# Patient Record
Sex: FEMALE | Race: BLACK OR AFRICAN AMERICAN | Employment: OTHER | URBAN - METROPOLITAN AREA
[De-identification: names, ages, dates, MRNs, and addresses within clinical notes are randomized per-mention and may not be internally consistent; named-entity substitution may affect disease eponyms.]

---

## 2017-01-02 DIAGNOSIS — R74.8 ELEVATED ALKALINE PHOSPHATASE LEVEL: ICD-10-CM

## 2017-01-02 DIAGNOSIS — R79.89 ABNORMAL LFTS (LIVER FUNCTION TESTS): ICD-10-CM

## 2017-01-02 DIAGNOSIS — E87.6 HYPOKALEMIA: ICD-10-CM

## 2017-01-02 DIAGNOSIS — R74.01 ELEVATED AST (SGOT): ICD-10-CM

## 2017-01-16 ENCOUNTER — OFFICE VISIT (OUTPATIENT)
Dept: SURGERY | Age: 72
End: 2017-01-16

## 2017-01-16 VITALS
TEMPERATURE: 98.1 F | HEIGHT: 63 IN | SYSTOLIC BLOOD PRESSURE: 147 MMHG | BODY MASS INDEX: 28 KG/M2 | WEIGHT: 158 LBS | DIASTOLIC BLOOD PRESSURE: 89 MMHG | RESPIRATION RATE: 20 BRPM | HEART RATE: 94 BPM

## 2017-01-16 DIAGNOSIS — Z12.11 ENCOUNTER FOR SCREENING COLONOSCOPY FOR NON-HIGH-RISK PATIENT: Primary | ICD-10-CM

## 2017-01-16 RX ORDER — POLYETHYLENE GLYCOL 3350, SODIUM CHLORIDE, POTASSIUM CHLORIDE, SODIUM BICARBONATE, AND SODIUM SULFATE 240; 5.84; 2.98; 6.72; 22.72 G/4L; G/4L; G/4L; G/4L; G/4L
4 POWDER, FOR SOLUTION ORAL
Qty: 4 L | Refills: 0 | Status: SHIPPED | OUTPATIENT
Start: 2017-01-16 | End: 2017-01-16

## 2017-01-16 NOTE — PATIENT INSTRUCTIONS
If you have any questions or concerns about today's appointment, the verbal and/or written instructions you were given for follow up care, please call our office at [phone].     [facility] - [address]    [phone] office  [phone]

## 2017-01-16 NOTE — PROGRESS NOTES
[place] [address]  [address] [address]  [phone]    Colonoscopy History and Physical    Patient: Ana Parkinson MRN: [id number]  SSN: [id number]    YOB: 1945  Age: 66 y.o. Sex: female      Subjective: Ana Parkinson is a 66 y.o. female who was referred by Dr. Nury Penaloza for colonoscopy for   Screening colonoscopy. Her last colon screening was 10 years ago and was negative per the patient. The patient denies any rectal bleeding, change in bowel habits, weight changes, nor any abdominal pain. She denies constipation, vomiting, diarrhea, bloody stools, mucousy stools, difficulty swallowing, loss of appetite, reflux and nausea  No family hx of colon cancer. Past Medical History   Diagnosis Date    Hypercholesterolemia     Hypertension     Osteoarthritis      Past Surgical History   Procedure Laterality Date    Hx hysterectomy      Hx cholecystectomy        Family History   Problem Relation Age of Onset    No Known Problems Mother     No Known Problems Father      Social History   Substance Use Topics    Smoking status: Never Smoker    Smokeless tobacco: Never Used    Alcohol use No      Prior to Admission medications    Medication Sig Start Date End Date Taking? Authorizing Provider   amLODIPine (NORVASC) 5 mg tablet TAKE ONE TABLET BY MOUTH DAILY 12/15/16  Yes Karen Gibson MD   lisinopril-hydroCHLOROthiazide (PRINZIDE, ZESTORETIC) 20-25 mg per tablet TAKE ONE TABLET BY MOUTH DAILY 12/15/16  Yes Karen Gibson MD   simvastatin (ZOCOR) 10 mg tablet Take 1 Tab by mouth nightly. 12/15/16  Yes Karen Gibson MD   ergocalciferol (ERGOCALCIFEROL) 50,000 unit capsule Take 1 Cap by mouth every seven (7) days. 9/12/16  Yes Karen Gibson MD   aspirin delayed-release 81 mg tablet Take 1 Tab by mouth daily.  9/12/16  Yes Karen Gibson MD   oxyCODONE IR (OXY-IR) 15 mg immediate release tablet Take 15 mg by mouth every four (4) hours as needed for Pain. Yes Historical Provider   tiZANidine (ZANAFLEX) 4 mg tablet Take 1 Tab by mouth two (2) times a day. 10/13/15  Yes Paz Abbott MD   potassium chloride (K-DUR, KLOR-CON) 10 mEq tablet Take 1 Tab by mouth daily. Recheck potassium levels after 90 days 10/13/15  Yes Paz Abbott MD   guaiFENesin ER [facility]) 600 mg ER tablet Take 600 mg by mouth two (2) times daily as needed for Congestion. Yes Historical Provider   azithromycin (ZITHROMAX) 250 mg tablet Take two tablets today then one tablet daily 12/20/16   Kalyan Moran MD   magnesium oxide (MAG-OX) 400 mg tablet Take 1 Tab by mouth daily. Magnesium level to be rechecked after 90 days 10/13/15   Paz Abbott MD        No Known Allergies    Review of Systems:  Review of systems performed with findings as noted. Objective:     Vitals:    01/16/17 0801   BP: 147/89   Pulse: 94   Resp: 20   Temp: 98.1 °F (36.7 °C)   TempSrc: Oral   Weight: 71.7 kg (158 lb)   Height: 5' 3\" (1.6 m)        Physical Exam:  GENERAL: alert, cooperative, no distress, appears stated age  LUNG: clear to auscultation bilaterally  HEART: regular rate and rhythm  ABDOMEN: soft, non-tender. Bowel sounds normal. No masses,  no organomegaly  NEUROLOGIC: negative  PSYCHIATRIC: non focal    Assessment:   Megan Mayo is a 66 y.o. female who presents for colonoscopy for   Screening colonoscopy    Plan:   1. I recommend proceeding with colonoscopy. The patient was in full agreement and was eager to proceed. 2. I discussed the details of the colonoscopy procedure. The risks of colonoscopy were discussed including colon injury/perforation, anesthesia issues, bleeding, and the possibility of incomplete examination. The patient was willing to accept these risks and proceed with the examination. All questions were answered to the patient's satisfaction.      3. The patient was provided with the instructions in preparation for the colonoscopy procedure including the bowel prep recommendations.                Signed By: Layton Clark NP     January 16, 2017

## 2017-01-16 NOTE — PROGRESS NOTES
Treasure Sheth is a 66 y.o. female who presents today with   Chief Complaint   Patient presents with    Colon Cancer Screening     consult                1. Have you been to the ER, urgent care clinic since your last visit? Hospitalized since your last visit? No    2. Have you seen or consulted any other health care providers outside of the [address] [place] [address] since your last visit? Include any pap smears or colon screening.  No

## 2017-01-16 NOTE — MR AVS SNAPSHOT
Visit Information Date & Time Provider Department Dept. Phone Encounter #  
 1/16/2017  8:00 AM Tashi More NP [facility] [phone] [id number] Your Appointments 3/20/2017 12:45 PM  
Follow Up with MD Rosendo Contreras [facility]) Appt Note: 3 mo f/u  
 [employer] [address]  
  
   
 [address] [address]  
  
    
 6/12/2017  9:00 AM  
Follow Up with MD Rosendo Contreras [facility]) Appt Note: 6 month f/u  
 [place] [address] [id number]  
[phone] Upcoming Health Maintenance Date Due DTaP/Tdap/Td series (1 - Tdap) 7/4/1965 FOBT Q 1 YEAR AGE 50-75 7/4/1994 ZOSTER VACCINE AGE 60> 7/4/2004 Pneumococcal 65+ Low/Medium Risk (1 of 2 - PCV13) 7/4/2009 MEDICARE YEARLY EXAM 7/4/2009 BREAST CANCER SCRN MAMMOGRAM 8/2/2018 GLAUCOMA SCREENING Q2Y 8/18/2018 Allergies as of 1/16/2017  Review Complete On: 1/16/2017 By: Rajan Kahn No Known Allergies Current Immunizations  Reviewed on 10/13/2015 Name Date Influenza High Dose Vaccine PF 12/12/2016 Influenza Vaccine 10/13/2015 12:16 PM  
  
 Not reviewed this visit Vitals BP Pulse Temp Resp Height(growth percentile) Weight(growth percentile) 147/89 (BP 1 Location: Right arm, BP Patient Position: At rest) 94 98.1 °F (36.7 °C) (Oral) 20 5' 3\" (1.6 m) 158 lb (71.7 kg) BMI OB Status Smoking Status 27.99 kg/m2 Postmenopausal Never Smoker BMI and BSA Data Body Mass Index Body Surface Area  
 27.99 kg/m 2 1.79 m 2 Preferred Pharmacy Pharmacy Name Phone [facility] [phone] Your Updated Medication List  
  
   
 This list is accurate as of: 1/16/17  8:30 AM.  Always use your most recent med list. amLODIPine 5 mg tablet Commonly known as:  Tru Childs TAKE ONE TABLET BY MOUTH DAILY  
  
 aspirin delayed-release 81 mg tablet Take 1 Tab by mouth daily. azithromycin 250 mg tablet Commonly known as:  Tanika Pickmagaly Take two tablets today then one tablet daily  
  
 ergocalciferol 50,000 unit capsule Commonly known as:  ERGOCALCIFEROL Take 1 Cap by mouth every seven (7) days. lisinopril-hydroCHLOROthiazide 20-25 mg per tablet Commonly known as:  PRINZIDE, ZESTORETIC  
TAKE ONE TABLET BY MOUTH DAILY  
  
 magnesium oxide 400 mg tablet Commonly known as:  MAG-OX Take 1 Tab by mouth daily. Magnesium level to be rechecked after 90 days MUCINEX 600 mg ER tablet Generic drug:  guaiFENesin ER Take 600 mg by mouth two (2) times daily as needed for Congestion. oxyCODONE IR 15 mg immediate release tablet Commonly known as:  OXY-IR Take 15 mg by mouth every four (4) hours as needed for Pain.  
  
 potassium chloride 10 mEq tablet Commonly known as:  K-DUR, KLOR-CON Take 1 Tab by mouth daily. Recheck potassium levels after 90 days  
  
 simvastatin 10 mg tablet Commonly known as:  ZOCOR Take 1 Tab by mouth nightly. tiZANidine 4 mg tablet Commonly known as:  Inez Crape Take 1 Tab by mouth two (2) times a day. Patient Instructions If you have any questions or concerns about today's appointment, the verbal and/or written instructions you were given for follow up care, please call our office at [phone]. [employer] [facility] - [address], [address] 
 
[phone] office [phone] Introducing [facility]! [employer] introduces [employer] patient portal. Now you can access parts of your medical record, email your doctor's office, and request medication refills online. 1. In your internet browser, go to [email or web address] 2. Click on the First Time User? Click Here link in the Sign In box. You will see the New Member Sign Up page. 3. Enter your [employer] Access Code exactly as it appears below. You will not need to use this code after youve completed the sign-up process. If you do not sign up before the expiration date, you must request a new code. · [employer] Access Code: [email or web address] Expires: 3/12/2017 11:20 AM 
 
4. Enter the last four digits of your Social Security Number (xxxx) and Date of Birth (mm/dd/yyyy) as indicated and click Submit. You will be taken to the next sign-up page. 5. Create a Anam Mobilet ID. This will be your [employer] login ID and cannot be changed, so think of one that is secure and easy to remember. 6. Create a [employer] password. You can change your password at any time. 7. Enter your Password Reset Question and Answer. This can be used at a later time if you forget your password. 8. Enter your e-mail address. You will receive e-mail notification when new information is available in [address] 9. Click Sign Up. You can now view and download portions of your medical record. 10. Click the Download Summary menu link to download a portable copy of your medical information. If you have questions, please visit the Frequently Asked Questions section of the [employer] website. Remember, [employer] is NOT to be used for urgent needs. For medical emergencies, dial 911. Now available from your iPhone and Android! Please provide this summary of care documentation to your next provider. Your primary care clinician is listed as Tyrese Holden. If you have any questions after today's visit, please call [phone].

## 2017-01-17 ENCOUNTER — TELEPHONE (OUTPATIENT)
Dept: SURGERY | Age: 72
End: 2017-01-17

## 2017-01-17 NOTE — TELEPHONE ENCOUNTER
SPOKE TO PT TO SCHEDULE HER COLONOSCOPY. SCHEDULED HER FOR 2/2/17 AT 3PM (ARRIVAL TIME 2PM.) PT STATED SHE HAD HER PREP SHEET AND SHE WOULD CALL BACK WITH QUESTIONS.

## 2017-01-19 ENCOUNTER — HOSPITAL ENCOUNTER (OUTPATIENT)
Dept: LAB | Age: 72
Discharge: HOME | End: 2017-01-19
Payer: MEDICARE

## 2017-01-19 DIAGNOSIS — R74.01 ELEVATED AST (SGOT): ICD-10-CM

## 2017-01-19 DIAGNOSIS — E87.6 HYPOKALEMIA: ICD-10-CM

## 2017-01-19 DIAGNOSIS — R79.89 ABNORMAL LFTS (LIVER FUNCTION TESTS): ICD-10-CM

## 2017-01-19 DIAGNOSIS — R74.8 ELEVATED ALKALINE PHOSPHATASE LEVEL: ICD-10-CM

## 2017-01-19 LAB
ALBUMIN SERPL BCP-MCNC: 3.9 G/DL (ref 3.4–5)
ALBUMIN/GLOB SERPL: 1.1 {RATIO} (ref 0.8–1.7)
ALP SERPL-CCNC: 88 U/L (ref 45–117)
ALT SERPL-CCNC: 22 U/L (ref 13–56)
AST SERPL W P-5'-P-CCNC: 33 U/L (ref 15–37)
BILIRUB DIRECT SERPL-MCNC: <0.1 MG/DL (ref 0–0.2)
BILIRUB SERPL-MCNC: 0.4 MG/DL (ref 0.2–1)
GLOBULIN SER CALC-MCNC: 3.6 G/DL (ref 2–4)
POTASSIUM SERPL-SCNC: 4.2 MMOL/L (ref 3.5–5.5)
PROT SERPL-MCNC: 7.5 G/DL (ref 6.4–8.2)

## 2017-01-19 PROCEDURE — 84132 ASSAY OF SERUM POTASSIUM: CPT | Performed by: CLINIC/CENTER

## 2017-01-19 PROCEDURE — 84080 ASSAY ALKALINE PHOSPHATASES: CPT | Performed by: CLINIC/CENTER

## 2017-01-19 PROCEDURE — 80074 ACUTE HEPATITIS PANEL: CPT | Performed by: CLINIC/CENTER

## 2017-01-19 PROCEDURE — 80076 HEPATIC FUNCTION PANEL: CPT | Performed by: CLINIC/CENTER

## 2017-01-19 PROCEDURE — 36415 COLL VENOUS BLD VENIPUNCTURE: CPT | Performed by: CLINIC/CENTER

## 2017-01-23 LAB
ALP BONE CFR SERPL: 38 % (ref 14–68)
ALP INTEST CFR SERPL: 2 % (ref 0–18)
ALP LIVER CFR SERPL: 60 % (ref 18–85)
ALP SERPL-CCNC: 80 IU/L (ref 39–117)

## 2017-01-24 LAB
HAV IGM SERPL QL IA: NEGATIVE
HBV CORE IGM SER QL: NEGATIVE
HBV SURFACE AG SER QL: <0.1 INDEX
HBV SURFACE AG SER QL: NEGATIVE
HCV AB SER IA-ACNC: 0.05 INDEX
HCV AB SERPL QL IA: NEGATIVE
HCV COMMENT,HCGAC: NORMAL
SP1: NORMAL
SP2: NORMAL
SP3: NORMAL

## 2017-02-02 ENCOUNTER — SURGERY (OUTPATIENT)
Age: 72
End: 2017-02-02

## 2017-02-02 ENCOUNTER — HOSPITAL ENCOUNTER (OUTPATIENT)
Age: 72
Setting detail: OUTPATIENT SURGERY
Discharge: HOME | End: 2017-02-02
Attending: COLON & RECTAL SURGERY | Admitting: COLON & RECTAL SURGERY
Payer: MEDICARE

## 2017-02-02 VITALS
WEIGHT: 151 LBS | HEART RATE: 78 BPM | TEMPERATURE: 97.7 F | SYSTOLIC BLOOD PRESSURE: 118 MMHG | BODY MASS INDEX: 26.75 KG/M2 | HEIGHT: 63 IN | OXYGEN SATURATION: 95 % | DIASTOLIC BLOOD PRESSURE: 74 MMHG | RESPIRATION RATE: 14 BRPM

## 2017-02-02 PROCEDURE — 76040000007: Performed by: COLON & RECTAL SURGERY

## 2017-02-02 PROCEDURE — 77030031670 HC DEV INFL ENDOTEK BIG60 MRTM -B: Performed by: COLON & RECTAL SURGERY

## 2017-02-02 PROCEDURE — 74011250636 HC RX REV CODE- 250/636: Performed by: NURSE PRACTITIONER

## 2017-02-02 PROCEDURE — 74011250636 HC RX REV CODE- 250/636

## 2017-02-02 RX ORDER — SODIUM CHLORIDE 0.9 % (FLUSH) 0.9 %
5-10 SYRINGE (ML) INJECTION EVERY 8 HOURS
Status: DISCONTINUED | OUTPATIENT
Start: 2017-02-02 | End: 2017-02-02 | Stop reason: HOSPADM

## 2017-02-02 RX ORDER — MIDAZOLAM HYDROCHLORIDE 1 MG/ML
.25-5 INJECTION, SOLUTION INTRAMUSCULAR; INTRAVENOUS
Status: DISCONTINUED | OUTPATIENT
Start: 2017-02-02 | End: 2017-02-02 | Stop reason: HOSPADM

## 2017-02-02 RX ORDER — DEXTROMETHORPHAN/PSEUDOEPHED 2.5-7.5/.8
1.2 DROPS ORAL
Status: DISCONTINUED | OUTPATIENT
Start: 2017-02-02 | End: 2017-02-02 | Stop reason: HOSPADM

## 2017-02-02 RX ORDER — FLUMAZENIL 0.1 MG/ML
0.2 INJECTION INTRAVENOUS
Status: DISCONTINUED | OUTPATIENT
Start: 2017-02-02 | End: 2017-02-02 | Stop reason: HOSPADM

## 2017-02-02 RX ORDER — EPINEPHRINE 0.1 MG/ML
1 INJECTION INTRACARDIAC; INTRAVENOUS
Status: DISCONTINUED | OUTPATIENT
Start: 2017-02-02 | End: 2017-02-02 | Stop reason: HOSPADM

## 2017-02-02 RX ORDER — ATROPINE SULFATE 0.1 MG/ML
0.5 INJECTION INTRAVENOUS
Status: DISCONTINUED | OUTPATIENT
Start: 2017-02-02 | End: 2017-02-02 | Stop reason: HOSPADM

## 2017-02-02 RX ORDER — MIDAZOLAM HYDROCHLORIDE 1 MG/ML
INJECTION, SOLUTION INTRAMUSCULAR; INTRAVENOUS
Status: DISCONTINUED
Start: 2017-02-02 | End: 2017-02-02 | Stop reason: HOSPADM

## 2017-02-02 RX ORDER — NALOXONE HYDROCHLORIDE 0.4 MG/ML
0.4 INJECTION, SOLUTION INTRAMUSCULAR; INTRAVENOUS; SUBCUTANEOUS
Status: DISCONTINUED | OUTPATIENT
Start: 2017-02-02 | End: 2017-02-02 | Stop reason: HOSPADM

## 2017-02-02 RX ORDER — SODIUM CHLORIDE 9 MG/ML
25 INJECTION, SOLUTION INTRAVENOUS CONTINUOUS
Status: DISCONTINUED | OUTPATIENT
Start: 2017-02-02 | End: 2017-02-02 | Stop reason: HOSPADM

## 2017-02-02 RX ORDER — SODIUM CHLORIDE 0.9 % (FLUSH) 0.9 %
5-10 SYRINGE (ML) INJECTION AS NEEDED
Status: DISCONTINUED | OUTPATIENT
Start: 2017-02-02 | End: 2017-02-02 | Stop reason: HOSPADM

## 2017-02-02 RX ADMIN — MIDAZOLAM HYDROCHLORIDE 2 MG: 1 INJECTION, SOLUTION INTRAMUSCULAR; INTRAVENOUS at 16:29

## 2017-02-02 RX ADMIN — MIDAZOLAM HYDROCHLORIDE 1 MG: 1 INJECTION, SOLUTION INTRAMUSCULAR; INTRAVENOUS at 16:31

## 2017-02-02 RX ADMIN — SODIUM CHLORIDE 25 ML/HR: 900 INJECTION, SOLUTION INTRAVENOUS at 16:20

## 2017-02-02 NOTE — H&P (VIEW-ONLY)
[place] [address]  [address] [address]  [phone]    Colonoscopy History and Physical    Patient: Angela Singh MRN: [id number]  SSN: [id number]    YOB: 1945  Age: 66 y.o. Sex: female      Subjective: Angela Singh is a 66 y.o. female who was referred by Dr. Kirsten Reddy for colonoscopy for   Screening colonoscopy. Her last colon screening was 10 years ago and was negative per the patient. The patient denies any rectal bleeding, change in bowel habits, weight changes, nor any abdominal pain. She denies constipation, vomiting, diarrhea, bloody stools, mucousy stools, difficulty swallowing, loss of appetite, reflux and nausea  No family hx of colon cancer. Past Medical History   Diagnosis Date    Hypercholesterolemia     Hypertension     Osteoarthritis      Past Surgical History   Procedure Laterality Date    Hx hysterectomy      Hx cholecystectomy        Family History   Problem Relation Age of Onset    No Known Problems Mother     No Known Problems Father      Social History   Substance Use Topics    Smoking status: Never Smoker    Smokeless tobacco: Never Used    Alcohol use No      Prior to Admission medications    Medication Sig Start Date End Date Taking? Authorizing Provider   amLODIPine (NORVASC) 5 mg tablet TAKE ONE TABLET BY MOUTH DAILY 12/15/16  Yes Biju Martinez MD   lisinopril-hydroCHLOROthiazide (PRINZIDE, ZESTORETIC) 20-25 mg per tablet TAKE ONE TABLET BY MOUTH DAILY 12/15/16  Yes Biju Martinez MD   simvastatin (ZOCOR) 10 mg tablet Take 1 Tab by mouth nightly. 12/15/16  Yes Biju Martinez MD   ergocalciferol (ERGOCALCIFEROL) 50,000 unit capsule Take 1 Cap by mouth every seven (7) days. 9/12/16  Yes Biju Martinez MD   aspirin delayed-release 81 mg tablet Take 1 Tab by mouth daily.  9/12/16  Yes Biju Martinez MD   oxyCODONE IR (OXY-IR) 15 mg immediate release tablet Take 15 mg by mouth every four (4) hours as needed for Pain. Yes Historical Provider   tiZANidine (ZANAFLEX) 4 mg tablet Take 1 Tab by mouth two (2) times a day. 10/13/15  Yes Sreekanth Seals MD   potassium chloride (K-DUR, KLOR-CON) 10 mEq tablet Take 1 Tab by mouth daily. Recheck potassium levels after 90 days 10/13/15  Yes Sreekanth Seals MD   guaiFENesin ER [facility]) 600 mg ER tablet Take 600 mg by mouth two (2) times daily as needed for Congestion. Yes Historical Provider   azithromycin (ZITHROMAX) 250 mg tablet Take two tablets today then one tablet daily 12/20/16   Steve Juraez MD   magnesium oxide (MAG-OX) 400 mg tablet Take 1 Tab by mouth daily. Magnesium level to be rechecked after 90 days 10/13/15   rSeekanth Seals MD        No Known Allergies    Review of Systems:  Review of systems performed with findings as noted. Objective:     Vitals:    01/16/17 0801   BP: 147/89   Pulse: 94   Resp: 20   Temp: 98.1 °F (36.7 °C)   TempSrc: Oral   Weight: 71.7 kg (158 lb)   Height: 5' 3\" (1.6 m)        Physical Exam:  GENERAL: alert, cooperative, no distress, appears stated age  LUNG: clear to auscultation bilaterally  HEART: regular rate and rhythm  ABDOMEN: soft, non-tender. Bowel sounds normal. No masses,  no organomegaly  NEUROLOGIC: negative  PSYCHIATRIC: non focal    Assessment:   Shila Cevallos is a 66 y.o. female who presents for colonoscopy for   Screening colonoscopy    Plan:   1. I recommend proceeding with colonoscopy. The patient was in full agreement and was eager to proceed. 2. I discussed the details of the colonoscopy procedure. The risks of colonoscopy were discussed including colon injury/perforation, anesthesia issues, bleeding, and the possibility of incomplete examination. The patient was willing to accept these risks and proceed with the examination. All questions were answered to the patient's satisfaction.      3. The patient was provided with the instructions in preparation for the colonoscopy procedure including the bowel prep recommendations.                Signed By: Kerline Cardoza NP     January 16, 2017

## 2017-02-02 NOTE — PROCEDURES
Colonoscopy Procedure Note      Jason Martinez  [birth year]  [id number]                Date of Procedure: 2/2/2017    Indications:    Screening colonoscopy     Preoperative diagnosis: colon cancer screening      Postoperative diagnosis: normal colonoscopy exam    Title of Procedure:  Colonoscopy, screening    :  Keith Martinez MD    Assistant(s): Endoscopy Technician-1: Claudene Rho  Endoscopy RN-1: Manuela Gil RN    Referring Source:  Tyrese Holden MD    Sedation:  Demerol 50 mg IV,  Versed 3 mg IV      ASA Class: ASA 2 - Mild systemic disease       Procedure Details:    Prior to the procedure, a history and physical were performed. The patients medications, allergies and sensitivities were reviewed and all questions were answered. After informed consent was obtained for the procedure, with all risks and benefits of procedure explained. The patient was taken to the endoscopy suite and placed in the left lateral decubitus position. Patient identification and proposed procedure were verified prior to the procedure by the nurse and I. Following the  satisfactory administration of sedation,  the anus was inspected and appeared within normal limits with few benign sentinel tags. Digital rectal examination revealed Normal sphincter tone and squeeze pressure. Palpation revealed No Masses. Next the Olympus video colonoscope was introduced through the anus and advanced to cecum, which was identified by the ileocecal valve and appendiceal orifice, terminal ileum. The quality of preparation was good. The terminal ileum was visualized. The colonoscope was then slowly withdrawn and the mucosa carefully examined for any abnormalities. Cecal withdrawl time was greater than six minutes. The patient tolerated the procedure well.       Findings:   Rectum: normal  Sigmoid: normal  Descending Colon: normal  Transverse Colon: normal  Ascending Colon: normal  Cecum: normal  Terminal Ileum: normal    Interventions:  none    Specimen Removed: * No specimens in log *     Complications: None. EBL:  None. Impression:  normal colonoscopy exam      Recommendations: -Repeat colonoscopy in 10 years   Resume normal medication(s). Discharge Disposition:  Home in the company of a  when able to ambulate.         Constantine Couch MD, FACS, FASCRS  Colon and Rectal Surgery  [facility]  Office [phone]  Fax     [phone]  2/2/2017  4:59 PM       [facility]  [address] [place] [address]

## 2017-02-02 NOTE — IP AVS SNAPSHOT
[address] [place] [address] [address] 
[phone] Patient: Christina Mendez MRN: [id number] NKZ:[id number] You are allergic to the following No active allergies Recent Documentation Height Weight BMI OB Status Smoking Status 1.6 m 68.5 kg 26.75 kg/m2 Postmenopausal Never Smoker Emergency Contacts Name Discharge Info Relation Home Work Mobile Shantelle CAREGIVER [3] Daughter [21] [phone] Delroy Marino N/A  AT THIS TIME [6] Son [22] [phone] About your hospitalization You were admitted on:  February 2, 2017 You last received care in the:  [facility] PHASE 2 RECOVERY You were discharged on:  February 2, 2017 Unit phone number:  [phone] Why you were hospitalized Your primary diagnosis was:  Not on File Providers Seen During Your Hospitalizations Provider Role Specialty Primary office phone Izzy Cai MD Attending Provider Colon and Rectal Surgery [phone] Your Primary Care Physician (PCP) Primary Care Physician Office Phone Office Fax 6586 [facility], [address] [place] [address] [phone] Follow-up Information Follow up With Details Comments Contact Info Jorge Kyle MD   [facility] [address] [facility] [phone] zIzy Cai MD  Please contact Dr Maritza Christie for any questions [id number] [facility] [address] [facility] [phone] Current Discharge Medication List  
  
CONTINUE these medications which have NOT CHANGED Dose & Instructions Dispensing Information Comments Morning Noon Evening Bedtime  
 amLODIPine 5 mg tablet Commonly known as:  Martha Pace Your next dose is: Today, Tomorrow Other:  _________ TAKE ONE TABLET BY MOUTH DAILY Quantity:  30 Tab Refills:  5 This prescription was filled today(12/7/2016). Any refills authorized will be placed on file. aspirin delayed-release 81 mg tablet Your next dose is: Today, Tomorrow Other:  _________ Dose:  81 mg Take 1 Tab by mouth daily. Quantity:  30 Tab Refills:  1  
     
   
   
   
  
 ergocalciferol 50,000 unit capsule Commonly known as:  ERGOCALCIFEROL Your next dose is: Today, Tomorrow Other:  _________ Dose:  34440 Units Take 1 Cap by mouth every seven (7) days. Quantity:  4 Cap Refills:  1  
     
   
   
   
  
 lisinopril-hydroCHLOROthiazide 20-25 mg per tablet Commonly known as:  Cl Blanco Your next dose is: Today, Tomorrow Other:  _________ TAKE ONE TABLET BY MOUTH DAILY Quantity:  30 Tab Refills:  5 This prescription was filled today(12/7/2016). Any refills authorized will be placed on file.  
    
   
   
   
  
 magnesium oxide 400 mg tablet Commonly known as:  MAG-OX Your next dose is: Today, Tomorrow Other:  _________ Dose:  400 mg Take 1 Tab by mouth daily. Magnesium level to be rechecked after 90 days Quantity:  90 Tab Refills:  0 MUCINEX 600 mg ER tablet Generic drug:  guaiFENesin ER Your next dose is: Today, Tomorrow Other:  _________ Dose:  600 mg Take 600 mg by mouth two (2) times daily as needed for Congestion. Refills:  0  
     
   
   
   
  
 oxyCODONE IR 15 mg immediate release tablet Commonly known as:  OXY-IR Your next dose is: Today, Tomorrow Other:  _________ Dose:  15 mg Take 15 mg by mouth every four (4) hours as needed for Pain. Refills:  0  
     
   
   
   
  
 potassium chloride 10 mEq tablet Commonly known as:  K-DUR, KLOR-CON Your next dose is: Today, Tomorrow Other:  _________ Dose:  10 mEq Take 1 Tab by mouth daily. Recheck potassium levels after 90 days Quantity:  90 Tab Refills:  0  
     
   
   
   
  
 simvastatin 10 mg tablet Commonly known as:  ZOCOR Your next dose is: Today, Tomorrow Other:  _________ Dose:  10 mg Take 1 Tab by mouth nightly. Quantity:  30 Tab Refills:  5  
     
   
   
   
  
 tiZANidine 4 mg tablet Commonly known as:  Mesha Medina Your next dose is: Today, Tomorrow Other:  _________ Dose:  4 mg Take 1 Tab by mouth two (2) times a day. Quantity:  60 Tab Refills:  1 Discharge Instructions Colonoscopy Discharge Instructions Kwan Chase [id number] 
1945 COLONOSCOPY FINDINGS: 
Your colonoscopy showed:       Normal examination. FOLLOW UP RECOMMENDATIONS: 
 Dr. Dang Agarwal recommends your next colonoscopy in 10 years. DISCOMFORT: 
If you have redness at your IV site- apply warm compress to area; if redness or soreness persist- contact your physician There may be a slight amount of blood passed from the rectum, more than a teaspoon of bright red blood is not expected - contact your physician Gaseous discomfort is common- walking, belching will help relieve any gas pains. If discomfort persist- contact your physician DIET: 
 Regular diet. ACTIVITY: 
You may resume your normal daily activities, however, it is recommended that you spend the remainder of the day resting - avoiding any strenuous activities. You may not operate a vehicle for 24 hours You may not engage in an occupation involving machinery or appliances for rest of today You may not drink alcoholic beverages for at least 24 hours Avoid making any critical decisions for at least 24 hour CALL M.D. ANY SIGN OF: Increasing pain, nausea, vomiting Abdominal distension (swelling) New increased bleeding Fever or chills Pain in chest area or shortness of breath Angelica Thorne MD, FACS, FASCRS Colon and Rectal Surgery [facility] Office [phone] Fax     [phone] DISCHARGE SUMMARY from Nurse The following personal items are in your possession at time of discharge: 
 
Dental Appliances: None Visual Aid: Glasses PATIENT INSTRUCTIONS: 
 
 
F-face looks uneven A-arms unable to move or move unevenly S-speech slurred or non-existent T-time-call 911 as soon as signs and symptoms begin-DO NOT go Back to bed or wait to see if you get better-TIME IS BRAIN. Warning Signs of HEART ATTACK Call 911 if you have these symptoms: 
? Chest discomfort. Most heart attacks involve discomfort in the center of the chest that lasts more than a few minutes, or that goes away and comes back. It can feel like uncomfortable pressure, squeezing, fullness, or pain. ? Discomfort in other areas of the upper body. Symptoms can include pain or discomfort in one or both arms, the back, neck, jaw, or stomach. ? Shortness of breath with or without chest discomfort. ? Other signs may include breaking out in a cold sweat, nausea, or lightheadedness. Don't wait more than five minutes to call [address]! Fast action can save your life. Calling 911 is almost always the fastest way to get lifesaving treatment. Emergency Medical Services staff can begin treatment when they arrive  up to an hour sooner than if someone gets to the hospital by car. The discharge information has been reviewed with the patient. The patient verbalized understanding. Discharge medications reviewed with the patient and appropriate educational materials and side effects teaching were provided. Patient armband removed and given to patient to take home.   Patient was informed of the privacy risks if armband lost or stolen Discharge Orders None Introducing [facility]! [employer] introduces [employer] patient portal. Now you can access parts of your medical record, email your doctor's office, and request medication refills online. 1. In your internet browser, go to [email or web address] 2. Click on the First Time User? Click Here link in the Sign In box. You will see the New Member Sign Up page. 3. Enter your Metabar Access Code exactly as it appears below. You will not need to use this code after youve completed the sign-up process. If you do not sign up before the expiration date, you must request a new code. · Metabar Access Code: [email or web address] Expires: 3/12/2017 11:20 AM 
 
4. Enter the last four digits of your Social Security Number (xxxx) and Date of Birth (mm/dd/yyyy) as indicated and click Submit. You will be taken to the next sign-up page. 5. Create a Metabar ID. This will be your Metabar login ID and cannot be changed, so think of one that is secure and easy to remember. 6. Create a Metabar password. You can change your password at any time. 7. Enter your Password Reset Question and Answer. This can be used at a later time if you forget your password. 8. Enter your e-mail address. You will receive e-mail notification when new information is available in [address] 9. Click Sign Up. You can now view and download portions of your medical record. 10. Click the Download Summary menu link to download a portable copy of your medical information. If you have questions, please visit the Frequently Asked Questions section of the Metabar website. Remember, Metabar is NOT to be used for urgent needs. For medical emergencies, dial 911. Now available from your iPhone and Android! General Information Please provide this summary of care documentation to your next provider. Patient Signature:  ____________________________________________________________ Date:  ____________________________________________________________  
  
Suzon Mems Provider Signature:  ____________________________________________________________ Date:  ____________________________________________________________

## 2017-02-02 NOTE — DISCHARGE INSTRUCTIONS
Colonoscopy Discharge Instructions       Elisa Major  [id number]  1945      COLONOSCOPY FINDINGS:  Your colonoscopy showed:       Normal examination. FOLLOW UP RECOMMENDATIONS:   Dr. Kaylin Mera recommends your next colonoscopy in 10 years. DISCOMFORT:  If you have redness at your IV site- apply warm compress to area; if redness or soreness persist- contact your physician  There may be a slight amount of blood passed from the rectum, more than a teaspoon of bright red blood is not expected - contact your physician  Gaseous discomfort is common- walking, belching will help relieve any gas pains. If discomfort persist- contact your physician    DIET:   Regular diet. ACTIVITY:  You may resume your normal daily activities, however, it is recommended that you spend the remainder of the day resting - avoiding any strenuous activities. You may not operate a vehicle for 24 hours  You may not engage in an occupation involving machinery or appliances for rest of today  You may not drink alcoholic beverages for at least 24 hours  Avoid making any critical decisions for at least 24 hour    CALL M.D.   ANY SIGN OF:   Increasing pain, nausea, vomiting  Abdominal distension (swelling)  New increased bleeding   Fever or chills  Pain in chest area or shortness of breath      Saurabh Rasheed MD, FACS, FASCRS  Colon and Rectal Surgery  [facility]  Office [phone]  Fax     [phone] SUMMARY from Nurse    The following personal items are in your possession at time of discharge:    Dental Appliances: None  Visual Aid: Glasses                            PATIENT INSTRUCTIONS:    After general anesthesia or intravenous sedation, for 24 hours or while taking prescription Narcotics:  · Limit your activities  · Do not drive and operate hazardous machinery  · Do not make important personal or business decisions  · Do  not drink alcoholic beverages  · If you have not urinated within 8 hours after discharge, please contact your surgeon on call. Report the following to your surgeon:  · Excessive pain, swelling, redness or odor of or around the surgical area  · Temperature over 100.5  · Nausea and vomiting lasting longer than 4 hours or if unable to take medications  · Any signs of decreased circulation or nerve impairment to extremity: change in color, persistent  numbness, tingling, coldness or increase pain  · Any questions        What to do at Home:  Recommended activity: Activity as tolerated and no driving for today. *  Please give a list of your current medications to your Primary Care Provider. *  Please update this list whenever your medications are discontinued, doses are      changed, or new medications (including over-the-counter products) are added. *  Please carry medication information at all times in case of emergency situations. These are general instructions for a healthy lifestyle:    No smoking/ No tobacco products/ Avoid exposure to second hand smoke    Surgeon General's Warning:  Quitting smoking now greatly reduces serious risk to your health. Obesity, smoking, and sedentary lifestyle greatly increases your risk for illness    A healthy diet, regular physical exercise & weight monitoring are important for maintaining a healthy lifestyle    You may be retaining fluid if you have a history of heart failure or if you experience any of the following symptoms:  Weight gain of 3 pounds or more overnight or 5 pounds in a week, increased swelling in our hands or feet or shortness of breath while lying flat in bed. Please call your doctor as soon as you notice any of these symptoms; do not wait until your next office visit.     Recognize signs and symptoms of STROKE:    F-face looks uneven    A-arms unable to move or move unevenly    S-speech slurred or non-existent    T-time-call 911 as soon as signs and symptoms begin-DO NOT go       Back to bed or wait to see if you get better-TIME IS BRAIN. Warning Signs of HEART ATTACK     Call 911 if you have these symptoms:   Chest discomfort. Most heart attacks involve discomfort in the center of the chest that lasts more than a few minutes, or that goes away and comes back. It can feel like uncomfortable pressure, squeezing, fullness, or pain.  Discomfort in other areas of the upper body. Symptoms can include pain or discomfort in one or both arms, the back, neck, jaw, or stomach.  Shortness of breath with or without chest discomfort.  Other signs may include breaking out in a cold sweat, nausea, or lightheadedness. Don't wait more than five minutes to call 911 - MINUTES MATTER! Fast action can save your life. Calling 911 is almost always the fastest way to get lifesaving treatment. Emergency Medical Services staff can begin treatment when they arrive -- up to an hour sooner than if someone gets to the hospital by car. The discharge information has been reviewed with the patient. The patient verbalized understanding. Discharge medications reviewed with the patient and appropriate educational materials and side effects teaching were provided. Patient armband removed and given to patient to take home.   Patient was informed of the privacy risks if armband lost or stolen

## 2017-02-02 NOTE — INTERVAL H&P NOTE
H&P Update: Adeline Salmon was seen and examined. History and physical has been reviewed. The patient has been examined.  There have been no significant clinical changes since the completion of the originally dated History and Physical.    Signed By: Alejo Rossi MD     February 2, 2017 2:30 PM

## 2017-03-20 ENCOUNTER — OFFICE VISIT (OUTPATIENT)
Dept: FAMILY MEDICINE CLINIC | Age: 72
End: 2017-03-20

## 2017-03-20 VITALS
HEIGHT: 63 IN | TEMPERATURE: 98.1 F | DIASTOLIC BLOOD PRESSURE: 86 MMHG | RESPIRATION RATE: 20 BRPM | BODY MASS INDEX: 27.64 KG/M2 | HEART RATE: 94 BPM | SYSTOLIC BLOOD PRESSURE: 138 MMHG | WEIGHT: 156 LBS | OXYGEN SATURATION: 94 %

## 2017-03-20 DIAGNOSIS — I10 ESSENTIAL HYPERTENSION: ICD-10-CM

## 2017-03-20 DIAGNOSIS — R06.02 SOB (SHORTNESS OF BREATH): ICD-10-CM

## 2017-03-20 DIAGNOSIS — M54.41 CHRONIC BILATERAL LOW BACK PAIN WITH BILATERAL SCIATICA: ICD-10-CM

## 2017-03-20 DIAGNOSIS — G89.29 CHRONIC BILATERAL LOW BACK PAIN WITH BILATERAL SCIATICA: ICD-10-CM

## 2017-03-20 DIAGNOSIS — E78.2 MIXED HYPERLIPIDEMIA: ICD-10-CM

## 2017-03-20 DIAGNOSIS — M54.42 CHRONIC BILATERAL LOW BACK PAIN WITH BILATERAL SCIATICA: ICD-10-CM

## 2017-03-20 DIAGNOSIS — E55.9 VITAMIN D DEFICIENCY: ICD-10-CM

## 2017-03-20 RX ORDER — CHOLECALCIFEROL (VITAMIN D3) 125 MCG
CAPSULE ORAL DAILY
COMMUNITY

## 2017-03-20 RX ORDER — ALBUTEROL SULFATE 90 UG/1
2 AEROSOL, METERED RESPIRATORY (INHALATION)
Qty: 1 INHALER | Refills: 2 | Status: SHIPPED | OUTPATIENT
Start: 2017-03-20 | End: 2020-04-07 | Stop reason: SDUPTHER

## 2017-03-20 NOTE — PROGRESS NOTES
King Lr is a 66 y.o. female and presents with Follow-up (hypertension and hyperlipidemia)       Subjective:  Mrs. Kandis Loera is here today to follow up on her chronic conditions. 1. Chronic Back Pain: Spinal Stenosis with radiculopathy, lumbar region  - Per previous notes, patient has \"long-standing and painful history of spinal stenosis\".    - Followed by Dr. Shade He for pain management and has received multiple epidural injections. She controls her pain with Oxycodone IR 15mg tablets - 1 tablet q4h prn. Tizanidine 4mg BID taken for muscle spasms in her back. - Consultation with Neurosurgery took place on 10/13/16 with Dr. Alison Gordon. Per their note, Dr. Kana Acevedo does not feel that narcotic therapy is going to be useful long term and that \"surgical intervention is indicated based on the appearance of the studies, her functional limitations as well as failure to derive significant long-lasting benefit from other pain management procedures and medications. \" Patient would require a multilevel lumbar laminectomy, instrumented spinal fusion and deformity correction. Patient was told that, due to her complex and challenging spinal problem, her surgery would be very extensive with a lengthy period of recovery. She was also informed in detail about the multiple serious risks involved with the surgery and complication rates approaching 50-75%. Dr. Kana Acevedo gave her the option of seeking a second opinion  - Mrs. Kandis Loera has decided to hold off on pursuing any surgical intervention at this time and will continue to see Dr. Mathew Goodpasture for pain management. Patient would also like to go back to therapy, although she would prefer aquatic therapy because regular PT leaves her feeling sore. We gave her a referral to have therapy in [facility] where some of her family lives, but she was unable to find a place there.  She would like to try aquatic therapy in [facility].   - Patient does not have any difficulty walking despite her pain. She denies LE weakness.      2. HTN  - /86 today. Patient is on Norvasc and Prinzide. No HA, dizziness, lightheadedness      3. Vitamin D Deficiency  - Last level was 31.8. Patient is on a daily supplement.       4. Hyperlipidemia  - Taking Zocor 10 mg every evening. Last FLP in 12/2016 LDL 49, HDL 72. Will repeat prior to her next appointment since Zocor dose was reduced. 5. Mild SOB  - New problem. Patient reports feeling a bit more winded when exerting herself and that she thinks she needs something to \"open her up. \" Patient also reports a history of mild asthma and  was given an inhaler (can't remember what it was called) which helped when she experienced similar symptoms in the past. Patient denies any symptoms at rest. Denies cough, chest pain, nausea or diaphoresis. Denies LE edema.           Health Maintenance  - DEXA Scan 3/20/15, normal  - Colonoscopy - Done with Dr. Adelaida Sosa 2/2/17. Normal. Repeat in 10 years. - Hx of Hysterectomy (she thinks partial) - Gynecologist was Dr. Kolby Vasquez. She was told she did not need anymore pap smears.   - Mammogram on 8/2/16 - No evidence for malignancy. Suggest routine annual follow up. - OV with optometrist on 8/18/16 - bilateral nuclear sclerotic cataracts. Surgery being deferred until functional vision worsens. Return on 8/17/17. ROS:  Pt denies: Wt loss, Fever/Chills, HA, Visual changes, Fatigue, Chest pain, Abd pain, N/V/D/C, Blood in stool or urine, Edema. Pertinent positive as above in HPI. All others negative. The problem list was updated as a part of today's visit. There is no problem list on file for this patient. The PSH, FH were reviewed.       SH:  Social History   Substance Use Topics    Smoking status: Never Smoker    Smokeless tobacco: Never Used    Alcohol use No         Medications/Allergies:  Current Outpatient Prescriptions on File Prior to Visit   Medication Sig Dispense Refill    amLODIPine (NORVASC) 5 mg tablet TAKE ONE TABLET BY MOUTH DAILY 30 Tab 5    lisinopril-hydroCHLOROthiazide (PRINZIDE, ZESTORETIC) 20-25 mg per tablet TAKE ONE TABLET BY MOUTH DAILY 30 Tab 5    simvastatin (ZOCOR) 10 mg tablet Take 1 Tab by mouth nightly. 30 Tab 5    ergocalciferol (ERGOCALCIFEROL) 50,000 unit capsule Take 1 Cap by mouth every seven (7) days. 4 Cap 1    aspirin delayed-release 81 mg tablet Take 1 Tab by mouth daily. 30 Tab 1    oxyCODONE IR (OXY-IR) 15 mg immediate release tablet Take 15 mg by mouth every four (4) hours as needed for Pain.  tiZANidine (ZANAFLEX) 4 mg tablet Take 1 Tab by mouth two (2) times a day. 60 Tab 1    potassium chloride (K-DUR, KLOR-CON) 10 mEq tablet Take 1 Tab by mouth daily. Recheck potassium levels after 90 days 90 Tab 0    magnesium oxide (MAG-OX) 400 mg tablet Take 1 Tab by mouth daily. Magnesium level to be rechecked after 90 days 90 Tab 0    guaiFENesin ER (MUCINEX) 600 mg ER tablet Take 600 mg by mouth two (2) times daily as needed for Congestion. No current facility-administered medications on file prior to visit. No Known Allergies    Objective:  Visit Vitals    /86    Pulse 94    Temp 98.1 °F (36.7 °C) (Oral)    Resp 20    Ht 5' 3\" (1.6 m)    Wt 156 lb (70.8 kg)    SpO2 94%  Comment: room air    BMI 27.63 kg/m2    Body mass index is 27.63 kg/(m^2). Appearance: Alert, well appearing, in no respiratory distress and well hydrated. HEENT: NC/AT. Exterior ears and tympanic membranes normal bilaterally. Conjunctiva normal. PERRL. EOMI  Oropharynx clear and moist mucous membranes. Neck: Supple. No cervical lymphadenopathy. Heart: RRR without M/R/G. Intact distal pulses. No edema. Lungs: CTAB, no rhonchi, rales, or wheezes with good air exchange  Abdomen: Soft, normoactive BS, non-tender, non-distended, no palpable masses.  Noted a small bit of firm tissue when palpating along the lower left side of her abdomen near her hysterectomy scar - I suspect this is scar tissue given its proximity to her healed surgical incision. Will assess it again at her next appointment to see if there are any changes. MSK: Gait normal. Strength equal and intact bilateral upper and lower ext. Normal ROM all extremities. Feet warm and well perfused with good dp pulses and capillary refill. Skin: No rash   Neuro: AAO x 3. No focal motor or sensory deficits. Speech normal  Psych: Appropriate affect, judgement and insight. Short-term memory intact. Labwork and Ancillary Studies:    CBC w/Diff  Lab Results   Component Value Date/Time    WBC 7.8 09/15/2016 02:23 PM    HGB 12.1 09/15/2016 02:23 PM    PLATELET 971 61/99/2549 02:23 PM         Basic Metabolic Profile  Lab Results   Component Value Date/Time    Sodium 141 12/20/2016 03:41 PM    Potassium 4.2 01/19/2017 11:07 AM    Chloride 100 12/20/2016 03:41 PM    CO2 30 12/20/2016 03:41 PM    Anion gap 11 12/20/2016 03:41 PM    Glucose 88 12/20/2016 03:41 PM    BUN 16 12/20/2016 03:41 PM    Creatinine 0.71 12/20/2016 03:41 PM    BUN/Creatinine ratio 23 12/20/2016 03:41 PM    GFR est AA >60 12/20/2016 03:41 PM    GFR est non-AA >60 12/20/2016 03:41 PM    Calcium 9.5 12/20/2016 03:41 PM        Cholesterol  Lab Results   Component Value Date/Time    Cholesterol, total 136 12/13/2016 10:10 AM    HDL Cholesterol 72 12/13/2016 10:10 AM    LDL, calculated 49 12/13/2016 10:10 AM    Triglyceride 75 12/13/2016 10:10 AM    CHOL/HDL Ratio 1.9 12/13/2016 10:10 AM       Assessment/Plan:      ICD-10-CM ICD-9-CM    1. Chronic bilateral low back pain with bilateral sciatica M54.42 724.2 Pain managed by Dr. Neto Lyons  Patient has decided not to pursue any surgical interventions at this time. Will place a referral for her to have aquatic physical therapy with [facility] if insurance will cover. M54.41 724.3     G89.29 338.29    2. Essential hypertension I10 401.9 BP well controlled. Continue current meds.    METABOLIC PANEL, BASIC   3. Vitamin D deficiency E55.9 268.9 Continue daily supplement. Check level today. VITAMIN D, 25 HYDROXY   4. Mixed hyperlipidemia E78.2 272.2 Continue Zocor 10 mg. Will check FLP at next appointment. 5. SOB (shortness of breath) R06.02 786.05 Patient reports history of mild asthma and previous use of an inhaler for similar symptoms. Plan to give a trial with prn albuterol inhaler. Have patient return in 1 week for follow up and will pursue work up to include CXR, EKG, possibly PFTs etc if there is no improvement in the patient's symptoms. Follow-up Disposition:  Return in about 1 week (around 3/27/2017). I have discussed the diagnosis with the patient and the intended plan as seen in the above orders. The patient has received an After-Visit Summary and questions were answered concerning future plans. Patient verbalized understanding of above instructions. Health Maintenance:   Health Maintenance   Topic Date Due    DTaP/Tdap/Td series (1 - Tdap) 07/04/1965    FOBT Q 1 YEAR AGE 50-75  07/04/1994    ZOSTER VACCINE AGE 60>  07/04/2004    Pneumococcal 65+ Low/Medium Risk (1 of 2 - PCV13) 07/04/2009    MEDICARE YEARLY EXAM  07/04/2009    BREAST CANCER SCRN MAMMOGRAM  08/02/2018    GLAUCOMA SCREENING Q2Y  08/18/2018    OSTEOPOROSIS SCREENING (DEXA)  Completed    INFLUENZA AGE 9 TO ADULT  Completed       No orders of the defined types were placed in this encounter.

## 2017-03-20 NOTE — MR AVS SNAPSHOT
Visit Information Date & Time Provider Department Dept. Phone Encounter #  
 3/20/2017  9:30 AM Ana Pinon, [address] [facility] [phone] [id number] Your Appointments 6/12/2017  9:00 AM  
Follow Up with Ana Pinon MD  
[facility] [address] [place] [address]) Appt Note: 6 month f/u  
 Andrew Syed. [address]  
  
   
 [address] [facility] Upcoming Health Maintenance Date Due DTaP/Tdap/Td series (1 - Tdap) 7/4/1965 FOBT Q 1 YEAR AGE 50-75 7/4/1994 ZOSTER VACCINE AGE 60> 7/4/2004 Pneumococcal 65+ Low/Medium Risk (1 of 2 - PCV13) 7/4/2009 MEDICARE YEARLY EXAM 7/4/2009 BREAST CANCER SCRN MAMMOGRAM 8/2/2018 GLAUCOMA SCREENING Q2Y 8/18/2018 Allergies as of 3/20/2017  Review Complete On: 3/20/2017 By: Hilton Marquez LPN No Known Allergies Current Immunizations  Reviewed on 10/13/2015 Name Date Influenza High Dose Vaccine PF 12/12/2016 Influenza Vaccine 10/13/2015 12:16 PM  
  
 Not reviewed this visit You Were Diagnosed With   
  
 Codes Comments Essential hypertension     ICD-10-CM: I10 
ICD-9-CM: 401.9 Vitamin D deficiency     ICD-10-CM: E55.9 ICD-9-CM: 268.9 Vitals BP Pulse Temp Resp Height(growth percentile) Weight(growth percentile) 138/86 94 98.1 °F (36.7 °C) (Oral) 20 5' 3\" (1.6 m) 156 lb (70.8 kg) SpO2 BMI OB Status Smoking Status 94% 27.63 kg/m2 Postmenopausal Never Smoker BMI and BSA Data Body Mass Index Body Surface Area  
 27.63 kg/m 2 1.77 m 2 Preferred Pharmacy Pharmacy Name Phone [facility], [address] [place] [address] [phone] Your Updated Medication List  
  
   
This list is accurate as of: 3/20/17 10:12 AM.  Always use your most recent med list. amLODIPine 5 mg tablet Commonly known as:  Nighat Fraction TAKE ONE TABLET BY MOUTH DAILY  
  
 aspirin delayed-release 81 mg tablet Take 1 Tab by mouth daily. lisinopril-hydroCHLOROthiazide 20-25 mg per tablet Commonly known as:  PRINZIDE, ZESTORETIC  
TAKE ONE TABLET BY MOUTH DAILY  
  
 magnesium oxide 400 mg tablet Commonly known as:  MAG-OX Take 1 Tab by mouth daily. Magnesium level to be rechecked after 90 days MUCINEX 600 mg ER tablet Generic drug:  guaiFENesin ER Take 600 mg by mouth two (2) times daily as needed for Congestion. oxyCODONE IR 15 mg immediate release tablet Commonly known as:  OXY-IR Take 15 mg by mouth every four (4) hours as needed for Pain.  
  
 potassium chloride 10 mEq tablet Commonly known as:  K-DUR, KLOR-CON Take 1 Tab by mouth daily. Recheck potassium levels after 90 days  
  
 simvastatin 10 mg tablet Commonly known as:  ZOCOR Take 1 Tab by mouth nightly. tiZANidine 4 mg tablet Commonly known as:  Bev [place] Take 1 Tab by mouth two (2) times a day. VITAMIN D3 2,000 unit Tab Generic drug:  cholecalciferol (vitamin D3) Take  by mouth daily. To-Do List   
 03/20/2017 Lab:  METABOLIC PANEL, BASIC   
  
 03/20/2017 Lab:  VITAMIN D, 25 HYDROXY Introducing [facility]! Monika Dao introduces [employer] patient portal. Now you can access parts of your medical record, email your doctor's office, and request medication refills online. 1. In your internet browser, go to [email or web address] 2. Click on the First Time User? Click Here link in the Sign In box. You will see the New Member Sign Up page. 3. Enter your [employer] Access Code exactly as it appears below. You will not need to use this code after youve completed the sign-up process. If you do not sign up before the expiration date, you must request a new code. · [employer] Access Code: [email or web address] Expires: 6/18/2017 10:12 AM 
 
 4. Enter the last four digits of your Social Security Number (xxxx) and Date of Birth (mm/dd/yyyy) as indicated and click Submit. You will be taken to the next sign-up page. 5. Create a [employer] ID. This will be your [employer] login ID and cannot be changed, so think of one that is secure and easy to remember. 6. Create a [employer] password. You can change your password at any time. 7. Enter your Password Reset Question and Answer. This can be used at a later time if you forget your password. 8. Enter your e-mail address. You will receive e-mail notification when new information is available in [address] 9. Click Sign Up. You can now view and download portions of your medical record. 10. Click the Download Summary menu link to download a portable copy of your medical information. If you have questions, please visit the Frequently Asked Questions section of the [employer] website. Remember, [employer] is NOT to be used for urgent needs. For medical emergencies, dial 911. Now available from your iPhone and Android! Please provide this summary of care documentation to your next provider. Your primary care clinician is listed as Sd Dennison. If you have any questions after today's visit, please call [phone].

## 2017-03-20 NOTE — PROGRESS NOTES
Pt here today for routine 3 month follow up hypertension and hyperlipidemia    1. Have you been to the ER, urgent care clinic since your last visit? Hospitalized since your last visit? No    2. Have you seen or consulted any other health care providers outside of the [address] [place] [address] since your last visit? Include any pap smears or colon screening.  No

## 2017-05-01 RX ORDER — POTASSIUM CHLORIDE 750 MG/1
10 TABLET, EXTENDED RELEASE ORAL DAILY
Qty: 90 TAB | Refills: 0 | OUTPATIENT
Start: 2017-05-01

## 2017-06-20 ENCOUNTER — OFFICE VISIT (OUTPATIENT)
Dept: FAMILY MEDICINE CLINIC | Age: 72
End: 2017-06-20

## 2017-06-20 VITALS
RESPIRATION RATE: 16 BRPM | TEMPERATURE: 97.5 F | SYSTOLIC BLOOD PRESSURE: 121 MMHG | WEIGHT: 152.6 LBS | HEART RATE: 83 BPM | BODY MASS INDEX: 27.04 KG/M2 | HEIGHT: 63 IN | DIASTOLIC BLOOD PRESSURE: 71 MMHG

## 2017-06-20 DIAGNOSIS — M48.061 LUMBAR SPINAL STENOSIS: ICD-10-CM

## 2017-06-20 DIAGNOSIS — E78.2 MIXED HYPERLIPIDEMIA: ICD-10-CM

## 2017-06-20 DIAGNOSIS — G89.29 CHRONIC BILATERAL LOW BACK PAIN WITH BILATERAL SCIATICA: ICD-10-CM

## 2017-06-20 DIAGNOSIS — M53.9 MULTILEVEL DEGENERATIVE DISC DISEASE: ICD-10-CM

## 2017-06-20 DIAGNOSIS — I10 ESSENTIAL HYPERTENSION: ICD-10-CM

## 2017-06-20 DIAGNOSIS — E55.9 VITAMIN D DEFICIENCY: ICD-10-CM

## 2017-06-20 DIAGNOSIS — M54.41 CHRONIC BILATERAL LOW BACK PAIN WITH BILATERAL SCIATICA: ICD-10-CM

## 2017-06-20 DIAGNOSIS — M25.562 ACUTE PAIN OF LEFT KNEE: ICD-10-CM

## 2017-06-20 DIAGNOSIS — M54.42 CHRONIC BILATERAL LOW BACK PAIN WITH BILATERAL SCIATICA: ICD-10-CM

## 2017-06-20 DIAGNOSIS — R01.1 HEART MURMUR: ICD-10-CM

## 2017-06-20 NOTE — MR AVS SNAPSHOT
Visit Information Date & Time Provider Department Dept. Phone Encounter #  
 6/20/2017  8:00 AM Johnny Amato, 445 [facility] [phone] [id number] Follow-up Instructions Return in about 3 months (around 9/20/2017). Upcoming Health Maintenance Date Due DTaP/Tdap/Td series (1 - Tdap) 7/4/1965 FOBT Q 1 YEAR AGE 50-75 7/4/1994 ZOSTER VACCINE AGE 60> 7/4/2004 Pneumococcal 65+ Low/Medium Risk (1 of 2 - PCV13) 7/4/2009 MEDICARE YEARLY EXAM 7/4/2009 INFLUENZA AGE 9 TO ADULT 8/1/2017 BREAST CANCER SCRN MAMMOGRAM 8/2/2018 GLAUCOMA SCREENING Q2Y 8/18/2018 Allergies as of 6/20/2017  Review Complete On: 6/20/2017 By: Carlos Rivera No Known Allergies Current Immunizations  Reviewed on 10/13/2015 Name Date Influenza High Dose Vaccine PF 12/12/2016 Influenza Vaccine 10/13/2015 12:16 PM  
  
 Not reviewed this visit You Were Diagnosed With   
  
 Codes Comments Essential hypertension     ICD-10-CM: I10 
ICD-9-CM: 401.9 Vitamin D deficiency     ICD-10-CM: E55.9 ICD-9-CM: 268.9 Mixed hyperlipidemia     ICD-10-CM: E78.2 ICD-9-CM: 272.2 Acute pain of left knee     ICD-10-CM: M25.562 ICD-9-CM: 719.46 Heart murmur     ICD-10-CM: R01.1 ICD-9-CM: 346. 2 Vitals BP Pulse Temp Resp Height(growth percentile) Weight(growth percentile) 121/71 83 97.5 °F (36.4 °C) (Oral) 16 5' 3\" (1.6 m) 152 lb 9.6 oz (69.2 kg) BMI OB Status Smoking Status 27.03 kg/m2 Postmenopausal Never Smoker Vitals History BMI and BSA Data Body Mass Index Body Surface Area  
 27.03 kg/m 2 1.75 m 2 Preferred Pharmacy Pharmacy Name Phone [facility] [phone] Your Updated Medication List  
  
   
This list is accurate as of: 6/20/17  9:02 AM.  Always use your most recent med list.  
  
  
  
  
 albuterol 90 mcg/actuation inhaler Commonly known as:  PROVENTIL HFA, VENTOLIN HFA, PROAIR HFA Take 2 Puffs by inhalation every six (6) hours as needed for Wheezing or Shortness of Breath. amLODIPine 5 mg tablet Commonly known as:  [place] TAKE ONE TABLET BY MOUTH DAILY  
  
 aspirin delayed-release 81 mg tablet Take 1 Tab by mouth daily. lisinopril-hydroCHLOROthiazide 20-25 mg per tablet Commonly known as:  PRINZIDE, ZESTORETIC  
TAKE ONE TABLET BY MOUTH DAILY  
  
 magnesium oxide 400 mg tablet Commonly known as:  MAG-OX Take 1 Tab by mouth daily. Magnesium level to be rechecked after 90 days MUCINEX 600 mg ER tablet Generic drug:  guaiFENesin ER Take 600 mg by mouth two (2) times daily as needed for Congestion. oxyCODONE IR 15 mg immediate release tablet Commonly known as:  OXY-IR Take 15 mg by mouth every four (4) hours as needed for Pain.  
  
 potassium chloride 10 mEq tablet Commonly known as:  KLOR-CON Take 1 Tab by mouth daily. simvastatin 10 mg tablet Commonly known as:  ZOCOR Take 1 Tab by mouth nightly. tiZANidine 4 mg tablet Commonly known as:  Nighat Jonas Take 1 Tab by mouth two (2) times a day. VITAMIN D3 2,000 unit Tab Generic drug:  cholecalciferol (vitamin D3) Take  by mouth daily. Follow-up Instructions Return in about 3 months (around 9/20/2017). To-Do List   
 06/20/2017 Cardiac Services:  2D ECHO COMPLETE ADULT (TTE)   
  
 06/20/2017 Lab:  CBC WITH AUTOMATED DIFF   
  
 06/20/2017 Lab:  LIPID PANEL   
  
 06/20/2017 Lab:  METABOLIC PANEL, COMPREHENSIVE   
  
 06/20/2017 Lab:  VITAMIN D, 25 HYDROXY   
  
 06/20/2017 Imaging:  XR KNEE LT MIN 4 V   
  
 06/20/2017 9:05 AM  
  Appointment with [facility] RAD XR RM 1 at [address] [place] [address] ([phone]) Introducing [facility]!    
 Cayden Ramos introduces [employer] patient portal. Now you can access parts of your medical record, email your doctor's office, and request medication refills online. 1. In your internet browser, go to [email or web address] 2. Click on the First Time User? Click Here link in the Sign In box. You will see the New Member Sign Up page. 3. Enter your "[employer] Access Code exactly as it appears below. You will not need to use this code after youve completed the sign-up process. If you do not sign up before the expiration date, you must request a new code. · "[employer] Access Code: [email or web address] Expires: 9/18/2017  9:02 AM 
 
4. Enter the last four digits of your Social Security Number (xxxx) and Date of Birth (mm/dd/yyyy) as indicated and click Submit. You will be taken to the next sign-up page. 5. Create a "[employer] ID. This will be your "[employer] login ID and cannot be changed, so think of one that is secure and easy to remember. 6. Create a "[employer] password. You can change your password at any time. 7. Enter your Password Reset Question and Answer. This can be used at a later time if you forget your password. 8. Enter your e-mail address. You will receive e-mail notification when new information is available in [address] 9. Click Sign Up. You can now view and download portions of your medical record. 10. Click the Download Summary menu link to download a portable copy of your medical information. If you have questions, please visit the Frequently Asked Questions section of the "[employer] website. Remember, "[employer] is NOT to be used for urgent needs. For medical emergencies, dial 911. Now available from your iPhone and Android! Please provide this summary of care documentation to your next provider. Your primary care clinician is listed as Efren Jaimes. If you have any questions after today's visit, please call [phone].

## 2017-06-20 NOTE — PROGRESS NOTES
Melissa Reeves is a 66 y.o. female and presents with Follow Up Chronic Condition; Hypertension; Cholesterol Problem; Vitamin D Deficiency; Back Pain; and Medication Refill       Subjective:  Mrs. Kolby Fernández is here to follow up on her chronic conditions    1. Chronic Back Pain: Spinal Stenosis with radiculopathy, lumbar region  - Patient has long-standing and painful history of spinal stenosis. Followed by Dr. Rehana Cabrera for pain management and has received multiple epidural injections. She controls her pain with Oxycodone IR 15mg tablets - 1 tablet q4h prn. Tizanidine 4mg BID taken for muscle spasms in her back. - Patient saw Neurosurgery, shari Jones in 10/2016. Per his note, Dr. Tiburcio Lozoya did not feel that narcotic therapy was going to be useful long term and that \"surgical intervention is indicated based on the appearance of the studies, her functional limitations as well as failure to derive significant long-lasting benefit from other pain management procedures and medications. \" Patient would require a multilevel lumbar laminectomy, instrumented spinal fusion and deformity correction. Patient was told that, due to her complex and challenging spinal problem, her surgery would be very extensive with a lengthy period of recovery. She was also informed in detail about the multiple serious risks involved with the surgery and complication rates approaching 50-75%. Dr. Tiburcio Lozoya gave her the option of seeking a second opinion. Mrs. Kolby Fernández has decided to hold off on pursuing any surgical intervention at this time. - Patient was sent to the ER on 4/5/17 for an MRI because she was having significant back pain and pain radiating into her legs and Dr. Faviola Lane worried she was having a worsening process. MRI L-spine showed stable severe degenerative disease with scoliosis when compared to July 2016.  Patient was given a prescription for Neurontin 100 mg TID and she has been using this prn.   - Today, she says her pain is pretty much at baseline. She is ambulating okay and denies feeling any weakness in her legs. No bowel or bladder dysfunction. Ambulation is only difficult when she is in severe pain. However, when she takes her medication and receives her epidural injections, she does not feel weak in her legs and can walk without any trouble. She has been doing water exercises with a [employer] in a pool and feels this has been helpful and better than regular physical therapy. - We had a long discussion today about her chronic pain and what the next step should be. Patient still does not want to pursue surgery. She will go back and see Dr. Luis Alberto Larkin on 7/11/17 and discuss either getting her epidural injections more frequently or adjusting her PO medications so her pain is better controlled and she is not suffering. If these measure do not work, then she will seek a second Neurosurgery opinion. MRI L-Spine 4/5/17:   Impression:   Scoliosis with advanced disc disease throughout. Most severe central stenosis at L4-L5 followed by L3-L4, with posterior disc bulge with facet and ligamentous hypertrophy. Foraminal stenosis is also severe from combination with spondylolisthesis. Severe left L5 foraminal stenosis with nerve root compression. Overall, stable severe degenerative disease with scoliosis when compared to July 2016.       2. HTN: /71today. Patient is on Norvasc and Prinzide. No HA, dizziness, lightheadedness      3. Vitamin D Deficiency: Last level was 31.8. She is taking 2,000 iu daily.       4. Hyperlipidemia: Taking Zocor 10 mg every evening. Last FLP in 12/2016 LDL 49, HDL 72. Need to update.      5. Mild SOB: This was mentioned last visit, but patient says it has resolved and is not an issue at all. As mentioned above, she has been exercising in the pool without difficulty. Not using the albuterol inhaler than was prescribed. 6. Left Knee Pain: She has had this for about a month.  Denies any injury or trauma. Knee is not giving out on her, it just hurts sometimes. She notices the pain mostly at night while lying in bed, but changing positions helps. Health Maintenance  - DEXA Scan 3/20/15, normal  - Colonoscopy - Done with Dr. Roshan Gonzalez 2/2/17. Normal. Repeat in 10 years. - Hx of Hysterectomy (she thinks partial) - Gynecologist was Dr. Jim Apple. She was told she did not need anymore pap smears.   - Mammogram on 8/2/16 - No evidence for malignancy. Suggest routine annual follow up. - OV with optometrist on 8/18/16 - bilateral nuclear sclerotic cataracts. Surgery being deferred until functional vision worsens. Return on 8/17/17.     ROS:  Pt denies: Wt loss, Fever/Chills, HA, Visual changes, Fatigue, Chest pain, SOB, MONDRAGON, Abd pain, N/V/D/C, Blood in stool or urine, Edema. Pertinent positive as above in HPI. All others negative. The problem list was updated as a part of today's visit. There is no problem list on file for this patient. The PSH, FH were reviewed. SH:  Social History   Substance Use Topics    Smoking status: Never Smoker    Smokeless tobacco: Never Used    Alcohol use No         Medications/Allergies:  Current Outpatient Prescriptions on File Prior to Visit   Medication Sig Dispense Refill    potassium chloride (K-DUR, KLOR-CON) 10 mEq tablet Take 1 Tab by mouth daily. 90 Tab 0    cholecalciferol, vitamin D3, (VITAMIN D3) 2,000 unit tab Take  by mouth daily.  albuterol (PROVENTIL HFA, VENTOLIN HFA, PROAIR HFA) 90 mcg/actuation inhaler Take 2 Puffs by inhalation every six (6) hours as needed for Wheezing or Shortness of Breath. 1 Inhaler 2    amLODIPine (NORVASC) 5 mg tablet TAKE ONE TABLET BY MOUTH DAILY 30 Tab 5    lisinopril-hydroCHLOROthiazide (PRINZIDE, ZESTORETIC) 20-25 mg per tablet TAKE ONE TABLET BY MOUTH DAILY 30 Tab 5    simvastatin (ZOCOR) 10 mg tablet Take 1 Tab by mouth nightly.  30 Tab 5    aspirin delayed-release 81 mg tablet Take 1 Tab by mouth daily. 30 Tab 1    oxyCODONE IR (OXY-IR) 15 mg immediate release tablet Take 15 mg by mouth every four (4) hours as needed for Pain.  tiZANidine (ZANAFLEX) 4 mg tablet Take 1 Tab by mouth two (2) times a day. 60 Tab 1    magnesium oxide (MAG-OX) 400 mg tablet Take 1 Tab by mouth daily. Magnesium level to be rechecked after 90 days 90 Tab 0    guaiFENesin ER (MUCINEX) 600 mg ER tablet Take 600 mg by mouth two (2) times daily as needed for Congestion. No current facility-administered medications on file prior to visit. No Known Allergies    Objective:  Visit Vitals    /71    Pulse 83    Temp 97.5 °F (36.4 °C) (Oral)    Resp 16    Ht 5' 3\" (1.6 m)    Wt 152 lb 9.6 oz (69.2 kg)    BMI 27.03 kg/m2    Body mass index is 27.03 kg/(m^2). Appearance: Alert, well appearing, in no respiratory distress and well hydrated. HEENT: NC/AT. Exterior ears and tympanic membranes normal bilaterally. Conjunctiva normal. PERRL. EOMI  Oropharynx clear and moist mucous membranes. Neck: Supple. No cervical lymphadenopathy. Heart: RRR without R/G. (+) 2/6 murmur heard best at LUSB. Intact distal pulses. No edema. Lungs: CTAB, no rhonchi, rales, or wheezes with good air exchange  Abdomen: Soft, normoactive BS, non-tender, non-distended, no palpable masses. MSK: Gait normal. Strength equal and intact bilateral upper and lower ext. Normal ROM all extremities. Left Knee - (+) Mild ttp over anterior portion of knee. No warmth or erythema. Possibly a bit of swelling noted below the patella medially. Strength intact. No joint instability. Anterior/Posterior drawer tests performed - negative. Feet warm and well perfused with good dp pulses. Skin: No rash   Neuro: AAO x 3. No focal deficits. Speech normal  Psych: Appropriate affect, judgement and insight. Short-term memory intact.     Labwork and Ancillary Studies:    CBC w/Diff  Lab Results   Component Value Date/Time    WBC 7.8 09/15/2016 02:23 PM    HGB 12.1 09/15/2016 02:23 PM    PLATELET 873 62/70/8172 02:23 PM         Basic Metabolic Profile  Lab Results   Component Value Date/Time    Sodium 141 12/20/2016 03:41 PM    Potassium 4.2 01/19/2017 11:07 AM    Chloride 100 12/20/2016 03:41 PM    CO2 30 12/20/2016 03:41 PM    Anion gap 11 12/20/2016 03:41 PM    Glucose 88 12/20/2016 03:41 PM    BUN 16 12/20/2016 03:41 PM    Creatinine 0.71 12/20/2016 03:41 PM    BUN/Creatinine ratio 23 12/20/2016 03:41 PM    GFR est AA >60 12/20/2016 03:41 PM    GFR est non-AA >60 12/20/2016 03:41 PM    Calcium 9.5 12/20/2016 03:41 PM        Cholesterol  Lab Results   Component Value Date/Time    Cholesterol, total 136 12/13/2016 10:10 AM    HDL Cholesterol 72 12/13/2016 10:10 AM    LDL, calculated 49 12/13/2016 10:10 AM    Triglyceride 75 12/13/2016 10:10 AM    CHOL/HDL Ratio 1.9 12/13/2016 10:10 AM       Assessment/Plan:      ICD-10-CM ICD-9-CM    1. Chronic bilateral low back pain with bilateral sciatica M54.42 724.2 Recent MRI showed stable severe degenerative disease with scoliosis. BLE strength and ROM intact today on exam.  Pain managed by Dr. Lalitha Gomez. Evaluated by Neurosurgery - patient has declined surgery at this time. Plans to follow up with Dr. Lalitha Gomez regarding pain management options - possibly getting epidural injections closer together or adjusting her PO pain meds. She has agreed to seek a second Neurosurgery appointment if these measure do not work. M54.41 724.3     G89.29 338.29    2. Lumbar spinal stenosis M48.06 724.02 See above. 3. Multilevel degenerative disc disease M53.9 722.6 See above. 4. Essential hypertension I10 401.9 BP well controlled on current therapy. CBC WITH AUTOMATED DIFF      METABOLIC PANEL, COMPREHENSIVE   5. Vitamin D deficiency E55.9 268.9 VITAMIN D, 25 HYDROXY   6. Mixed hyperlipidemia E78.2 272.2 LIPID PANEL   7. Acute pain of left knee M25.562 719.46 Will check xray to r/o any acute bony abnormality.  Have patient see Ortho as well since this has been bothering her for over a month. XR KNEE LT MIN 4 V      REFERRAL TO ORTHOPEDICS   8. Heart murmur R01.1 785. 2 Murmur heard on exam today and I don't recall appreciating this before. 2D ECHO COMPLETE ADULT (TTE)       Follow-up Disposition:  Return in about 3 months (around 9/20/2017). I have discussed the diagnosis with the patient and the intended plan as seen in the above orders. The patient has received an After-Visit Summary and questions were answered concerning future plans. Patient verbalized understanding of above instructions. Health Maintenance:   Health Maintenance   Topic Date Due    DTaP/Tdap/Td series (1 - Tdap) 07/04/1965    FOBT Q 1 YEAR AGE 50-75  07/04/1994    ZOSTER VACCINE AGE 60>  07/04/2004    Pneumococcal 65+ Low/Medium Risk (1 of 2 - PCV13) 07/04/2009    MEDICARE YEARLY EXAM  07/04/2009    INFLUENZA AGE 9 TO ADULT  08/01/2017    BREAST CANCER SCRN MAMMOGRAM  08/02/2018    GLAUCOMA SCREENING Q2Y  08/18/2018    OSTEOPOROSIS SCREENING (DEXA)  Completed       No orders of the defined types were placed in this encounter.

## 2017-06-20 NOTE — PROGRESS NOTES
1. Have you been to the ER, urgent care clinic since your last visit? Hospitalized since your last visit? No.     2. Have you seen or consulted any other health care providers outside of the [address] [place] [address] since your last visit? Include any pap smears or colon screening. No.     Patient presents with follow up Hypertension, Hyperlipidemia, back pain and Vitamin D deficiency. Need All medication refills.

## 2017-06-21 RX ORDER — AMLODIPINE BESYLATE 5 MG/1
TABLET ORAL
Qty: 30 TAB | Refills: 5 | Status: SHIPPED | OUTPATIENT
Start: 2017-06-21 | End: 2017-12-19 | Stop reason: SDUPTHER

## 2017-06-25 PROBLEM — M48.061 LUMBAR SPINAL STENOSIS: Status: ACTIVE | Noted: 2017-06-25

## 2017-06-25 PROBLEM — M54.42 CHRONIC BILATERAL LOW BACK PAIN WITH BILATERAL SCIATICA: Status: ACTIVE | Noted: 2017-06-25

## 2017-06-25 PROBLEM — G89.29 CHRONIC BILATERAL LOW BACK PAIN WITH BILATERAL SCIATICA: Status: ACTIVE | Noted: 2017-06-25

## 2017-06-25 PROBLEM — M54.41 CHRONIC BILATERAL LOW BACK PAIN WITH BILATERAL SCIATICA: Status: ACTIVE | Noted: 2017-06-25

## 2017-07-10 ENCOUNTER — DOCUMENTATION ONLY (OUTPATIENT)
Dept: FAMILY MEDICINE CLINIC | Age: 72
End: 2017-07-10

## 2017-07-10 DIAGNOSIS — M25.562 LEFT KNEE PAIN, UNSPECIFIED CHRONICITY: ICD-10-CM

## 2017-07-10 DIAGNOSIS — M17.12 TRICOMPARTMENT OSTEOARTHRITIS OF LEFT KNEE: ICD-10-CM

## 2017-07-10 NOTE — PROGRESS NOTES
Patient calling requesting [facility] prior authorization for patient to see Dr. Jena Vicente. [place]az tomorrow (7/11/2017) Multiple attempts were made to obtain authorization through the united health care web site, however the web site stated that there was no information regarding provider. Spoke to a representative at [facility]. They transferred me to the credentialing department and the credentialing department did not answer. A voice mail was left with Dr. Juan Carlos Andrews office to have her nurse call back the office and advise us if Dr. Halina Foy was still a participating provider with [facility]. Patient was called and informed that we were unable to obtain authorization, patient was strongly advised not to keep follow up appointment with Dr. Halina Foy for she would be charged with a bill. Patient verbalized understanding of instructions.

## 2017-07-12 ENCOUNTER — DOCUMENTATION ONLY (OUTPATIENT)
Dept: FAMILY MEDICINE CLINIC | Age: 72
End: 2017-07-12

## 2017-07-19 ENCOUNTER — DOCUMENTATION ONLY (OUTPATIENT)
Dept: FAMILY MEDICINE CLINIC | Age: 72
End: 2017-07-19

## 2017-07-19 RX ORDER — LISINOPRIL AND HYDROCHLOROTHIAZIDE 20; 25 MG/1; MG/1
TABLET ORAL
Qty: 30 TAB | Refills: 5 | OUTPATIENT
Start: 2017-07-19

## 2017-07-19 RX ORDER — SIMVASTATIN 10 MG/1
10 TABLET, FILM COATED ORAL
Qty: 30 TAB | Refills: 5 | OUTPATIENT
Start: 2017-07-19

## 2017-07-19 NOTE — PROGRESS NOTES
Spoke to [facility] and [employer] site. He was advised that Dr. Hira Alexander did not pull up in the system. Heat ticket was placed to correct the issue, I was informed that this process could take 34-48 hours to be corrected. Reference number provided for active ticket is [id number].

## 2017-07-24 RX ORDER — LISINOPRIL AND HYDROCHLOROTHIAZIDE 20; 25 MG/1; MG/1
TABLET ORAL
Qty: 30 TAB | Refills: 5 | OUTPATIENT
Start: 2017-07-24

## 2017-07-24 RX ORDER — SIMVASTATIN 10 MG/1
10 TABLET, FILM COATED ORAL
Qty: 30 TAB | Refills: 5 | OUTPATIENT
Start: 2017-07-24

## 2017-07-26 ENCOUNTER — TELEPHONE (OUTPATIENT)
Dept: FAMILY MEDICINE CLINIC | Age: 72
End: 2017-07-26

## 2017-07-27 NOTE — TELEPHONE ENCOUNTER
[facility] did a 1 time referrral starting July 28 to sept 11, 2017 for 2 visits. They also notified dr Leonor Irving office.

## 2017-08-17 RX ORDER — POTASSIUM CHLORIDE 750 MG/1
10 TABLET, EXTENDED RELEASE ORAL DAILY
Qty: 90 TAB | Refills: 0 | Status: SHIPPED | OUTPATIENT
Start: 2017-08-17 | End: 2017-11-13 | Stop reason: SDUPTHER

## 2017-10-04 ENCOUNTER — OFFICE VISIT (OUTPATIENT)
Dept: FAMILY MEDICINE CLINIC | Age: 72
End: 2017-10-04

## 2017-10-04 VITALS
DIASTOLIC BLOOD PRESSURE: 84 MMHG | HEART RATE: 92 BPM | SYSTOLIC BLOOD PRESSURE: 146 MMHG | BODY MASS INDEX: 27.64 KG/M2 | HEIGHT: 63 IN | WEIGHT: 156 LBS | TEMPERATURE: 97.5 F | RESPIRATION RATE: 20 BRPM

## 2017-10-04 DIAGNOSIS — Z00.00 MEDICARE ANNUAL WELLNESS VISIT, SUBSEQUENT: Primary | ICD-10-CM

## 2017-10-04 DIAGNOSIS — G89.29 CHRONIC BILATERAL LOW BACK PAIN WITH BILATERAL SCIATICA: ICD-10-CM

## 2017-10-04 DIAGNOSIS — Z78.0 MENOPAUSE: ICD-10-CM

## 2017-10-04 DIAGNOSIS — M54.41 CHRONIC BILATERAL LOW BACK PAIN WITH BILATERAL SCIATICA: ICD-10-CM

## 2017-10-04 DIAGNOSIS — J45.30 MILD PERSISTENT ASTHMA WITHOUT COMPLICATION: ICD-10-CM

## 2017-10-04 DIAGNOSIS — Z71.89 ADVANCED DIRECTIVES, COUNSELING/DISCUSSION: ICD-10-CM

## 2017-10-04 DIAGNOSIS — Z12.31 ENCOUNTER FOR SCREENING MAMMOGRAM FOR MALIGNANT NEOPLASM OF BREAST: ICD-10-CM

## 2017-10-04 DIAGNOSIS — E55.9 VITAMIN D DEFICIENCY: ICD-10-CM

## 2017-10-04 DIAGNOSIS — I10 ESSENTIAL HYPERTENSION: ICD-10-CM

## 2017-10-04 DIAGNOSIS — M54.42 CHRONIC BILATERAL LOW BACK PAIN WITH BILATERAL SCIATICA: ICD-10-CM

## 2017-10-04 DIAGNOSIS — E78.2 MIXED HYPERLIPIDEMIA: ICD-10-CM

## 2017-10-04 DIAGNOSIS — Z23 ENCOUNTER FOR IMMUNIZATION: ICD-10-CM

## 2017-10-04 RX ORDER — LORATADINE 10 MG/1
10 TABLET ORAL
COMMUNITY
End: 2018-01-10

## 2017-10-04 NOTE — MR AVS SNAPSHOT
Visit Information Date & Time Provider Department Dept. Phone Encounter #  
 10/4/2017 10:30 AM Missael Renteria MD [address] [place] [address] [id number] Follow-up Instructions Return in about 3 months (around 1/4/2018) for follow up, fasting labs with Dr. Thai Del Castillo. Upcoming Health Maintenance Date Due DTaP/Tdap/Td series (1 - Tdap) 7/4/1965 FOBT Q 1 YEAR AGE 50-75 7/4/1994 ZOSTER VACCINE AGE 60> 5/4/2004 Pneumococcal 65+ Low/Medium Risk (1 of 2 - PCV13) 7/4/2009 MEDICARE YEARLY EXAM 7/4/2009 INFLUENZA AGE 9 TO ADULT 8/1/2017 BREAST CANCER SCRN MAMMOGRAM 8/2/2018 GLAUCOMA SCREENING Q2Y 8/18/2018 Allergies as of 10/4/2017  Review Complete On: 10/4/2017 By: Missael Renteria MD  
 No Known Allergies Current Immunizations  Reviewed on 10/4/2017 Name Date Influenza High Dose Vaccine PF  Incomplete, 12/12/2016 Influenza Vaccine 10/13/2015 12:16 PM  
  
 Reviewed by Missael Renteria MD on 10/4/2017 at 10:54 AM  
You Were Diagnosed With   
  
 Codes Comments Medicare annual wellness visit, subsequent    -  Primary ICD-10-CM: Z00.00 ICD-9-CM: V70.0 Chronic bilateral low back pain with bilateral sciatica     ICD-10-CM: M54.42, M54.41, G89.29 ICD-9-CM: 724.2, 724.3, 338.29 Encounter for immunization     ICD-10-CM: Q75 ICD-9-CM: V03.89 Menopause     ICD-10-CM: Z78.0 ICD-9-CM: 627.2 Encounter for screening mammogram for malignant neoplasm of breast     ICD-10-CM: Z12.31 
ICD-9-CM: V76.12 Essential hypertension     ICD-10-CM: I10 
ICD-9-CM: 401.9 Mixed hyperlipidemia     ICD-10-CM: E78.2 ICD-9-CM: 272.2 Vitamin D deficiency     ICD-10-CM: E55.9 ICD-9-CM: 268.9 Vitals BP Pulse Temp Resp Height(growth percentile) Weight(growth percentile) 146/84 92 97.5 °F (36.4 °C) (Oral) 20 5' 3\" (1.6 m) 156 lb (70.8 kg) BMI OB Status Smoking Status 27.63 kg/m2 Postmenopausal Never Smoker BMI and BSA Data Body Mass Index Body Surface Area  
 27.63 kg/m 2 1.77 m 2 Preferred Pharmacy [facility] [place] [address] [phone] Your Updated Medication List  
  
   
This list is accurate as of: 10/4/17 11:19 AM.  Always use your most recent med list.  
  
  
  
  
 albuterol 90 mcg/actuation inhaler Commonly known as:  PROVENTIL HFA, VENTOLIN HFA, PROAIR HFA Take 2 Puffs by inhalation every six (6) hours as needed for Wheezing or Shortness of Breath. amLODIPine 5 mg tablet Commonly known as:  [place] Mend TAKE ONE TABLET BY MOUTH DAILY  
  
 aspirin delayed-release 81 mg tablet Take 1 Tab by mouth daily. CLARITIN 10 mg tablet Generic drug:  loratadine Take 10 mg by mouth daily as needed for Allergies. lisinopril-hydroCHLOROthiazide 20-25 mg per tablet Commonly known as:  PRINZIDE, ZESTORETIC  
TAKE ONE TABLET BY MOUTH DAILY  
  
 magnesium oxide 400 mg tablet Commonly known as:  MAG-OX Take 1 Tab by mouth daily. Magnesium level to be rechecked after 90 days MUCINEX 600 mg ER tablet Generic drug:  guaiFENesin ER Take 600 mg by mouth two (2) times daily as needed for Congestion. oxyCODONE IR 15 mg immediate release tablet Commonly known as:  OXY-IR Take 15 mg by mouth every four (4) hours as needed for Pain.  
  
 potassium chloride 10 mEq tablet Commonly known as:  KLOR-CON Take 1 Tab by mouth daily. simvastatin 10 mg tablet Commonly known as:  ZOCOR Take 1 Tab by mouth nightly. tiZANidine 4 mg tablet Commonly known as:  [place]ule Take 1 Tab by mouth two (2) times a day. VITAMIN D3 2,000 unit Tab Generic drug:  cholecalciferol (vitamin D3) Take  by mouth daily. We Performed the Following ADMIN INFLUENZA VIRUS VAC [ [facility]] INFLUENZA VIRUS VACCINE, HIGH DOSE SEASONAL, PRESERVATIVE FREE [30434 CPT(R)] REFERRAL TO PAIN MANAGEMENT [YPN140 Custom] Comments:  
 Needs follow up referral to Dr. Shaheen Villa Follow-up Instructions Return in about 3 months (around 1/4/2018) for follow up, fasting labs with Dr. Thai Del Castillo.  
  
To-Do List   
 10/07/2017 Imaging:  DEXA BONE DENSITY STUDY AXIAL   
  
 10/07/2017 Imaging:  LIBBY MAMMO BI SCREENING INCL CAD Referral Information Referral ID Referred By Referred To  
  
 [id number] N[place] Loose Not Available Visits Status Start Date End Date 1 New Request 10/4/17 10/4/18 If your referral has a status of pending review or denied, additional information will be sent to support the outcome of this decision. Patient Instructions Medicare Wellness Visit, Female The best way to live healthy is to have a healthy lifestyle by eating a well-balanced diet, exercising regularly, limiting alcohol and stopping smoking. Regular physical exams and screening tests are another way to keep healthy. Preventive exams provided by your health care provider can find health problems before they become diseases or illnesses. Preventive services including immunizations, screening tests, monitoring and exams can help you take care of your own health. All people over age 72 should have a pneumovax  and and a prevnar shot to prevent pneumonia. These are once in a lifetime unless you and your provider decide differently. All people over 65 should have a yearly flu shot and a tetanus vaccine every 10 years. A bone mass density to screen for osteoporosis or thinning of the bones should be done every 2 years after 65. Screening for diabetes mellitus with a blood sugar test should be done every year.  
 
Glaucoma is a disease of the eye due to increased ocular pressure that can lead to blindness and it should be done every year by an eye professional. 
 
 Cardiovascular screening tests that check for elevated lipids (fatty part of blood) which can lead to heart disease and strokes should be done every 5 years. Colorectal screening that evaluates for blood or polyps in your colon should be done yearly as a stool test or every five years as a flexible sigmoidoscope or every 10 years as a colonoscopy up to age 76. Breast cancer screening with a mammogram is recommended biennially  for women age 54-69. Screening for cervical cancer with a pap smear and pelvic exam is recommended for women after age 72 years every 2 years up to age 79 or when the provider and patient decide to stop. If there is a history of cervical abnormalities or other increased risk for cancer then the test is recommended yearly. Hepatitis C screening is also recommended for anyone born between 80 through [address]. A shingles vaccine is also recommended once in a lifetime after age 61. Your Medicare Wellness Exam is recommended annually. Here is a list of your current Health Maintenance items with a due date: 
Health Maintenance Due Topic Date Due  
 DTaP/Tdap/Td  (1 - Tdap) 07/04/1965  Stool testing for trace blood  07/04/1994  Shingles Vaccine  05/04/2004  Pneumococcal Vaccine (1 of 2 - PCV13) 07/04/2009 Molly Dias Annual Well Visit  07/04/2009  Flu Vaccine  08/01/2017 Medicare Wellness Visit, Female The best way to live healthy is to have a healthy lifestyle by eating a well-balanced diet, exercising regularly, limiting alcohol and stopping smoking. Regular physical exams and screening tests are another way to keep healthy. Preventive exams provided by your health care provider can find health problems before they become diseases or illnesses. Preventive services including immunizations, screening tests, monitoring and exams can help you take care of your own health.  
 
All people over age 72 should have a pneumovax  and and a prevnar shot to prevent pneumonia. These are once in a lifetime unless you and your provider decide differently. All people over 65 should have a yearly flu shot and a tetanus vaccine every 10 years. A bone mass density to screen for osteoporosis or thinning of the bones should be done every 2 years after 65. Screening for diabetes mellitus with a blood sugar test should be done every year. Glaucoma is a disease of the eye due to increased ocular pressure that can lead to blindness and it should be done every year by an eye professional. 
 
Cardiovascular screening tests that check for elevated lipids (fatty part of blood) which can lead to heart disease and strokes should be done every 5 years. Colorectal screening that evaluates for blood or polyps in your colon should be done yearly as a stool test or every five years as a flexible sigmoidoscope or every 10 years as a colonoscopy up to age 76. Breast cancer screening with a mammogram is recommended biennially  for women age 54-69. Screening for cervical cancer with a pap smear and pelvic exam is recommended for women after age 72 years every 2 years up to age 79 or when the provider and patient decide to stop. If there is a history of cervical abnormalities or other increased risk for cancer then the test is recommended yearly. Hepatitis C screening is also recommended for anyone born between 80 through [address]. A shingles vaccine is also recommended once in a lifetime after age 61. Your Medicare Wellness Exam is recommended annually. Here is a list of your current Health Maintenance items with a due date: 
Health Maintenance Due Topic Date Due  
 DTaP/Tdap/Td  (1 - Tdap) 07/04/1965  Stool testing for trace blood  07/04/1994  Shingles Vaccine  05/04/2004  Pneumococcal Vaccine (1 of 2 - PCV13) 07/04/2009 Gail Leahy Annual Well Visit  07/04/2009  Flu Vaccine  08/01/2017 Introducing [facility]! Evan Kuhn introduces [employer] patient portal. Now you can access parts of your medical record, email your doctor's office, and request medication refills online. 1. In your internet browser, go to [email or web address] 2. Click on the First Time User? Click Here link in the Sign In box. You will see the New Member Sign Up page. 3. Enter your [employer] Access Code exactly as it appears below. You will not need to use this code after youve completed the sign-up process. If you do not sign up before the expiration date, you must request a new code. · [employer] Access Code: [email or web address] Expires: 1/2/2018 11:01 AM 
 
4. Enter the last four digits of your Social Security Number (xxxx) and Date of Birth (mm/dd/yyyy) as indicated and click Submit. You will be taken to the next sign-up page. 5. Create a [employer] ID. This will be your [employer] login ID and cannot be changed, so think of one that is secure and easy to remember. 6. Create a [employer] password. You can change your password at any time. 7. Enter your Password Reset Question and Answer. This can be used at a later time if you forget your password. 8. Enter your e-mail address. You will receive e-mail notification when new information is available in [address] 9. Click Sign Up. You can now view and download portions of your medical record. 10. Click the Download Summary menu link to download a portable copy of your medical information. If you have questions, please visit the Frequently Asked Questions section of the [employer] website. Remember, [employer] is NOT to be used for urgent needs. For medical emergencies, dial 911. Now available from your iPhone and Android! Please provide this summary of care documentation to your next provider. Your primary care clinician is listed as Suzie Jolly. If you have any questions after today's visit, please call [phone].

## 2017-10-04 NOTE — PROGRESS NOTES
Chief Complaint   Patient presents with    Follow Up Chronic Condition    Annual Wellness Visit       Pt is a 67y.o. year old female who presents for follow up of her chronic medical problems  First visit with me; Dr. Alisha Braga is her PCP    BP Readings from Last 3 Encounters:   10/04/17 146/84   06/20/17 121/71   03/20/17 138/86   Ate gumbo last night which is not the norm for her she says  On Lisinopril HCT and Norvasc-compliant  Lab Results   Component Value Date/Time    Potassium 4.2 01/19/2017 11:07 AM   on K pill once a day    Wt Readings from Last 3 Encounters:   10/04/17 156 lb (70.8 kg)   06/20/17 152 lb 9.6 oz (69.2 kg)   03/20/17 156 lb (70.8 kg)     Wt Readings from Last 3 Encounters:   10/04/17 156 lb (70.8 kg)   06/20/17 152 lb 9.6 oz (69.2 kg)   03/20/17 156 lb (70.8 kg)     Lab Results   Component Value Date/Time    Cholesterol, total 136 12/13/2016 10:10 AM    HDL Cholesterol 72 12/13/2016 10:10 AM    LDL, calculated 49 12/13/2016 10:10 AM    VLDL, calculated 15 12/13/2016 10:10 AM    Triglyceride 75 12/13/2016 10:10 AM    CHOL/HDL Ratio 1.9 12/13/2016 10:10 AM   On Zocor-compliant, no side effects  Not fasting today    Sees Dr. Walker Drivers for pain mgt-needs updated referral    Asthma -dx years ago  Advair disc does work-wants to be back on this      ROS:    Pt denies: Wt loss, Fever/Chills, HA, Visual changes, Fatigue, Chest pain, SOB, MONDRAGON, Abd pain, N/V/D/C, Blood in stool or urine, Edema. Pertinent positive as above in HPI.  All others were negative    Patient Active Problem List   Diagnosis Code    Essential hypertension I10    Vitamin D deficiency E55.9    Mixed hyperlipidemia E78.2    Chronic bilateral low back pain with bilateral sciatica M54.42, M54.41, G89.29    Lumbar spinal stenosis M48.061    Mild persistent asthma without complication K42.87    Advanced directives, counseling/discussion Z71.89       Past Medical History:   Diagnosis Date    Hypercholesterolemia     Hypertension  Osteoarthritis        Current Outpatient Prescriptions   Medication Sig Dispense Refill    loratadine (CLARITIN) 10 mg tablet Take 10 mg by mouth daily as needed for Allergies.  potassium chloride (KLOR-CON) 10 mEq tablet Take 1 Tab by mouth daily. 90 Tab 0    simvastatin (ZOCOR) 10 mg tablet Take 1 Tab by mouth nightly. 30 Tab 5    lisinopril-hydroCHLOROthiazide (PRINZIDE, ZESTORETIC) 20-25 mg per tablet TAKE ONE TABLET BY MOUTH DAILY 30 Tab 5    amLODIPine (NORVASC) 5 mg tablet TAKE ONE TABLET BY MOUTH DAILY 30 Tab 5    cholecalciferol, vitamin D3, (VITAMIN D3) 2,000 unit tab Take  by mouth daily.  albuterol (PROVENTIL HFA, VENTOLIN HFA, PROAIR HFA) 90 mcg/actuation inhaler Take 2 Puffs by inhalation every six (6) hours as needed for Wheezing or Shortness of Breath. 1 Inhaler 2    aspirin delayed-release 81 mg tablet Take 1 Tab by mouth daily. 30 Tab 1    oxyCODONE IR (OXY-IR) 15 mg immediate release tablet Take 15 mg by mouth every four (4) hours as needed for Pain.  tiZANidine (ZANAFLEX) 4 mg tablet Take 1 Tab by mouth two (2) times a day. 60 Tab 1    guaiFENesin ER (MUCINEX) 600 mg ER tablet Take 600 mg by mouth two (2) times daily as needed for Congestion.  magnesium oxide (MAG-OX) 400 mg tablet Take 1 Tab by mouth daily. Magnesium level to be rechecked after 90 days 90 Tab 0       History   Smoking Status    Never Smoker   Smokeless Tobacco    Never Used       No Known Allergies    Patient Labs were reviewed: yes      Patient Past Records were reviewed:  yes        Objective:     Vitals:    10/04/17 1019   BP: 146/84   Pulse: 92   Resp: 20   Temp: 97.5 °F (36.4 °C)   TempSrc: Oral   Weight: 156 lb (70.8 kg)   Height: 5' 3\" (1.6 m)     Body mass index is 27.63 kg/(m^2). Exam:   Appearance: alert, well appearing,  oriented to person, place, and time, acyanotic, in no respiratory distress and well hydrated.   HEENT:  NC/AT, pink conj, anicteric sclerae  Neck:  No cervical lymphadenopathy, no JVD, no thyromegaly, no carotid bruit  Heart:  RRR without M/R/G  Lungs:  CTAB, no rhonchi, rales, or wheezes with good air exchange   Abdomen:  Non-tender, pos bowel sounds, no hepatosplenomegaly  Ext:  No C/C/E    Skin: no rash  Neuro: no lateralizing signs, CNs II-XII intact      Assessment/ Plan:   Diagnoses and all orders for this visit:    1. Medicare annual wellness visit, subsequent    2. Essential hypertension    3. Mixed hyperlipidemia    4. Vitamin D deficiency    5. Chronic bilateral low back pain with bilateral sciatica  -     REFERRAL TO PAIN MANAGEMENT    6. Mild persistent asthma without complication  -     fluticasone-salmeterol (ADVAIR DISKUS) 100-50 mcg/dose diskus inhaler; Take 1 Puff by inhalation every twelve (12) hours. 7. Encounter for immunization  -     Influenza Admin ()  -     Influenza virus vaccine (FLUZONE HIGH DOSE) PF (97444)    8. Menopause  -     Dexa Bone Density Study Axial ([id number]); Future    9. Encounter for screening mammogram for malignant neoplasm of breast  -     Bilateral Digital Screening Mammography; Future    10. Advanced directives, counseling/discussion        Follow-up Disposition:  Return in about 3 months (around 1/4/2018) for follow up, fasting labs with Dr. Daryn Lagunas.        I have discussed the diagnosis with the patient and the intended plan as seen in the above orders. The patient has received an After-Visit Summary and questions were answered concerning future plans. Medication Side Effects and Warnings were discussed with patient: yes    Patient verbalized understanding of above instructions. Zoran Garcia MD  Internal Medicine  [facility]    This is a Subsequent Medicare Annual Wellness Exam (AWV) (Performed 12 months after IPPE or effective date of Medicare Part B enrollment, Once in a lifetime)    I have reviewed the patient's medical history in detail and updated the computerized patient record. Health Maintenance Due   Topic Date Due    DTaP/Tdap/Td series (1 - Tdap) 07/04/1965    FOBT Q 1 YEAR AGE 50-75 -colonoscopy recently-normal, recheck in 10 yrs 07/04/1994    ZOSTER VACCINE AGE 60>  05/04/2004    Pneumococcal 65+ Low/Medium Risk (1 of 2 - PCV13) 07/04/2009    MEDICARE YEARLY EXAM  07/04/2009    INFLUENZA AGE 9 TO ADULT  08/01/2017   MAMMO-DIGITAL SCREENING BILAT W/ TOMOSYNTHESIS8/2/2016  [facility]  Result Impression   Impression:    No evidence for malignancy. Suggest routine annual follow-up. BONE DENSITY STUDY - CENTRAL3/20/2015  [facility]  Result Impression   Impression:    1.  BMD MEASURES WITHIN NORMAL LIMITS. 2.  COMPARED TO THE PRECEDING STUDY, BMD  IS WITHOUT STATISTICALLY SIGNIFICANT INTERVAL CHANGE. 3.  COMPARED TO BASELINE STUDY, BMD HAS DECREASED. Already done in Aug-eye exam    History     Past Medical History:   Diagnosis Date    Hypercholesterolemia     Hypertension     Osteoarthritis       Past Surgical History:   Procedure Laterality Date    COLONOSCOPY N/A 2/2/2017    COLONOSCOPY performed by Santiago Wiseman MD at [address] HX CHOLECYSTECTOMY      HX HYSTERECTOMY       Current Outpatient Prescriptions   Medication Sig Dispense Refill    fluticasone-salmeterol (ADVAIR DISKUS) 100-50 mcg/dose diskus inhaler Take 1 Puff by inhalation every twelve (12) hours. 1 Each 3    loratadine (CLARITIN) 10 mg tablet Take 10 mg by mouth daily as needed for Allergies.  potassium chloride (KLOR-CON) 10 mEq tablet Take 1 Tab by mouth daily. 90 Tab 0    simvastatin (ZOCOR) 10 mg tablet Take 1 Tab by mouth nightly. 30 Tab 5    lisinopril-hydroCHLOROthiazide (PRINZIDE, ZESTORETIC) 20-25 mg per tablet TAKE ONE TABLET BY MOUTH DAILY 30 Tab 5    amLODIPine (NORVASC) 5 mg tablet TAKE ONE TABLET BY MOUTH DAILY 30 Tab 5    cholecalciferol, vitamin D3, (VITAMIN D3) 2,000 unit tab Take  by mouth daily.       albuterol (PROVENTIL HFA, VENTOLIN HFA, PROAIR HFA) 90 mcg/actuation inhaler Take 2 Puffs by inhalation every six (6) hours as needed for Wheezing or Shortness of Breath. 1 Inhaler 2    aspirin delayed-release 81 mg tablet Take 1 Tab by mouth daily. 30 Tab 1    oxyCODONE IR (OXY-IR) 15 mg immediate release tablet Take 15 mg by mouth every four (4) hours as needed for Pain.  tiZANidine (ZANAFLEX) 4 mg tablet Take 1 Tab by mouth two (2) times a day. 60 Tab 1    guaiFENesin ER (MUCINEX) 600 mg ER tablet Take 600 mg by mouth two (2) times daily as needed for Congestion.  magnesium oxide (MAG-OX) 400 mg tablet Take 1 Tab by mouth daily. Magnesium level to be rechecked after 90 days 90 Tab 0     No Known Allergies  Family History   Problem Relation Age of Onset    No Known Problems Mother     No Known Problems Father      Social History   Substance Use Topics    Smoking status: Never Smoker    Smokeless tobacco: Never Used    Alcohol use No     Patient Active Problem List   Diagnosis Code    Essential hypertension I10    Vitamin D deficiency E55.9    Mixed hyperlipidemia E78.2    Chronic bilateral low back pain with bilateral sciatica M54.42, M54.41, G89.29    Lumbar spinal stenosis M48.061    Mild persistent asthma without complication P13.05    Advanced directives, counseling/discussion Z71.89       Depression Risk Factor Screening:     PHQ over the last two weeks 10/4/2017   Little interest or pleasure in doing things Not at all   Feeling down, depressed or hopeless Not at all   Total Score PHQ 2 0     Alcohol Risk Factor Screening: You do not drink alcohol or very rarely. Functional Ability and Level of Safety:   Hearing Loss  Hearing is good. Activities of Daily Living  The home contains: no safety equipment. Patient does total self care  Fall Risk  Fall Risk Assessment, last 12 mths 10/4/2017   Able to walk? Yes   Fall in past 12 months?  No       Abuse Screen  Patient is not abused    Cognitive Screening   Evaluation of Cognitive Function:  Has your family/caregiver stated any concerns about your memory: no  Normal MMSE        Patient Care Team   Patient Care Team:  Claudio Berrios MD as PCP - General (Internal Medicine)  Patel Galvez MD (Physical Medicine and Rehab)  Camelia Galvan MD (Optometry)    Assessment/Plan   Education and counseling provided:  Are appropriate based on today's review and evaluation  End-of-Life planning (with patient's consent)-see ACP note  Pneumococcal Vaccine-advised she needs Prevnar 13  Influenza Vaccine-today  Screening Pap and pelvic (covered once every 2 years)-advised no longer needed  Colorectal cancer screening tests-done recently  Cardiovascular screening blood test-needs repeat fasting lipids next visit  Lab Results   Component Value Date/Time    Cholesterol, total 136 12/13/2016 10:10 AM    HDL Cholesterol 72 12/13/2016 10:10 AM    LDL, calculated 49 12/13/2016 10:10 AM    VLDL, calculated 15 12/13/2016 10:10 AM    Triglyceride 75 12/13/2016 10:10 AM    CHOL/HDL Ratio 1.9 12/13/2016 10:10 AM     Bone mass measurement (DEXA)-due  Screening for glaucoma-done in August  Diabetes screening test  Lab Results   Component Value Date/Time    Glucose 88 12/20/2016 03:41 PM       Diagnoses and all orders for this visit:    1. Medicare annual wellness visit, subsequent-Refer to above for plan and to patient instructions for recommendations on HM    2. Encounter for immunization  -     Influenza Admin ()  -     Influenza virus vaccine (FLUZONE HIGH DOSE) PF ([id number])    3. Menopause  -     Dexa Bone Density Study Axial ([id number]); Future    4. Encounter for screening mammogram for malignant neoplasm of breast  -     Bilateral Digital Screening Mammography; Future    5.  Advanced directives, counseling/discussion-see ACP note        Health Maintenance Due   Topic Date Due    DTaP/Tdap/Td series (1 - Tdap) 07/04/1965    FOBT Q 1 YEAR AGE 50-75  07/04/1994    ZOSTER VACCINE AGE 60>  05/04/2004  Pneumococcal 65+ Low/Medium Risk (1 of 2 - PCV13) 07/04/2009     RTC yearly for wellness visit

## 2017-10-04 NOTE — PATIENT INSTRUCTIONS

## 2017-10-04 NOTE — PROGRESS NOTES
Chief Complaint   Patient presents with    Follow Up Chronic Condition     1. Have you been to the ER, urgent care clinic since your last visit? Hospitalized since your last visit? No    2. Have you seen or consulted any other health care providers outside of the [address] [place] [address] since your last visit? Include any pap smears or colon screening. Yes When: 9/2017 Dr. Abimbola Traylor Management.

## 2017-10-09 PROBLEM — J45.30 MILD PERSISTENT ASTHMA WITHOUT COMPLICATION: Status: ACTIVE | Noted: 2017-10-09

## 2017-10-09 PROBLEM — Z71.89 ADVANCED DIRECTIVES, COUNSELING/DISCUSSION: Status: ACTIVE | Noted: 2017-10-09

## 2017-10-09 RX ORDER — FLUTICASONE PROPIONATE AND SALMETEROL 100; 50 UG/1; UG/1
1 POWDER RESPIRATORY (INHALATION) EVERY 12 HOURS
Qty: 1 EACH | Refills: 3 | Status: SHIPPED | OUTPATIENT
Start: 2017-10-09 | End: 2018-10-04 | Stop reason: SDUPTHER

## 2017-10-10 NOTE — ACP (ADVANCE CARE PLANNING)
Advance Care Planning (ACP) Provider Conversation Snapshot    Date of ACP Conversation: 10/04/17  Persons included in Conversation:  patient  Length of ACP Conversation in minutes:  <16 minutes (Non-Billable)    Authorized Decision Maker (if patient is incapable of making informed decisions):    This person is:   Her daughter will decide for her-Daniel Brooten Slipper          For Patients with Decision Making Capacity:   Values/Goals: Exploration of values, goals, and preferences if recovery is not expected, even with continued medical treatment in the event of:  Imminent death  Severe, permanent brain injury    Conversation Outcomes / Follow-Up Plan:   Recommended completion of Advance Directive form after review of ACP materials and conversation with prospective healthcare agent

## 2017-10-16 ENCOUNTER — DOCUMENTATION ONLY (OUTPATIENT)
Dept: FAMILY MEDICINE CLINIC | Age: 72
End: 2017-10-16

## 2017-10-16 NOTE — PROGRESS NOTES
Patient called office inquiring about [address]. Patient was informed that Dr. Garth Klinefelter. Anna Moser does not populate under referral submission and we were unable to obtain [address] for this patient. Spoke to [employer] and advised them that when trying to pull Dr. Anna Moser up in the system, we receive an error message that states: No Physicians were found based on the search criteria entered. Please validate and modify your search criteria and try again. Dr. Zoran Rincon NPI was entered into the system with same result. [facility] representative stated that they would contact Dr. Zoran Rincon office regarding referral submission issue.

## 2017-10-18 ENCOUNTER — TELEPHONE (OUTPATIENT)
Dept: FAMILY MEDICINE CLINIC | Age: 72
End: 2017-10-18

## 2017-10-18 NOTE — TELEPHONE ENCOUNTER
Alpa Bower from Pain management called stating a referral is needed for this patients appointment on 10/24 with Dr. Christoph Wise. Alpa Bower cam be contacted at [phone] Option #6.

## 2017-10-18 NOTE — TELEPHONE ENCOUNTER
Currently talking with a Yun Restrepo. Trying to figure out why office cant pull Dr. Ange Tucker up in [facility] syt to complete auth for patient appointment. Yun stated it must be a glitch with the [employer]. She is calling [facility] back to speak with them about updating the offices provider list. Awaiting return call from [employer] [facility] with instructions for what to do next.

## 2017-10-19 NOTE — TELEPHONE ENCOUNTER
05-23-16  Received call from secondary technical team rep with [employer]. She took information about patients referral that needs to be processed for patient appointment today with Dr. Veronica Masters. They will process authorization for patient's appointment.

## 2017-11-06 DIAGNOSIS — Z12.31 ENCOUNTER FOR SCREENING MAMMOGRAM FOR MALIGNANT NEOPLASM OF BREAST: ICD-10-CM

## 2017-11-09 DIAGNOSIS — Z78.0 MENOPAUSE: ICD-10-CM

## 2017-11-14 RX ORDER — POTASSIUM CHLORIDE 750 MG/1
10 TABLET, EXTENDED RELEASE ORAL DAILY
Qty: 90 TAB | Refills: 2 | Status: SHIPPED | OUTPATIENT
Start: 2017-11-14 | End: 2018-04-11 | Stop reason: SDUPTHER

## 2017-12-21 RX ORDER — AMLODIPINE BESYLATE 5 MG/1
TABLET ORAL
Qty: 30 TAB | Refills: 5 | Status: SHIPPED | OUTPATIENT
Start: 2017-12-21 | End: 2018-05-16 | Stop reason: SDUPTHER

## 2017-12-21 RX ORDER — LISINOPRIL AND HYDROCHLOROTHIAZIDE 20; 25 MG/1; MG/1
TABLET ORAL
Qty: 30 TAB | Refills: 5 | Status: SHIPPED | OUTPATIENT
Start: 2017-12-21 | End: 2018-08-10 | Stop reason: SDUPTHER

## 2017-12-21 RX ORDER — SIMVASTATIN 10 MG/1
10 TABLET, FILM COATED ORAL
Qty: 30 TAB | Refills: 5 | Status: SHIPPED | OUTPATIENT
Start: 2017-12-21 | End: 2018-06-20 | Stop reason: SDUPTHER

## 2017-12-21 NOTE — TELEPHONE ENCOUNTER
"Subjective:       Patient ID: Lizz Oneil is a 26 y.o. female.    Chief Complaint: Follow-up    HPI   25 yo female from [facility] with anxiety presents for f/u visit after labs, H pylori stool test completed and for f/u from starting prozac.  Prozac working well, no SI, no HI, no depression. Anxiety is improved, and stress eating has decreased. No side effects noted.  Also starting trazadone has helped significantly with sleep. No AM grogginess.    Question about right breast mass felt at home. No nipple discharge, no skin changes, no fam hx of breast ca.  Pt is expecting menstrual cycle in next few days.  LMP 9/1/2017.    Review of Systems   Constitutional: Negative for appetite change, chills, diaphoresis, fever and unexpected weight change.   HENT: Negative for congestion, rhinorrhea and trouble swallowing.    Eyes: Negative for visual disturbance.   Respiratory: Negative for chest tightness, shortness of breath and wheezing.    Cardiovascular: Negative for chest pain and palpitations.   Gastrointestinal: Negative for abdominal distention, abdominal pain, constipation, diarrhea, nausea and vomiting.        Chronic epigastric "noise," "maybe hungry"   Genitourinary: Negative for decreased urine volume, difficulty urinating and frequency.   Musculoskeletal: Negative for arthralgias, back pain and joint swelling.   Skin: Negative for wound.   Neurological: Negative for dizziness, seizures, weakness and light-headedness.   Psychiatric/Behavioral: Negative for agitation and decreased concentration.   All other systems reviewed and are negative.      Objective:      Vitals:    10/12/17 0840   BP: (!) 94/57   Pulse: 64     Physical Exam   Constitutional: She is oriented to person, place, and time. She appears well-developed and well-nourished. No distress.   HENT:   Head: Normocephalic and atraumatic.   Mouth/Throat: No oropharyngeal exudate.   Eyes: Conjunctivae and EOM are normal. Pupils are equal, round, and " Saw dr. Megan Sanchez last time reactive to light.   Neck: Normal range of motion. Neck supple. No JVD present.   Cardiovascular: Normal rate, regular rhythm and normal heart sounds.    No murmur heard.  Pulmonary/Chest: Effort normal and breath sounds normal. No respiratory distress. She has no wheezes. She has no rales.   Abdominal: Soft. Bowel sounds are normal. She exhibits no distension and no mass. There is no tenderness.   Genitourinary:   Genitourinary Comments: Right breast: fibrocystic tissue with with pea sized fibrous portion at 12 o'clock portion above nipple deep to tissue, non-tender, no skin changes, no nipple discharge    Left breast: minimal fibroglandular tissue in upper-outer quadrant, no masses palpated   Musculoskeletal: Normal range of motion. She exhibits no edema.   Neurological: She is alert and oriented to person, place, and time. No cranial nerve deficit.   Skin: Skin is warm and dry. Capillary refill takes less than 2 seconds. She is not diaphoretic.   Psychiatric: She has a normal mood and affect. Her behavior is normal. Judgment and thought content normal.   Nursing note and vitals reviewed.        Labs:  CBC reviewed- WNL  CMP reviewed- WNL  TSH reviewed- 2.086  Hep B Surface, Core Ab + (immune)  H Pylori neg    Assessment:       1. Preventative health care    2. Breast mass, right    3. Anxiety        Plan:       Preventative health care  -     Influenza - Quadrivalent (3 years & older) (PF)    Breast mass, right- likely fibrocystic        -     Pt to see Ob-GYN in 2 weeks, will ask them to examine as well        -     Will follow to re-examine after menstrual cycle, consider ultrasound in future        -     Pap with Ob-gyn    Anxiety  -     Improved, Cont Prozac 20 mg daily.     Other orders  -     trazodone (DESYREL) 50 MG tablet.  Refilled      Return in about 6 months (around 4/12/2018).

## 2017-12-22 RX ORDER — AMLODIPINE BESYLATE 5 MG/1
TABLET ORAL
Qty: 30 TAB | Refills: 5 | OUTPATIENT
Start: 2017-12-22

## 2018-01-10 ENCOUNTER — OFFICE VISIT (OUTPATIENT)
Dept: FAMILY MEDICINE CLINIC | Age: 73
End: 2018-01-10

## 2018-01-10 ENCOUNTER — HOSPITAL ENCOUNTER (OUTPATIENT)
Dept: LAB | Age: 73
Discharge: HOME | End: 2018-01-10
Payer: MEDICARE

## 2018-01-10 VITALS
DIASTOLIC BLOOD PRESSURE: 68 MMHG | BODY MASS INDEX: 27.64 KG/M2 | OXYGEN SATURATION: 96 % | HEIGHT: 63 IN | RESPIRATION RATE: 18 BRPM | HEART RATE: 88 BPM | SYSTOLIC BLOOD PRESSURE: 99 MMHG | WEIGHT: 156 LBS | TEMPERATURE: 98 F

## 2018-01-10 DIAGNOSIS — E66.3 OVERWEIGHT (BMI 25.0-29.9): ICD-10-CM

## 2018-01-10 DIAGNOSIS — I10 ESSENTIAL HYPERTENSION: Primary | ICD-10-CM

## 2018-01-10 DIAGNOSIS — D69.6 THROMBOCYTOPENIA (HCC): ICD-10-CM

## 2018-01-10 DIAGNOSIS — J01.90 ACUTE NON-RECURRENT SINUSITIS, UNSPECIFIED LOCATION: ICD-10-CM

## 2018-01-10 DIAGNOSIS — E55.9 VITAMIN D DEFICIENCY: ICD-10-CM

## 2018-01-10 DIAGNOSIS — J45.30 MILD PERSISTENT ASTHMA WITHOUT COMPLICATION: ICD-10-CM

## 2018-01-10 DIAGNOSIS — M48.061 SPINAL STENOSIS OF LUMBAR REGION, UNSPECIFIED WHETHER NEUROGENIC CLAUDICATION PRESENT: ICD-10-CM

## 2018-01-10 DIAGNOSIS — I10 ESSENTIAL HYPERTENSION: ICD-10-CM

## 2018-01-10 DIAGNOSIS — E78.2 MIXED HYPERLIPIDEMIA: ICD-10-CM

## 2018-01-10 LAB
25(OH)D3 SERPL-MCNC: 32.3 NG/ML (ref 30–100)
ALBUMIN SERPL-MCNC: 3.6 G/DL (ref 3.4–5)
ALBUMIN/GLOB SERPL: 0.9 {RATIO} (ref 0.8–1.7)
ALP SERPL-CCNC: 93 U/L (ref 45–117)
ALT SERPL-CCNC: 38 U/L (ref 13–56)
ANION GAP SERPL CALC-SCNC: 8 MMOL/L (ref 3–18)
AST SERPL-CCNC: 31 U/L (ref 15–37)
BASOPHILS # BLD: 0 K/UL (ref 0–0.06)
BASOPHILS NFR BLD: 0 % (ref 0–2)
BILIRUB SERPL-MCNC: 0.4 MG/DL (ref 0.2–1)
BUN SERPL-MCNC: 16 MG/DL (ref 7–18)
BUN/CREAT SERPL: 21 (ref 12–20)
CALCIUM SERPL-MCNC: 9.4 MG/DL (ref 8.5–10.1)
CHLORIDE SERPL-SCNC: 102 MMOL/L (ref 100–108)
CHOLEST SERPL-MCNC: 174 MG/DL
CO2 SERPL-SCNC: 30 MMOL/L (ref 21–32)
CREAT SERPL-MCNC: 0.76 MG/DL (ref 0.6–1.3)
DIFFERENTIAL METHOD BLD: ABNORMAL
EOSINOPHIL # BLD: 0 K/UL (ref 0–0.4)
EOSINOPHIL NFR BLD: 0 % (ref 0–5)
ERYTHROCYTE [DISTWIDTH] IN BLOOD BY AUTOMATED COUNT: 14 % (ref 11.6–14.5)
GLOBULIN SER CALC-MCNC: 4 G/DL (ref 2–4)
GLUCOSE SERPL-MCNC: 113 MG/DL (ref 74–99)
HCT VFR BLD AUTO: 39.6 % (ref 35–45)
HDLC SERPL-MCNC: 81 MG/DL (ref 40–60)
HDLC SERPL: 2.1 {RATIO} (ref 0–5)
HGB BLD-MCNC: 12.7 G/DL (ref 12–16)
LDLC SERPL CALC-MCNC: 73.4 MG/DL (ref 0–100)
LIPID PROFILE,FLP: ABNORMAL
LYMPHOCYTES # BLD: 1.5 K/UL (ref 0.9–3.6)
LYMPHOCYTES NFR BLD: 20 % (ref 21–52)
MCH RBC QN AUTO: 28.3 PG (ref 24–34)
MCHC RBC AUTO-ENTMCNC: 32.1 G/DL (ref 31–37)
MCV RBC AUTO: 88.4 FL (ref 74–97)
MONOCYTES # BLD: 0.4 K/UL (ref 0.05–1.2)
MONOCYTES NFR BLD: 5 % (ref 3–10)
NEUTS SEG # BLD: 5.5 K/UL (ref 1.8–8)
NEUTS SEG NFR BLD: 75 % (ref 40–73)
PLATELET # BLD AUTO: 328 K/UL (ref 135–420)
PMV BLD AUTO: 11.2 FL (ref 9.2–11.8)
POTASSIUM SERPL-SCNC: 3.7 MMOL/L (ref 3.5–5.5)
PROT SERPL-MCNC: 7.6 G/DL (ref 6.4–8.2)
RBC # BLD AUTO: 4.48 M/UL (ref 4.2–5.3)
SODIUM SERPL-SCNC: 140 MMOL/L (ref 136–145)
TRIGL SERPL-MCNC: 98 MG/DL (ref ?–150)
VLDLC SERPL CALC-MCNC: 19.6 MG/DL
WBC # BLD AUTO: 7.5 K/UL (ref 4.6–13.2)

## 2018-01-10 PROCEDURE — 80061 LIPID PANEL: CPT | Performed by: INTERNAL MEDICINE

## 2018-01-10 PROCEDURE — 80053 COMPREHEN METABOLIC PANEL: CPT | Performed by: INTERNAL MEDICINE

## 2018-01-10 PROCEDURE — 36415 COLL VENOUS BLD VENIPUNCTURE: CPT | Performed by: INTERNAL MEDICINE

## 2018-01-10 PROCEDURE — 85025 COMPLETE CBC W/AUTO DIFF WBC: CPT | Performed by: INTERNAL MEDICINE

## 2018-01-10 PROCEDURE — 82306 VITAMIN D 25 HYDROXY: CPT | Performed by: INTERNAL MEDICINE

## 2018-01-10 RX ORDER — AZITHROMYCIN 250 MG/1
TABLET, FILM COATED ORAL
Qty: 6 TAB | Refills: 0 | Status: SHIPPED | OUTPATIENT
Start: 2018-01-10 | End: 2018-01-15

## 2018-01-10 NOTE — PROGRESS NOTES
Chief Complaint   Patient presents with    Follow Up Chronic Condition     3 month    Cough     x1 week     1. Have you been to the ER, urgent care clinic since your last visit? Hospitalized since your last visit? No    2. Have you seen or consulted any other health care providers outside of the [address] [place] [address] since your last visit? Include any pap smears or colon screening. No     Patient is fasting for labs.

## 2018-01-10 NOTE — PATIENT INSTRUCTIONS
Sinusitis: Care Instructions  Your Care Instructions    Sinusitis is an infection of the lining of the sinus cavities in your head. Sinusitis often follows a cold. It causes pain and pressure in your head and face. In most cases, sinusitis gets better on its own in 1 to 2 weeks. But some mild symptoms may last for several weeks. Sometimes antibiotics are needed. Follow-up care is a key part of your treatment and safety. Be sure to make and go to all appointments, and call your doctor if you are having problems. It's also a good idea to know your test results and keep a list of the medicines you take. How can you care for yourself at home? · Take an over-the-counter pain medicine, such as acetaminophen (Tylenol), ibuprofen (Advil, Motrin), or naproxen (Aleve). Read and follow all instructions on the label. · If the doctor prescribed antibiotics, take them as directed. Do not stop taking them just because you feel better. You need to take the full course of antibiotics. · Be careful when taking over-the-counter cold or flu medicines and Tylenol at the same time. Many of these medicines have acetaminophen, which is Tylenol. Read the labels to make sure that you are not taking more than the recommended dose. Too much acetaminophen (Tylenol) can be harmful. · Breathe warm, moist air from a steamy shower, a hot bath, or a sink filled with hot water. Avoid cold, dry air. Using a humidifier in your home may help. Follow the directions for cleaning the machine. · Use saline (saltwater) nasal washes to help keep your nasal passages open and wash out mucus and bacteria. You can buy saline nose drops at a grocery store or drugstore. Or you can make your own at home by adding 1 teaspoon of salt and 1 teaspoon of baking soda to 2 cups of distilled water. If you make your own, fill a bulb syringe with the solution, insert the tip into your nostril, and squeeze gently. Hoy Rash your nose.   · Put a hot, wet towel or a warm gel pack on your face 3 or 4 times a day for 5 to 10 minutes each time. · Try a decongestant nasal spray like oxymetazoline (Afrin). Do not use it for more than 3 days in a row. Using it for more than 3 days can make your congestion worse. When should you call for help? Call your doctor now or seek immediate medical care if:  ? · You have new or worse swelling or redness in your face or around your eyes. ? · You have a new or higher fever. ? Watch closely for changes in your health, and be sure to contact your doctor if:  ? · You have new or worse facial pain. ? · The mucus from your nose becomes thicker (like pus) or has new blood in it. ? · You are not getting better as expected. Where can you learn more? Go to http://ariadna-conor.info/. Enter M407 in the search box to learn more about \"Sinusitis: Care Instructions. \"  Current as of: May 12, 2017  Content Version: 11.4  © 6936-2934 [employer]. Care instructions adapted under license by [employer] (which disclaims liability or warranty for this information). If you have questions about a medical condition or this instruction, always ask your healthcare professional. [address] any warranty or liability for your use of this information.

## 2018-01-10 NOTE — MR AVS SNAPSHOT
Visit Information Date & Time Provider Department Dept. Phone Encounter #  
 1/10/2018  8:45 AM Tierra Horton MD [facility] [id number] Follow-up Instructions Return in about 3 months (around 4/10/2018) for follow up . Your Appointments 10/4/2018 10:30 AM  
Office Visit with MD Rosendo Oneal 23 ([facility]) Andrew Syed. [address]  
  
   
 [address] Upcoming Health Maintenance Date Due DTaP/Tdap/Td series (1 - Tdap) 7/4/1965 ZOSTER VACCINE AGE 60> 5/4/2004 Pneumococcal 65+ Low/Medium Risk (1 of 2 - PCV13) 7/4/2009 GLAUCOMA SCREENING Q2Y 8/18/2018 MEDICARE YEARLY EXAM 10/5/2018 BREAST CANCER SCRN MAMMOGRAM 10/20/2019 COLONOSCOPY 2/2/2027 Allergies as of 1/10/2018  Review Complete On: 1/10/2018 By: Tierra Horton MD  
 No Known Allergies Current Immunizations  Reviewed on 1/10/2018 Name Date Influenza High Dose Vaccine PF 10/4/2017, 12/12/2016 Influenza Vaccine 10/13/2015 12:16 PM  
  
 Reviewed by Tierra Horton MD on 1/10/2018 at  9:01 AM  
You Were Diagnosed With   
  
 Codes Comments Essential hypertension    -  Primary ICD-10-CM: I10 
ICD-9-CM: 401.9 Vitamin D deficiency     ICD-10-CM: E55.9 ICD-9-CM: 268.9 Mixed hyperlipidemia     ICD-10-CM: E78.2 ICD-9-CM: 272.2 Thrombocytopenia ([address]     ICD-10-CM: D69.6 ICD-9-CM: 287.5 Overweight (BMI 25.0-29. 9)     ICD-10-CM: T71.7 ICD-9-CM: 278.02 Acute non-recurrent sinusitis, unspecified location     ICD-10-CM: J01.90 ICD-9-CM: 461.9 Vitals BP Pulse Temp Resp Height(growth percentile) Weight(growth percentile) 99/68 88 98 °F (36.7 °C) (Oral) 18 5' 3\" (1.6 m) 156 lb (70.8 kg) SpO2 BMI OB Status Smoking Status 96% 27.63 kg/m2 Postmenopausal Never Smoker Vitals History BMI and BSA Data Body Mass Index Body Surface Area  
 27.63 kg/m 2 1.77 m 2 Preferred Pharmacy Pharmacy Name Phone [facility], [address] [place] [address] [phone] Your Updated Medication List  
  
   
This list is accurate as of: 1/10/18  9:21 AM.  Always use your most recent med list.  
  
  
  
  
 albuterol 90 mcg/actuation inhaler Commonly known as:  PROVENTIL HFA, VENTOLIN HFA, PROAIR HFA Take 2 Puffs by inhalation every six (6) hours as needed for Wheezing or Shortness of Breath. amLODIPine 5 mg tablet Commonly known as:  Janel Sims TAKE ONE TABLET BY MOUTH DAILY  
  
 aspirin delayed-release 81 mg tablet Take 1 Tab by mouth daily. azithromycin 250 mg tablet Commonly known as:  Carlos Curiel Take 2 tablets today, then take 1 tablet daily  
  
 fluticasone-salmeterol 100-50 mcg/dose diskus inhaler Commonly known as:  ADVAIR DISKUS Take 1 Puff by inhalation every twelve (12) hours. lisinopril-hydroCHLOROthiazide 20-25 mg per tablet Commonly known as:  PRINZIDE, ZESTORETIC  
TAKE ONE TABLET BY MOUTH DAILY  
  
 oxyCODONE IR 15 mg immediate release tablet Commonly known as:  OXY-IR Take 15 mg by mouth every four (4) hours as needed for Pain.  
  
 potassium chloride 10 mEq tablet Commonly known as:  KLOR-CON Take 1 Tab by mouth daily. simvastatin 10 mg tablet Commonly known as:  ZOCOR Take 1 Tab by mouth nightly. tiZANidine 4 mg tablet Commonly known as:  Anatoliy Perch Take 1 Tab by mouth two (2) times a day. VITAMIN D3 2,000 unit Tab Generic drug:  cholecalciferol (vitamin D3) Take  by mouth daily. Prescriptions Sent to Pharmacy Refills  
 azithromycin (ZITHROMAX) 250 mg tablet 0 Sig: Take 2 tablets today, then take 1 tablet daily Class: Normal  
 Pharmacy: [facility], [address] [place] [address], [address] [facility] #: [phone] Follow-up Instructions Return in about 3 months (around 4/10/2018) for follow up . To-Do List   
 01/10/2018 Lab:  CBC WITH AUTOMATED DIFF   
  
 01/10/2018 Lab:  LIPID PANEL   
  
 01/10/2018 Lab:  METABOLIC PANEL, COMPREHENSIVE   
  
 01/10/2018 Lab:  VITAMIN D, 25 HYDROXY Patient Instructions Sinusitis: Care Instructions Your Care Instructions Sinusitis is an infection of the lining of the sinus cavities in your head. Sinusitis often follows a cold. It causes pain and pressure in your head and face. In most cases, sinusitis gets better on its own in 1 to 2 weeks. But some mild symptoms may last for several weeks. Sometimes antibiotics are needed. Follow-up care is a key part of your treatment and safety. Be sure to make and go to all appointments, and call your doctor if you are having problems. It's also a good idea to know your test results and keep a list of the medicines you take. How can you care for yourself at home? · Take an over-the-counter pain medicine, such as acetaminophen (Tylenol), ibuprofen (Advil, Motrin), or naproxen (Aleve). Read and follow all instructions on the label. · If the doctor prescribed antibiotics, take them as directed. Do not stop taking them just because you feel better. You need to take the full course of antibiotics. · Be careful when taking over-the-counter cold or flu medicines and Tylenol at the same time. Many of these medicines have acetaminophen, which is Tylenol. Read the labels to make sure that you are not taking more than the recommended dose. Too much acetaminophen (Tylenol) can be harmful. · Breathe warm, moist air from a steamy shower, a hot bath, or a sink filled with hot water. Avoid cold, dry air. Using a humidifier in your home may help. Follow the directions for cleaning the machine.  
· Use saline (saltwater) nasal washes to help keep your nasal passages open and wash out mucus and bacteria. You can buy saline nose drops at a grocery store or drugstore. Or you can make your own at home by adding 1 teaspoon of salt and 1 teaspoon of baking soda to 2 cups of distilled water. If you make your own, fill a bulb syringe with the solution, insert the tip into your nostril, and squeeze gently. Jodell Chi your nose. · Put a hot, wet towel or a warm gel pack on your face 3 or 4 times a day for 5 to 10 minutes each time. · Try a decongestant nasal spray like oxymetazoline (Afrin). Do not use it for more than 3 days in a row. Using it for more than 3 days can make your congestion worse. When should you call for help? Call your doctor now or seek immediate medical care if: 
? · You have new or worse swelling or redness in your face or around your eyes. ? · You have a new or higher fever. ? Watch closely for changes in your health, and be sure to contact your doctor if: 
? · You have new or worse facial pain. ? · The mucus from your nose becomes thicker (like pus) or has new blood in it. ? · You are not getting better as expected. Where can you learn more? Go to http://ariadna-conor.info/. Enter A601 in the search box to learn more about \"Sinusitis: Care Instructions. \" Current as of: May 12, 2017 Content Version: 11.4 © 2837-7907 [employer]. Care instructions adapted under license by [employer] (which disclaims liability or warranty for this information). If you have questions about a medical condition or this instruction, always ask your healthcare professional. [place] [address] any warranty or liability for your use of this information. Introducing [facility]! Omer Hines introduces Q-Sensei patient portal. Now you can access parts of your medical record, email your doctor's office, and request medication refills online.    
 
1. In your internet browser, go to [email or web address] 2. Click on the First Time User? Click Here link in the Sign In box. You will see the New Member Sign Up page. 3. Enter your [employer] Access Code exactly as it appears below. You will not need to use this code after youve completed the sign-up process. If you do not sign up before the expiration date, you must request a new code. · [email or web address] Access Code: [email or web address] Expires: 4/10/2018  9:21 AM 
 
4. Enter the last four digits of your Social Security Number (xxxx) and Date of Birth (mm/dd/yyyy) as indicated and click Submit. You will be taken to the next sign-up page. 5. Create a [employer] ID. This will be your [employer] login ID and cannot be changed, so think of one that is secure and easy to remember. 6. Create a [employer] password. You can change your password at any time. 7. Enter your Password Reset Question and Answer. This can be used at a later time if you forget your password. 8. Enter your e-mail address. You will receive e-mail notification when new information is available in [address] 9. Click Sign Up. You can now view and download portions of your medical record. 10. Click the Download Summary menu link to download a portable copy of your medical information. If you have questions, please visit the Frequently Asked Questions section of the [email or web address] website. Remember, [employer] is NOT to be used for urgent needs. For medical emergencies, dial 911. Now available from your iPhone and Android! Please provide this summary of care documentation to your next provider. Your primary care clinician is listed as Eunice Whitlock. If you have any questions after today's visit, please call [phone].

## 2018-01-10 NOTE — PROGRESS NOTES
Chief Complaint   Patient presents with    Follow Up Chronic Condition     3 month    Cough     x1 week       Pt is a 67y.o. year old female who presents for follow up of her chronic medical problems    Health Maintenance Due   Topic Date Due    DTaP/Tdap/Td series (1 - Tdap) 07/04/1965    ZOSTER VACCINE AGE 60>  05/04/2004    Pneumococcal 65+ Low/Medium Risk (1 of 2 - PCV13) 07/04/2009-needs Pneumovax     Fasting? yes    Lab Results   Component Value Date/Time    Cholesterol, total 136 12/13/2016 10:10 AM    HDL Cholesterol 72 12/13/2016 10:10 AM    LDL, calculated 49 12/13/2016 10:10 AM    VLDL, calculated 15 12/13/2016 10:10 AM    Triglyceride 75 12/13/2016 10:10 AM    CHOL/HDL Ratio 1.9 12/13/2016 10:10 AM   On Zocor-compliant, no side effects    Lab Results   Component Value Date/Time    Vitamin D 25-Hydroxy 31.8 12/13/2016 10:10 AM   On OTC vit D    BP Readings from Last 3 Encounters:   01/10/18 99/68   10/04/17 146/84   06/20/17 121/71   Repeat BP-better    sinus congestion since the last 5 days; feeling bad  Has not tried OTC meds-her first time to get out since the snow storm     BMI 27  Wt Readings from Last 3 Encounters:   01/10/18 156 lb (70.8 kg)   10/04/17 156 lb (70.8 kg)   06/20/17 152 lb 9.6 oz (69.2 kg)     Using the Advair regularly    Saw Dr. Malcolm Gamino for her back pain-on Oxycodone; has a follow up on Jan 25      ROS:    Pt denies: Wt loss, Fever/Chills, HA, Visual changes, Fatigue, Chest pain, SOB, MONDRAGON, Abd pain, N/V/D/C, Blood in stool or urine, Edema. Pertinent positive as above in HPI.  All others were negative    Patient Active Problem List   Diagnosis Code    Essential hypertension I10    Vitamin D deficiency E55.9    Mixed hyperlipidemia E78.2    Chronic bilateral low back pain with bilateral sciatica M54.42, M54.41, G89.29    Lumbar spinal stenosis M48.061    Mild persistent asthma without complication E92.02    Advanced directives, counseling/discussion Z71.89    Overweight (BMI 25.0-29. 9) E66.3       Past Medical History:   Diagnosis Date    Hypercholesterolemia     Hypertension     Osteoarthritis        Current Outpatient Prescriptions   Medication Sig Dispense Refill           amLODIPine (NORVASC) 5 mg tablet TAKE ONE TABLET BY MOUTH DAILY 30 Tab 5    lisinopril-hydroCHLOROthiazide (PRINZIDE, ZESTORETIC) 20-25 mg per tablet TAKE ONE TABLET BY MOUTH DAILY 30 Tab 5    simvastatin (ZOCOR) 10 mg tablet Take 1 Tab by mouth nightly. 30 Tab 5    potassium chloride (KLOR-CON) 10 mEq tablet Take 1 Tab by mouth daily. 90 Tab 2    fluticasone-salmeterol (ADVAIR DISKUS) 100-50 mcg/dose diskus inhaler Take 1 Puff by inhalation every twelve (12) hours. 1 Each 3    cholecalciferol, vitamin D3, (VITAMIN D3) 2,000 unit tab Take  by mouth daily.  albuterol (PROVENTIL HFA, VENTOLIN HFA, PROAIR HFA) 90 mcg/actuation inhaler Take 2 Puffs by inhalation every six (6) hours as needed for Wheezing or Shortness of Breath. 1 Inhaler 2    aspirin delayed-release 81 mg tablet Take 1 Tab by mouth daily. 30 Tab 1    oxyCODONE IR (OXY-IR) 15 mg immediate release tablet Take 15 mg by mouth every four (4) hours as needed for Pain.  tiZANidine (ZANAFLEX) 4 mg tablet Take 1 Tab by mouth two (2) times a day. 61 Tab 1       History   Smoking Status    Never Smoker   Smokeless Tobacco    Never Used       No Known Allergies    Patient Labs were reviewed: yes      Patient Past Records were reviewed:  yes        Objective:     Vitals:    01/10/18 0856   BP: 99/68   Pulse: 88   Resp: 18   Temp: 98 °F (36.7 °C)   TempSrc: Oral   SpO2: 96%   Weight: 156 lb (70.8 kg)   Height: 5' 3\" (1.6 m)     Body mass index is 27.63 kg/(m^2). Exam:   Appearance: alert, well appearing,  oriented to person, place, and time, acyanotic, in no respiratory distress and well hydrated.   HEENT:  NC/AT, pink conj, anicteric sclerae, nasal congestion, bilateral malar tenderness  Neck:  No cervical lymphadenopathy, no JVD, no thyromegaly, no carotid bruit  Heart:  RRR without M/R/G  Lungs:  CTAB, no rhonchi, rales, or wheezes with good air exchange   Abdomen:  Non-tender, pos bowel sounds, no hepatosplenomegaly  Ext:  No C/C/E    Skin: no rash  Neuro: no lateralizing signs, CNs II-XII intact      Assessment/ Plan:   Diagnoses and all orders for this visit:    1. Essential hypertension-controlled; continue wit present meds and low sodium diet  -     METABOLIC PANEL, COMPREHENSIVE; Future    2. Vitamin D deficiency-on OTC  Vit D  -     VITAMIN D, 25 HYDROXY; Future    3. Mixed hyperlipidemia-on Zocor; HDL elevated  -     LIPID PANEL; Future    4. Thrombocytopenia (HCC)-will check to see if this has resolved  -     CBC WITH AUTOMATED DIFF; Future    5. Overweight (BMI 25.0-29. 9)-discussed diet and exercise to lose weight    6. Acute non-recurrent sinusitis, unspecified location-RTc next week if sxs persist  -     azithromycin (ZITHROMAX) 250 mg tablet; Take 2 tablets today, then take 1 tablet daily    7. Chronic low back pain-continue to see pain mgt    8. Asthma, mild persistent-currently asymptomatic; continue with daily Advair and prn Albuterol    Follow-up Disposition:  Return in about 3 months (around 4/10/2018) for follow up . Given list of vaccines she needs      I have discussed the diagnosis with the patient and the intended plan as seen in the above orders. The patient has received an After-Visit Summary and questions were answered concerning future plans. Medication Side Effects and Warnings were discussed with patient: yes    Patient verbalized understanding of above instructions.     Robinette Sandifer, MD  Internal Medicine  [facility]

## 2018-01-12 ENCOUNTER — TELEPHONE (OUTPATIENT)
Dept: FAMILY MEDICINE CLINIC | Age: 73
End: 2018-01-12

## 2018-01-12 NOTE — TELEPHONE ENCOUNTER
----- Message from Margo Brizuela MD sent at 1/11/2018  6:44 AM EST -----  pls let patient know labs stable-continue with same meds

## 2018-04-11 ENCOUNTER — OFFICE VISIT (OUTPATIENT)
Dept: FAMILY MEDICINE CLINIC | Age: 73
End: 2018-04-11

## 2018-04-11 VITALS
HEART RATE: 90 BPM | HEIGHT: 63 IN | SYSTOLIC BLOOD PRESSURE: 123 MMHG | OXYGEN SATURATION: 96 % | RESPIRATION RATE: 18 BRPM | WEIGHT: 159 LBS | BODY MASS INDEX: 28.17 KG/M2 | DIASTOLIC BLOOD PRESSURE: 77 MMHG | TEMPERATURE: 97.9 F

## 2018-04-11 DIAGNOSIS — E55.9 VITAMIN D DEFICIENCY: ICD-10-CM

## 2018-04-11 DIAGNOSIS — J45.30 MILD PERSISTENT ASTHMA WITHOUT COMPLICATION: ICD-10-CM

## 2018-04-11 DIAGNOSIS — M54.41 CHRONIC BILATERAL LOW BACK PAIN WITH BILATERAL SCIATICA: ICD-10-CM

## 2018-04-11 DIAGNOSIS — M54.42 CHRONIC BILATERAL LOW BACK PAIN WITH BILATERAL SCIATICA: ICD-10-CM

## 2018-04-11 DIAGNOSIS — M25.511 ACUTE PAIN OF RIGHT SHOULDER: ICD-10-CM

## 2018-04-11 DIAGNOSIS — I10 ESSENTIAL HYPERTENSION: Primary | ICD-10-CM

## 2018-04-11 DIAGNOSIS — G89.29 CHRONIC BILATERAL LOW BACK PAIN WITH BILATERAL SCIATICA: ICD-10-CM

## 2018-04-11 DIAGNOSIS — E78.2 MIXED HYPERLIPIDEMIA: ICD-10-CM

## 2018-04-11 RX ORDER — NAPROXEN 500 MG/1
500 TABLET ORAL 2 TIMES DAILY WITH MEALS
Qty: 60 TAB | Refills: 0 | Status: SHIPPED | OUTPATIENT
Start: 2018-04-11 | End: 2018-07-10 | Stop reason: SDUPTHER

## 2018-04-11 RX ORDER — POTASSIUM CHLORIDE 750 MG/1
10 TABLET, EXTENDED RELEASE ORAL DAILY
Qty: 90 TAB | Refills: 2 | Status: SHIPPED | OUTPATIENT
Start: 2018-04-11 | End: 2019-01-03 | Stop reason: SDUPTHER

## 2018-04-11 NOTE — PROGRESS NOTES
Chief Complaint   Patient presents with    Follow Up Chronic Condition     3 month    Shoulder Pain     Related to previous rotator cuff injury       Pt is a 67y.o. year old female who presents for follow up of her chronic medical problems    Right rotator cuff many yrs ago, flared up recently-achy right shoulder-woke her up last night  Has been taking water up to her  house as well as groceries-has since taken it easy    BP Readings from Last 3 Encounters:   04/11/18 123/77   01/10/18 99/68   10/04/17 146/84     Wt Readings from Last 3 Encounters:   04/11/18 159 lb (72.1 kg)   01/10/18 156 lb (70.8 kg)   10/04/17 156 lb (70.8 kg)   BMI 28    Lab Results   Component Value Date/Time    Cholesterol, total 174 01/10/2018 09:23 AM    HDL Cholesterol 81 (H) 01/10/2018 09:23 AM    LDL, calculated 73.4 01/10/2018 09:23 AM    VLDL, calculated 19.6 01/10/2018 09:23 AM    Triglyceride 98 01/10/2018 09:23 AM    CHOL/HDL Ratio 2.1 01/10/2018 09:23 AM   On Zocor    Asthma ok-uses inhaler \"for the most part\"    Still sees Pain mgt  Wants a back brace    Lab Results   Component Value Date/Time    Vitamin D 25-Hydroxy 32.3 01/10/2018 09:23 AM     On OTC Vit d    Health Maintenance Due   Topic Date Due    DTaP/Tdap/Td series (1 - Tdap) 07/04/1965    ZOSTER VACCINE AGE 60>  05/04/2004    Pneumococcal 65+ Low/Medium Risk (1 of 2 - PCV13) 07/04/2009   Advised to get above vaccines at her pharmacy    ROS:    Pt denies: Wt loss, Fever/Chills, HA, Visual changes, Fatigue, Chest pain, SOB, MONDRAGON, Abd pain, N/V/D/C, Blood in stool or urine, Edema. Pertinent positive as above in HPI.  All others were negative    Patient Active Problem List   Diagnosis Code    Essential hypertension I10    Mixed hyperlipidemia E78.2    Chronic bilateral low back pain with bilateral sciatica M54.42, M54.41, G89.29    Lumbar spinal stenosis M48.061    Mild persistent asthma without complication A30.00    Advanced directives, counseling/discussion Z71.89    Overweight (BMI 25.0-29. 9) E66.3       Past Medical History:   Diagnosis Date    Hypercholesterolemia     Hypertension     Osteoarthritis        Current Outpatient Prescriptions   Medication Sig Dispense Refill    potassium chloride (KLOR-CON) 10 mEq tablet Take 1 Tab by mouth daily. 90 Tab 2    naproxen (NAPROSYN) 500 mg tablet Take 1 Tab by mouth two (2) times daily (with meals). 60 Tab 0    amLODIPine (NORVASC) 5 mg tablet TAKE ONE TABLET BY MOUTH DAILY 30 Tab 5    lisinopril-hydroCHLOROthiazide (PRINZIDE, ZESTORETIC) 20-25 mg per tablet TAKE ONE TABLET BY MOUTH DAILY 30 Tab 5    simvastatin (ZOCOR) 10 mg tablet Take 1 Tab by mouth nightly. 30 Tab 5    fluticasone-salmeterol (ADVAIR DISKUS) 100-50 mcg/dose diskus inhaler Take 1 Puff by inhalation every twelve (12) hours. 1 Each 3    cholecalciferol, vitamin D3, (VITAMIN D3) 2,000 unit tab Take  by mouth daily.  albuterol (PROVENTIL HFA, VENTOLIN HFA, PROAIR HFA) 90 mcg/actuation inhaler Take 2 Puffs by inhalation every six (6) hours as needed for Wheezing or Shortness of Breath. 1 Inhaler 2    aspirin delayed-release 81 mg tablet Take 1 Tab by mouth daily. 30 Tab 1    oxyCODONE IR (OXY-IR) 15 mg immediate release tablet Take 15 mg by mouth every four (4) hours as needed for Pain.  tiZANidine (ZANAFLEX) 4 mg tablet Take 1 Tab by mouth two (2) times a day. 61 Tab 1       History   Smoking Status    Never Smoker   Smokeless Tobacco    Never Used       No Known Allergies    Patient Labs were reviewed: yes      Patient Past Records were reviewed:  yes        Objective:     Vitals:    04/11/18 1040   BP: 123/77   Pulse: 90   Resp: 18   Temp: 97.9 °F (36.6 °C)   TempSrc: Oral   SpO2: 96%   Weight: 159 lb (72.1 kg)   Height: 5' 3\" (1.6 m)     Body mass index is 28.17 kg/(m^2). Exam:   Appearance: alert, well appearing,  oriented to person, place, and time, acyanotic, in no respiratory distress and well hydrated.   HEENT:  NC/AT, pink conj, anicteric sclerae  Neck:  No cervical lymphadenopathy, no JVD, no thyromegaly, no carotid bruit  Heart:  RRR without M/R/G  Lungs:  CTAB, no rhonchi, rales, or wheezes with good air exchange   Abdomen:  Non-tender, pos bowel sounds, no hepatosplenomegaly  Ext:  No C/C/E, decreased ROM of the right shoulder on active and passive ROM testing    Skin: no rash  Neuro: no lateralizing signs, CNs II-XII intact      Assessment/ Plan:   Diagnoses and all orders for this visit:    1. Essential hypertension-controlled, continue with Lisinopril HCT; advised to continue with K pills as K is on the low side and she is on diuretics  -     potassium chloride (KLOR-CON) 10 mEq tablet; Take 1 Tab by mouth daily. 2. Acute pain of right shoulder-given exercises to do  -     naproxen (NAPROSYN) 500 mg tablet; Take 1 Tab by mouth two (2) times daily (with meals). 3. Mild persistent asthma without complication-on low dose Advair    4. Chronic bilateral low back pain with bilateral sciatica-sees pain mgt  -     AMB SUPPLY ORDER    5. Mixed hyperlipidemia-at goal on Zocor    6. Vitamin D deficiency-resolved, continue with OTC Vit D        Follow-up Disposition:  Return in about 3 months (around 7/11/2018) for follow up. I have discussed the diagnosis with the patient and the intended plan as seen in the above orders. The patient has received an After-Visit Summary and questions were answered concerning future plans. Medication Side Effects and Warnings were discussed with patient: yes    Patient verbalized understanding of above instructions.     Seda Quesada MD  Internal Medicine  [facility]

## 2018-04-11 NOTE — PATIENT INSTRUCTIONS
Rotator Cuff: Exercises  Your Care Instructions  Here are some examples of typical rehabilitation exercises for your condition. Start each exercise slowly. Ease off the exercise if you start to have pain. Your doctor or physical therapist will tell you when you can start these exercises and which ones will work best for you. How to do the exercises  Pendulum swing    If you have pain in your back, do not do this exercise. 1. Hold on to a table or the back of a chair with your good arm. Then bend forward a little and let your sore arm hang straight down. This exercise does not use the arm muscles. Rather, use your legs and your hips to create movement that makes your arm swing freely. 2. Use the movement from your hips and legs to guide the slightly swinging arm back and forth like a pendulum (or elephant trunk). Then guide it in circles that start small (about the size of a dinner plate). Make the circles a bit larger each day, as your pain allows. 3. Do this exercise for 5 minutes, 5 to 7 times each day. 4. As you have less pain, try bending over a little farther to do this exercise. This will increase the amount of movement at your shoulder. Posterior stretching exercise    1. Hold the elbow of your injured arm with your other hand. 2. Use your hand to pull your injured arm gently up and across your body. You will feel a gentle stretch across the back of your injured shoulder. 3. Hold for at least 15 to 30 seconds. Then slowly lower your arm. 4. Repeat 2 to 4 times. Up-the-back stretch    Your doctor or physical therapist may want you to wait to do this stretch until you have regained most of your range of motion and strength. You can do this stretch in different ways. Hold any of these stretches for at least 15 to 30 seconds. Repeat them 2 to 4 times. 1. Put your hand in your back pocket. Let it rest there to stretch your shoulder.   2. With your other hand, hold your injured arm (palm outward) behind your back by the wrist. Pull your arm up gently to stretch your shoulder. 3. Next, put a towel over your other shoulder. Put the hand of your injured arm behind your back. Now hold the back end of the towel. With the other hand, hold the front end of the towel in front of your body. Pull gently on the front end of the towel. This will bring your hand farther up your back to stretch your shoulder. Overhead stretch    1. Standing about an arm's length away, grasp onto a solid surface. You could use a countertop, a doorknob, or the back of a sturdy chair. 2. With your knees slightly bent, bend forward with your arms straight. Lower your upper body, and let your shoulders stretch. 3. As your shoulders are able to stretch farther, you may need to take a step or two backward. 4. Hold for at least 15 to 30 seconds. Then stand up and relax. If you had stepped back during your stretch, step forward so you can keep your hands on the solid surface. 5. Repeat 2 to 4 times. Shoulder flexion (lying down)    To make a wand for this exercise, use a piece of PVC pipe or a broom handle with the broom removed. Make the wand about a foot wider than your shoulders. 1. Lie on your back, holding a wand with both hands. Your palms should face down as you hold the wand. 2. Keeping your elbows straight, slowly raise your arms over your head. Raise them until you feel a stretch in your shoulders, upper back, and chest.  3. Hold for 15 to 30 seconds. 4. Repeat 2 to 4 times. Shoulder rotation (lying down)    To make a wand for this exercise, use a piece of PVC pipe or a broom handle with the broom removed. Make the wand about a foot wider than your shoulders. 1. Lie on your back. Hold a wand with both hands with your elbows bent and palms up. 2. Keep your elbows close to your body, and move the wand across your body toward the sore arm. 3. Hold for 8 to 12 seconds. 4. Repeat 2 to 4 times.   Wall climbing (to the side)    Avoid any movement that is straight to your side, and be careful not to arch your back. Your arm should stay about 30 degrees to the front of your side. 1. Stand with your side to a wall so that your fingers can just touch it at an angle about 30 degrees toward the front of your body. 2. Walk the fingers of your injured arm up the wall as high as pain permits. Try not to shrug your shoulder up toward your ear as you move your arm up. 3. Hold that position for a count of at least 15 to 20.  4. Walk your fingers back down to the starting position. 5. Repeat at least 2 to 4 times. Try to reach higher each time. Wall climbing (to the front)    During this stretching exercise, be careful not to arch your back. 1. Face a wall, and stand so your fingers can just touch it. 2. Keeping your shoulder down, walk the fingers of your injured arm up the wall as high as pain permits. (Don't shrug your shoulder up toward your ear.)  3. Hold your arm in that position for at least 15 to 30 seconds. 4. Slowly walk your fingers back down to where you started. 5. Repeat at least 2 to 4 times. Try to reach higher each time. Shoulder blade squeeze    1. Stand with your arms at your sides, and squeeze your shoulder blades together. Do not raise your shoulders up as you squeeze. 2. Hold 6 seconds. 3. Repeat 8 to 12 times. Scapular exercise: Arm reach    1. Lie flat on your back. This exercise is a very slight motion that starts with your arms raised (elbows straight, arms straight). 2. From this position, reach higher toward the stephanie or ceiling. Keep your elbows straight. All motion should be from your shoulder blade only. 3. Relax your arms back to where you started. 4. Repeat 8 to 12 times. Arm raise to the side    During this strengthening exercise, your arm should stay about 30 degrees to the front of your side. 1. Slowly raise your injured arm to the side, with your thumb facing up.  Raise your arm 60 degrees at the most (shoulder level is 90 degrees). 2. Hold the position for 3 to 5 seconds. Then lower your arm back to your side. If you need to, bring your \"good\" arm across your body and place it under the elbow as you lower your injured arm. Use your good arm to keep your injured arm from dropping down too fast.  3. Repeat 8 to 12 times. 4. When you first start out, don't hold any extra weight in your hand. As you get stronger, you may use a 1-pound to 2-pound dumbbell or a small can of food. Shoulder flexor and extensor exercise    These are isometric exercises. That means you contract your muscles without actually moving. 1. Push forward (flex): Stand facing a wall or doorjamb, about 6 inches or less back. Hold your injured arm against your body. Make a closed fist with your thumb on top. Then gently push your hand forward into the wall with about 25% to 50% of your strength. Don't let your body move backward as you push. Hold for about 6 seconds. Relax for a few seconds. Repeat 8 to 12 times. 2. Push backward (extend): Stand with your back flat against a wall. Your upper arm should be against the wall, with your elbow bent 90 degrees (your hand straight ahead). Push your elbow gently back against the wall with about 25% to 50% of your strength. Don't let your body move forward as you push. Hold for about 6 seconds. Relax for a few seconds. Repeat 8 to 12 times. Scapular exercise: Wall push-ups    This exercise is best done with your fingers somewhat turned out, rather than straight up and down. 1. Stand facing a wall, about 12 inches to 18 inches away. 2. Place your hands on the wall at shoulder height. 3. Slowly bend your elbows and bring your face to the wall. Keep your back and hips straight. 4. Push back to where you started. 5. Repeat 8 to 12 times. 6. When you can do this exercise against a wall comfortably, you can try it against a counter.  You can then slowly progress to the end of a couch, then to a sturdy chair, and finally to the floor. Scapular exercise: Retraction    For this exercise, you will need elastic exercise material, such as surgical tubing or Thera-Band. 1. Put the band around a solid object at about waist level. (A bedpost will work well.) Each hand should hold an end of the band. 2. With your elbows at your sides and bent to 90 degrees, pull the band back. Your shoulder blades should move toward each other. Then move your arms back where you started. 3. Repeat 8 to 12 times. 4. If you have good range of motion in your shoulders, try this exercise with your arms lifted out to the sides. Keep your elbows at a 90-degree angle. Raise the elastic band up to about shoulder level. Pull the band back to move your shoulder blades toward each other. Then move your arms back where you started. Internal rotator strengthening exercise    1. Start by tying a piece of elastic exercise material to a doorknob. You can use surgical tubing or Thera-Band. 2. Stand or sit with your shoulder relaxed and your elbow bent 90 degrees. Your upper arm should rest comfortably against your side. Squeeze a rolled towel between your elbow and your body for comfort. This will help keep your arm at your side. 3. Hold one end of the elastic band in the hand of the painful arm. 4. Slowly rotate your forearm toward your body until it touches your belly. Slowly move it back to where you started. 5. Keep your elbow and upper arm firmly tucked against the towel roll or at your side. 6. Repeat 8 to 12 times. External rotator strengthening exercise    1. Start by tying a piece of elastic exercise material to a doorknob. You can use surgical tubing or Thera-Band. (You may also hold one end of the band in each hand.)  2. Stand or sit with your shoulder relaxed and your elbow bent 90 degrees. Your upper arm should rest comfortably against your side. Squeeze a rolled towel between your elbow and your body for comfort.  This will help keep your arm at your side. 3. Hold one end of the elastic band with the hand of the painful arm. 4. Start with your forearm across your belly. Slowly rotate the forearm out away from your body. Keep your elbow and upper arm tucked against the towel roll or the side of your body until you begin to feel tightness in your shoulder. Slowly move your arm back to where you started. 5. Repeat 8 to 12 times. Follow-up care is a key part of your treatment and safety. Be sure to make and go to all appointments, and call your doctor if you are having problems. It's also a good idea to know your test results and keep a list of the medicines you take. Where can you learn more? Go to http://ariadna-conor.info/. Enter Yahaira Amaya in the search box to learn more about \"Rotator Cuff: Exercises. \"  Current as of: March 21, 2017  Content Version: 11.4  © 8725-0743 [employer]. Care instructions adapted under license by [employer] (which disclaims liability or warranty for this information). If you have questions about a medical condition or this instruction, always ask your healthcare professional. [address] any warranty or liability for your use of this information.

## 2018-04-11 NOTE — MR AVS SNAPSHOT
Pramod Bhagat [address] [address] [facility] [address] [id number] 
[phone] Patient: Sal Le MRN: [id number] GXY:[id number][birth year] Visit Information Date & Time Provider Department Dept. Phone Encounter #  
 4/11/2018 10:15 AM Jose Dahl MD Chait[facility] 13 [id number] Follow-up Instructions Return in about 3 months (around 7/11/2018) for follow up. Your Appointments 10/4/2018 10:30 AM  
Office Visit with Jose Dahl MD  
[place] [address] ([address]) [address] [facility] [address] [facility] [address]  
  
   
 [address] [place] [address] 95 Upcoming Health Maintenance Date Due DTaP/Tdap/Td series (1 - Tdap) 7/4/1965 ZOSTER VACCINE AGE 60> 5/4/2004 Pneumococcal 65+ Low/Medium Risk (1 of 2 - PCV13) 7/4/2009 GLAUCOMA SCREENING Q2Y 8/18/2018 MEDICARE YEARLY EXAM 10/5/2018 BREAST CANCER SCRN MAMMOGRAM 10/20/2019 COLONOSCOPY 2/2/2027 Allergies as of 4/11/2018  Review Complete On: 4/11/2018 By: Tony Bal LPN No Known Allergies Current Immunizations  Reviewed on 4/11/2018 Name Date Influenza High Dose Vaccine PF 10/4/2017, 12/12/2016 Influenza Vaccine 10/13/2015 12:16 PM  
  
 Reviewed by Jose Dahl MD on 4/11/2018 at 11:05 AM  
You Were Diagnosed With   
  
 Codes Comments Acute pain of right shoulder    -  Primary ICD-10-CM: M25.511 ICD-9-CM: 719.41 Essential hypertension     ICD-10-CM: I10 
ICD-9-CM: 401.9 Mild persistent asthma without complication     ZCD-08-PN: J45.30 ICD-9-CM: 493.90 Chronic bilateral low back pain with bilateral sciatica     ICD-10-CM: M54.42, M54.41, G89.29 ICD-9-CM: 724.2, 724.3, 338.29 Vitals BP Pulse Temp Resp Height(growth percentile) Weight(growth percentile) 123/77 90 97.9 °F (36.6 °C) (Oral) 18 5' 3\" (1.6 m) 159 lb (72.1 kg) SpO2 BMI OB Status Smoking Status 96% 28.17 kg/m2 Postmenopausal Never Smoker BMI and BSA Data Body Mass Index Body Surface Area  
 28.17 kg/m 2 1.79 m 2 Preferred Pharmacy Pharmacy Name Phone [facility], [address] [place] [address] [phone] Your Updated Medication List  
  
   
This list is accurate as of 4/11/18 11:16 AM.  Always use your most recent med list.  
  
  
  
  
 albuterol 90 mcg/actuation inhaler Commonly known as:  PROVENTIL HFA, VENTOLIN HFA, PROAIR HFA Take 2 Puffs by inhalation every six (6) hours as needed for Wheezing or Shortness of Breath. amLODIPine 5 mg tablet Commonly known as:  Unknown [place] TAKE ONE TABLET BY MOUTH DAILY  
  
 aspirin delayed-release 81 mg tablet Take 1 Tab by mouth daily. fluticasone-salmeterol 100-50 mcg/dose diskus inhaler Commonly known as:  ADVAIR DISKUS Take 1 Puff by inhalation every twelve (12) hours. lisinopril-hydroCHLOROthiazide 20-25 mg per tablet Commonly known as:  PRINZIDE, ZESTORETIC  
TAKE ONE TABLET BY MOUTH DAILY  
  
 naproxen 500 mg tablet Commonly known as:  NAPROSYN Take 1 Tab by mouth two (2) times daily (with meals). oxyCODONE IR 15 mg immediate release tablet Commonly known as:  OXY-IR Take 15 mg by mouth every four (4) hours as needed for Pain.  
  
 potassium chloride 10 mEq tablet Commonly known as:  KLOR-CON Take 1 Tab by mouth daily. simvastatin 10 mg tablet Commonly known as:  ZOCOR Take 1 Tab by mouth nightly. tiZANidine 4 mg tablet Commonly known as:  Santos Curran Take 1 Tab by mouth two (2) times a day. VITAMIN D3 2,000 unit Tab Generic drug:  cholecalciferol (vitamin D3) Take  by mouth daily. Prescriptions Sent to Pharmacy Refills  
 potassium chloride (KLOR-CON) 10 mEq tablet 2 Sig: Take 1 Tab by mouth daily.   
 Class: Normal  
 Pharmacy: [facility], [address] [place] [address], [address] [facility] Ph #: [phone] Route: Oral  
 naproxen (NAPROSYN) 500 mg tablet 0 Sig: Take 1 Tab by mouth two (2) times daily (with meals). Class: Normal  
 Pharmacy: [facility], [address] [place] [address], [address] [facility] Ph #: [phone] Route: Oral  
  
We Performed the Following AMB SUPPLY ORDER [[id number] Custom] Comments:  
 rollator Follow-up Instructions Return in about 3 months (around 7/11/2018) for follow up. Patient Instructions Rotator Cuff: Exercises Your Care Instructions Here are some examples of typical rehabilitation exercises for your condition. Start each exercise slowly. Ease off the exercise if you start to have pain. Your doctor or physical therapist will tell you when you can start these exercises and which ones will work best for you. How to do the exercises Pendulum swing If you have pain in your back, do not do this exercise. 1. Hold on to a table or the back of a chair with your good arm. Then bend forward a little and let your sore arm hang straight down. This exercise does not use the arm muscles. Rather, use your legs and your hips to create movement that makes your arm swing freely. 2. Use the movement from your hips and legs to guide the slightly swinging arm back and forth like a pendulum (or elephant trunk). Then guide it in circles that start small (about the size of a dinner plate). Make the circles a bit larger each day, as your pain allows. 3. Do this exercise for 5 minutes, 5 to 7 times each day. 4. As you have less pain, try bending over a little farther to do this exercise. This will increase the amount of movement at your shoulder. Posterior stretching exercise 1. Hold the elbow of your injured arm with your other hand. 2. Use your hand to pull your injured arm gently up and across your body. You will feel a gentle stretch across the back of your injured shoulder. 3. Hold for at least 15 to 30 seconds. Then slowly lower your arm. 4. Repeat 2 to 4 times. Up-the-back stretch Your doctor or physical therapist may want you to wait to do this stretch until you have regained most of your range of motion and strength. You can do this stretch in different ways. Hold any of these stretches for at least 15 to 30 seconds. Repeat them 2 to 4 times. 1. Put your hand in your back pocket. Let it rest there to stretch your shoulder. 2. With your other hand, hold your injured arm (palm outward) behind your back by the wrist. Pull your arm up gently to stretch your shoulder. 3. Next, put a towel over your other shoulder. Put the hand of your injured arm behind your back. Now hold the back end of the towel. With the other hand, hold the front end of the towel in front of your body. Pull gently on the front end of the towel. This will bring your hand farther up your back to stretch your shoulder. Overhead stretch 1. Standing about an arm's length away, grasp onto a solid surface. You could use a countertop, a doorknob, or the back of a sturdy chair. 2. With your knees slightly bent, bend forward with your arms straight. Lower your upper body, and let your shoulders stretch. 3. As your shoulders are able to stretch farther, you may need to take a step or two backward. 4. Hold for at least 15 to 30 seconds. Then stand up and relax. If you had stepped back during your stretch, step forward so you can keep your hands on the solid surface. 5. Repeat 2 to 4 times. Shoulder flexion (lying down) To make a wand for this exercise, use a piece of PVC pipe or a broom handle with the broom removed. Make the wand about a foot wider than your shoulders. 1. Lie on your back, holding a wand with both hands. Your palms should face down as you hold the wand. 2. Keeping your elbows straight, slowly raise your arms over your head. Raise them until you feel a stretch in your shoulders, upper back, and chest. 
3. Hold for 15 to 30 seconds. 4. Repeat 2 to 4 times. Shoulder rotation (lying down) To make a wand for this exercise, use a piece of PVC pipe or a broom handle with the broom removed. Make the wand about a foot wider than your shoulders. 1. Lie on your back. Hold a wand with both hands with your elbows bent and palms up. 2. Keep your elbows close to your body, and move the wand across your body toward the sore arm. 3. Hold for 8 to 12 seconds. 4. Repeat 2 to 4 times. Wall climbing (to the side) Avoid any movement that is straight to your side, and be careful not to arch your back. Your arm should stay about 30 degrees to the front of your side. 1. Stand with your side to a wall so that your fingers can just touch it at an angle about 30 degrees toward the front of your body. 2. Walk the fingers of your injured arm up the wall as high as pain permits. Try not to shrug your shoulder up toward your ear as you move your arm up. 3. Hold that position for a count of at least 15 to 20. 
4. Walk your fingers back down to the starting position. 5. Repeat at least 2 to 4 times. Try to reach higher each time. Wall climbing (to the front) During this stretching exercise, be careful not to arch your back. 1. Face a wall, and stand so your fingers can just touch it. 2. Keeping your shoulder down, walk the fingers of your injured arm up the wall as high as pain permits. (Don't shrug your shoulder up toward your ear.) 3. Hold your arm in that position for at least 15 to 30 seconds. 4. Slowly walk your fingers back down to where you started. 5. Repeat at least 2 to 4 times. Try to reach higher each time. Shoulder blade squeeze 1. Stand with your arms at your sides, and squeeze your shoulder blades together. Do not raise your shoulders up as you squeeze. 2. Hold 6 seconds. 3. Repeat 8 to 12 times. Scapular exercise: Arm reach 1. Lie flat on your back. This exercise is a very slight motion that starts with your arms raised (elbows straight, arms straight). 2. From this position, reach higher toward the stephanie or ceiling. Keep your elbows straight. All motion should be from your shoulder blade only. 3. Relax your arms back to where you started. 4. Repeat 8 to 12 times. Arm raise to the side During this strengthening exercise, your arm should stay about 30 degrees to the front of your side. 1. Slowly raise your injured arm to the side, with your thumb facing up. Raise your arm 60 degrees at the most (shoulder level is 90 degrees). 2. Hold the position for 3 to 5 seconds. Then lower your arm back to your side. If you need to, bring your \"good\" arm across your body and place it under the elbow as you lower your injured arm. Use your good arm to keep your injured arm from dropping down too fast. 
3. Repeat 8 to 12 times. 4. When you first start out, don't hold any extra weight in your hand. As you get stronger, you may use a 1-pound to 2-pound dumbbell or a small can of food. Shoulder flexor and extensor exercise These are isometric exercises. That means you contract your muscles without actually moving. 1. Push forward (flex): Stand facing a wall or doorjamb, about 6 inches or less back. Hold your injured arm against your body. Make a closed fist with your thumb on top. Then gently push your hand forward into the wall with about 25% to 50% of your strength. Don't let your body move backward as you push. Hold for about 6 seconds. Relax for a few seconds. Repeat 8 to 12 times. 2. Push backward (extend): Stand with your back flat against a wall. Your upper arm should be against the wall, with your elbow bent 90 degrees (your hand straight ahead).  Push your elbow gently back against the wall with about 25% to 50% of your strength. Don't let your body move forward as you push. Hold for about 6 seconds. Relax for a few seconds. Repeat 8 to 12 times. Scapular exercise: Wall push-ups This exercise is best done with your fingers somewhat turned out, rather than straight up and down. 1. Stand facing a wall, about 12 inches to 18 inches away. 2. Place your hands on the wall at shoulder height. 3. Slowly bend your elbows and bring your face to the wall. Keep your back and hips straight. 4. Push back to where you started. 5. Repeat 8 to 12 times. 6. When you can do this exercise against a wall comfortably, you can try it against a counter. You can then slowly progress to the end of a couch, then to a sturdy chair, and finally to the floor. Scapular exercise: Retraction For this exercise, you will need elastic exercise material, such as surgical tubing or Thera-Band. 1. Put the band around a solid object at about waist level. (A bedpost will work well.) Each hand should hold an end of the band. 2. With your elbows at your sides and bent to 90 degrees, pull the band back. Your shoulder blades should move toward each other. Then move your arms back where you started. 3. Repeat 8 to 12 times. 4. If you have good range of motion in your shoulders, try this exercise with your arms lifted out to the sides. Keep your elbows at a 90-degree angle. Raise the elastic band up to about shoulder level. Pull the band back to move your shoulder blades toward each other. Then move your arms back where you started. Internal rotator strengthening exercise 1. Start by tying a piece of elastic exercise material to a doorknob. You can use surgical tubing or Thera-Band. 2. Stand or sit with your shoulder relaxed and your elbow bent 90 degrees. Your upper arm should rest comfortably against your side. Squeeze a rolled towel between your elbow and your body for comfort. This will help keep your arm at your side. 3. Hold one end of the elastic band in the hand of the painful arm. 4. Slowly rotate your forearm toward your body until it touches your belly. Slowly move it back to where you started. 5. Keep your elbow and upper arm firmly tucked against the towel roll or at your side. 6. Repeat 8 to 12 times. External rotator strengthening exercise 1. Start by tying a piece of elastic exercise material to a doorknob. You can use surgical tubing or Thera-Band. (You may also hold one end of the band in each hand.) 2. Stand or sit with your shoulder relaxed and your elbow bent 90 degrees. Your upper arm should rest comfortably against your side. Squeeze a rolled towel between your elbow and your body for comfort. This will help keep your arm at your side. 3. Hold one end of the elastic band with the hand of the painful arm. 4. Start with your forearm across your belly. Slowly rotate the forearm out away from your body. Keep your elbow and upper arm tucked against the towel roll or the side of your body until you begin to feel tightness in your shoulder. Slowly move your arm back to where you started. 5. Repeat 8 to 12 times. Follow-up care is a key part of your treatment and safety. Be sure to make and go to all appointments, and call your doctor if you are having problems. It's also a good idea to know your test results and keep a list of the medicines you take. Where can you learn more? Go to http://ariadna-conor.info/. Enter Franciso Aschoff in the search box to learn more about \"Rotator Cuff: Exercises. \" Current as of: March 21, 2017 Content Version: 11.4 © 7736-9993 [employer]. Care instructions adapted under license by [employer] (which disclaims liability or warranty for this information).  If you have questions about a medical condition or this instruction, always ask your healthcare professional. [employer] disclaims any warranty or liability for your use of this information. Introducing [facility]! Preston Blackmon introduces EachNet patient portal. Now you can access parts of your medical record, email your doctor's office, and request medication refills online. 1. In your internet browser, go to [email or web address]. [email or web address] 2. Click on the First Time User? Click Here link in the Sign In box. You will see the New Member Sign Up page. 3. Enter your EachNet Access Code exactly as it appears below. You will not need to use this code after youve completed the sign-up process. If you do not sign up before the expiration date, you must request a new code. · EachNet Access Code: [email or web address] Expires: 7/10/2018 11:16 AM 
 
4. Enter the last four digits of your Social Security Number (xxxx) and Date of Birth (mm/dd/yyyy) as indicated and click Submit. You will be taken to the next sign-up page. 5. Create a EachNet ID. This will be your EachNet login ID and cannot be changed, so think of one that is secure and easy to remember. 6. Create a EachNet password. You can change your password at any time. 7. Enter your Password Reset Question and Answer. This can be used at a later time if you forget your password. 8. Enter your e-mail address. You will receive e-mail notification when new information is available in [address] 9. Click Sign Up. You can now view and download portions of your medical record. 10. Click the Download Summary menu link to download a portable copy of your medical information. If you have questions, please visit the Frequently Asked Questions section of the EachNet website. Remember, EachNet is NOT to be used for urgent needs. For medical emergencies, dial 911. Now available from your iPhone and Android! Please provide this summary of care documentation to your next provider. Your primary care clinician is listed as Daisy Locke. If you have any questions after today's visit, please call [phone].

## 2018-04-11 NOTE — PROGRESS NOTES
Chief Complaint   Patient presents with    Follow Up Chronic Condition     3 month    Shoulder Pain     Related to previous rotator cuff injury       1. Have you been to the ER, urgent care clinic since your last visit? Hospitalized since your last visit? No    2. Have you seen or consulted any other health care providers outside of the [address] [place] [address] since your last visit? Include any pap smears or colon screening.  No

## 2018-04-16 PROBLEM — E55.9 VITAMIN D DEFICIENCY: Status: RESOLVED | Noted: 2017-06-20 | Resolved: 2018-04-16

## 2018-06-21 RX ORDER — SIMVASTATIN 10 MG/1
10 TABLET, FILM COATED ORAL
Qty: 30 TAB | Refills: 5 | Status: SHIPPED | OUTPATIENT
Start: 2018-06-21 | End: 2018-09-17 | Stop reason: SDUPTHER

## 2018-07-10 ENCOUNTER — OFFICE VISIT (OUTPATIENT)
Dept: FAMILY MEDICINE CLINIC | Age: 73
End: 2018-07-10

## 2018-07-10 VITALS
WEIGHT: 157.6 LBS | DIASTOLIC BLOOD PRESSURE: 78 MMHG | SYSTOLIC BLOOD PRESSURE: 137 MMHG | HEART RATE: 87 BPM | OXYGEN SATURATION: 97 % | RESPIRATION RATE: 16 BRPM | TEMPERATURE: 98 F | BODY MASS INDEX: 27.93 KG/M2 | HEIGHT: 63 IN

## 2018-07-10 DIAGNOSIS — E78.2 MIXED HYPERLIPIDEMIA: ICD-10-CM

## 2018-07-10 DIAGNOSIS — J45.30 MILD PERSISTENT ASTHMA WITHOUT COMPLICATION: ICD-10-CM

## 2018-07-10 DIAGNOSIS — M17.0 PRIMARY OSTEOARTHRITIS OF BOTH KNEES: ICD-10-CM

## 2018-07-10 DIAGNOSIS — I10 ESSENTIAL HYPERTENSION: Primary | ICD-10-CM

## 2018-07-10 DIAGNOSIS — E66.3 OVERWEIGHT (BMI 25.0-29.9): ICD-10-CM

## 2018-07-10 DIAGNOSIS — M54.41 CHRONIC BILATERAL LOW BACK PAIN WITH BILATERAL SCIATICA: ICD-10-CM

## 2018-07-10 DIAGNOSIS — M54.42 CHRONIC BILATERAL LOW BACK PAIN WITH BILATERAL SCIATICA: ICD-10-CM

## 2018-07-10 DIAGNOSIS — G89.29 CHRONIC BILATERAL LOW BACK PAIN WITH BILATERAL SCIATICA: ICD-10-CM

## 2018-07-10 RX ORDER — NAPROXEN 500 MG/1
500 TABLET ORAL 2 TIMES DAILY WITH MEALS
Qty: 60 TAB | Refills: 0 | Status: SHIPPED | OUTPATIENT
Start: 2018-07-10 | End: 2019-01-03 | Stop reason: SDUPTHER

## 2018-07-10 NOTE — MR AVS SNAPSHOT
[facility]ramod [facility] 879 [address] [facility] [address] [id number] [phone] Patient: Treasure Sheth MRN: [id number] U:3/0/[id number] Visit Information Date & Time Provider Department Dept. Phone Encounter #  
 7/10/2018 10:15 AM Parag Barger MD Chaitanya Saavedra [id number] Follow-up Instructions Return in about 3 months (around 10/10/2018) for follow up. Your Appointments 10/4/2018 10:30 AM  
Office Visit with MD Rosendo Kline 23 ([facility]) [facility] [address]. [address]  
  
   
 [address] Upcoming Health Maintenance Date Due DTaP/Tdap/Td series (1 - Tdap) 7/4/1965 ZOSTER VACCINE AGE 60> 5/4/2004 Pneumococcal 65+ Low/Medium Risk (1 of 2 - PCV13) 7/4/2009 GLAUCOMA SCREENING Q2Y 8/18/2018 Influenza Age 5 to Adult 8/1/2018 MEDICARE YEARLY EXAM 10/5/2018 BREAST CANCER SCRN MAMMOGRAM 10/20/2019 COLONOSCOPY 2/2/2027 Allergies as of 7/10/2018  Review Complete On: 7/10/2018 By: Rosemarie Walters LPN No Known Allergies Current Immunizations  Reviewed on 7/10/2018 Name Date Influenza High Dose Vaccine PF 10/4/2017, 12/12/2016 Influenza Vaccine 10/13/2015 12:16 PM  
  
 Reviewed by Parag Barger MD on 7/10/2018 at 10:31 AM  
 Reviewed by Parag Barger MD on 7/10/2018 at 10:31 AM  
You Were Diagnosed With   
  
 Codes Comments Essential hypertension    -  Primary ICD-10-CM: I10 
ICD-9-CM: 401.9 Primary osteoarthritis of both knees     ICD-10-CM: M17.0 ICD-9-CM: 715.16 Mixed hyperlipidemia     ICD-10-CM: E78.2 ICD-9-CM: 272.2 Mild persistent asthma without complication     P-40-FV: J45.30 ICD-9-CM: 493.90 Chronic bilateral low back pain with bilateral sciatica     ICD-10-CM: M54.42, M54.41, G89.29 ICD-9-CM: 724.2, 724.3, 338.29   
 Overweight (BMI 25.0-29. 9)     ICD-10-CM: D18.2 ICD-9-CM: 278.02 Vitals BP Pulse Temp Resp Height(growth percentile) Weight(growth percentile)  
 137/78 87 98 °F (36.7 °C) (Oral) 16 5' 3\" (1.6 m) 157 lb 9.6 oz (71.5 kg) SpO2 BMI OB Status Smoking Status 97% 27.92 kg/m2 Postmenopausal Never Smoker Vitals History BMI and BSA Data Body Mass Index Body Surface Area  
 27.92 kg/m 2 1.78 m 2 Preferred Pharmacy Pharmacy Name Phone [facility], [address] [place] [address], [address] [place] [address] [phone] Your Updated Medication List  
  
   
This list is accurate as of 7/10/18 10:40 AM.  Always use your most recent med list.  
  
  
  
  
 albuterol 90 mcg/actuation inhaler Commonly known as:  PROVENTIL HFA, VENTOLIN HFA, PROAIR HFA Take 2 Puffs by inhalation every six (6) hours as needed for Wheezing or Shortness of Breath. amLODIPine 5 mg tablet Commonly known as:  Santos Fanti TAKE ONE TABLET BY MOUTH EVERY DAY  
  
 aspirin delayed-release 81 mg tablet Take 1 Tab by mouth daily. fluticasone-salmeterol 100-50 mcg/dose diskus inhaler Commonly known as:  ADVAIR DISKUS Take 1 Puff by inhalation every twelve (12) hours. lisinopril-hydroCHLOROthiazide 20-25 mg per tablet Commonly known as:  PRINZIDE, ZESTORETIC  
TAKE ONE TABLET BY MOUTH DAILY  
  
 naproxen 500 mg tablet Commonly known as:  NAPROSYN Take 1 Tab by mouth two (2) times daily (with meals). oxyCODONE IR 15 mg immediate release tablet Commonly known as:  OXY-IR Take 15 mg by mouth every four (4) hours as needed for Pain.  
  
 potassium chloride 10 mEq tablet Commonly known as:  KLOR-CON Take 1 Tab by mouth daily. simvastatin 10 mg tablet Commonly known as:  ZOCOR Take 1 Tab by mouth nightly. tiZANidine 4 mg tablet Commonly known as:  [place] Take 1 Tab by mouth two (2) times a day. VITAMIN D3 2,000 unit Tab Generic drug:  cholecalciferol (vitamin D3) Take  by mouth daily. Prescriptions Sent to Pharmacy Refills  
 naproxen (NAPROSYN) 500 mg tablet 0 Sig: Take 1 Tab by mouth two (2) times daily (with meals). Class: Normal  
 [facility], [address] [place] [address], [address] [facility] #: [phone] Route: Oral  
  
We Performed the Following REFERRAL TO ORTHOPEDICS [DLK250 Custom] Follow-up Instructions Return in about 3 months (around 10/10/2018) for follow up. Referral Information Referral ID Referred By Referred To  
  
 [id number] Mele Glover Not Available Visits Status Start Date End Date 1 New Request 7/10/18 7/10/19 If your referral has a status of pending review or denied, additional information will be sent to support the outcome of this decision. Patient Instructions Knee Arthritis: Exercises Your Care Instructions Here are some examples of exercises for knee arthritis. Start each exercise slowly. Ease off the exercise if you start to have pain. Your doctor or physical therapist will tell you when you can start these exercises and which ones will work best for you. How to do the exercises Knee flexion with heel slide 1. Lie on your back with your knees bent. 2. Slide your heel back by bending your affected knee as far as you can. Then hook your other foot around your ankle to help pull your heel even farther back. 3. Hold for about 6 seconds, then rest for up to 10 seconds. 4. Repeat 8 to 12 times. 5. Switch legs and repeat steps 1 through 4, even if only one knee is sore. [facility] [employer] 1. Sit with your affected leg straight and supported on the floor or a firm bed. Place a small, rolled-up towel under your knee. Your other leg should be bent, with that foot flat on the floor. 2. Tighten the thigh muscles of your affected leg by pressing the back of your knee down into the towel. 3. Hold for about 6 seconds, then rest for up to 10 seconds. 4. Repeat 8 to 12 times. 5. Switch legs and repeat steps 1 through 4, even if only one knee is sore. Straight-leg raises to the front 1. Lie on your back with your good knee bent so that your foot rests flat on the floor. Your affected leg should be straight. Make sure that your low back has a normal curve. You should be able to slip your hand in between the floor and the small of your back, with your palm touching the floor and your back touching the back of your hand. 2. Tighten the thigh muscles in your affected leg by pressing the back of your knee flat down to the floor. Hold your knee straight. 3. Keeping the thigh muscles tight and your leg straight, lift your affected leg up so that your heel is about 12 inches off the floor. Hold for about 6 seconds, then lower slowly. 4. Relax for up to 10 seconds between repetitions. 5. Repeat 8 to 12 times. 6. Switch legs and repeat steps 1 through 5, even if only one knee is sore. Active knee flexion 1. Lie on your stomach with your knees straight. If your kneecap is uncomfortable, roll up a washcloth and put it under your leg just above your kneecap. 2. Lift the foot of your affected leg by bending the knee so that you bring the foot up toward your buttock. If this motion hurts, try it without bending your knee quite as far. This may help you avoid any painful motion. 3. Slowly move your leg up and down. 4. Repeat 8 to 12 times. 5. Switch legs and repeat steps 1 through 4, even if only one knee is sore. Quadriceps stretch (facedown) 1. Lie flat on your stomach, and rest your face on the floor. 2. Wrap a towel or belt strap around the lower part of your affected leg. Then use the towel or belt strap to slowly pull your heel toward your buttock until you feel a stretch. 3. Hold for about 15 to 30 seconds, then relax your leg against the towel or belt strap. 4. Repeat 2 to 4 times. 5. Switch legs and repeat steps 1 through 4, even if only one knee is sore. Stationary exercise bike 1. If you do not have a stationary exercise bike at home, you can find one to ride at your local health club or community center. 2. Adjust the height of the bike seat so that your knee is slightly bent when your leg is extended downward. If your knee hurts when the pedal reaches the top, you can raise the seat so that your knee does not bend as much. 3. Start slowly. At first, try to do 5 to 10 minutes of cycling with little to no resistance. Then increase your time and the resistance bit by bit until you can do 20 to 30 minutes without pain. 4. If you start to have pain, rest your knee until your pain gets back to the level that is normal for you. Or cycle for less time or with less effort. Follow-up care is a key part of your treatment and safety. Be sure to make and go to all appointments, and call your doctor if you are having problems. It's also a good idea to know your test results and keep a list of the medicines you take. Where can you learn more? Go to http://ariadna-conor.info/. Enter C159 in the search box to learn more about \"Knee Arthritis: Exercises. \" Current as of: March 21, 2017 Content Version: 11.4 © 7116-4291 Healthwise, Incorporated. Care instructions adapted under license by [employer] (which disclaims liability or warranty for this information). If you have questions about a medical condition or this instruction, always ask your healthcare professional. [place] [address] any warranty or liability for your use of this information. Introducing [facility] [place] [facility]! [facility] introduces [employer] patient portal. Now you can access parts of your medical record, email your doctor's office, and request medication refills online. 1. In your internet browser, go to [email or web address] 2. Click on the First Time User? Click Here link in the Sign In box. You will see the New Member Sign Up page. 3. Enter your Gini Access Code exactly as it appears below. You will not need to use this code after youve completed the sign-up process. If you do not sign up before the expiration date, you must request a new code. · Gini Access Code: [email or web address] Expires: 7/10/2018 11:16 AM 
 
4. Enter the last four digits of your Social Security Number (xxxx) and Date of Birth (mm/dd/yyyy) as indicated and click Submit. You will be taken to the next sign-up page. 5. Create a Gini ID. This will be your Gini login ID and cannot be changed, so think of one that is secure and easy to remember. 6. Create a Gini password. You can change your password at any time. 7. Enter your Password Reset Question and Answer. This can be used at a later time if you forget your password. 8. Enter your e-mail address. You will receive e-mail notification when new information is available in [address] 9. Click Sign Up. You can now view and download portions of your medical record. 10. Click the Download Summary menu link to download a portable copy of your medical information. If you have questions, please visit the Frequently Asked Questions section of the Gini website. Remember, Gini is NOT to be used for urgent needs. For medical emergencies, dial 911. Now available from your iPhone and Android! Please provide this summary of care documentation to your next provider. Your primary care clinician is listed as Haresh Moralez. If you have any questions after today's visit, please call [phone].

## 2018-07-10 NOTE — PROGRESS NOTES
Chief Complaint   Patient presents with    Follow Up Chronic Condition     3 month; patient is not fasting    Knee Pain     Right knee, unable to bear weight       1. Have you been to the ER, urgent care clinic since your last visit? Hospitalized since your last visit? No    2. Have you seen or consulted any other health care providers outside of the [facility] since your last visit? Include any pap smears or colon screening.  No

## 2018-07-10 NOTE — PATIENT INSTRUCTIONS
Knee Arthritis: Exercises Your Care Instructions Here are some examples of exercises for knee arthritis. Start each exercise slowly. Ease off the exercise if you start to have pain. Your doctor or physical therapist will tell you when you can start these exercises and which ones will work best for you. How to do the exercises Knee flexion with heel slide 1. Lie on your back with your knees bent. 2. Slide your heel back by bending your affected knee as far as you can. Then hook your other foot around your ankle to help pull your heel even farther back. 3. Hold for about 6 seconds, then rest for up to 10 seconds. 4. Repeat 8 to 12 times. 5. Switch legs and repeat steps 1 through 4, even if only one knee is sore. [employer] 1. Sit with your affected leg straight and supported on the floor or a firm bed. Place a small, rolled-up towel under your knee. Your other leg should be bent, with that foot flat on the floor. 2. Tighten the thigh muscles of your affected leg by pressing the back of your knee down into the towel. 3. Hold for about 6 seconds, then rest for up to 10 seconds. 4. Repeat 8 to 12 times. 5. Switch legs and repeat steps 1 through 4, even if only one knee is sore. Straight-leg raises to the front 1. Lie on your back with your good knee bent so that your foot rests flat on the floor. Your affected leg should be straight. Make sure that your low back has a normal curve. You should be able to slip your hand in between the floor and the small of your back, with your palm touching the floor and your back touching the back of your hand. 2. Tighten the thigh muscles in your affected leg by pressing the back of your knee flat down to the floor. Hold your knee straight. 3. Keeping the thigh muscles tight and your leg straight, lift your affected leg up so that your heel is about 12 inches off the floor. Hold for about 6 seconds, then lower slowly.  
4. Relax for up to 10 seconds between repetitions. 5. Repeat 8 to 12 times. 6. Switch legs and repeat steps 1 through 5, even if only one knee is sore. Active knee flexion 1. Lie on your stomach with your knees straight. If your kneecap is uncomfortable, roll up a washcloth and put it under your leg just above your kneecap. 2. Lift the foot of your affected leg by bending the knee so that you bring the foot up toward your buttock. If this motion hurts, try it without bending your knee quite as far. This may help you avoid any painful motion. 3. Slowly move your leg up and down. 4. Repeat 8 to 12 times. 5. Switch legs and repeat steps 1 through 4, even if only one knee is sore. Quadriceps stretch (facedown) 1. Lie flat on your stomach, and rest your face on the floor. 2. Wrap a towel or belt strap around the lower part of your affected leg. Then use the towel or belt strap to slowly pull your heel toward your buttock until you feel a stretch. 3. Hold for about 15 to 30 seconds, then relax your leg against the towel or belt strap. 4. Repeat 2 to 4 times. 5. Switch legs and repeat steps 1 through 4, even if only one knee is sore. Stationary exercise bike 1. If you do not have a stationary exercise bike at home, you can find one to ride at your local health club or community center. 2. Adjust the height of the bike seat so that your knee is slightly bent when your leg is extended downward. If your knee hurts when the pedal reaches the top, you can raise the seat so that your knee does not bend as much. 3. Start slowly. At first, try to do 5 to 10 minutes of cycling with little to no resistance. Then increase your time and the resistance bit by bit until you can do 20 to 30 minutes without pain. 4. If you start to have pain, rest your knee until your pain gets back to the level that is normal for you. Or cycle for less time or with less effort. Follow-up care is a key part of your treatment and safety.  Be sure to make and go to all appointments, and call your doctor if you are having problems. It's also a good idea to know your test results and keep a list of the medicines you take. Where can you learn more? Go to http://ariadna-conor.info/. Enter C159 in the search box to learn more about \"Knee Arthritis: Exercises. \" Current as of: March 21, 2017 Content Version: 11.4 © 9849-5674 mascotsecret. Care instructions adapted under license by [employer] (which disclaims liability or warranty for this information). If you have questions about a medical condition or this instruction, always ask your healthcare professional. [address] any warranty or liability for your use of this information.

## 2018-07-10 NOTE — PROGRESS NOTES
Chief Complaint   Patient presents with    Follow Up Chronic Condition     3 month; patient is not fasting    Knee Pain     Right knee, unable to bear weight       Pt is a 75y.o. year old female who presents for follow up of her chronic medical problems    Health Maintenance Due   Topic Date Due    DTaP/Tdap/Td series (1 - Tdap) 07/04/1965    ZOSTER VACCINE AGE 60>  05/04/2004    Pneumococcal 65+ Low/Medium Risk (1 of 2 - PCV13) 07/04/2009    GLAUCOMA SCREENING Q2Y  08/18/2018-wears eyeglasses/already scheduled -Dr Ravi Jones at [address]     BMI 27  Wt Readings from Last 3 Encounters:   07/10/18 157 lb 9.6 oz (71.5 kg)   04/11/18 159 lb (72.1 kg)   01/10/18 156 lb (70.8 kg)       BP Readings from Last 3 Encounters:   07/10/18 137/78   04/11/18 123/77   01/10/18 99/68       Lab Results   Component Value Date/Time    Cholesterol, total 174 01/10/2018 09:23 AM    HDL Cholesterol 81 (H) 01/10/2018 09:23 AM    LDL, calculated 73.4 01/10/2018 09:23 AM    VLDL, calculated 19.6 01/10/2018 09:23 AM    Triglyceride 98 01/10/2018 09:23 AM    CHOL/HDL Ratio 2.1 01/10/2018 09:23 AM     Asthma-doing well    Pain mgt-seeing her for right sided sciatica-on Oxycodone  Given shot on the right shoulder months ago    New sx: right knee pain-has been putting up with it  Walks with a walker now  Tricompartment arthritis on left knee on Xray in 2017  Has been going up and down stairs at her daughter's house  Ran out of [employer]    ROS:    Pt denies: Wt loss, Fever/Chills, HA, Visual changes, Fatigue, Chest pain, SOB, MONDRAGON, Abd pain, N/V/D/C, Blood in stool or urine, Edema. Pertinent positive as above in HPI.  All others were negative    Patient Active Problem List   Diagnosis Code    Essential hypertension I10    Mixed hyperlipidemia E78.2    Chronic bilateral low back pain with bilateral sciatica M54.42, M54.41, G89.29    Lumbar spinal stenosis M48.061    Mild persistent asthma without complication Q88.22    Advanced directives, counseling/discussion Z71.89    Overweight (BMI 25.0-29. 9) E66.3       Past Medical History:   Diagnosis Date    Hypercholesterolemia     Hypertension     Osteoarthritis        Current Outpatient Prescriptions   Medication Sig Dispense Refill    naproxen (NAPROSYN) 500 mg tablet Take 1 Tab by mouth two (2) times daily (with meals). 60 Tab 0    simvastatin (ZOCOR) 10 mg tablet Take 1 Tab by mouth nightly. 30 Tab 5    amLODIPine (NORVASC) 5 mg tablet TAKE ONE TABLET BY MOUTH EVERY DAY 30 Tab 5    potassium chloride (KLOR-CON) 10 mEq tablet Take 1 Tab by mouth daily. 90 Tab 2    lisinopril-hydroCHLOROthiazide (PRINZIDE, ZESTORETIC) 20-25 mg per tablet TAKE ONE TABLET BY MOUTH DAILY 30 Tab 5    fluticasone-salmeterol (ADVAIR DISKUS) 100-50 mcg/dose diskus inhaler Take 1 Puff by inhalation every twelve (12) hours. 1 Each 3    cholecalciferol, vitamin D3, (VITAMIN D3) 2,000 unit tab Take  by mouth daily.  albuterol (PROVENTIL HFA, VENTOLIN HFA, PROAIR HFA) 90 mcg/actuation inhaler Take 2 Puffs by inhalation every six (6) hours as needed for Wheezing or Shortness of Breath. 1 Inhaler 2    aspirin delayed-release 81 mg tablet Take 1 Tab by mouth daily. 30 Tab 1    oxyCODONE IR (OXY-IR) 15 mg immediate release tablet Take 15 mg by mouth every four (4) hours as needed for Pain.  tiZANidine (ZANAFLEX) 4 mg tablet Take 1 Tab by mouth two (2) times a day. 61 Tab 1       History   Smoking Status    Never Smoker   Smokeless Tobacco    Never Used       No Known Allergies    Patient Labs were reviewed: yes      Patient Past Records were reviewed:  yes        Objective:     Vitals:    07/10/18 1013   BP: 137/78   Pulse: 87   Resp: 16   Temp: 98 °F (36.7 °C)   TempSrc: Oral   SpO2: 97%   Weight: 157 lb 9.6 oz (71.5 kg)   Height: 5' 3\" (1.6 m)     Body mass index is 27.92 kg/(m^2).     Exam:   Appearance: alert, well appearing,  oriented to person, place, and time, acyanotic, in no respiratory distress and well hydrated. HEENT:  NC/AT, pink conj, anicteric sclerae  Neck:  No cervical lymphadenopathy, no JVD, no thyromegaly, no carotid bruit  Heart:  RRR without M/R/G  Lungs:  CTAB, no rhonchi, rales, or wheezes with good air exchange   Abdomen:  Non-tender, pos bowel sounds, no hepatosplenomegaly  Ext:  No C/C/E, crepitus on both knees   Skin: no rash  Neuro: no lateralizing signs, CNs II-XII intact      Assessment/ Plan:   Diagnoses and all orders for this visit:    1. Essential hypertension-controlled, continue with Norvasc, Lisinopril/Hct    2. Primary osteoarthritis of both knees  -     naproxen (NAPROSYN) 500 mg tablet; Take 1 Tab by mouth two (2) times daily (with meals). -     REFERRAL TO ORTHOPEDICS    3. Mixed hyperlipidemia-at goal on Zocor    4. Mild persistent asthma without complication-asymptomatic on daily low dose Advair    5. Chronic bilateral low back pain with bilateral sciatica-pain mgt following    6. Overweight (BMI 25.0-29. 9)-discussed limiting vicente to 0013-9258/day as she is unable to exercise       Follow-up Disposition:  Return in about 3 months (around 10/10/2018) for follow up. I have discussed the diagnosis with the patient and the intended plan as seen in the above orders. The patient has received an After-Visit Summary and questions were answered concerning future plans. Medication Side Effects and Warnings were discussed with patient: yes    Patient verbalized understanding of above instructions.     Lakesha Reed MD  Internal Medicine  [facility] [place] [facility]

## 2018-10-04 ENCOUNTER — OFFICE VISIT (OUTPATIENT)
Dept: FAMILY MEDICINE CLINIC | Age: 73
End: 2018-10-04

## 2018-10-04 ENCOUNTER — HOSPITAL ENCOUNTER (OUTPATIENT)
Dept: LAB | Age: 73
Discharge: HOME | End: 2018-10-04
Payer: MEDICARE

## 2018-10-04 VITALS
BODY MASS INDEX: 28.07 KG/M2 | OXYGEN SATURATION: 95 % | RESPIRATION RATE: 16 BRPM | HEART RATE: 99 BPM | HEIGHT: 63 IN | SYSTOLIC BLOOD PRESSURE: 120 MMHG | TEMPERATURE: 97.8 F | WEIGHT: 158.4 LBS | DIASTOLIC BLOOD PRESSURE: 75 MMHG

## 2018-10-04 DIAGNOSIS — M54.42 CHRONIC BILATERAL LOW BACK PAIN WITH BILATERAL SCIATICA: ICD-10-CM

## 2018-10-04 DIAGNOSIS — I10 ESSENTIAL HYPERTENSION: ICD-10-CM

## 2018-10-04 DIAGNOSIS — Z71.89 ADVANCED DIRECTIVES, COUNSELING/DISCUSSION: ICD-10-CM

## 2018-10-04 DIAGNOSIS — E78.2 MIXED HYPERLIPIDEMIA: ICD-10-CM

## 2018-10-04 DIAGNOSIS — T40.2X5A THERAPEUTIC OPIOID-INDUCED CONSTIPATION (OIC): ICD-10-CM

## 2018-10-04 DIAGNOSIS — M54.41 CHRONIC BILATERAL LOW BACK PAIN WITH BILATERAL SCIATICA: ICD-10-CM

## 2018-10-04 DIAGNOSIS — K59.03 THERAPEUTIC OPIOID-INDUCED CONSTIPATION (OIC): ICD-10-CM

## 2018-10-04 DIAGNOSIS — G89.29 CHRONIC BILATERAL LOW BACK PAIN WITH BILATERAL SCIATICA: ICD-10-CM

## 2018-10-04 DIAGNOSIS — Z23 ENCOUNTER FOR IMMUNIZATION: ICD-10-CM

## 2018-10-04 DIAGNOSIS — E66.3 OVERWEIGHT (BMI 25.0-29.9): ICD-10-CM

## 2018-10-04 DIAGNOSIS — Z00.00 MEDICARE ANNUAL WELLNESS VISIT, SUBSEQUENT: Primary | ICD-10-CM

## 2018-10-04 DIAGNOSIS — J45.30 MILD PERSISTENT ASTHMA WITHOUT COMPLICATION: ICD-10-CM

## 2018-10-04 LAB
ALBUMIN SERPL-MCNC: 3.7 G/DL (ref 3.4–5)
ALBUMIN/GLOB SERPL: 0.9 {RATIO} (ref 0.8–1.7)
ALP SERPL-CCNC: 80 U/L (ref 45–117)
ALT SERPL-CCNC: 29 U/L (ref 13–56)
ANION GAP SERPL CALC-SCNC: 7 MMOL/L (ref 3–18)
AST SERPL-CCNC: 26 U/L (ref 15–37)
BASOPHILS # BLD: 0 K/UL (ref 0–0.1)
BASOPHILS NFR BLD: 0 % (ref 0–2)
BILIRUB SERPL-MCNC: 0.3 MG/DL (ref 0.2–1)
BUN SERPL-MCNC: 16 MG/DL (ref 7–18)
BUN/CREAT SERPL: 19 (ref 12–20)
CALCIUM SERPL-MCNC: 9.6 MG/DL (ref 8.5–10.1)
CHLORIDE SERPL-SCNC: 104 MMOL/L (ref 100–108)
CHOLEST SERPL-MCNC: 139 MG/DL
CO2 SERPL-SCNC: 29 MMOL/L (ref 21–32)
CREAT SERPL-MCNC: 0.84 MG/DL (ref 0.6–1.3)
DIFFERENTIAL METHOD BLD: ABNORMAL
EOSINOPHIL # BLD: 0 K/UL (ref 0–0.4)
EOSINOPHIL NFR BLD: 0 % (ref 0–5)
ERYTHROCYTE [DISTWIDTH] IN BLOOD BY AUTOMATED COUNT: 13.8 % (ref 11.6–14.5)
GLOBULIN SER CALC-MCNC: 4.2 G/DL (ref 2–4)
GLUCOSE SERPL-MCNC: 117 MG/DL (ref 74–99)
HCT VFR BLD AUTO: 39.9 % (ref 35–45)
HDLC SERPL-MCNC: 69 MG/DL (ref 40–60)
HDLC SERPL: 2 {RATIO} (ref 0–5)
HGB BLD-MCNC: 12.8 G/DL (ref 12–16)
LDLC SERPL CALC-MCNC: 50.6 MG/DL (ref 0–100)
LIPID PROFILE,FLP: ABNORMAL
LYMPHOCYTES # BLD: 1.4 K/UL (ref 0.9–3.6)
LYMPHOCYTES NFR BLD: 15 % (ref 21–52)
MCH RBC QN AUTO: 28.3 PG (ref 24–34)
MCHC RBC AUTO-ENTMCNC: 32.1 G/DL (ref 31–37)
MCV RBC AUTO: 88.3 FL (ref 74–97)
MONOCYTES # BLD: 0.5 K/UL (ref 0.05–1.2)
MONOCYTES NFR BLD: 5 % (ref 3–10)
NEUTS SEG # BLD: 7.8 K/UL (ref 1.8–8)
NEUTS SEG NFR BLD: 80 % (ref 40–73)
PLATELET # BLD AUTO: 327 K/UL (ref 135–420)
PMV BLD AUTO: 11.3 FL (ref 9.2–11.8)
POTASSIUM SERPL-SCNC: 3.7 MMOL/L (ref 3.5–5.5)
PROT SERPL-MCNC: 7.9 G/DL (ref 6.4–8.2)
RBC # BLD AUTO: 4.52 M/UL (ref 4.2–5.3)
SODIUM SERPL-SCNC: 140 MMOL/L (ref 136–145)
TRIGL SERPL-MCNC: 97 MG/DL (ref ?–150)
VLDLC SERPL CALC-MCNC: 19.4 MG/DL
WBC # BLD AUTO: 9.7 K/UL (ref 4.6–13.2)

## 2018-10-04 PROCEDURE — 85025 COMPLETE CBC W/AUTO DIFF WBC: CPT | Performed by: INTERNAL MEDICINE

## 2018-10-04 PROCEDURE — 80053 COMPREHEN METABOLIC PANEL: CPT | Performed by: INTERNAL MEDICINE

## 2018-10-04 PROCEDURE — 36415 COLL VENOUS BLD VENIPUNCTURE: CPT | Performed by: INTERNAL MEDICINE

## 2018-10-04 PROCEDURE — 80061 LIPID PANEL: CPT | Performed by: INTERNAL MEDICINE

## 2018-10-04 RX ORDER — FLUTICASONE PROPIONATE AND SALMETEROL 100; 50 UG/1; UG/1
1 POWDER RESPIRATORY (INHALATION) EVERY 12 HOURS
Qty: 1 EACH | Refills: 3 | Status: SHIPPED | OUTPATIENT
Start: 2018-10-04 | End: 2020-04-07 | Stop reason: SDUPTHER

## 2018-10-04 RX ORDER — LUBIPROSTONE 24 UG/1
24 CAPSULE, GELATIN COATED ORAL 2 TIMES DAILY WITH MEALS
Qty: 60 CAP | Refills: 5 | Status: SHIPPED | OUTPATIENT
Start: 2018-10-04 | End: 2018-11-03

## 2018-10-04 NOTE — PROGRESS NOTES
Chief Complaint Patient presents with Sarah Ridley Annual Wellness Visit  Follow Up Chronic Condition 3 month;Patient is fasting  Immunization/Injection  
  influenza  Constipation Due to pain medication 1. Have you been to the ER, urgent care clinic since your last visit? Hospitalized since your last visit? No 
 
2. Have you seen or consulted any other health care providers outside of the [address] [place] [address] since your last visit? Include any pap smears or colon screening.  No

## 2018-10-04 NOTE — PATIENT INSTRUCTIONS
Medicare Wellness Visit, Female The best way to live healthy is to have a lifestyle where you eat a well-balanced diet, exercise regularly, limit alcohol use, and quit all forms of tobacco/nicotine, if applicable. Regular preventive services are another way to keep healthy. Preventive services (vaccines, screening tests, monitoring & exams) can help personalize your care plan, which helps you manage your own care. Screening tests can find health problems at the earliest stages, when they are easiest to treat. [facility] follows the current, evidence-based guidelines published by the [facility] Reynaldo Huy ([facility]) when recommending preventive services for our patients. Because we follow these guidelines, sometimes recommendations change over time as research supports it. (For example, mammograms used to be recommended annually. Even though Medicare will still pay for an annual mammogram, the newer guidelines recommend a mammogram every two years for women of average risk.) Of course, you and your doctor may decide to screen more often for some diseases, based on your risk and your health status. Preventive services for you include: - Medicare offers their members a free annual wellness visit, which is time for you and your primary care provider to discuss and plan for your preventive service needs. Take advantage of this benefit every year! 
-All adults over the age of 72 should receive the recommended pneumonia vaccines. Current USPSTF guidelines recommend a series of two vaccines for the best pneumonia protection.  
-All adults should have a flu vaccine yearly and a tetanus vaccine every 10 years. All adults age 61 and older should receive a shingles vaccine once in their lifetime.   
-A bone mass density test is recommended when a woman turns 65 to screen for osteoporosis. This test is only recommended one time, as a screening. Some providers will use this same test as a disease monitoring tool if you already have osteoporosis. -All adults age 38-68 who are overweight should have a diabetes screening test once every three years.  
-Other screening tests and preventive services for persons with diabetes include: an eye exam to screen for diabetic retinopathy, a kidney function test, a foot exam, and stricter control over your cholesterol.  
-Cardiovascular screening for adults with routine risk involves an electrocardiogram (ECG) at intervals determined by your doctor.  
-Colorectal cancer screenings should be done for adults age 54-65 with no increased risk factors for colorectal cancer. There are a number of acceptable methods of screening for this type of cancer. Each test has its own benefits and drawbacks. Discuss with your doctor what is most appropriate for you during your annual wellness visit. The different tests include: colonoscopy (considered the best screening method), a fecal occult blood test, a fecal DNA test, and sigmoidoscopy. -Breast cancer screenings are recommended every other year for women of normal risk, age 54-69. 
-Cervical cancer screenings for women over age 72 are only recommended with certain risk factors.  
-All adults born between [facility] should be screened once for Hepatitis C. Here is a list of your current Health Maintenance items (your personalized list of preventive services) with a due date: 
Health Maintenance Due Topic Date Due  
 DTaP/Tdap/Td  (1 - Tdap) 07/04/1965  Shingles Vaccine (1 of 2) 07/04/1994  Pneumococcal Vaccine (1 of 2 - PCV13) 07/04/2009  Flu Vaccine  08/01/2018  Glaucoma Screening   08/18/2018

## 2018-10-04 NOTE — MR AVS SNAPSHOT
Pramod Bhagat [place] [address] [facility] [address] [facility] [address] [id number] 
[phone] Patient: Karla Méndez MRN: [id number] AXA:[birth year] Visit Information Date & Time Provider Department Dept. Phone Encounter #  
 10/4/2018 10:30 AM Ankita Mccormack MD [facility] [id number] Follow-up Instructions Return in about 3 months (around 1/4/2019) for follow up. Upcoming Health Maintenance Date Due DTaP/Tdap/Td series (1 - Tdap) 7/4/1965 Shingrix Vaccine Age 50> (1 of 2) 7/4/1994 Pneumococcal 65+ Low/Medium Risk (1 of 2 - PCV13) 7/4/2009 Influenza Age 5 to Adult 8/1/2018 GLAUCOMA SCREENING Q2Y 8/18/2018 MEDICARE YEARLY EXAM 10/5/2018 BREAST CANCER SCRN MAMMOGRAM 10/20/2019 COLONOSCOPY 2/2/2027 Allergies as of 10/4/2018  Review Complete On: 10/4/2018 By: Ankita Mccormack MD  
 No Known Allergies Current Immunizations  Reviewed on 10/4/2018 Name Date Influenza High Dose Vaccine PF 10/4/2017, 12/12/2016 Influenza Vaccine 10/13/2015 12:16 PM  
 Influenza Vaccine (Tri) Adjuvanted 10/4/2018 Tdap 7/28/2018 Reviewed by Ankita Mccormack MD on 10/4/2018 at 11:00 AM  
 Reviewed by Ankita Mccormack MD on 10/4/2018 at 11:05 AM  
 Reviewed by Ankita Mccormack MD on 10/4/2018 at 11:05 AM  
You Were Diagnosed With   
  
 Codes Comments Medicare annual wellness visit, subsequent    -  Primary ICD-10-CM: Z00.00 ICD-9-CM: V70.0 Essential hypertension     ICD-10-CM: I10 
ICD-9-CM: 401.9 Mixed hyperlipidemia     ICD-10-CM: E78.2 ICD-9-CM: 272.2 Chronic bilateral low back pain with bilateral sciatica     ICD-10-CM: M54.42, M54.41, G89.29 ICD-9-CM: 724.2, 724.3, 338.29 Mild persistent asthma without complication     LSJ-25-JN: J45.30 ICD-9-CM: 493.90 Therapeutic opioid-induced constipation (OIC)     ICD-10-CM: K59.03, T40.2X5A 
ICD-9-CM: 564.09, E935.2 Advanced directives, counseling/discussion     ICD-10-CM: Z71.89 ICD-9-CM: V65.49 Overweight (BMI 25.0-29. 9)     ICD-10-CM: B00.5 ICD-9-CM: 278.02 Encounter for immunization     ICD-10-CM: N73 ICD-9-CM: V03.89 Vitals BP Pulse Temp Resp Height(growth percentile) Weight(growth percentile) 120/75 99 97.8 °F (36.6 °C) 16 5' 3\" (1.6 m) 158 lb 6.4 oz (71.8 kg) SpO2 BMI OB Status Smoking Status 95% 28.06 kg/m2 Postmenopausal Never Smoker BMI and BSA Data Body Mass Index Body Surface Area 28.06 kg/m 2 1.79 m 2 Preferred Pharmacy Pharmacy Name Phone [facility] [place], [address] [place] AT [facility] [phone] Your Updated Medication List  
  
   
This list is accurate as of 10/4/18 11:21 AM.  Always use your most recent med list.  
  
  
  
  
 albuterol 90 mcg/actuation inhaler Commonly known as:  PROVENTIL HFA, VENTOLIN HFA, PROAIR HFA Take 2 Puffs by inhalation every six (6) hours as needed for Wheezing or Shortness of Breath. amLODIPine 5 mg tablet Commonly known as:  Boubacar Janeth TAKE ONE TABLET BY MOUTH EVERY DAY  
  
 aspirin delayed-release 81 mg tablet Take 1 Tab by mouth daily. fluticasone-salmeterol 100-50 mcg/dose diskus inhaler Commonly known as:  ADVAIR DISKUS Take 1 Puff by inhalation every twelve (12) hours. lisinopril-hydroCHLOROthiazide 20-25 mg per tablet Commonly known as:  PRINZIDE, ZESTORETIC  
TAKE ONE TABLET BY MOUTH EVERY DAY  
  
 lubiPROStone 24 mcg capsule Commonly known as:  Arabella Jock Take 1 Cap by mouth two (2) times daily (with meals) for 30 days. naproxen 500 mg tablet Commonly known as:  NAPROSYN Take 1 Tab by mouth two (2) times daily (with meals). oxyCODONE IR 15 mg immediate release tablet Commonly known as:  OXY-IR Take 15 mg by mouth every four (4) hours as needed for Pain.  
  
 potassium chloride 10 mEq tablet Commonly known as:  KLOR-CON Take 1 Tab by mouth daily. simvastatin 10 mg tablet Commonly known as:  ZOCOR  
TAKE ONE TABLET BY MOUTH EVERY NIGHT AT BEDTIME tiZANidine 4 mg tablet Commonly known as:  Jenna Gold Take 1 Tab by mouth two (2) times a day. VITAMIN D3 2,000 unit Tab Generic drug:  cholecalciferol (vitamin D3) Take  by mouth daily. Prescriptions Sent to Pharmacy Refills  
 fluticasone-salmeterol (ADVAIR DISKUS) 100-50 mcg/dose diskus inhaler 3 Sig: Take 1 Puff by inhalation every twelve (12) hours. Class: Normal  
 Pharmacy: [address] Ph #: [phone] Route: Inhalation  
 lubiPROStone (AMITIZA) 24 mcg capsule 5 Sig: Take 1 Cap by mouth two (2) times daily (with meals) for 30 days. Class: Normal  
 Pharmacy: [address] Ph #: [phone] Route: Oral  
  
We Performed the Following ADMIN INFLUENZA VIRUS VAC [ [facility]] INFLUENZA VACCINE INACTIVATED (IIV), SUBUNIT, ADJUVANTED, IM [id number] CPT(R)] Follow-up Instructions Return in about 3 months (around 1/4/2019) for follow up. Patient Instructions Medicare Wellness Visit, Female The best way to live healthy is to have a lifestyle where you eat a well-balanced diet, exercise regularly, limit alcohol use, and quit all forms of tobacco/nicotine, if applicable. Regular preventive services are another way to keep healthy. Preventive services (vaccines, screening tests, monitoring & exams) can help personalize your care plan, which helps you manage your own care. Screening tests can find health problems at the earliest stages, when they are easiest to treat.   
[facility] follows the current, evidence-based guidelines published by the [place] Reynaldo Huy (USPSTF) when recommending preventive services for our patients. Because we follow these guidelines, sometimes recommendations change over time as research supports it. (For example, mammograms used to be recommended annually. Even though Medicare will still pay for an annual mammogram, the newer guidelines recommend a mammogram every two years for women of average risk.) Of course, you and your doctor may decide to screen more often for some diseases, based on your risk and your health status. Preventive services for you include: - Medicare offers their members a free annual wellness visit, which is time for you and your primary care provider to discuss and plan for your preventive service needs. Take advantage of this benefit every year! 
-All adults over the age of 72 should receive the recommended pneumonia vaccines. Current USPSTF guidelines recommend a series of two vaccines for the best pneumonia protection.  
-All adults should have a flu vaccine yearly and a tetanus vaccine every 10 years. All adults age 61 and older should receive a shingles vaccine once in their lifetime.   
-A bone mass density test is recommended when a woman turns 65 to screen for osteoporosis. This test is only recommended one time, as a screening. Some providers will use this same test as a disease monitoring tool if you already have osteoporosis. -All adults age 38-68 who are overweight should have a diabetes screening test once every three years.  
-Other screening tests and preventive services for persons with diabetes include: an eye exam to screen for diabetic retinopathy, a kidney function test, a foot exam, and stricter control over your cholesterol.  
-Cardiovascular screening for adults with routine risk involves an electrocardiogram (ECG) at intervals determined by your doctor.  
-Colorectal cancer screenings should be done for adults age 54-65 with no increased risk factors for colorectal cancer.   There are a number of acceptable methods of screening for this type of cancer. Each test has its own benefits and drawbacks. Discuss with your doctor what is most appropriate for you during your annual wellness visit. The different tests include: colonoscopy (considered the best screening method), a fecal occult blood test, a fecal DNA test, and sigmoidoscopy. -Breast cancer screenings are recommended every other year for women of normal risk, age 54-69. 
-Cervical cancer screenings for women over age 72 are only recommended with certain risk factors.  
-All adults born between [facility] should be screened once for Hepatitis C. Here is a list of your current Health Maintenance items (your personalized list of preventive services) with a due date: 
Health Maintenance Due Topic Date Due  
 DTaP/Tdap/Td  (1 - Tdap) 07/04/1965  Shingles Vaccine (1 of 2) 07/04/1994  Pneumococcal Vaccine (1 of 2 - PCV13) 07/04/2009  Flu Vaccine  08/01/2018  Glaucoma Screening   08/18/2018 Introducing [facility]! Isamar Coles introduces Well[employer] patient portal. Now you can access parts of your medical record, email your doctor's office, and request medication refills online. 1. In your internet browser, go to [email or web address] 2. Click on the First Time User? Click Here link in the Sign In box. You will see the New Member Sign Up page. 3. Enter your Well[employer] Access Code exactly as it appears below. You will not need to use this code after youve completed the sign-up process. If you do not sign up before the expiration date, you must request a new code. · Well[employer] Access Code: [email or web address] Expires: 1/2/2019 11:21 AM 
 
4. Enter the last four digits of your Social Security Number (xxxx) and Date of Birth (mm/dd/yyyy) as indicated and click Submit. You will be taken to the next sign-up page. 5. Create a WellGen ID.  This will be your Well[employer] login ID and cannot be changed, so think of one that is secure and easy to remember. 6. Create a [email or web address] password. You can change your password at any time. 7. Enter your Password Reset Question and Answer. This can be used at a later time if you forget your password. 8. Enter your e-mail address. You will receive e-mail notification when new information is available in [address] 9. Click Sign Up. You can now view and download portions of your medical record. 10. Click the Download Summary menu link to download a portable copy of your medical information. If you have questions, please visit the Frequently Asked Questions section of the [email or web address] website. Remember, [email or web address] is NOT to be used for urgent needs. For medical emergencies, dial 911. Now available from your iPhone and Android! Please provide this summary of care documentation to your next provider. Your primary care clinician is listed as Aryan Arenas. If you have any questions after today's visit, please call [phone].

## 2018-10-04 NOTE — ACP (ADVANCE CARE PLANNING)
Advance Care Planning (ACP) Provider Conversation Snapshot    Date of ACP Conversation: 10/04/18  Persons included in Conversation:  patient  Length of ACP Conversation in minutes:  <16 minutes (Non-Billable)    Authorized Decision Maker (if patient is incapable of making informed decisions):    This person is:   her daughterAdri          For Patients with Decision Making Capacity:   Values/Goals: Exploration of values, goals, and preferences if recovery is not expected, even with continued medical treatment in the event of:  Imminent death  Severe, permanent brain injury    Conversation Outcomes / Follow-Up Plan:   Recommended completion of Advance Directive form after review of ACP materials and conversation with prospective healthcare agent

## 2018-10-04 NOTE — PROGRESS NOTES
Chief Complaint Patient presents with [address] [place] [address] Annual Wellness Visit  Follow Up Chronic Condition 3 month;Patient is fasting  Immunization/Injection  
  influenza  Constipation Due to pain medication Pt is a 75y.o. year old female who presents for follow up of her chronic medical problems Taking Oxycodone for pain from pain mgt-right sided sciatica Feels constipated even with Miralax Colonoscopy up to date [facility] [place] for ambulation for stability BP Readings from Last 3 Encounters:  
10/04/18 120/75  
07/10/18 137/78  
04/11/18 123/77  
compliant with BP meds Wt Readings from Last 3 Encounters:  
10/04/18 158 lb 6.4 oz (71.8 kg) 07/10/18 157 lb 9.6 oz (71.5 kg) 04/11/18 159 lb (72.1 kg) BMI 28 Lab Results Component Value Date/Time Cholesterol, total 139 10/04/2018 11:17 AM  
 HDL Cholesterol 69 (H) 10/04/2018 11:17 AM  
 LDL, calculated 50.6 10/04/2018 11:17 AM  
 VLDL, calculated 19.4 10/04/2018 11:17 AM  
 Triglyceride 97 10/04/2018 11:17 AM  
 CHOL/HDL Ratio 2.0 10/04/2018 11:17 AM  
On Zocor Asthma-not using inhaler daily ROS: 
 
Pt denies: Wt loss, Fever/Chills, HA, Visual changes, Fatigue, Chest pain, SOB, MONDRAGON, Abd pain, N/V/D/C, Blood in stool or urine, Edema. Pertinent positive as above in HPI. All others were negative Patient Active Problem List  
Diagnosis Code  Essential hypertension I10  
 Mixed hyperlipidemia E78.2  Chronic bilateral low back pain with bilateral sciatica M54.42, M54.41, G89.29  Lumbar spinal stenosis M48.061  
 Mild persistent asthma without complication R73.99  Advanced directives, counseling/discussion Z71.89  
 Overweight (BMI 25.0-29. 9) E66.3  Therapeutic opioid-induced constipation (OIC) K59.03, T40.2X5A Past Medical History:  
Diagnosis Date  Hypercholesterolemia  Hypertension  Osteoarthritis Current Outpatient Prescriptions Medication Sig Dispense Refill  amLODIPine (NORVASC) 5 mg tablet TAKE ONE TABLET BY MOUTH EVERY DAY 90 Tab 1  
 simvastatin (ZOCOR) 10 mg tablet TAKE ONE TABLET BY MOUTH EVERY NIGHT AT BEDTIME 90 Tab 1  
 lisinopril-hydroCHLOROthiazide (PRINZIDE, ZESTORETIC) 20-25 mg per tablet TAKE ONE TABLET BY MOUTH EVERY DAY 90 Tab 1  
 naproxen (NAPROSYN) 500 mg tablet Take 1 Tab by mouth two (2) times daily (with meals). 60 Tab 0  
 potassium chloride (KLOR-CON) 10 mEq tablet Take 1 Tab by mouth daily. 90 Tab 2  
 fluticasone-salmeterol (ADVAIR DISKUS) 100-50 mcg/dose diskus inhaler Take 1 Puff by inhalation every twelve (12) hours. 1 Each 3  
 cholecalciferol, vitamin D3, (VITAMIN D3) 2,000 unit tab Take  by mouth daily.  albuterol (PROVENTIL HFA, VENTOLIN HFA, PROAIR HFA) 90 mcg/actuation inhaler Take 2 Puffs by inhalation every six (6) hours as needed for Wheezing or Shortness of Breath. 1 Inhaler 2  
 aspirin delayed-release 81 mg tablet Take 1 Tab by mouth daily. 30 Tab 1  
 oxyCODONE IR (OXY-IR) 15 mg immediate release tablet Take 15 mg by mouth every four (4) hours as needed for Pain.  tiZANidine (ZANAFLEX) 4 mg tablet Take 1 Tab by mouth two (2) times a day. 60 Tab 1 History Smoking Status  Never Smoker Smokeless Tobacco  
 Never Used No Known Allergies Patient Labs were reviewed: yes Patient Past Records were reviewed:  yes Objective:  
 
Vitals:  
 10/04/18 1036 BP: 120/75 Pulse: 99 Resp: 16 Temp: 97.8 °F (36.6 °C) SpO2: 95% Weight: 158 lb 6.4 oz (71.8 kg) Height: 5' 3\" (1.6 m) Body mass index is 28.06 kg/(m^2). Exam:  
Appearance: alert, well appearing,  oriented to person, place, and time, acyanotic, in no respiratory distress and well hydrated. HEENT:  NC/AT, pink conj, anicteric sclerae Neck:  No cervical lymphadenopathy, no JVD, no thyromegaly, no carotid bruit Heart:  RRR without M/R/G Lungs:  CTAB, no rhonchi, rales, or wheezes with good air exchange Abdomen:  Non-tender, pos bowel sounds, no hepatosplenomegaly Ext:  No C/C/E Skin: no rash Neuro: no lateralizing signs, CNs II-XII intact Assessment/ Plan:  
Diagnoses and all orders for this visit: 
 
1. Medicare annual wellness visit, subsequent-see note 2. Essential hypertension-controlled, continue with present meds 
-     CBC WITH AUTOMATED DIFF; Future -     METABOLIC PANEL, COMPREHENSIVE; Future 3. Mixed hyperlipidemia-at goal on Zocor -     LIPID PANEL; Future 4. Chronic bilateral low back pain with bilateral sciatica-Pain mgt following 5. Mild persistent asthma without complication-advised to take Advair daily evi with the season change 
-     fluticasone-salmeterol (ADVAIR DISKUS) 100-50 mcg/dose diskus inhaler; Take 1 Puff by inhalation every twelve (12) hours. 6. Therapeutic opioid-induced constipation (OIC)-will have her try Amitiza 
-     lubiPROStone (AMITIZA) 24 mcg capsule; Take 1 Cap by mouth two (2) times daily (with meals) for 30 days. Follow-up Disposition: 
Return in about 3 months (around 1/4/2019) for follow up. I have discussed the diagnosis with the patient and the intended plan as seen in the above orders. The patient has received an After-Visit Summary and questions were answered concerning future plans. Medication Side Effects and Warnings were discussed with patient: yes Patient verbalized understanding of above instructions. Ann Marie Mera MD 
Internal [facility] This is the Subsequent Medicare Annual Wellness Exam, performed 12 months or more after the Initial AWV or the last Subsequent AWV I have reviewed the patient's medical history in detail and updated the computerized patient record. History Past Medical History:  
Diagnosis Date  Hypercholesterolemia  Hypertension  Osteoarthritis Past Surgical History:  
Procedure Laterality Date  COLONOSCOPY N/A 2/2/2017 COLONOSCOPY performed by Lily Cornelius MD at [address]   HX HYSTERECTOMY Current Outpatient Prescriptions Medication Sig Dispense Refill  fluticasone-salmeterol (ADVAIR DISKUS) 100-50 mcg/dose diskus inhaler Take 1 Puff by inhalation every twelve (12) hours. 1 Each 3  
 lubiPROStone (AMITIZA) 24 mcg capsule Take 1 Cap by mouth two (2) times daily (with meals) for 30 days. 60 Cap 5  
 amLODIPine (NORVASC) 5 mg tablet TAKE ONE TABLET BY MOUTH EVERY DAY 90 Tab 1  
 simvastatin (ZOCOR) 10 mg tablet TAKE ONE TABLET BY MOUTH EVERY NIGHT AT BEDTIME 90 Tab 1  
 lisinopril-hydroCHLOROthiazide (PRINZIDE, ZESTORETIC) 20-25 mg per tablet TAKE ONE TABLET BY MOUTH EVERY DAY 90 Tab 1  
 naproxen (NAPROSYN) 500 mg tablet Take 1 Tab by mouth two (2) times daily (with meals). 60 Tab 0  
 potassium chloride (KLOR-CON) 10 mEq tablet Take 1 Tab by mouth daily. 90 Tab 2  cholecalciferol, vitamin D3, (VITAMIN D3) 2,000 unit tab Take  by mouth daily.  albuterol (PROVENTIL HFA, VENTOLIN HFA, PROAIR HFA) 90 mcg/actuation inhaler Take 2 Puffs by inhalation every six (6) hours as needed for Wheezing or Shortness of Breath. 1 Inhaler 2  
 aspirin delayed-release 81 mg tablet Take 1 Tab by mouth daily. 30 Tab 1  
 oxyCODONE IR (OXY-IR) 15 mg immediate release tablet Take 15 mg by mouth every four (4) hours as needed for Pain.  tiZANidine (ZANAFLEX) 4 mg tablet Take 1 Tab by mouth two (2) times a day. 60 Tab 1 No Known Allergies Family History Problem Relation Age of Onset  No Known Problems Mother  No Known Problems Father Social History Substance Use Topics  Smoking status: Never Smoker  Smokeless tobacco: Never Used  Alcohol use No  
 
Patient Active Problem List  
Diagnosis Code  Essential hypertension I10  
 Mixed hyperlipidemia E78.2  Chronic bilateral low back pain with bilateral sciatica M54.42, M54.41, G89.29  
  Lumbar spinal stenosis M48.061  
 Mild persistent asthma without complication O77.16  Advanced directives, counseling/discussion Z71.89  
 Overweight (BMI 25.0-29. 9) E66.3  Therapeutic opioid-induced constipation (OIC) K59.03, T40.2X5A Depression Risk Factor Screening: PHQ over the last two weeks 1/10/2018 Little interest or pleasure in doing things Not at all Feeling down, depressed, irritable, or hopeless Not at all Total Score PHQ 2 0 Alcohol Risk Factor Screening: You do not drink alcohol or very rarely. Functional Ability and Level of Safety:  
Hearing Loss Hearing is good. Activities of Daily Living The home contains: handrails and grab bars Patient does total self care Fall Risk Fall Risk Assessment, last 12 mths 1/10/2018 Able to walk? Yes Fall in past 12 months? No  
 
 
Abuse Screen Patient is not abused Cognitive Screening Evaluation of Cognitive Function: 
Has your family/caregiver stated any concerns about your memory: no 
Normal 
 
Patient Care Team  
Patient Care Team: Neville Edge MD as PCP - General (Internal Medicine) Zaheer Mayen MD (Physical Medicine and Rehab) Diego Robles MD (Optometry) Assessment/Plan Education and counseling provided: 
Are appropriate based on today's review and evaluation End-of-Life planning (with patient's consent)-see ACP note Pneumococcal Vaccine-will get it at her pharmacy Influenza Vaccine-today Screening Mammography-due 10/2018 Colorectal cancer screening tests-up to date, 2017 Cardiovascular screening blood test 
Lab Results Component Value Date/Time Cholesterol, total 139 10/04/2018 11:17 AM  
 HDL Cholesterol 69 (H) 10/04/2018 11:17 AM  
 LDL, calculated 50.6 10/04/2018 11:17 AM  
 VLDL, calculated 19.4 10/04/2018 11:17 AM  
 Triglyceride 97 10/04/2018 11:17 AM  
 CHOL/HDL Ratio 2.0 10/04/2018 11:17 AM  
Bone mass measurement (DEXA)-normal done 10/2017 Diagnoses and all orders for this visit: 
 
1. Medicare annual wellness visit, subsequent-Refer to above for plan and to patient instructions for recommendations on HM 2. Advanced directives, counseling/discussion-see ACP note 3. Overweight (BMI 25.0-29. 9)-discussed limiting vicente to 4377-5366/day and exercising at least 150 min a week 4. Encounter for immunization -     Influenza Admin () -     Influenza Vaccine Inactivated (IIV)(FLUAD), Subunit, Adjuvanted, IM (05988) Health Maintenance Due Topic Date Due  
 DTaP/Tdap/Td series (1 - Tdap) 07/04/1965-done  Shingrix Vaccine Age 50> (1 of 2) 07/04/1994  Pneumococcal 65+ Low/Medium Risk (1 of 2 - PCV13) 07/04/2009-will get Prevnar 13 at her pharmacy  Influenza Age 5 to Adult  08/01/2018-today  GLAUCOMA SCREENING Q2Y  08/18/2018-wears eyeglasses/August with Dr Cal Schneider at [facility] yearly for wellness visit

## 2019-01-03 ENCOUNTER — OFFICE VISIT (OUTPATIENT)
Dept: FAMILY MEDICINE CLINIC | Age: 74
End: 2019-01-03

## 2019-01-03 VITALS
WEIGHT: 154 LBS | DIASTOLIC BLOOD PRESSURE: 80 MMHG | SYSTOLIC BLOOD PRESSURE: 126 MMHG | RESPIRATION RATE: 14 BRPM | HEART RATE: 95 BPM | HEIGHT: 63 IN | TEMPERATURE: 98.1 F | BODY MASS INDEX: 27.29 KG/M2

## 2019-01-03 DIAGNOSIS — M17.0 PRIMARY OSTEOARTHRITIS OF BOTH KNEES: ICD-10-CM

## 2019-01-03 DIAGNOSIS — I10 ESSENTIAL HYPERTENSION: Primary | ICD-10-CM

## 2019-01-03 DIAGNOSIS — E78.2 MIXED HYPERLIPIDEMIA: ICD-10-CM

## 2019-01-03 DIAGNOSIS — J45.30 MILD PERSISTENT ASTHMA WITHOUT COMPLICATION: ICD-10-CM

## 2019-01-03 DIAGNOSIS — M48.061 SPINAL STENOSIS OF LUMBAR REGION, UNSPECIFIED WHETHER NEUROGENIC CLAUDICATION PRESENT: ICD-10-CM

## 2019-01-03 DIAGNOSIS — M25.512 LEFT SHOULDER PAIN, UNSPECIFIED CHRONICITY: ICD-10-CM

## 2019-01-03 DIAGNOSIS — E66.3 OVERWEIGHT (BMI 25.0-29.9): ICD-10-CM

## 2019-01-03 RX ORDER — AMLODIPINE BESYLATE 5 MG/1
TABLET ORAL
Qty: 90 TAB | Refills: 2 | Status: SHIPPED | OUTPATIENT
Start: 2019-01-03 | End: 2019-07-16

## 2019-01-03 RX ORDER — NAPROXEN 500 MG/1
TABLET ORAL
Qty: 180 TAB | Refills: 2 | Status: SHIPPED | OUTPATIENT
Start: 2019-01-03 | End: 2019-08-01 | Stop reason: SDUPTHER

## 2019-01-03 RX ORDER — LISINOPRIL AND HYDROCHLOROTHIAZIDE 20; 25 MG/1; MG/1
TABLET ORAL
Qty: 90 TAB | Refills: 1 | Status: SHIPPED | OUTPATIENT
Start: 2019-01-03 | End: 2019-03-20 | Stop reason: SDUPTHER

## 2019-01-03 RX ORDER — POTASSIUM CHLORIDE 750 MG/1
10 TABLET, EXTENDED RELEASE ORAL DAILY
Qty: 90 TAB | Refills: 2 | Status: SHIPPED | OUTPATIENT
Start: 2019-01-03 | End: 2019-11-11 | Stop reason: SDUPTHER

## 2019-01-03 RX ORDER — NAPROXEN 500 MG/1
500 TABLET ORAL 2 TIMES DAILY WITH MEALS
Qty: 60 TAB | Refills: 2 | Status: SHIPPED | OUTPATIENT
Start: 2019-01-03 | End: 2019-01-03 | Stop reason: SDUPTHER

## 2019-01-03 NOTE — PATIENT INSTRUCTIONS
Shoulder Bursitis: Exercises  Your Care Instructions  Here are some examples of typical rehabilitation exercises for your condition. Start each exercise slowly. Ease off the exercise if you start to have pain. Your doctor or physical therapist will tell you when you can start these exercises and which ones will work best for you. How to do the exercises  Posterior stretching exercise    1. Hold the elbow of your injured arm with your other hand. 2. Use your hand to pull your injured arm gently up and across your body. You will feel a gentle stretch across the back of your injured shoulder. 3. Hold for at least 15 to 30 seconds. Then slowly lower your arm. 4. Repeat 2 to 4 times. Up-the-back stretch    1. Put your hand in your back pocket. Let it rest there to stretch your shoulder. 2. With your other hand, hold your injured arm (palm outward) behind your back by the wrist. Pull your arm up gently to stretch your shoulder. 3. Next, put a towel over your other shoulder. Put the hand of your injured arm behind your back. Now hold the back end of the towel. With the other hand, hold the front end of the towel in front of your body. Pull gently on the front end of the towel. This will bring your hand farther up your back to stretch your shoulder. Overhead stretch    1. Standing about an arm's length away, grasp onto a solid surface. You could use a countertop, a doorknob, or the back of a sturdy chair. 2. With your knees slightly bent, bend forward with your arms straight. Lower your upper body, and let your shoulders stretch. 3. As your shoulders are able to stretch farther, you may need to take a step or two backward. 4. Hold for at least 15 to 30 seconds. Then stand up and relax. If you had stepped back during your stretch, step forward so you can keep your hands on the solid surface. 5. Repeat 2 to 4 times. Shoulder flexion (lying down)    1. Lie on your back, holding a wand with both hands. Your palms should face down as you hold the wand. 2. Keeping your elbows straight, slowly raise your arms over your head. Raise them until you feel a stretch in your shoulders, upper back, and chest.  3. Hold for 15 to 30 seconds. 4. Repeat 2 to 4 times. Shoulder rotation (lying down)    1. Lie on your back. Hold a wand with both hands with your elbows bent and palms up. 2. Keep your elbows close to your body, and move the wand across your body toward the sore arm. 3. Hold for 8 to 12 seconds. 4. Repeat 2 to 4 times. Shoulder blade squeeze    1. Stand with your arms at your sides, and squeeze your shoulder blades together. Do not raise your shoulders up as you squeeze. 2. Hold 6 seconds. 3. Repeat 8 to 12 times. Shoulder flexor and extensor exercise    1. Push forward (flex): Stand facing a wall or doorjamb, about 6 inches or less back. Hold your injured arm against your body. Make a closed fist with your thumb on top. Then gently push your hand forward into the wall with about 25% to 50% of your strength. Don't let your body move backward as you push. Hold for about 6 seconds. Relax for a few seconds. Repeat 8 to 12 times. 2. Push backward (extend): Stand with your back flat against a wall. Your upper arm should be against the wall, with your elbow bent 90 degrees (your hand straight ahead). Push your elbow gently back against the wall with about 25% to 50% of your strength. Don't let your body move forward as you push. Hold for about 6 seconds. Relax for a few seconds. Repeat 8 to 12 times. Scapular exercise: Wall push-ups    1. Stand facing a wall, about 12 inches to 18 inches away. 2. Place your hands on the wall at shoulder height. 3. Slowly bend your elbows and bring your face to the wall. Keep your back and hips straight. 4. Push back to where you started. 5. Repeat 8 to 12 times. 6. When you can do this exercise against a wall comfortably, you can try it against a counter.  You can then slowly progress to the end of a couch, then to a sturdy chair, and finally to the floor. Scapular exercise: Retraction    1. Put the band around a solid object at about waist level. (A bedpost will work well.) Each hand should hold an end of the band. 2. With your elbows at your sides and bent to 90 degrees, pull the band back. Your shoulder blades should move toward each other. Then move your arms back where you started. 3. Repeat 8 to 12 times. 4. If you have good range of motion in your shoulders, try this exercise with your arms lifted out to the sides. Keep your elbows at a 90-degree angle. Raise the elastic band up to about shoulder level. Pull the band back to move your shoulder blades toward each other. Then move your arms back where you started. Internal rotator strengthening exercise    1. Start by tying a piece of elastic exercise material to a doorknob. You can use surgical tubing or Thera-Band. 2. Stand or sit with your shoulder relaxed and your elbow bent 90 degrees. Your upper arm should rest comfortably against your side. Squeeze a rolled towel between your elbow and your body for comfort. This will help keep your arm at your side. 3. Hold one end of the elastic band in the hand of the painful arm. 4. Slowly rotate your forearm toward your body until it touches your belly. Slowly move it back to where you started. 5. Keep your elbow and upper arm firmly tucked against the towel roll or at your side. 6. Repeat 8 to 12 times. External rotator strengthening exercise    1. Start by tying a piece of elastic exercise material to a doorknob. You can use surgical tubing or Thera-Band. (You may also hold one end of the band in each hand.)  2. Stand or sit with your shoulder relaxed and your elbow bent 90 degrees. Your upper arm should rest comfortably against your side. Squeeze a rolled towel between your elbow and your body for comfort.  This will help keep your arm at your side.  3. Hold one end of the elastic band with the hand of the painful arm. 4. Start with your forearm across your belly. Slowly rotate the forearm out away from your body. Keep your elbow and upper arm tucked against the towel roll or the side of your body until you begin to feel tightness in your shoulder. Slowly move your arm back to where you started. 5. Repeat 8 to 12 times. Follow-up care is a key part of your treatment and safety. Be sure to make and go to all appointments, and call your doctor if you are having problems. It's also a good idea to know your test results and keep a list of the medicines you take. Where can you learn more? Go to http://ariadna-conor.info/. Enter M440 in the search box to learn more about \"Shoulder Bursitis: Exercises. \"  Current as of: November 29, 2017  Content Version: 11.8  © 2435-2222 [employer]. Care instructions adapted under license by [employer] (which disclaims liability or warranty for this information). If you have questions about a medical condition or this instruction, always ask your healthcare professional. [place] [address] any warranty or liability for your use of this information.

## 2019-01-03 NOTE — Clinical Note
[phone] tel number of [facility] eye doc

## 2019-01-03 NOTE — PROGRESS NOTES
Chief Complaint   Patient presents with    Follow Up Chronic Condition       Pt is a 75y.o. year old female who presents for follow up of her chronic medical problems    Health Maintenance Due   Topic Date Due    Shingrix Vaccine Age 49> (1 of 2) 07/04/1994    Pneumococcal 65+ Low/Medium Risk (1 of 2 - PCV13) 07/04/2009    GLAUCOMA SCREENING Q2Y [facility] [phone] tel number of her eye doc 08/18/2018-wears eyeglasses,      BP Readings from Last 3 Encounters:   01/03/19 126/80   10/04/18 120/75   07/10/18 137/78   Compliant with BP meds    Lab Results   Component Value Date/Time    Cholesterol, total 139 10/04/2018 11:17 AM    HDL Cholesterol 69 (H) 10/04/2018 11:17 AM    LDL, calculated 50.6 10/04/2018 11:17 AM    VLDL, calculated 19.4 10/04/2018 11:17 AM    Triglyceride 97 10/04/2018 11:17 AM    CHOL/HDL Ratio 2.0 10/04/2018 11:17 AM   On Zocor, compliant, no side effects    Asthma-doing much better on Advair daily    Sees Pain Mgt-for her back; right sided sciatica    Lab Results   Component Value Date/Time    Glucose 117 (H) 10/04/2018 11:17 AM   Not fasting then    Did not go to appt for Ortho  Left knee worse than right-last Xray was done in 6/2017-mild tricompartmental OA  Left shoulder pain even with pain med and Naprosyn     Wt Readings from Last 3 Encounters:   01/03/19 154 lb (69.9 kg)   10/04/18 158 lb 6.4 oz (71.8 kg)   07/10/18 157 lb 9.6 oz (71.5 kg)       ROS:    Pt denies: Wt loss, Fever/Chills, HA, Visual changes, Fatigue, Chest pain, SOB, MONDRAGON, Abd pain, N/V/D/C, Blood in stool or urine, Edema. Pertinent positive as above in HPI.  All others were negative    Patient Active Problem List   Diagnosis Code    Essential hypertension I10    Mixed hyperlipidemia E78.2    Chronic bilateral low back pain with bilateral sciatica M54.42, M54.41, G89.29    Lumbar spinal stenosis M48.061    Mild persistent asthma without complication W06.96    Advanced directives, counseling/discussion Z71.89    Overweight (BMI 25.0-29. 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, T40.2X5A       Past Medical History:   Diagnosis Date    Hypercholesterolemia     Hypertension     Osteoarthritis        Current Outpatient Medications   Medication Sig Dispense Refill    potassium chloride (KLOR-CON) 10 mEq tablet Take 1 Tab by mouth daily. 90 Tab 2    amLODIPine (NORVASC) 5 mg tablet TAKE ONE TABLET BY MOUTH EVERY DAY 90 Tab 2    lisinopril-hydroCHLOROthiazide (PRINZIDE, ZESTORETIC) 20-25 mg per tablet TAKE ONE TABLET BY MOUTH EVERY DAY 90 Tab 1    fluticasone-salmeterol (ADVAIR DISKUS) 100-50 mcg/dose diskus inhaler Take 1 Puff by inhalation every twelve (12) hours. 1 Each 3    simvastatin (ZOCOR) 10 mg tablet TAKE ONE TABLET BY MOUTH EVERY NIGHT AT BEDTIME 90 Tab 1    cholecalciferol, vitamin D3, (VITAMIN D3) 2,000 unit tab Take  by mouth daily.  albuterol (PROVENTIL HFA, VENTOLIN HFA, PROAIR HFA) 90 mcg/actuation inhaler Take 2 Puffs by inhalation every six (6) hours as needed for Wheezing or Shortness of Breath. 1 Inhaler 2    aspirin delayed-release 81 mg tablet Take 1 Tab by mouth daily. 30 Tab 1    oxyCODONE IR (OXY-IR) 15 mg immediate release tablet Take 15 mg by mouth every four (4) hours as needed for Pain.  tiZANidine (ZANAFLEX) 4 mg tablet Take 1 Tab by mouth two (2) times a day. 60 Tab 1    naproxen (NAPROSYN) 500 mg tablet TAKE 1 TABLET BY MOUTH TWICE DAILY WITH FOOD 180 Tab 2       Social History     Tobacco Use   Smoking Status Never Smoker   Smokeless Tobacco Never Used       No Known Allergies    Patient Labs were reviewed: yes      Patient Past Records were reviewed:  yes        Objective:     Vitals:    01/03/19 1035   BP: 126/80   Pulse: 95   Resp: 14   Temp: 98.1 °F (36.7 °C)   TempSrc: Oral   Weight: 154 lb (69.9 kg)   Height: 5' 3\" (1.6 m)     Body mass index is 27.28 kg/m².     Exam:   Appearance: alert, well appearing,  oriented to person, place, and time, acyanotic, in no respiratory distress and well hydrated. HEENT:  NC/AT, pink conj, anicteric sclerae  Neck:  No cervical lymphadenopathy, no JVD, no thyromegaly, no carotid bruit  Heart:  RRR without M/R/G  Lungs:  CTAB, no rhonchi, rales, or wheezes with good air exchange   Abdomen:  Non-tender, pos bowel sounds, no hepatosplenomegaly  Ext:  No C/C/E, crepitus on both knees, decreased ROM of the left shoulder secondary to pain    Skin: no rash  Neuro: no lateralizing signs, CNs II-XII intact      Assessment/ Plan:   Diagnoses and all orders for this visit:    1. Essential hypertension-controlled, continue with present meds  -     potassium chloride (KLOR-CON) 10 mEq tablet; Take 1 Tab by mouth daily. -     amLODIPine (NORVASC) 5 mg tablet; TAKE ONE TABLET BY MOUTH EVERY DAY  -     lisinopril-hydroCHLOROthiazide (PRINZIDE, ZESTORETIC) 20-25 mg per tablet; TAKE ONE TABLET BY MOUTH EVERY DAY    2. Primary osteoarthritis of both knees  -     REFERRAL TO PHYSICAL THERAPY  -     REFERRAL TO ORTHOPEDICS    3. Mixed hyperlipidemia-at goal on Zocor    4. Mild persistent asthma without complication-doing well on Advair    5. Left shoulder pain, unspecified chronicity  -     REFERRAL TO PHYSICAL THERAPY  -     REFERRAL TO ORTHOPEDICS    6. Spinal stenosis of lumbar region, unspecified whether neurogenic claudication present-pain mgt following    7. Overweight (BMI 25.0-29. 9)-discussed limiting vicente to 4542-4738/day as she is unable to exercise with her arthritis        Follow-up Disposition:  Return in about 3 months (around 4/3/2019) for follow up. I have discussed the diagnosis with the patient and the intended plan as seen in the above orders. The patient has received an After-Visit Summary and questions were answered concerning future plans. Medication Side Effects and Warnings were discussed with patient: yes    Patient verbalized understanding of above instructions.     Manju Marcelino MD  Internal Medicine  [facility]

## 2019-01-03 NOTE — PROGRESS NOTES
1. Have you been to the ER, urgent care clinic since your last visit? Hospitalized since your last visit? No    2. Have you seen or consulted any other health care providers outside of the [address] [place] [address] since your last visit? Include any pap smears or colon screening.  No

## 2019-01-11 ENCOUNTER — HOSPITAL ENCOUNTER (OUTPATIENT)
Dept: PHYSICAL THERAPY | Age: 74
Discharge: HOME | End: 2019-01-11
Payer: MEDICARE

## 2019-01-11 PROCEDURE — 97162 PT EVAL MOD COMPLEX 30 MIN: CPT

## 2019-01-11 PROCEDURE — 97110 THERAPEUTIC EXERCISES: CPT

## 2019-01-11 NOTE — PROGRESS NOTES
[address] PHYSICAL THERAPY AT [address] [facility] [address], [address], [facility], [address] - Phone: [phone]  Fax: [phone] PLAN OF CARE / STATEMENT OF MEDICAL NECESSITY FOR PHYSICAL THERAPY SERVICES Patient Name: Delwin Najjar : 1945 Medical  
Diagnosis: Primary osteoarthritis of both knees [M17.0] Left shoulder pain [M25.512] Treatment Diagnosis: Primary osteoarthritis of both knees [M17.0] Left shoulder pain [M25.512] Onset Date: 2018 for knee Referral Source: Abelardo Cade MD Start of Care [facility] 2019 Prior Hospitalization: See medical history Provider #: [id number] Prior Level of Function: Chronic episodic L shoulder pain for past 15 years, chronic LBP with sciatica Comorbidities: Spinal stenosis with R sciatica, L RTC tear without surgery x 15 years Medications: Verified on Patient Summary List  
The Plan of Care and following information is based on the information from the initial evaluation.  
================================================================================== Assessment / key information:  Patient is a 75 y.o. female who [facility] of Primary osteoarthritis of both knees [M17.0] Left shoulder pain [M25.512]. Patient reports central LBP, R quadriceps/knee pain, imbalance and L shoulder pain. Pain in all regions is chronic but L shoulder pain increased  Insidiously 10/2018. R knee and thigh pain increased 2019 after extended walking. Patient's pain level ranges from 8/10 to 10/10. Pain  Increases with in L shoulder with placing hand behind back, increases in RLE with standing and walking. She sees a chronic pain MD for back injections with her last injection 10/2018. She reports no recent knee, back or shoulder xrays.   Upon objective evaluation patient presents with impaired  strength in core, bilateral shoulder and bilateral hip and knee and decreased flexibility of  quadriceps and hamstring muscles. Patient ambulates with RW and decreased step height/length. .  She was unable to stand unsupported > 3 seconds. Thigh pain did decrease with repeated seated lumbar flexion. Patient scored 40 on FOTO indicating decreased function and quality of life. Functional limitations include placing hand behind back, lifting groceries, bed mobility, standing, walking and household chores. Patient can benefit from skilled PT to increase strength and flexibility, decrease pain to improve overall function and quality of life.================================================================================== Eval Complexity: History: HIGH Complexity :3+ comorbidities / personal factors will impact the outcome/ POC Exam:HIGH Complexity : 4+ Standardized tests and measures addressing body structure, function, activity limitation and / or participation in recreation  Presentation: MEDIUM Complexity : Evolving with changing characteristics  Clinical Decision Making:MEDIUM Complexity : FOTO score of 26-74Overall Complexity:MEDIUM Problem List: pain affecting function, decrease ROM, decrease strength, edema affecting function, impaired gait/ balance, decrease ADL/ functional abilitiies, decrease activity tolerance, decrease flexibility/ joint mobility. Treatment Plan may include any combination of the following: Therapeutic exercise, Therapeutic activities, Neuromuscular re-education, Physical agent/modality, Gait/balance training, Manual therapy and Patient education Patient / Family readiness to learn indicated by: asking questions, trying to perform skills and interestPersons(s) to be included in education: patient (P) Barriers to Learning/Limitations: None Measures taken:   
Patient Goal (s): \"Ease the pain\" Patient self reported health status: excellent Rehabilitation Potential: good? Short Term Goals: To be accomplished in 3  weeks: 
1) Patient performing daily home exercise program.. 2) Increase bilateral shoulder strength to 4/5 to facilitate carrying and lifting groceries. 3) Increase bilateral hip add/abd to 3-/5 to facilitate standing. 4) Patient reports RLE pain no > 6-7/10 with ADLs. ? Long Term Goals: To be accomplished in  6  weeks: 
1) Patient  independent with HEP. 2) Patient will Increase bilateral hip add/abd to 3/5 to facilitate standing. 3) Increase FOTO to 57 indicating improved function and quality of life. Eun Quesada 4) Patient will increase bilateral shoulder strength to 4+/5 to facilitate carrying and lifting groceries. 5) Patient reports RLE pain no > 4-5/10 with ADLs. Frequency / Duration:   Patient to be seen  2  times per week for 6  weeks: 
Patient / Caregiver education and instruction: activity modification and exercises G-Codes (GP): na Therapist Signature: Glenny Lubin PT Date: 1/11/2019 Certification Period: 1/11/2019 to 4/10/2019 Time: 1:47 PM  
================================================================================== I certify that the above Physical Therapy Services are being furnished while the patient is under my care. I agree with the treatment plan and certify that this therapy is necessary. Physician Signature:       Date:      Time:  Please sign and return to [facility] or you may fax the signed copy to [phone]. Thank you.

## 2019-01-11 NOTE — PROGRESS NOTES
PHYSICAL THERAPY - DAILY TREATMENT NOTE Patient Name: Kvng Weeks        Date: 2019 : 1945   YES Patient  Verified Visit #:     Insurance: Payor: [address] [place] [address] / Plan: [facility] DEVANG MEDICARE COMPLETE / Product Type: Managed Care Medicare / In time: 11:07 Out time: 12:18 Total Treatment Time: 70 Medicare Time /BCBS Tracking (below) Total Timed Codes (min):  10 1:1 Treatment Time:  10 TREATMENT AREA =  Primary osteoarthritis of both knees [M17.0] Left shoulder pain [M25.512] SUBJECTIVE Pain Level (on 0 to 10 scale):  9  / 10 Medication Changes/New allergies or changes in medical history, any new surgeries or procedures? NO    If yes, update Summary List  
Subjective Functional Status/Changes:  []  No changes reported See medical history OBJECTIVE Modalities Rationale:     decrease pain and increase tissue extensibility to improve patient's ability to stand and walk 
 min [] Estim, type/location:   
                                 []  att     []  unatt     []  w/US     []  w/ice    []  w/heat 
 min []  Mechanical Traction: type/lbs   
               []  pro   []  sup   []  int   []  cont    []  before manual    []  after manual  
 min []  Ultrasound, settings/location:    
 min []  Iontophoresis w/ dexamethasone, location:   
                                           []  take home patch       []  in clinic  
10 min []  Ice     [x]  Heat    location/position: R quad in supine  
 min []  Vasopneumatic Device, press/temp:   
 min []  Other:   
[x] Skin assessment post-treatment (if applicable):   
[x]  intact    []  redness- no adverse reaction    
[]redness  adverse reaction:     
10 min Therapeutic Exercise:  [x]  See flow sheet Rationale:      increase ROM, increase strength and increase flexibility to improve the patients ability to perform bed mobility, stand and walk  
na min Manual Therapy: Rationale:      na to improve patient's ability to na 
 min Patient Education:  YES  Reviewed HEP/POC/Goals/Effects of exercise on arthritis []  Progressed/Changed HEP based on: Other Objective/Functional Measures: 
 
See POC Post Treatment Pain Level (on 0 to 10) scale:   5  / 10 ASSESSMENT Assessment/Changes in Function:  
 
Justification for Eval Code Complexity: 
Patient History : See POC Examination see exam  
Clinical Presentation: evolving Clinical Decision Making : FOTO : 45 /100 []  See Progress Note/Recertification Patient will continue to benefit from skilled PT services to modify and progress therapeutic interventions, address functional mobility deficits, address ROM deficits, address strength deficits, analyze and address soft tissue restrictions, analyze and modify body mechanics/ergonomics, assess and modify postural abnormalities and instruct in home and community integration to attain remaining goals. Progress toward goals / Updated goals: 
Initial evaluation completed with home exercise program and education initiated. PLAN [x]  Upgrade activities as tolerated YES Continue plan of care  
[]  Discharge due to :   
[]  Other:   
 
Therapist: John Riley PT Date: 1/11/2019 Time: 12:18 AM  
 
Future Appointments Date Time Provider Niyah Britton 1/14/2019  9:45 AM Kisha Prim, [facility]  
1/17/2019 11:30 AM Nidia Argueta PT [facility]  
1/23/2019 12:00 PM Kisha Prim, [facility]  
1/25/2019  9:45 AM Kisha Prim, [facility]  
1/29/2019 11:15 AM Kisha Prim, [facility]  
2/1/2019 12:45 PM Kisha Prim, [facility]  
2/5/2019 12:00 PM Kisha Prim, [facility]  
2/8/2019 10:00 AM Nidia Argueta PT [facility]  
2/12/2019 12:00 PM Kisha Prim, [facility]  
2/15/2019 12:45 PM Kisha Prim, [facility]  
2/19/2019 12:00 PM Kisha Prim, PTA [facility]  
 2/22/2019 10:00 AM Osmani Wagner, PT [facility]  
4/3/2019  1:00 PM MD JIMMY Kessler  
10/7/2019 10:30 AM Frank Huang MD [address] [facility] 2724

## 2019-01-14 ENCOUNTER — HOSPITAL ENCOUNTER (OUTPATIENT)
Dept: PHYSICAL THERAPY | Age: 74
Discharge: HOME | End: 2019-01-14
Payer: MEDICARE

## 2019-01-14 PROCEDURE — 97140 MANUAL THERAPY 1/> REGIONS: CPT

## 2019-01-14 PROCEDURE — 97110 THERAPEUTIC EXERCISES: CPT

## 2019-01-14 NOTE — PROGRESS NOTES
PHYSICAL THERAPY - DAILY TREATMENT NOTE Patient Name: Ruth Staley        Date: 2019 : 1945   YES Patient  Verified Visit #:   2   of   12  Insurance: Payor: Heather Hernandez / Plan: [employer] [facility] MEDICARE COMPLETE / Product Type: Managed Care Medicare / In time: 10:01 am Out time: 10:56 am  
Total Treatment Time: 54 Medicare Time /BCBS Tracking (below) Total Timed Codes (min):  45 1:1 Treatment Time: 45 TREATMENT AREA =  Primary osteoarthritis of both knees [M17.0] Left shoulder pain [M25.512] SUBJECTIVE Pain Level (on 0 to 10 scale):  5  / 10- right thigh Medication Changes/New allergies or changes in medical history, any new surgeries or procedures? NO    If yes, update Summary List  
Subjective Functional Status/Changes:  []  No changes reported Pt states \"I'm doing better than last week. Not as much pain. \" \"I can bear some weightPt reports it feels like her thigh is swollen. Doing HEP 1x OBJECTIVE Modalities Rationale:     decrease edema, decrease inflammation, decrease pain and increase tissue extensibility to improve patient's ability to perform reaching, prolonged walking, standing 
 min [] Estim, type/location:   
                                 []  att     []  unatt     []  w/US     []  w/ice    []  w/heat 
 min []  Mechanical Traction: type/lbs   
               []  pro   []  sup   []  int   []  cont    []  before manual    []  after manual  
 min []  Ultrasound, settings/location:    
 min []  Iontophoresis w/ dexamethasone, location:   
                                           []  take home patch       []  in clinic  
10 min [x]  Ice     [x]  Heat    location/position: Semi reclined ice low back, heat groin  
 min []  Vasopneumatic Device, press/temp:   
 min []  Other:   
[x] Skin assessment post-treatment (if applicable):   
[x]  intact    []  redness- no adverse reaction    
[]redness  adverse reaction: 36 min Therapeutic Exercise:  [x]  See flow sheet Rationale:      increase ROM and increase strength to improve the patients ability to perform reaching, prolonged walking, standing 9 min Manual Therapy: Supine STM with roller to right quad, side lying STM with roller to right ITB; pt instructed in self massage technique to right quad and ITB Rationale:      decrease pain, increase ROM, increase tissue extensibility and decrease trigger points to improve patient's ability to improve tissue mobility in ADLs 
 min Patient Education:  YES  Reviewed HEP []  Progressed/Changed HEP based on: Other Objective/Functional Measures: 
Review HEP, add BKFO, supine bridge, quad sets with towel, sit FF, sit hamstring stretch Post Treatment Pain Level (on 0 to 10) scale:   3  / 10 ASSESSMENT Assessment/Changes in Function:  
 
Reviewed initial evaluation. Pt education in use of supportive foot wear. Advanced Thereapeutic exercise for gentle core ROM and strenghtening. Updated written HEP. Pt verbalized good understanding of written HEP. []  See Progress Note/Recertification Patient will continue to benefit from skilled PT services to modify and progress therapeutic interventions, address functional mobility deficits, address ROM deficits, address strength deficits and instruct in home and community integration to attain remaining goals. Progress toward goals / Updated goals: 
Progressed treatment per plan of care PLAN 
[]  Upgrade activities as tolerated YES Continue plan of care  
[]  Discharge due to :   
[]  Other:   
 
Therapist: Nga Vinse PTA Date: 1/14/2019 Time: 10:56 AM  
 
Future Appointments Date Time Provider Niyah Britton 1/17/2019 11:30 AM Sheridan Renteria PT [facility]  
1/23/2019 12:00 PM Jordan Navarrete PTA [facility]  
1/25/2019  9:45 AM Jordan Navarrete PTA [facility]  
1/29/2019 11:15 AM Jordan Navarrete PTA [facility]  
 2/1/2019 12:45 PM Rolando Santoyo PTA [facility]  
2/5/2019 12:00 PM Rolando Santoyo PTA [facility]  
2/8/2019 10:00 AM Prisca Dee, PT [facility]  
2/12/2019 12:00 PM Rolando Santoyo PTA [facility]  
2/15/2019 12:45 PM Rolando Santoyo PTA [facility]  
2/19/2019 12:00 PM Rolando Santoyo PTA [facility]  
2/22/2019 10:00 AM Prisca Dee, PT [facility]  
4/3/2019  1:00 PM MD JIMMY Blandon  
10/7/2019 10:30 AM Benji Hooper MD [address] [facility] 3948

## 2019-01-17 ENCOUNTER — APPOINTMENT (OUTPATIENT)
Dept: PHYSICAL THERAPY | Age: 74
End: 2019-01-17
Payer: MEDICARE

## 2019-01-18 ENCOUNTER — OFFICE VISIT (OUTPATIENT)
Dept: FAMILY MEDICINE CLINIC | Age: 74
End: 2019-01-18

## 2019-01-18 VITALS
WEIGHT: 157 LBS | OXYGEN SATURATION: 97 % | RESPIRATION RATE: 16 BRPM | HEIGHT: 63 IN | HEART RATE: 96 BPM | BODY MASS INDEX: 27.82 KG/M2 | SYSTOLIC BLOOD PRESSURE: 124 MMHG | TEMPERATURE: 97.5 F | DIASTOLIC BLOOD PRESSURE: 80 MMHG

## 2019-01-18 DIAGNOSIS — R35.0 URINARY FREQUENCY: ICD-10-CM

## 2019-01-18 DIAGNOSIS — N30.00 ACUTE CYSTITIS WITHOUT HEMATURIA: Primary | ICD-10-CM

## 2019-01-18 LAB
BILIRUB UR QL STRIP: NEGATIVE
GLUCOSE UR-MCNC: NEGATIVE MG/DL
KETONES P FAST UR STRIP-MCNC: NEGATIVE MG/DL
PH UR STRIP: 6 [PH] (ref 4.6–8)
PROT UR QL STRIP: NEGATIVE
SP GR UR STRIP: 1.01 (ref 1–1.03)
UA UROBILINOGEN AMB POC: NORMAL (ref 0.2–1)
URINALYSIS CLARITY POC: CLEAR
URINALYSIS COLOR POC: YELLOW
URINE BLOOD POC: NEGATIVE
URINE LEUKOCYTES POC: NORMAL
URINE NITRITES POC: NEGATIVE

## 2019-01-18 RX ORDER — NITROFURANTOIN 25; 75 MG/1; MG/1
100 CAPSULE ORAL 2 TIMES DAILY
Qty: 10 CAP | Refills: 0 | Status: SHIPPED | OUTPATIENT
Start: 2019-01-18 | End: 2019-01-23

## 2019-01-18 NOTE — PROGRESS NOTES
Chief Complaint   Patient presents with    Urinary Frequency     NOTICE LAST WEEK PATIENT STATED HAD NO NOTICABLE SIGNS BUT THIS WEEK STARTED HAVING FREQUENCY, HAVING KNEE PAIN IN RIGHT, AND NOT FEELING WELL     1. Have you been to the ER, urgent care clinic since your last visit? Hospitalized since your last visit? No    2. Have you seen or consulted any other health care providers outside of the [address] [place] [address] since your last visit? Include any pap smears or colon screening.  No

## 2019-01-18 NOTE — PROGRESS NOTES
Subjective: Maria Teresa Ryan is a 75 y.o. female who complains of frequency, urgency, abnormal smelling urine, incomplete bladder emptying, incontinence for 7 days. Patient denies flank pain, nausea, vomiting, fever, abdominal pain, unusual vaginal discharge, unusual vaginal bleeding, pelvic pain. Patient does not have a history of recurrent UTI. Patient does not have a history of pyelonephritis. Patient Active Problem List   Diagnosis Code    Essential hypertension I10    Mixed hyperlipidemia E78.2    Chronic bilateral low back pain with bilateral sciatica M54.42, M54.41, G89.29    Lumbar spinal stenosis M48.061    Mild persistent asthma without complication R93.63    Advanced directives, counseling/discussion Z71.89    Overweight (BMI 25.0-29. 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, T40.2X5A     Patient Active Problem List    Diagnosis Date Noted    Therapeutic opioid-induced constipation (OIC) 10/04/2018    Overweight (BMI 25.0-29.9) 01/10/2018    Mild persistent asthma without complication 79/54/8595    Advanced directives, counseling/discussion 10/09/2017    Chronic bilateral low back pain with bilateral sciatica 06/25/2017    Lumbar spinal stenosis 06/25/2017    Essential hypertension 06/20/2017    Mixed hyperlipidemia 06/20/2017     Current Outpatient Medications   Medication Sig Dispense Refill    nitrofurantoin, macrocrystal-monohydrate, (MACROBID) 100 mg capsule Take 1 Cap by mouth two (2) times a day for 5 days. 10 Cap 0    potassium chloride (KLOR-CON) 10 mEq tablet Take 1 Tab by mouth daily.  90 Tab 2    amLODIPine (NORVASC) 5 mg tablet TAKE ONE TABLET BY MOUTH EVERY DAY 90 Tab 2    lisinopril-hydroCHLOROthiazide (PRINZIDE, ZESTORETIC) 20-25 mg per tablet TAKE ONE TABLET BY MOUTH EVERY DAY 90 Tab 1    naproxen (NAPROSYN) 500 mg tablet TAKE 1 TABLET BY MOUTH TWICE DAILY WITH FOOD 180 Tab 2    fluticasone-salmeterol (ADVAIR DISKUS) 100-50 mcg/dose diskus inhaler Take 1 Puff by inhalation every twelve (12) hours. 1 Each 3    simvastatin (ZOCOR) 10 mg tablet TAKE ONE TABLET BY MOUTH EVERY NIGHT AT BEDTIME 90 Tab 1    cholecalciferol, vitamin D3, (VITAMIN D3) 2,000 unit tab Take  by mouth daily.  albuterol (PROVENTIL HFA, VENTOLIN HFA, PROAIR HFA) 90 mcg/actuation inhaler Take 2 Puffs by inhalation every six (6) hours as needed for Wheezing or Shortness of Breath. 1 Inhaler 2    aspirin delayed-release 81 mg tablet Take 1 Tab by mouth daily. 30 Tab 1    oxyCODONE IR (OXY-IR) 15 mg immediate release tablet Take 15 mg by mouth every four (4) hours as needed for Pain.  tiZANidine (ZANAFLEX) 4 mg tablet Take 1 Tab by mouth two (2) times a day. 60 Tab 1     Allergies   Allergen Reactions    Penicillins Swelling     Past Medical History:   Diagnosis Date    Hypercholesterolemia     Hypertension     Osteoarthritis      Past Surgical History:   Procedure Laterality Date    COLONOSCOPY N/A 2/2/2017    COLON[facility] ENDOSCOPY    HX CHOLECYSTECTOMY      HX HYSTERECTOMY       Family History   Problem Relation Age of Onset    No Known Problems Mother     No Known Problems Father      Social History     Tobacco Use    Smoking status: Never Smoker    Smokeless tobacco: Never Used   Substance Use Topics    Alcohol use: No        Review of Systems  Pertinent items are noted in HPI. Objective:     Visit Vitals  /80   Pulse 96   Temp 97.5 °F (36.4 °C) (Oral)   Resp 16   Ht 5' 3\" (1.6 m)   Wt 157 lb (71.2 kg)   SpO2 97%   BMI 27.81 kg/m²     General:  alert, cooperative, no distress   Abdomen: soft, nontender, nondistended, no masses or organomegaly. Back:  CVA tenderness absent   :  defer exam     Laboratory:   Urine dipstick shows positive for leukocytes. Micro exam: not done. Assessment/Plan:     Acute cystitis     1. nitrofurantoin  2. Maintain adequate hydration  3.  May use OTC pyridium as desired, which will turn urine orange/red color  4. Follow up if symptoms not improving, and prn. ICD-10-CM ICD-9-CM    1. Acute cystitis without hematuria N30.00 595.0 nitrofurantoin, macrocrystal-monohydrate, (MACROBID) 100 mg capsule   2. Urinary frequency R35.0 788.41 AMB POC URINALYSIS DIP STICK AUTO W/O MICRO     UA positive for UTI, GFR >60, treat with macrobid. An After Visit Summary was printed and given to the patient. All diagnosis have been discussed with the patient and all of the patient's questions have been answered. Follow-up Disposition:  Return if symptoms worsen or fail to improve. Unknown [place] [id number] [address].   [facility] [address], [place] [address]

## 2019-01-18 NOTE — PATIENT INSTRUCTIONS
Urinary Tract Infection in Women: Care Instructions  Your Care Instructions    A urinary tract infection, or UTI, is a general term for an infection anywhere between the kidneys and the urethra (where urine comes out). Most UTIs are bladder infections. They often cause pain or burning when you urinate. UTIs are caused by bacteria and can be cured with antibiotics. Be sure to complete your treatment so that the infection goes away. Follow-up care is a key part of your treatment and safety. Be sure to make and go to all appointments, and call your doctor if you are having problems. It's also a good idea to know your test results and keep a list of the medicines you take. How can you care for yourself at home? · Take your antibiotics as directed. Do not stop taking them just because you feel better. You need to take the full course of antibiotics. · Drink extra water and other fluids for the next day or two. This may help wash out the bacteria that are causing the infection. (If you have kidney, heart, or liver disease and have to limit fluids, talk with your doctor before you increase your fluid intake.)  · Avoid drinks that are carbonated or have caffeine. They can irritate the bladder. · Urinate often. Try to empty your bladder each time. · To relieve pain, take a hot bath or lay a heating pad set on low over your lower belly or genital area. Never go to sleep with a heating pad in place. To prevent UTIs  · Drink plenty of water each day. This helps you urinate often, which clears bacteria from your system. (If you have kidney, heart, or liver disease and have to limit fluids, talk with your doctor before you increase your fluid intake.)  · Urinate when you need to. · Urinate right after you have sex. · Change sanitary pads often. · Avoid douches, bubble baths, feminine hygiene sprays, and other feminine hygiene products that have deodorants.   · After going to the bathroom, wipe from front to back.  When should you call for help? Call your doctor now or seek immediate medical care if:    · Symptoms such as fever, chills, nausea, or vomiting get worse or appear for the first time.     · You have new pain in your back just below your rib cage. This is called flank pain.     · There is new blood or pus in your urine.     · You have any problems with your antibiotic medicine.    Watch closely for changes in your health, and be sure to contact your doctor if:    · You are not getting better after taking an antibiotic for 2 days.     · Your symptoms go away but then come back. Where can you learn more? Go to http://ariadna-conor.info/. Enter L566 in the search box to learn more about \"Urinary Tract Infection in Women: Care Instructions. \"  Current as of: March 20, 2018  Content Version: 11.9  © 5731-7147 [employer]. Care instructions adapted under license by [employer] (which disclaims liability or warranty for this information). If you have questions about a medical condition or this instruction, always ask your healthcare professional. [address] any warranty or liability for your use of this information.

## 2019-01-25 ENCOUNTER — APPOINTMENT (OUTPATIENT)
Dept: PHYSICAL THERAPY | Age: 74
End: 2019-01-25
Payer: MEDICARE

## 2019-01-29 ENCOUNTER — APPOINTMENT (OUTPATIENT)
Dept: PHYSICAL THERAPY | Age: 74
End: 2019-01-29
Payer: MEDICARE

## 2019-01-29 ENCOUNTER — OFFICE VISIT (OUTPATIENT)
Dept: FAMILY MEDICINE CLINIC | Age: 74
End: 2019-01-29

## 2019-01-29 ENCOUNTER — HOSPITAL ENCOUNTER (OUTPATIENT)
Dept: LAB | Age: 74
Discharge: HOME | End: 2019-01-29
Payer: MEDICARE

## 2019-01-29 VITALS
HEIGHT: 63 IN | RESPIRATION RATE: 17 BRPM | DIASTOLIC BLOOD PRESSURE: 76 MMHG | SYSTOLIC BLOOD PRESSURE: 119 MMHG | BODY MASS INDEX: 27.64 KG/M2 | HEART RATE: 103 BPM | WEIGHT: 156 LBS | TEMPERATURE: 98.1 F | OXYGEN SATURATION: 95 %

## 2019-01-29 DIAGNOSIS — T40.2X5A CONSTIPATION DUE TO OPIOID THERAPY: ICD-10-CM

## 2019-01-29 DIAGNOSIS — K59.03 CONSTIPATION DUE TO OPIOID THERAPY: ICD-10-CM

## 2019-01-29 DIAGNOSIS — E66.3 OVERWEIGHT (BMI 25.0-29.9): ICD-10-CM

## 2019-01-29 DIAGNOSIS — G62.9 NEUROPATHY: ICD-10-CM

## 2019-01-29 DIAGNOSIS — R35.0 URINARY FREQUENCY: Primary | ICD-10-CM

## 2019-01-29 DIAGNOSIS — R35.0 URINARY FREQUENCY: ICD-10-CM

## 2019-01-29 LAB
BILIRUB UR QL STRIP: NEGATIVE
GLUCOSE UR-MCNC: NEGATIVE MG/DL
KETONES P FAST UR STRIP-MCNC: NEGATIVE MG/DL
PH UR STRIP: 6.5 [PH] (ref 4.6–8)
PROT UR QL STRIP: NEGATIVE
SP GR UR STRIP: 1.01 (ref 1–1.03)
UA UROBILINOGEN AMB POC: NORMAL (ref 0.2–1)
URINALYSIS CLARITY POC: CLEAR
URINALYSIS COLOR POC: YELLOW
URINE BLOOD POC: NEGATIVE
URINE LEUKOCYTES POC: NEGATIVE
URINE NITRITES POC: NEGATIVE

## 2019-01-29 PROCEDURE — 87086 URINE CULTURE/COLONY COUNT: CPT

## 2019-01-29 RX ORDER — GABAPENTIN 300 MG/1
300 CAPSULE ORAL
Qty: 90 CAP | Refills: 1 | Status: SHIPPED | OUTPATIENT
Start: 2019-01-29 | End: 2019-04-03 | Stop reason: DRUGHIGH

## 2019-01-29 NOTE — PROGRESS NOTES
Chief Complaint Patient presents with  UTI  
  recurrent UTI  Medication Evaluation Needs refill of Gabapentin Pt is a 75y.o. year old female who presents for an acute visit for the above 
 
sxs started early January Seen by NP on 1/18-txd with Macrobid; sxs persisted-pressure in the bladder, feels she has to go, go, go No previous urine culture done Needs refill of Gabapentin for tingling on both feet (was started way back when) Walks with a walker Amitiza not helping-stopped it already Miralax at night-helps ROS: 
 
Pt denies: Wt loss, Fever/Chills, HA, Visual changes, Fatigue, Chest pain, SOB, MONDRAGON, Abd pain, N/V/D/C, Blood in stool or urine, Edema. Pertinent positive as above in HPI. All others were negative Patient Active Problem List  
Diagnosis Code  Essential hypertension I10  
 Mixed hyperlipidemia E78.2  Chronic bilateral low back pain with bilateral sciatica M54.42, M54.41, G89.29  Lumbar spinal stenosis M48.061  
 Mild persistent asthma without complication J91.15  Advanced directives, counseling/discussion Z71.89  
 Overweight (BMI 25.0-29. 9) E66.3  Constipation due to opioid therapy K59.03, T40.2X5A  Neuropathy G62.9 Past Medical History:  
Diagnosis Date  Hypercholesterolemia  Hypertension  Osteoarthritis Current Outpatient Medications Medication Sig Dispense Refill  gabapentin (NEURONTIN) 300 mg capsule Take 1 Cap by mouth nightly. 90 Cap 1  potassium chloride (KLOR-CON) 10 mEq tablet Take 1 Tab by mouth daily.  90 Tab 2  
 amLODIPine (NORVASC) 5 mg tablet TAKE ONE TABLET BY MOUTH EVERY DAY 90 Tab 2  
 lisinopril-hydroCHLOROthiazide (PRINZIDE, ZESTORETIC) 20-25 mg per tablet TAKE ONE TABLET BY MOUTH EVERY DAY 90 Tab 1  
 naproxen (NAPROSYN) 500 mg tablet TAKE 1 TABLET BY MOUTH TWICE DAILY WITH FOOD 180 Tab 2  
 fluticasone-salmeterol (ADVAIR DISKUS) 100-50 mcg/dose diskus inhaler Take 1 Puff by inhalation every twelve (12) hours. 1 Each 3  
 simvastatin (ZOCOR) 10 mg tablet TAKE ONE TABLET BY MOUTH EVERY NIGHT AT BEDTIME 90 Tab 1  cholecalciferol, vitamin D3, (VITAMIN D3) 2,000 unit tab Take  by mouth daily.  albuterol (PROVENTIL HFA, VENTOLIN HFA, PROAIR HFA) 90 mcg/actuation inhaler Take 2 Puffs by inhalation every six (6) hours as needed for Wheezing or Shortness of Breath. 1 Inhaler 2  
 aspirin delayed-release 81 mg tablet Take 1 Tab by mouth daily. 30 Tab 1  
 oxyCODONE IR (OXY-IR) 15 mg immediate release tablet Take 15 mg by mouth every four (4) hours as needed for Pain.  tiZANidine (ZANAFLEX) 4 mg tablet Take 1 Tab by mouth two (2) times a day. 60 Tab 1 Social History Tobacco Use Smoking Status Never Smoker Smokeless Tobacco Never Used Allergies Allergen Reactions  Penicillins Swelling Patient Labs were reviewed: yes Patient Past Records were reviewed:  yes Objective:  
 
Vitals:  
 01/29/19 1225 BP: 119/76 Pulse: (!) 103 Resp: 17 Temp: 98.1 °F (36.7 °C) TempSrc: Oral  
SpO2: 95% Weight: 156 lb (70.8 kg) Height: 5' 3\" (1.6 m) Body mass index is 27.63 kg/m². Exam:  
Appearance: alert, well appearing,  oriented to person, place, and time, acyanotic, in no respiratory distress and well hydrated. HEENT:  NC/AT, pink conj, anicteric sclerae Neck:  No cervical lymphadenopathy, no JVD, no thyromegaly, no carotid bruit Heart:  RRR without M/R/G Lungs:  CTAB, no rhonchi, rales, or wheezes with good air exchange Abdomen:  Non-tender, pos bowel sounds, no hepatosplenomegaly Ext:  No C/C/E Skin: no rash Neuro: no lateralizing signs, CNs II-XII intact Assessment/ Plan:  
Diagnoses and all orders for this visit: 
 
1. Urinary frequency -     AMB POC URINALYSIS DIP STICK AUTO W/O MICRO 
-     gabapentin (NEURONTIN) 300 mg capsule; Take 1 Cap by mouth nightly. -     CULTURE, URINE; Future 2. Neuropathy 3. Constipation due to opioid therapy 4. Overweight (BMI 25.0-29. 9) Follow-up Disposition: 
Return for previous appt. I have discussed the diagnosis with the patient and the intended plan as seen in the above orders. The patient has received an After-Visit Summary and questions were answered concerning future plans. Medication Side Effects and Warnings were discussed with patient: yes Patient verbalized understanding of above instructions. Alice Bertrand MD 
Internal Medicine [facility]

## 2019-01-29 NOTE — PATIENT INSTRUCTIONS
Constipation: Care Instructions Your Care Instructions Constipation means that you have a hard time passing stools (bowel movements). People pass stools from 3 times a day to once every 3 days. What is normal for you may be different. Constipation may occur with pain in the rectum and cramping. The pain may get worse when you try to pass stools. Sometimes there are small amounts of bright red blood on toilet paper or the surface of stools. This is because of enlarged veins near the rectum (hemorrhoids). A few changes in your diet and lifestyle may help you avoid ongoing constipation. Your doctor may also prescribe medicine to help loosen your stool. Some medicines can cause constipation. These include pain medicines and antidepressants. Tell your doctor about all the medicines you take. Your doctor may want to make a medicine change to ease your symptoms. Follow-up care is a key part of your treatment and safety. Be sure to make and go to all appointments, and call your doctor if you are having problems. It's also a good idea to know your test results and keep a list of the medicines you take. How can you care for yourself at home? · Drink plenty of fluids, enough so that your urine is light yellow or clear like water. If you have kidney, heart, or liver disease and have to limit fluids, talk with your doctor before you increase the amount of fluids you drink. · Include high-fiber foods in your diet each day. These include fruits, vegetables, beans, and whole grains. · Get at least 30 minutes of exercise on most days of the week. Walking is a good choice. You also may want to do other activities, such as running, swimming, cycling, or playing tennis or team sports. · Take a fiber supplement, such as Citrucel or Metamucil, every day. Read and follow all instructions on the label. · Schedule time each day for a bowel movement. A daily routine may help. Take your time having your bowel movement. · Support your feet with a small step stool when you sit on the toilet. This helps flex your hips and places your pelvis in a squatting position. · Your doctor may recommend an over-the-counter laxative to relieve your constipation. Examples are Milk of Magnesia and MiraLax. Read and follow all instructions on the label. Do not use laxatives on a long-term basis. When should you call for help? Call your doctor now or seek immediate medical care if: 
  · You have new or worse belly pain.  
  · You have new or worse nausea or vomiting.  
  · You have blood in your stools.  
 Watch closely for changes in your health, and be sure to contact your doctor if: 
  · Your constipation is getting worse.  
  · You do not get better as expected. Where can you learn more? Go to http://ariadna-conor.info/. Enter [phone] in the search box to learn more about \"Constipation: Care Instructions. \" Current as of: September 23, 2018 Content Version: 11.9 © 5561-3813 [employer]. Care instructions adapted under license by [employer] (which disclaims liability or warranty for this information). If you have questions about a medical condition or this instruction, always ask your healthcare professional. [place] [address] any warranty or liability for your use of this information.

## 2019-01-29 NOTE — PROGRESS NOTES
Chief Complaint Patient presents with  UTI  
  recurrent UTI  Medication Evaluation Needs refill of Gabapentin 1. Have you been to the ER, urgent care clinic since your last visit? Hospitalized since your last visit? No 
 
2. Have you seen or consulted any other health care providers outside of the [facility] since your last visit? Include any pap smears or colon screening.  No

## 2019-02-01 ENCOUNTER — APPOINTMENT (OUTPATIENT)
Dept: PHYSICAL THERAPY | Age: 74
End: 2019-02-01

## 2019-02-01 LAB
BACTERIA SPEC CULT: NORMAL
SERVICE CMNT-IMP: NORMAL

## 2019-02-01 NOTE — PROGRESS NOTES
pls let patient know, no bacteria in the urine; if she is still having urinary symptoms, I will go ahead and refer her to a urogynecologist

## 2019-02-05 ENCOUNTER — APPOINTMENT (OUTPATIENT)
Dept: PHYSICAL THERAPY | Age: 74
End: 2019-02-05

## 2019-02-08 ENCOUNTER — APPOINTMENT (OUTPATIENT)
Dept: PHYSICAL THERAPY | Age: 74
End: 2019-02-08

## 2019-02-12 ENCOUNTER — APPOINTMENT (OUTPATIENT)
Dept: PHYSICAL THERAPY | Age: 74
End: 2019-02-12

## 2019-02-15 ENCOUNTER — APPOINTMENT (OUTPATIENT)
Dept: PHYSICAL THERAPY | Age: 74
End: 2019-02-15

## 2019-02-19 ENCOUNTER — APPOINTMENT (OUTPATIENT)
Dept: PHYSICAL THERAPY | Age: 74
End: 2019-02-19

## 2019-02-22 ENCOUNTER — APPOINTMENT (OUTPATIENT)
Dept: PHYSICAL THERAPY | Age: 74
End: 2019-02-22

## 2019-03-26 NOTE — PROGRESS NOTES
[address]  [facility] Arav[address] Morales [place], [address] - Phone: [phone]  Fax: [phone] DISCHARGE SUMMARY FOR:        [x]  PHYSICAL THERAPY Patient Name: Maria Luisa Randall : 1945 Treatment Diagnosis: Primary osteoarthritis of both knees [M17.0] Left shoulder pain [M25.512] Onset Date: 2018 for knee Referral Source: Mynor Abdullahi MD Start of Care [facility]): 2019 Prior Hospitalization: See medical history Provider #: [id number] Prior Level of Function: Chronic episodic L shoulder pain for past 15 years, chronic LBP with sciatica Comorbidities: Spinal stenosis with R sciatica, L RTC tear without surgery x 15 years Visits from [facility]: 2 Missed Visits: 1 Goal/Measure of Progress Goal Met? 1. Patient performing daily home exercise program.. Status at last Eval: dependent Current Status: Unable to be reassessed. no  
2. Increase bilateral shoulder strength to 4/5 to facilitate carrying and lifting groceries Status at last Eval: Flex: Bilateral: 3+/5 Scaption: bilateral: 3+/5 SS: bilateral: 3+/5 Current Status: Unable to be reassessed no 3.  3) Increase bilateral hip add/abd to 3-/5 to facilitate standing. 4) Patient reports RLE pain no > 6-7/10 with ADLs Status at last Eval: Pain: 8/10 Current Status: Unable to be reassessed no Key Functional Changes/Progress:Patient was unable to be formally reassessed secondary to not returning to PT after 19 visit. Patient requested to hold PT until follow up with MD. Assessments/Recommendations: Discontinue therapy due to lack of attendance or compliance. Specifics: na If you have any questions/comments please contact us directly at [phone]. Thank you for allowing us to assist in the care of your patient. Therapist Signature: Claudetta Greenspan, PT Date: 2019 Certification Period: 1/11/2019 to 4/10/2019 Time: 6:43 PM  
NOTE TO PHYSICIAN:  PLEASE COMPLETE THE ORDERS BELOW AND FAX TO [facility] Physical Therapy: [phone] If you are unable to process this request in 24 hours please contact our office: [phone] 
 
___ I have read the above report and request that my patient continue as recommended.  
___ I have read the above report and request that my patient continue therapy with the following changes/special instructions:_________________________________________________________  
___ I have read the above report and request that my patient be discharged from therapy.   
 
Physician Signature:       Date:      Time:

## 2019-04-03 ENCOUNTER — OFFICE VISIT (OUTPATIENT)
Dept: FAMILY MEDICINE CLINIC | Age: 74
End: 2019-04-03

## 2019-04-03 ENCOUNTER — HOSPITAL ENCOUNTER (OUTPATIENT)
Dept: LAB | Age: 74
Discharge: HOME | End: 2019-04-03
Payer: MEDICARE

## 2019-04-03 VITALS
BODY MASS INDEX: 27.64 KG/M2 | HEIGHT: 63 IN | SYSTOLIC BLOOD PRESSURE: 121 MMHG | WEIGHT: 156 LBS | DIASTOLIC BLOOD PRESSURE: 84 MMHG | TEMPERATURE: 96.7 F | OXYGEN SATURATION: 96 % | HEART RATE: 89 BPM | RESPIRATION RATE: 16 BRPM

## 2019-04-03 DIAGNOSIS — T40.2X5A CONSTIPATION DUE TO OPIOID THERAPY: ICD-10-CM

## 2019-04-03 DIAGNOSIS — N30.00 ACUTE CYSTITIS WITHOUT HEMATURIA: ICD-10-CM

## 2019-04-03 DIAGNOSIS — J01.90 ACUTE NON-RECURRENT SINUSITIS, UNSPECIFIED LOCATION: ICD-10-CM

## 2019-04-03 DIAGNOSIS — K59.03 CONSTIPATION DUE TO OPIOID THERAPY: ICD-10-CM

## 2019-04-03 DIAGNOSIS — I10 ESSENTIAL HYPERTENSION: Primary | ICD-10-CM

## 2019-04-03 DIAGNOSIS — E66.3 OVERWEIGHT (BMI 25.0-29.9): ICD-10-CM

## 2019-04-03 DIAGNOSIS — M48.061 SPINAL STENOSIS OF LUMBAR REGION, UNSPECIFIED WHETHER NEUROGENIC CLAUDICATION PRESENT: ICD-10-CM

## 2019-04-03 DIAGNOSIS — E78.2 MIXED HYPERLIPIDEMIA: ICD-10-CM

## 2019-04-03 LAB
BILIRUB UR QL STRIP: NEGATIVE
GLUCOSE UR-MCNC: NEGATIVE MG/DL
KETONES P FAST UR STRIP-MCNC: NEGATIVE MG/DL
PH UR STRIP: 5.5 [PH] (ref 4.6–8)
PROT UR QL STRIP: NEGATIVE
SP GR UR STRIP: 1.02 (ref 1–1.03)
UA UROBILINOGEN AMB POC: NORMAL (ref 0.2–1)
URINALYSIS CLARITY POC: CLEAR
URINALYSIS COLOR POC: NORMAL
URINE BLOOD POC: NEGATIVE
URINE LEUKOCYTES POC: NORMAL
URINE NITRITES POC: NEGATIVE

## 2019-04-03 PROCEDURE — 87086 URINE CULTURE/COLONY COUNT: CPT

## 2019-04-03 RX ORDER — POLYETHYLENE GLYCOL 3350 17 G/17G
17 POWDER, FOR SOLUTION ORAL DAILY
Qty: 1700 G | Refills: 1 | Status: SHIPPED | OUTPATIENT
Start: 2019-04-03

## 2019-04-03 RX ORDER — AZITHROMYCIN 250 MG/1
TABLET, FILM COATED ORAL
Qty: 6 TAB | Refills: 0 | Status: SHIPPED | OUTPATIENT
Start: 2019-04-03 | End: 2019-04-08

## 2019-04-03 RX ORDER — GABAPENTIN 300 MG/1
300 CAPSULE ORAL 2 TIMES DAILY
Qty: 180 CAP | Refills: 1 | Status: SHIPPED | OUTPATIENT
Start: 2019-04-03 | End: 2019-12-02 | Stop reason: SDUPTHER

## 2019-04-03 NOTE — PROGRESS NOTES
Chief Complaint   Patient presents with    Follow Up Chronic Condition     Patient not fasting    Allergies       Pt is a 75y.o. year old female who presents for follow up of her chronic medical problems    Wt Readings from Last 3 Encounters:   04/03/19 156 lb (70.8 kg)   01/29/19 156 lb (70.8 kg)   01/18/19 157 lb (71.2 kg)       BP Readings from Last 3 Encounters:   04/03/19 121/84   01/29/19 119/76   01/18/19 124/80   Compliant with BP meds    Lab Results   Component Value Date/Time    Cholesterol, total 139 10/04/2018 11:17 AM    HDL Cholesterol 69 (H) 10/04/2018 11:17 AM    LDL, calculated 50.6 10/04/2018 11:17 AM    VLDL, calculated 19.4 10/04/2018 11:17 AM    Triglyceride 97 10/04/2018 11:17 AM    CHOL/HDL Ratio 2.0 10/04/2018 11:17 AM   on Zocor-compliant, no side effects      Pain mgt with Dr Amaya-ordered for her to have MRI done at [address] [place] [address] yesterday; results not in chart  Taking pain meds  Cannot get any more shots  Last MRI was in 2017  Still with right lower back pain rad to the right leg-unsteady, has to walk with a walker   Saw surgeon before, surgery will be too extensive she was told  MR LUMBAR SPINE W/O CONTRAST4/5/2017  [facility]  Result Impression   Impression: Scoliosis with advanced disc disease throughout. Most severe central stenosis at L4-L5 followed by L3-L4, with posterior disc bulge with facet and ligamentous hypertrophy. Foraminal stenosis is also severe from combination with spondylolisthesis. Severe left L5 foraminal stenosis with nerve root compression. Overall, stable severe degenerative disease with scoliosis when compared to July 2016.       Takes Naprosyn prn together with the pain med  Gabapentin at night-helps with pain and helps her sleep    Sinus trouble for a while now despite taking OTC meds    Constipated from pain med  Amitiza did not help  Tried MOM but not Miralax    Wants to get urine checked     Sinus congestion for the last 2 weeks, not getting better    ROS:    Pt denies: Wt loss, Fever/Chills, HA, Visual changes, Fatigue, Chest pain, SOB, MONDRAGON, Abd pain, N/V/D/C, Blood in stool or urine, Edema. Pertinent positive as above in HPI. All others were negative    Patient Active Problem List   Diagnosis Code    Essential hypertension I10    Mixed hyperlipidemia E78.2    Chronic bilateral low back pain with bilateral sciatica M54.42, M54.41, G89.29    Lumbar spinal stenosis M48.061    Mild persistent asthma without complication W71.68    Advanced directives, counseling/discussion Z71.89    Overweight (BMI 25.0-29. 9) E66.3    Constipation due to opioid therapy K59.03, T40.2X5A    Neuropathy G62.9       Past Medical History:   Diagnosis Date    Hypercholesterolemia     Hypertension     Osteoarthritis        Current Outpatient Medications   Medication Sig Dispense Refill                                lisinopril-hydroCHLOROthiazide (PRINZIDE, ZESTORETIC) 20-25 mg per tablet TAKE ONE TABLET BY MOUTH EVERY DAY 90 Tab 1    potassium chloride (KLOR-CON) 10 mEq tablet Take 1 Tab by mouth daily. 90 Tab 2    amLODIPine (NORVASC) 5 mg tablet TAKE ONE TABLET BY MOUTH EVERY DAY 90 Tab 2    naproxen (NAPROSYN) 500 mg tablet TAKE 1 TABLET BY MOUTH TWICE DAILY WITH FOOD 180 Tab 2    fluticasone-salmeterol (ADVAIR DISKUS) 100-50 mcg/dose diskus inhaler Take 1 Puff by inhalation every twelve (12) hours. 1 Each 3    simvastatin (ZOCOR) 10 mg tablet TAKE ONE TABLET BY MOUTH EVERY NIGHT AT BEDTIME 90 Tab 1    cholecalciferol, vitamin D3, (VITAMIN D3) 2,000 unit tab Take  by mouth daily.  albuterol (PROVENTIL HFA, VENTOLIN HFA, PROAIR HFA) 90 mcg/actuation inhaler Take 2 Puffs by inhalation every six (6) hours as needed for Wheezing or Shortness of Breath. 1 Inhaler 2    aspirin delayed-release 81 mg tablet Take 1 Tab by mouth daily.  30 Tab 1    oxyCODONE IR (OXY-IR) 15 mg immediate release tablet Take 15 mg by mouth every four (4) hours as needed for Pain.  tiZANidine (ZANAFLEX) 4 mg tablet Take 1 Tab by mouth two (2) times a day. 60 Tab 1       Social History     Tobacco Use   Smoking Status Never Smoker   Smokeless Tobacco Never Used       Allergies   Allergen Reactions    Penicillins Swelling       Patient Labs were reviewed: yes      Patient Past Records were reviewed:  yes        Objective:     Vitals:    04/03/19 1306   BP: 121/84   Pulse: 89   Resp: 16   Temp: 96.7 °F (35.9 °C)   TempSrc: Oral   SpO2: 96%   Weight: 156 lb (70.8 kg)   Height: 5' 3\" (1.6 m)     Body mass index is 27.63 kg/m². Exam:   Appearance: alert, well appearing,  oriented to person, place, and time, acyanotic, in no respiratory distress and well hydrated. HEENT:  NC/AT, pink conj, anicteric sclerae, nasal congestion, bilateral malar tenderness  Neck:  No cervical lymphadenopathy, no JVD, no thyromegaly, no carotid bruit  Heart:  RRR without M/R/G  Lungs:  CTAB, no rhonchi, rales, or wheezes with good air exchange   Abdomen:  Non-tender, pos bowel sounds, no hepatosplenomegaly  Ext:  No C/C/E    Skin: no rash  Neuro: no lateralizing signs, CNs II-XII intact  Back: scoliosis noted    Assessment/ Plan:   Diagnoses and all orders for this visit:    1. Essential hypertension-controlled, continue with present meds    2. Constipation due to opioid therapy-will try Linzess and Miralax  -     linaclotide (LINZESS) 145 mcg cap capsule; Take 1 Cap by mouth Daily (before breakfast). -     polyethylene glycol (MIRALAX) 17 gram/dose powder; Take 17 g by mouth daily. 3. Acute non-recurrent sinusitis, unspecified location  -     azithromycin (ZITHROMAX) 250 mg tablet; Take 2 tablets today, then take 1 tablet daily    4. Mixed hyperlipidemia-continue with Zocor    5. Spinal stenosis of lumbar region, unspecified whether neurogenic claudication present-will increase dose of Gabapentin  -     gabapentin (NEURONTIN) 300 mg capsule; Take 1 Cap by mouth two (2) times a day.     6. Overweight (BMI 25.0-29. 9)-discussed limiting vicente to [phone]/day as she is unable to exercise    7. Acute cystitis without hematuria-await culture results prior to antibiotics  -     CULTURE, URINE; Future  -     AMB POC URINALYSIS DIP STICK AUTO W/O MICRO        Follow-up and Dispositions    · Return in about 3 months (around 7/3/2019) for follow up. I have discussed the diagnosis with the patient and the intended plan as seen in the above orders. The patient has received an After-Visit Summary and questions were answered concerning future plans. Medication Side Effects and Warnings were discussed with patient: yes    Patient verbalized understanding of above instructions.     Valeriy Aden MD  Internal Medicine  [facility]

## 2019-04-03 NOTE — PATIENT INSTRUCTIONS
Constipation: Care Instructions  Your Care Instructions    Constipation means that you have a hard time passing stools (bowel movements). People pass stools from 3 times a day to once every 3 days. What is normal for you may be different. Constipation may occur with pain in the rectum and cramping. The pain may get worse when you try to pass stools. Sometimes there are small amounts of bright red blood on toilet paper or the surface of stools. This is because of enlarged veins near the rectum (hemorrhoids). A few changes in your diet and lifestyle may help you avoid ongoing constipation. Your doctor may also prescribe medicine to help loosen your stool. Some medicines can cause constipation. These include pain medicines and antidepressants. Tell your doctor about all the medicines you take. Your doctor may want to make a medicine change to ease your symptoms. Follow-up care is a key part of your treatment and safety. Be sure to make and go to all appointments, and call your doctor if you are having problems. It's also a good idea to know your test results and keep a list of the medicines you take. How can you care for yourself at home? · Drink plenty of fluids, enough so that your urine is light yellow or clear like water. If you have kidney, heart, or liver disease and have to limit fluids, talk with your doctor before you increase the amount of fluids you drink. · Include high-fiber foods in your diet each day. These include fruits, vegetables, beans, and whole grains. · Get at least 30 minutes of exercise on most days of the week. Walking is a good choice. You also may want to do other activities, such as running, swimming, cycling, or playing tennis or team sports. · Take a fiber supplement, such as Citrucel or Metamucil, every day. Read and follow all instructions on the label. · Schedule time each day for a bowel movement. A daily routine may help.  Take your time having your bowel movement. · Support your feet with a small step stool when you sit on the toilet. This helps flex your hips and places your pelvis in a squatting position. · Your doctor may recommend an over-the-counter laxative to relieve your constipation. Examples are Milk of Magnesia and MiraLax. Read and follow all instructions on the label. Do not use laxatives on a long-term basis. When should you call for help? Call your doctor now or seek immediate medical care if:    · You have new or worse belly pain.     · You have new or worse nausea or vomiting.     · You have blood in your stools.    Watch closely for changes in your health, and be sure to contact your doctor if:    · Your constipation is getting worse.     · You do not get better as expected. Where can you learn more? Go to http://ariadna-conor.info/. Enter 21 [phone] in the search box to learn more about \"Constipation: Care Instructions. \"  Current as of: September 23, 2018  Content Version: 11.9  © 8808-0893 [employer]. Care instructions adapted under license by [employer] (which disclaims liability or warranty for this information). If you have questions about a medical condition or this instruction, always ask your healthcare professional. [place] [address] any warranty or liability for your use of this information.

## 2019-04-03 NOTE — PROGRESS NOTES
Chief Complaint   Patient presents with    Follow Up Chronic Condition     Patient not fasting    Allergies     1. Have you been to the ER, urgent care clinic since your last visit? Hospitalized since your last visit? No    2. Have you seen or consulted any other health care providers outside of the [address] [place] [address] since your last visit? Include any pap smears or colon screening.  Yes Where: Pain Management-Dr. Jose Maria Soares

## 2019-04-05 LAB
BACTERIA SPEC CULT: NORMAL
SERVICE CMNT-IMP: NORMAL

## 2019-07-16 ENCOUNTER — OFFICE VISIT (OUTPATIENT)
Dept: FAMILY MEDICINE CLINIC | Age: 74
End: 2019-07-16

## 2019-07-16 VITALS
OXYGEN SATURATION: 96 % | RESPIRATION RATE: 18 BRPM | BODY MASS INDEX: 26.75 KG/M2 | HEIGHT: 63 IN | SYSTOLIC BLOOD PRESSURE: 165 MMHG | HEART RATE: 100 BPM | TEMPERATURE: 98.1 F | DIASTOLIC BLOOD PRESSURE: 107 MMHG | WEIGHT: 151 LBS

## 2019-07-16 DIAGNOSIS — N39.490 OVERFLOW INCONTINENCE OF URINE: ICD-10-CM

## 2019-07-16 DIAGNOSIS — R35.0 URINARY FREQUENCY: Primary | ICD-10-CM

## 2019-07-16 LAB
BILIRUB UR QL STRIP: NEGATIVE
GLUCOSE UR-MCNC: NEGATIVE MG/DL
KETONES P FAST UR STRIP-MCNC: NEGATIVE MG/DL
PH UR STRIP: 7 [PH] (ref 4.6–8)
PROT UR QL STRIP: NEGATIVE
SP GR UR STRIP: 1.01 (ref 1–1.03)
UA UROBILINOGEN AMB POC: NORMAL (ref 0.2–1)
URINALYSIS CLARITY POC: CLEAR
URINALYSIS COLOR POC: YELLOW
URINE BLOOD POC: NEGATIVE
URINE LEUKOCYTES POC: NEGATIVE
URINE NITRITES POC: NEGATIVE

## 2019-07-16 RX ORDER — OXYCODONE HYDROCHLORIDE 10 MG/1
TABLET ORAL
Refills: 0 | COMMUNITY
Start: 2019-06-24

## 2019-07-16 NOTE — PROGRESS NOTES
[facility], [facility] [address]               [phone]      Darby Zaidi is a 75 y.o. female and presents with Urinary Frequency (Possibly from BP medication )       Assessment/Plan:    Diagnoses and all orders for this visit:    1. Urinary frequency  -     AMB POC URINALYSIS DIP STICK AUTO W/O MICRO  -     XR ABD (KUB); Future    2. Overflow incontinence of urine  -     XR ABD (KUB); Future    This has been an ongoing problem for her. Her real symptom is urinary frequency. On an MRI in 2017 it showed a distended bladder but she did urinate right after the test.  Question if this is a urine overflow issue or primary incontinence  Today she is in a brief and has soaked through her pants  She is seeing neurology today for her lumbar spine problems today, have asked her to discuss this problem with her neurology    Follow-up and Dispositions    · Return if symptoms worsen or fail to improve. Subjective:    Urinary frequency  onset: started 1/1/2019  Characteristics: incontinent - when I stand up I have to go and I urinate on myself, in 2017 when having mri was asked if she had any urinary symptoms: at that time she did not, the MRI showed distened bladder, she states she urinated after  Treatment: abx, several ua and cultures  Relieve: nothing  PMH: lumbar spine stenosis    ROS:  As stated in HPI, otherwise all others negative. ROS    The problem list was updated as a part of today's visit. Patient Active Problem List   Diagnosis Code    Essential hypertension I10    Mixed hyperlipidemia E78.2    Chronic bilateral low back pain with bilateral sciatica M54.42, M54.41, G89.29    Lumbar spinal stenosis M48.061    Mild persistent asthma without complication F99.08    Advanced directives, counseling/discussion Z71.89    Overweight (BMI 25.0-29. 9) E66.3    Constipation due to opioid therapy K59.03, T40.2X5A    Neuropathy G62.9       The PSH, FH were reviewed. SH:  Social History     Tobacco Use    Smoking status: Never Smoker    Smokeless tobacco: Never Used   Substance Use Topics    Alcohol use: No    Drug use: No       Medications/Allergies:  Current Outpatient Medications on File Prior to Visit   Medication Sig Dispense Refill    simvastatin (ZOCOR) 10 mg tablet TAKE 1 TABLET BY MOUTH EVERY NIGHT AT BEDTIME 90 Tab 1    lisinopril-hydroCHLOROthiazide (PRINZIDE, ZESTORETIC) 20-25 mg per tablet TAKE 1 TABLET BY MOUTH DAILY 90 Tab 1    amLODIPine (NORVASC) 5 mg tablet TAKE 1 TABLET BY MOUTH EVERY DAY 90 Tab 1    gabapentin (NEURONTIN) 300 mg capsule Take 1 Cap by mouth two (2) times a day. 180 Cap 1    linaclotide (LINZESS) 145 mcg cap capsule Take 1 Cap by mouth Daily (before breakfast). 90 Cap 1    polyethylene glycol (MIRALAX) 17 gram/dose powder Take 17 g by mouth daily. 1700 g 1    potassium chloride (KLOR-CON) 10 mEq tablet Take 1 Tab by mouth daily. 90 Tab 2    naproxen (NAPROSYN) 500 mg tablet TAKE 1 TABLET BY MOUTH TWICE DAILY WITH FOOD 180 Tab 2    cholecalciferol, vitamin D3, (VITAMIN D3) 2,000 unit tab Take  by mouth daily.  albuterol (PROVENTIL HFA, VENTOLIN HFA, PROAIR HFA) 90 mcg/actuation inhaler Take 2 Puffs by inhalation every six (6) hours as needed for Wheezing or Shortness of Breath. 1 Inhaler 2    aspirin delayed-release 81 mg tablet Take 1 Tab by mouth daily. 30 Tab 1    tiZANidine (ZANAFLEX) 4 mg tablet Take 1 Tab by mouth two (2) times a day. 60 Tab 1    oxyCODONE IR (ROXICODONE) 10 mg tab immediate release tablet TK 1 T PO  Q 6 H. FILL ON/AFTER 03/23/19  0    fluticasone-salmeterol (ADVAIR DISKUS) 100-50 mcg/dose diskus inhaler Take 1 Puff by inhalation every twelve (12) hours. 1 Each 3     No current facility-administered medications on file prior to visit.          Allergies   Allergen Reactions    Penicillins Swelling       Objective:  Visit Vitals  BP (!) 165/107 (BP 1 Location: Right arm, BP Patient Position: Sitting)   Pulse 100   Temp 98.1 °F (36.7 °C) (Oral)   Resp 18   Ht 5' 3\" (1.6 m)   Wt 151 lb (68.5 kg)   SpO2 96%   BMI 26.75 kg/m²    Body mass index is 26.75 kg/m². Physical assessment  Physical Exam   Constitutional: She is oriented to person, place, and time and well-developed, well-nourished, and in no distress. Vital signs are normal.   HENT:   Head: Normocephalic and atraumatic. Cardiovascular: Normal rate, regular rhythm and normal heart sounds. Pulmonary/Chest: Effort normal and breath sounds normal.   Genitourinary:   Genitourinary Comments: No palpable bladder     Neurological: She is alert and oriented to person, place, and time. Gait normal.   Skin: Skin is warm, dry and intact. Nursing note and vitals reviewed.   [id number]      Labwork and Ancillary Studies:    CBC w/Diff  Lab Results   Component Value Date/Time    WBC 9.7 10/04/2018 11:17 AM    HGB 12.8 10/04/2018 11:17 AM    PLATELET 709 21/85/3445 11:17 AM         Basic Metabolic Profile  Lab Results   Component Value Date/Time    Sodium 140 10/04/2018 11:17 AM    Potassium 3.7 10/04/2018 11:17 AM    Chloride 104 10/04/2018 11:17 AM    CO2 29 10/04/2018 11:17 AM    Anion gap 7 10/04/2018 11:17 AM    Glucose 117 (H) 10/04/2018 11:17 AM    BUN 16 10/04/2018 11:17 AM    Creatinine 0.84 10/04/2018 11:17 AM    BUN/Creatinine ratio 19 10/04/2018 11:17 AM    GFR est AA >60 10/04/2018 11:17 AM    GFR est non-AA >60 10/04/2018 11:17 AM    Calcium 9.6 10/04/2018 11:17 AM        Cholesterol  Lab Results   Component Value Date/Time    Cholesterol, total 139 10/04/2018 11:17 AM    HDL Cholesterol 69 (H) 10/04/2018 11:17 AM    LDL, calculated 50.6 10/04/2018 11:17 AM    Triglyceride 97 10/04/2018 11:17 AM    CHOL/HDL Ratio 2.0 10/04/2018 11:17 AM       Health Maintenance:   Health Maintenance   Topic Date Due    Shingrix Vaccine Age 50> (1 of 2) 07/04/1994    Pneumococcal 65+ years (1 of 2 - PCV13) 07/04/2009    GLAUCOMA SCREENING Q2Y  08/18/2018    Influenza Age 5 to Adult  08/01/2019    MEDICARE YEARLY EXAM  10/05/2019    BREAST CANCER SCRN MAMMOGRAM  03/11/2021    COLONOSCOPY  02/02/2027    DTaP/Tdap/Td series (2 - Td) 07/28/2028    Bone Densitometry (Dexa) Screening  Completed       I have discussed the diagnosis with the patient and the intended plan as seen in the above orders. The patient has received an After-Visit Summary and questions were answered concerning future plans. An After Visit Summary was printed and given to the patient. All diagnosis have been discussed with the patient and all of the patient's questions have been answered. Follow-up and Dispositions    · Return if symptoms worsen or fail to improve. Dennie Pilgrim, [facility]  [address] [facility]   [address] [facility] [address]  [id number]

## 2019-07-16 NOTE — PATIENT INSTRUCTIONS
Let Dr. Marquis Castillo know we did an Xray to check for possible bladder distention  She can call me if she has questions, I am awaiting radiologist wet read.

## 2019-07-16 NOTE — PROGRESS NOTES
1. Have you been to the ER, urgent care clinic since your last visit? Hospitalized since your last visit? No    2. Have you seen or consulted any other health care providers outside of the [address] [place] [address] since your last visit? Include any pap smears or colon screening.  No        Chief Complaint   Patient presents with    Urinary Frequency     Possibly from BP medication

## 2019-07-31 DIAGNOSIS — M17.0 PRIMARY OSTEOARTHRITIS OF BOTH KNEES: ICD-10-CM

## 2019-07-31 RX ORDER — NAPROXEN 500 MG/1
TABLET ORAL
Qty: 60 TAB | Refills: 0 | Status: SHIPPED | OUTPATIENT
Start: 2019-07-31 | End: 2019-07-31 | Stop reason: SDUPTHER

## 2019-08-01 RX ORDER — NAPROXEN 500 MG/1
TABLET ORAL
Qty: 180 TAB | Refills: 0 | Status: SHIPPED | OUTPATIENT
Start: 2019-08-01 | End: 2020-02-21

## 2019-10-07 ENCOUNTER — HOSPITAL ENCOUNTER (OUTPATIENT)
Dept: LAB | Age: 74
Discharge: HOME | End: 2019-10-07
Payer: MEDICARE

## 2019-10-07 ENCOUNTER — OFFICE VISIT (OUTPATIENT)
Dept: FAMILY MEDICINE CLINIC | Age: 74
End: 2019-10-07

## 2019-10-07 VITALS
BODY MASS INDEX: 27.82 KG/M2 | HEIGHT: 63 IN | WEIGHT: 157 LBS | HEART RATE: 78 BPM | RESPIRATION RATE: 16 BRPM | TEMPERATURE: 97.8 F | DIASTOLIC BLOOD PRESSURE: 77 MMHG | OXYGEN SATURATION: 96 % | SYSTOLIC BLOOD PRESSURE: 119 MMHG

## 2019-10-07 DIAGNOSIS — R30.0 DYSURIA: ICD-10-CM

## 2019-10-07 DIAGNOSIS — I10 ESSENTIAL HYPERTENSION: ICD-10-CM

## 2019-10-07 DIAGNOSIS — Z00.00 MEDICARE ANNUAL WELLNESS VISIT, SUBSEQUENT: Primary | ICD-10-CM

## 2019-10-07 DIAGNOSIS — N39.0 RECURRENT UTI: ICD-10-CM

## 2019-10-07 DIAGNOSIS — Z23 ENCOUNTER FOR IMMUNIZATION: ICD-10-CM

## 2019-10-07 DIAGNOSIS — M48.061 SPINAL STENOSIS OF LUMBAR REGION, UNSPECIFIED WHETHER NEUROGENIC CLAUDICATION PRESENT: ICD-10-CM

## 2019-10-07 DIAGNOSIS — E66.3 OVERWEIGHT (BMI 25.0-29.9): ICD-10-CM

## 2019-10-07 DIAGNOSIS — E78.2 MIXED HYPERLIPIDEMIA: ICD-10-CM

## 2019-10-07 DIAGNOSIS — T40.2X5A THERAPEUTIC OPIOID-INDUCED CONSTIPATION (OIC): ICD-10-CM

## 2019-10-07 DIAGNOSIS — Z71.89 ADVANCED DIRECTIVES, COUNSELING/DISCUSSION: ICD-10-CM

## 2019-10-07 DIAGNOSIS — K59.03 THERAPEUTIC OPIOID-INDUCED CONSTIPATION (OIC): ICD-10-CM

## 2019-10-07 DIAGNOSIS — J45.30 MILD PERSISTENT ASTHMA WITHOUT COMPLICATION: ICD-10-CM

## 2019-10-07 LAB
ALBUMIN SERPL-MCNC: 3.9 G/DL (ref 3.4–5)
ALBUMIN/GLOB SERPL: 1.1 {RATIO} (ref 0.8–1.7)
ALP SERPL-CCNC: 79 U/L (ref 45–117)
ALT SERPL-CCNC: 27 U/L (ref 13–56)
ANION GAP SERPL CALC-SCNC: 6 MMOL/L (ref 3–18)
AST SERPL-CCNC: 24 U/L (ref 10–38)
BASOPHILS # BLD: 0 K/UL (ref 0–0.1)
BASOPHILS NFR BLD: 0 % (ref 0–2)
BILIRUB SERPL-MCNC: 0.4 MG/DL (ref 0.2–1)
BILIRUB UR QL STRIP: NEGATIVE
BUN SERPL-MCNC: 19 MG/DL (ref 7–18)
BUN/CREAT SERPL: 26 (ref 12–20)
CALCIUM SERPL-MCNC: 9.5 MG/DL (ref 8.5–10.1)
CHLORIDE SERPL-SCNC: 103 MMOL/L (ref 100–111)
CO2 SERPL-SCNC: 32 MMOL/L (ref 21–32)
CREAT SERPL-MCNC: 0.72 MG/DL (ref 0.6–1.3)
DIFFERENTIAL METHOD BLD: ABNORMAL
EOSINOPHIL # BLD: 0.1 K/UL (ref 0–0.4)
EOSINOPHIL NFR BLD: 1 % (ref 0–5)
ERYTHROCYTE [DISTWIDTH] IN BLOOD BY AUTOMATED COUNT: 14.6 % (ref 11.6–14.5)
GLOBULIN SER CALC-MCNC: 3.7 G/DL (ref 2–4)
GLUCOSE SERPL-MCNC: 90 MG/DL (ref 74–99)
GLUCOSE UR-MCNC: NEGATIVE MG/DL
HCT VFR BLD AUTO: 40.2 % (ref 35–45)
HGB BLD-MCNC: 12.8 G/DL (ref 12–16)
KETONES P FAST UR STRIP-MCNC: NEGATIVE MG/DL
LYMPHOCYTES # BLD: 2 K/UL (ref 0.9–3.6)
LYMPHOCYTES NFR BLD: 29 % (ref 21–52)
MCH RBC QN AUTO: 28.3 PG (ref 24–34)
MCHC RBC AUTO-ENTMCNC: 31.8 G/DL (ref 31–37)
MCV RBC AUTO: 88.9 FL (ref 74–97)
MONOCYTES # BLD: 0.4 K/UL (ref 0.05–1.2)
MONOCYTES NFR BLD: 6 % (ref 3–10)
NEUTS SEG # BLD: 4.5 K/UL (ref 1.8–8)
NEUTS SEG NFR BLD: 64 % (ref 40–73)
PH UR STRIP: 7 [PH] (ref 4.6–8)
PLATELET # BLD AUTO: 280 K/UL (ref 135–420)
PMV BLD AUTO: 12 FL (ref 9.2–11.8)
POTASSIUM SERPL-SCNC: 3.9 MMOL/L (ref 3.5–5.5)
PROT SERPL-MCNC: 7.6 G/DL (ref 6.4–8.2)
PROT UR QL STRIP: NEGATIVE
RBC # BLD AUTO: 4.52 M/UL (ref 4.2–5.3)
SODIUM SERPL-SCNC: 141 MMOL/L (ref 136–145)
SP GR UR STRIP: 1.02 (ref 1–1.03)
UA UROBILINOGEN AMB POC: NORMAL (ref 0.2–1)
URINALYSIS CLARITY POC: CLEAR
URINALYSIS COLOR POC: YELLOW
URINE BLOOD POC: NEGATIVE
URINE LEUKOCYTES POC: NORMAL
URINE NITRITES POC: NEGATIVE
WBC # BLD AUTO: 7 K/UL (ref 4.6–13.2)

## 2019-10-07 PROCEDURE — 87077 CULTURE AEROBIC IDENTIFY: CPT

## 2019-10-07 PROCEDURE — 80061 LIPID PANEL: CPT

## 2019-10-07 PROCEDURE — 87086 URINE CULTURE/COLONY COUNT: CPT

## 2019-10-07 PROCEDURE — 80053 COMPREHEN METABOLIC PANEL: CPT

## 2019-10-07 PROCEDURE — 36415 COLL VENOUS BLD VENIPUNCTURE: CPT

## 2019-10-07 PROCEDURE — 85025 COMPLETE CBC W/AUTO DIFF WBC: CPT

## 2019-10-07 RX ORDER — CIPROFLOXACIN 250 MG/1
250 TABLET, FILM COATED ORAL EVERY 12 HOURS
Qty: 14 TAB | Refills: 0 | Status: SHIPPED | OUTPATIENT
Start: 2019-10-07 | End: 2019-10-14

## 2019-10-07 NOTE — ACP (ADVANCE CARE PLANNING)
Advance Care Planning (ACP) Provider Conversation Snapshot    Date of ACP Conversation: 10/07/19  Persons included in Conversation:  patient  Length of ACP Conversation in minutes:  <16 minutes (Non-Billable)    Authorized Decision Maker (if patient is incapable of making informed decisions):    This person is:   her daughter            For Patients with Decision Making Capacity:   Values/Goals: Exploration of values, goals, and preferences if recovery is not expected, even with continued medical treatment in the event of:  Imminent death  Severe, permanent brain injury    Conversation Outcomes / Follow-Up Plan:   Recommended completion of Advance Directive form after review of ACP materials and conversation with prospective healthcare agent

## 2019-10-07 NOTE — PROGRESS NOTES
Chief Complaint   Patient presents with   [facility] Annual Wellness Visit     Patient fasting for labs    Urinary Frequency    Immunization/Injection     Influenza vaccine       Pt is a 75y.o. year old female who presents for follow up of her chronic medical problems    Wt Readings from Last 3 Encounters:   10/07/19 157 lb (71.2 kg)   07/16/19 151 lb (68.5 kg)   04/03/19 156 lb (70.8 kg)       BP Readings from Last 3 Encounters:   10/07/19 119/77   07/16/19 (!) 165/107   04/03/19 121/84       Urinary freq, burning that she saw the nurse practitioner for  \"UTI sxs\"-never went away she says  I reviewed that visit and no urine culture was done then: ? Problem was more of urinary freq related to overflow incontinence  Admits that whenever she gets up, she urinates on herself  No fever    Lab Results   Component Value Date/Time    Cholesterol, total 161 10/07/2019 11:52 AM    HDL Cholesterol 82 (H) 10/07/2019 11:52 AM    LDL, calculated 65.4 10/07/2019 11:52 AM    VLDL, calculated 13.6 10/07/2019 11:52 AM    Triglyceride 68 10/07/2019 11:52 AM    CHOL/HDL Ratio 2.0 10/07/2019 11:52 AM   on Zocor  Fasting today      Asthma-no sxs    Pain mgt for her chronic back pain-appt on the 14th  Walks with a walker  On Oxycodone  And Gabapentin and Zanaflex  Linzess and Miralax for constipoation  Seeing a specialist for her back for second opinion (Dr Neill Frankel) in [place]; unable to find anyone here to possibly do surgery        ROS:    Pt denies: Wt loss, Fever/Chills, HA, Visual changes, Fatigue, Chest pain, SOB, MONDRAGON, Abd pain, N/V/D/C, Blood in stool or urine, Edema. Pertinent positive as above in HPI.  All others were negative    Patient Active Problem List   Diagnosis Code    Essential hypertension I10    Mixed hyperlipidemia E78.2    Chronic bilateral low back pain with bilateral sciatica M54.42, M54.41, G89.29    Lumbar spinal stenosis M48.061    Mild persistent asthma without complication D62.17    Advanced directives, counseling/discussion Z71.89    Overweight (BMI 25.0-29. 9) E66.3    Constipation due to opioid therapy K59.03, T40.2X5A    Neuropathy G62.9       Past Medical History:   Diagnosis Date    Hypercholesterolemia     Hypertension     Osteoarthritis        Current Outpatient Medications   Medication Sig Dispense Refill    ciprofloxacin HCl (CIPRO) 250 mg tablet Take 1 Tab by mouth every twelve (12) hours for 7 days. 14 Tab 0    naproxen (NAPROSYN) 500 mg tablet TAKE 1 TABLET BY MOUTH TWICE DAILY WITH FOOD 180 Tab 0    oxyCODONE IR (ROXICODONE) 10 mg tab immediate release tablet TK 1 T PO  Q 6 H. FILL ON/AFTER 03/23/19  0    simvastatin (ZOCOR) 10 mg tablet TAKE 1 TABLET BY MOUTH EVERY NIGHT AT BEDTIME 90 Tab 1    lisinopril-hydroCHLOROthiazide (PRINZIDE, ZESTORETIC) 20-25 mg per tablet TAKE 1 TABLET BY MOUTH DAILY 90 Tab 1    amLODIPine (NORVASC) 5 mg tablet TAKE 1 TABLET BY MOUTH EVERY DAY 90 Tab 1    gabapentin (NEURONTIN) 300 mg capsule Take 1 Cap by mouth two (2) times a day. 180 Cap 1    linaclotide (LINZESS) 145 mcg cap capsule Take 1 Cap by mouth Daily (before breakfast). 90 Cap 1    polyethylene glycol (MIRALAX) 17 gram/dose powder Take 17 g by mouth daily. 1700 g 1    potassium chloride (KLOR-CON) 10 mEq tablet Take 1 Tab by mouth daily. 90 Tab 2    fluticasone-salmeterol (ADVAIR DISKUS) 100-50 mcg/dose diskus inhaler Take 1 Puff by inhalation every twelve (12) hours. 1 Each 3    cholecalciferol, vitamin D3, (VITAMIN D3) 2,000 unit tab Take  by mouth daily.  albuterol (PROVENTIL HFA, VENTOLIN HFA, PROAIR HFA) 90 mcg/actuation inhaler Take 2 Puffs by inhalation every six (6) hours as needed for Wheezing or Shortness of Breath. 1 Inhaler 2    aspirin delayed-release 81 mg tablet Take 1 Tab by mouth daily. 30 Tab 1    tiZANidine (ZANAFLEX) 4 mg tablet Take 1 Tab by mouth two (2) times a day.  60 Tab 1       Social History     Tobacco Use   Smoking Status Never Smoker   Smokeless Tobacco Never Used       Allergies   Allergen Reactions    Penicillins Swelling       Patient Labs were reviewed: yes      Patient Past Records were reviewed:  yes        Objective:     Vitals:    10/07/19 1105   BP: 119/77   Pulse: 78   Resp: 16   Temp: 97.8 °F (36.6 °C)   TempSrc: Oral   SpO2: 96%   Weight: 157 lb (71.2 kg)   Height: 5' 3\" (1.6 m)     Body mass index is 27.81 kg/m². Exam:   Appearance: alert, well appearing,  oriented to person, place, and time, acyanotic, in no respiratory distress and well hydrated. HEENT:  NC/AT, pink conj, anicteric sclerae  Neck:  No cervical lymphadenopathy, no JVD, no thyromegaly, no carotid bruit  Heart:  RRR without M/R/G  Lungs:  CTAB, no rhonchi, rales, or wheezes with good air exchange   Abdomen:  Non-tender, pos bowel sounds, no hepatosplenomegaly, no CVA tenderness  Ext:  No C/C/E    Skin: no rash  Neuro: no lateralizing signs, CNs II-XII intact      Assessment/ Plan:   Diagnoses and all orders for this visit:    1. Medicare annual wellness visit, subsequent-see note below    2. Dysuria-empiric tx with Cipro  -     ciprofloxacin HCl (CIPRO) 250 mg tablet; Take 1 Tab by mouth every twelve (12) hours for 7 days. 3. Essential hypertension-controlled, continue with present meds  -     CBC WITH AUTOMATED DIFF; Future  -     METABOLIC PANEL, COMPREHENSIVE; Future  -     LIPID PANEL; Future    4. Recurrent UTI vs overflow incontinence  -     CULTURE, URINE; Future  -     REFERRAL TO FEMALE PELVIC MEDICINE AND RECONSTRUCTIVE SURGERY  -     AMB POC URINALYSIS DIP STICK AUTO W/O MICRO    5. Spinal stenosis of lumbar region, unspecified whether neurogenic claudication present-pain mgt on the case as well as Ortho    6. Mild persistent asthma without complication-asymptomatic on low dose Advair    7. Mixed hyperlipidemia-at goal on Zocor    8. Therapeutic opioid-induced constipation (OIC)-doing well on Linzess    9. Overweight (BMI 25.0-29. 9)-discussed limiting vicente to [id number]/day as she is unable to exercise        Follow-up and Dispositions    · Return in about 3 months (around 1/7/2020) for follow up. I have discussed the diagnosis with the patient and the intended plan as seen in the above orders. The patient has received an After-Visit Summary and questions were answered concerning future plans. Medication Side Effects and Warnings were discussed with patient: yes    Patient verbalized understanding of above instructions. Amy Vieira MD  Internal Medicine  [facility]    This is the Subsequent Medicare Annual Wellness Exam, performed 12 months or more after the Initial AWV or the last Subsequent AWV    I have reviewed the patient's medical history in detail and updated the computerized patient record. History     Past Medical History:   Diagnosis Date    Hypercholesterolemia     Hypertension     Osteoarthritis       Past Surgical History:   Procedure Laterality Date    COLONOSCOPY N/A 2/2/2017    COLONOSCOPY performed by Chioma Arce MD at [address] [place] [address] HX CHOLECYSTECTOMY      HX HYSTERECTOMY       Current Outpatient Medications   Medication Sig Dispense Refill    ciprofloxacin HCl (CIPRO) 250 mg tablet Take 1 Tab by mouth every twelve (12) hours for 7 days. 14 Tab 0    naproxen (NAPROSYN) 500 mg tablet TAKE 1 TABLET BY MOUTH TWICE DAILY WITH FOOD 180 Tab 0    oxyCODONE IR (ROXICODONE) 10 mg tab immediate release tablet TK 1 T PO  Q 6 H. FILL ON/AFTER 03/23/19  0    simvastatin (ZOCOR) 10 mg tablet TAKE 1 TABLET BY MOUTH EVERY NIGHT AT BEDTIME 90 Tab 1    lisinopril-hydroCHLOROthiazide (PRINZIDE, ZESTORETIC) 20-25 mg per tablet TAKE 1 TABLET BY MOUTH DAILY 90 Tab 1    amLODIPine (NORVASC) 5 mg tablet TAKE 1 TABLET BY MOUTH EVERY DAY 90 Tab 1    gabapentin (NEURONTIN) 300 mg capsule Take 1 Cap by mouth two (2) times a day.  180 Cap 1    linaclotide (LINZESS) 145 mcg cap capsule Take 1 Cap by mouth Daily (before breakfast). 90 Cap 1    polyethylene glycol (MIRALAX) 17 gram/dose powder Take 17 g by mouth daily. 1700 g 1    potassium chloride (KLOR-CON) 10 mEq tablet Take 1 Tab by mouth daily. 90 Tab 2    fluticasone-salmeterol (ADVAIR DISKUS) 100-50 mcg/dose diskus inhaler Take 1 Puff by inhalation every twelve (12) hours. 1 Each 3    cholecalciferol, vitamin D3, (VITAMIN D3) 2,000 unit tab Take  by mouth daily.  albuterol (PROVENTIL HFA, VENTOLIN HFA, PROAIR HFA) 90 mcg/actuation inhaler Take 2 Puffs by inhalation every six (6) hours as needed for Wheezing or Shortness of Breath. 1 Inhaler 2    aspirin delayed-release 81 mg tablet Take 1 Tab by mouth daily. 30 Tab 1    tiZANidine (ZANAFLEX) 4 mg tablet Take 1 Tab by mouth two (2) times a day. 60 Tab 1     Allergies   Allergen Reactions    Penicillins Swelling     Family History   Problem Relation Age of Onset    No Known Problems Mother     No Known Problems Father      Social History     Tobacco Use    Smoking status: Never Smoker    Smokeless tobacco: Never Used   Substance Use Topics    Alcohol use: No     Patient Active Problem List   Diagnosis Code    Essential hypertension I10    Mixed hyperlipidemia E78.2    Chronic bilateral low back pain with bilateral sciatica M54.42, M54.41, G89.29    Lumbar spinal stenosis M48.061    Mild persistent asthma without complication E12.37    Advanced directives, counseling/discussion Z71.89    Overweight (BMI 25.0-29. 9) E66.3    Constipation due to opioid therapy K59.03, T40.2X5A    Neuropathy G62.9       Depression Risk Factor Screening:     3 most recent PHQ Screens 7/16/2019   Little interest or pleasure in doing things Not at all   Feeling down, depressed, irritable, or hopeless Not at all   Total Score PHQ 2 0     Alcohol Risk Factor Screening: You do not drink alcohol or very rarely. Functional Ability and Level of Safety:   Hearing Loss  Hearing is good.     Activities of Daily Living  The home contains: handrails and grab bars  Patient does total self care    Fall Risk  Fall Risk Assessment, last 12 mths 7/16/2019   Able to walk? Yes   Fall in past 12 months? No       Abuse Screen  Patient is not abused    Cognitive Screening   Evaluation of Cognitive Function:  Has your family/caregiver stated any concerns about your memory: no  Normal, Clock Drawing test    Patient Care Team   Patient Care Team:  Alesia Verma MD as PCP - General (Internal Medicine)  Hezekiah Runner, MD (Physical Medicine and Rehab)  Vadim Drew MD (Optometry)    Assessment/Plan   Education and counseling provided:  Are appropriate based on today's review and evaluation  End-of-Life planning (with patient's consent)-her daughter, Nury Soto  Pneumococcal Vaccine-Prevnar 13 at her pharrmacy  Influenza Vaccine-today  Screening Mammography-3/2019  Colorectal cancer screening tests  Cardiovascular screening blood test  Bone mass measurement (DEXA)-10/2017-normal  Screening for glaucoma-wears eyeglasses, advised to get this done Q 2 yrs  Diabetes screening test-FBS today      Diagnoses and all orders for this visit:    1. Medicare annual wellness visit, subsequent-Refer to above for plan and to patient instructions for recommendations on HM    2. Encounter for immunization  -     INFLUENZA VACCINE INACTIVATED (IIV), SUBUNIT, ADJUVANTED, IM  -     ADMIN INFLUENZA VIRUS VAC    3.  Advanced directives, counseling/discussion        Health Maintenance Due   Topic Date Due    Shingrix Vaccine Age 49> (1 of 2)-at her pharmacy 07/04/1994    Pneumococcal 65+ years (1 of 2 - PCV13)-at her pharmacy 07/04/2009    GLAUCOMA SCREENING Q2Y  08/18/2018     RTC yearly for wellness visit

## 2019-10-07 NOTE — PATIENT INSTRUCTIONS
Medicare Wellness Visit, Female The best way to live healthy is to have a lifestyle where you eat a well-balanced diet, exercise regularly, limit alcohol use, and quit all forms of tobacco/nicotine, if applicable. Regular preventive services are another way to keep healthy. Preventive services (vaccines, screening tests, monitoring & exams) can help personalize your care plan, which helps you manage your own care. Screening tests can find health problems at the earliest stages, when they are easiest to treat. [facility] follows the current, evidence-based guidelines published by the [facility] Reynaldo Huy ([facility]) when recommending preventive services for our patients. Because we follow these guidelines, sometimes recommendations change over time as research supports it. (For example, mammograms used to be recommended annually. Even though Medicare will still pay for an annual mammogram, the newer guidelines recommend a mammogram every two years for women of average risk.) Of course, you and your doctor may decide to screen more often for some diseases, based on your risk and your health status. Preventive services for you include: - Medicare offers their members a free annual wellness visit, which is time for you and your primary care provider to discuss and plan for your preventive service needs. Take advantage of this benefit every year! 
-All adults over the age of 72 should receive the recommended pneumonia vaccines. Current USPSTF guidelines recommend a series of two vaccines for the best pneumonia protection.  
-All adults should have a flu vaccine yearly and a tetanus vaccine every 10 years. All adults age 61 and older should receive a shingles vaccine once in their lifetime.   
-A bone mass density test is recommended when a woman turns 65 to screen for osteoporosis. This test is only recommended one time, as a screening. Some providers will use this same test as a disease monitoring tool if you already have osteoporosis. -All adults age 38-68 who are overweight should have a diabetes screening test once every three years.  
-Other screening tests and preventive services for persons with diabetes include: an eye exam to screen for diabetic retinopathy, a kidney function test, a foot exam, and stricter control over your cholesterol.  
-Cardiovascular screening for adults with routine risk involves an electrocardiogram (ECG) at intervals determined by your doctor.  
-Colorectal cancer screenings should be done for adults age 54-65 with no increased risk factors for colorectal cancer. There are a number of acceptable methods of screening for this type of cancer. Each test has its own benefits and drawbacks. Discuss with your doctor what is most appropriate for you during your annual wellness visit. The different tests include: colonoscopy (considered the best screening method), a fecal occult blood test, a fecal DNA test, and sigmoidoscopy. -Breast cancer screenings are recommended every other year for women of normal risk, age 54-69. 
-Cervical cancer screenings for women over age 72 are only recommended with certain risk factors.  
-All adults born between [facility] should be screened once for Hepatitis C. Here is a list of your current Health Maintenance items (your personalized list of preventive services) with a due date: 
Health Maintenance Due Topic Date Due  Shingles Vaccine (1 of 2) 07/04/1994  Pneumococcal Vaccine (1 of 2 - PCV13) 07/04/2009  Glaucoma Screening   08/18/2018  Flu Vaccine  08/01/2019 [address] [place] [address] Annual Well Visit  10/05/2019

## 2019-10-07 NOTE — PROGRESS NOTES
Chief Complaint   Patient presents with   [facility] Annual Wellness Visit     Patient fasting for labs    Urinary Frequency    Immunization/Injection     Influenza vaccine       1. Have you been to the ER, urgent care clinic since your last visit? Hospitalized since your last visit? No    2. Have you seen or consulted any other health care providers outside of the [address] [place] [address] since your last visit? Include any pap smears or colon screening. Yes Where: Pain Management   Next vist 10/14/19        King Mason is a 75 y.o. female who presents for routine immunizations. She denies any symptoms , reactions or allergies that would exclude them from being immunized today. Risks and adverse reactions were discussed and the VIS was given to them. All questions were addressed. She was observed for 20 min post injection. There were no reactions observed.     Mckenna Joshi LPN

## 2019-10-08 LAB
CHOLEST SERPL-MCNC: 161 MG/DL
HDLC SERPL-MCNC: 82 MG/DL (ref 40–60)
HDLC SERPL: 2 {RATIO} (ref 0–5)
LDLC SERPL CALC-MCNC: 65.4 MG/DL (ref 0–100)
LIPID PROFILE,FLP: ABNORMAL
TRIGL SERPL-MCNC: 68 MG/DL (ref ?–150)
VLDLC SERPL CALC-MCNC: 13.6 MG/DL

## 2019-10-10 LAB
BACTERIA SPEC CULT: ABNORMAL
BACTERIA SPEC CULT: ABNORMAL
SERVICE CMNT-IMP: ABNORMAL

## 2020-01-07 ENCOUNTER — OFFICE VISIT (OUTPATIENT)
Dept: FAMILY MEDICINE CLINIC | Age: 75
End: 2020-01-07

## 2020-01-07 VITALS
DIASTOLIC BLOOD PRESSURE: 74 MMHG | RESPIRATION RATE: 18 BRPM | SYSTOLIC BLOOD PRESSURE: 130 MMHG | HEART RATE: 79 BPM | TEMPERATURE: 97.5 F | BODY MASS INDEX: 27.11 KG/M2 | HEIGHT: 63 IN | WEIGHT: 153 LBS | OXYGEN SATURATION: 97 %

## 2020-01-07 DIAGNOSIS — R73.9 ELEVATED BLOOD SUGAR: ICD-10-CM

## 2020-01-07 DIAGNOSIS — J45.30 MILD PERSISTENT ASTHMA WITHOUT COMPLICATION: ICD-10-CM

## 2020-01-07 DIAGNOSIS — I10 ESSENTIAL HYPERTENSION: Primary | ICD-10-CM

## 2020-01-07 DIAGNOSIS — T40.2X5A CONSTIPATION DUE TO OPIOID THERAPY: ICD-10-CM

## 2020-01-07 DIAGNOSIS — N39.0 RECURRENT UTI: ICD-10-CM

## 2020-01-07 DIAGNOSIS — K59.03 CONSTIPATION DUE TO OPIOID THERAPY: ICD-10-CM

## 2020-01-07 DIAGNOSIS — M48.061 SPINAL STENOSIS OF LUMBAR REGION, UNSPECIFIED WHETHER NEUROGENIC CLAUDICATION PRESENT: ICD-10-CM

## 2020-01-07 DIAGNOSIS — E78.2 MIXED HYPERLIPIDEMIA: ICD-10-CM

## 2020-01-07 LAB — HBA1C MFR BLD HPLC: 5.7 %

## 2020-01-07 NOTE — PROGRESS NOTES
Chief Complaint   Patient presents with    Follow Up Chronic Condition     3 month; patient not fasting       Pt is a 75y.o. year old female who presents for follow up of her chronic medical problems    Health Maintenance Due   Topic Date Due    Shingrix Vaccine Age 49> (1 of 2) 07/04/1994    Pneumococcal 65+ years (1 of 1 - PPSV23) 07/04/2009    GLAUCOMA SCREENING Q2Y -wears eyeglasses, will schedule/has to find one; no hx of cataracts 08/18/2018       BP Readings from Last 3 Encounters:   01/07/20 130/74   10/07/19 119/77   07/16/19 (!) 165/107       Lab Results   Component Value Date/Time    Cholesterol, total 161 10/07/2019 11:52 AM    HDL Cholesterol 82 (H) 10/07/2019 11:52 AM    LDL, calculated 65.4 10/07/2019 11:52 AM    VLDL, calculated 13.6 10/07/2019 11:52 AM    Triglyceride 68 10/07/2019 11:52 AM    CHOL/HDL Ratio 2.0 10/07/2019 11:52 AM   on Zocor-compliant, no side effects    Pain mgt- for chronic back pain  Walks with a walker    Constipation-on Linzess, helping  Miralax, prune juice    Asthma-no problems, on Advair    Saw Urogyn (Dr Ana Alfonso) for her bladder-not releasing all her urine so she will try to do self cath; will have a PAP done too    Told blood sugar was elevated on labs done Dec-told to start Metformin  Sister with DM  Lab Results   Component Value Date/Time    Glucose 90 10/07/2019 11:52 AM       ROS:    Pt denies: Wt loss, Fever/Chills, HA, Visual changes, Fatigue, Chest pain, SOB, MONDRAGON, Abd pain, N/V/D/C, Blood in stool or urine, Edema. Pertinent positive as above in HPI. All others were negative    Patient Active Problem List   Diagnosis Code    Essential hypertension I10    Mixed hyperlipidemia E78.2    Chronic bilateral low back pain with bilateral sciatica M54.42, M54.41, G89.29    Lumbar spinal stenosis M48.061    Mild persistent asthma without complication O06.30    Advanced directives, counseling/discussion Z71.89    Overweight (BMI 25.0-29. 9) E66.3    Constipation due to opioid therapy K59.03, T40.2X5A    Neuropathy G62.9       Past Medical History:   Diagnosis Date    Hypercholesterolemia     Hypertension     Osteoarthritis        Current Outpatient Medications   Medication Sig Dispense Refill    gabapentin (NEURONTIN) 300 mg capsule TAKE 1 CAPSULE BY MOUTH AT BEDTIME 90 Cap 0    potassium chloride (KLOR-CON) 10 mEq tablet TAKE 1 TABLET BY MOUTH DAILY 90 Tab 1    naproxen (NAPROSYN) 500 mg tablet TAKE 1 TABLET BY MOUTH TWICE DAILY WITH FOOD 180 Tab 0    oxyCODONE IR (ROXICODONE) 10 mg tab immediate release tablet TK 1 T PO  Q 6 H. FILL ON/AFTER 03/23/19  0    amLODIPine (NORVASC) 5 mg tablet TAKE 1 TABLET BY MOUTH EVERY DAY 90 Tab 1    linaclotide (LINZESS) 145 mcg cap capsule Take 1 Cap by mouth Daily (before breakfast). 90 Cap 1    polyethylene glycol (MIRALAX) 17 gram/dose powder Take 17 g by mouth daily. 1700 g 1    fluticasone-salmeterol (ADVAIR DISKUS) 100-50 mcg/dose diskus inhaler Take 1 Puff by inhalation every twelve (12) hours. 1 Each 3    cholecalciferol, vitamin D3, (VITAMIN D3) 2,000 unit tab Take  by mouth daily.  albuterol (PROVENTIL HFA, VENTOLIN HFA, PROAIR HFA) 90 mcg/actuation inhaler Take 2 Puffs by inhalation every six (6) hours as needed for Wheezing or Shortness of Breath. 1 Inhaler 2    aspirin delayed-release 81 mg tablet Take 1 Tab by mouth daily. 30 Tab 1    tiZANidine (ZANAFLEX) 4 mg tablet Take 1 Tab by mouth two (2) times a day.  60 Tab 1    lisinopril-hydroCHLOROthiazide (PRINZIDE, ZESTORETIC) 20-25 mg per tablet TAKE 1 TABLET BY MOUTH EVERY DAY 90 Tab 1    simvastatin (ZOCOR) 10 mg tablet TAKE 1 TABLET BY MOUTH EVERY NIGHT AT BEDTIME 90 Tab 1       Social History     Tobacco Use   Smoking Status Never Smoker   Smokeless Tobacco Never Used       Allergies   Allergen Reactions    Penicillins Swelling       Patient Labs were reviewed: yes      Patient Past Records were reviewed:  yes        Objective:     Vitals:    01/07/20 1124   BP: 130/74   Pulse: 79   Resp: 18   Temp: 97.5 °F (36.4 °C)   TempSrc: Oral   SpO2: 97%   Weight: 153 lb (69.4 kg)   Height: 5' 3\" (1.6 m)     Body mass index is 27.1 kg/m². Exam:   Appearance: alert, well appearing,  oriented to person, place, and time, acyanotic, in no respiratory distress and well hydrated. Ambulates with a walker  HEENT:  NC/AT, pink conj, anicteric sclerae  Neck:  No cervical lymphadenopathy, no JVD, no thyromegaly, no carotid bruit  Heart:  RRR without M/R/G  Lungs:  CTAB, no rhonchi, rales, or wheezes with good air exchange   Abdomen:  Non-tender, pos bowel sounds, no hepatosplenomegaly  Ext:  No C/C/E    Skin: no rash  Neuro: no lateralizing signs, CNs II-XII intact  Back: reproducible pain on the lower lumbar    Last Point of Care HGB A1C  Hemoglobin A1c (POC)   Date Value Ref Range Status   01/07/2020 5.7 % Final        Assessment/ Plan:   Diagnoses and all orders for this visit:    1. Essential hypertension-controlled, continue with present meds    2. Elevated blood sugar-advised to watch carbs in diet  -     AMB POC HEMOGLOBIN A1C    3. Mixed hyperlipidemia-at goal on Zocor    4. Recurrent UTI-currently seeing Urogyn    5. Spinal stenosis of lumbar region, unspecified whether neurogenic claudication present-seeing pain Mgt    6. Constipation due to opioid therapy-continue with Linzess    7. Mild persistent asthma without complication-doing well on low dose Advair        Follow-up and Dispositions    · Return in about 3 months (around 4/7/2020) for follow up. I have discussed the diagnosis with the patient and the intended plan as seen in the above orders. The patient has received an After-Visit Summary and questions were answered concerning future plans. Medication Side Effects and Warnings were discussed with patient: yes    Patient verbalized understanding of above instructions.     Linda Sánchez MD  Internal Medicine  [facility]

## 2020-01-07 NOTE — PATIENT INSTRUCTIONS

## 2020-01-07 NOTE — PROGRESS NOTES
Chief Complaint   Patient presents with    Follow Up Chronic Condition     3 month; patient not fasting       1. Have you been to the ER, urgent care clinic since your last visit? Hospitalized since your last visit? No    2. Have you seen or consulted any other health care providers outside of the [address] [place] [address] since your last visit? Include any pap smears or colon screening. Yes Where: Tl 12/23/20, 1/7/2020 Patient given Rx for Metformin due to elevated blood sugar.

## 2020-01-10 DIAGNOSIS — I10 ESSENTIAL HYPERTENSION: ICD-10-CM

## 2020-01-10 RX ORDER — SIMVASTATIN 10 MG/1
TABLET, FILM COATED ORAL
Qty: 90 TAB | Refills: 1 | Status: SHIPPED | OUTPATIENT
Start: 2020-01-10 | End: 2020-06-26

## 2020-01-12 RX ORDER — LISINOPRIL AND HYDROCHLOROTHIAZIDE 20; 25 MG/1; MG/1
TABLET ORAL
Qty: 90 TAB | Refills: 1 | Status: SHIPPED | OUTPATIENT
Start: 2020-01-12 | End: 2020-04-07 | Stop reason: SDUPTHER

## 2020-02-14 RX ORDER — AMLODIPINE BESYLATE 5 MG/1
TABLET ORAL
Qty: 90 TAB | Refills: 1 | Status: SHIPPED | OUTPATIENT
Start: 2020-02-14 | End: 2020-07-25

## 2020-03-11 RX ORDER — AZITHROMYCIN 250 MG/1
TABLET, FILM COATED ORAL
Qty: 6 TAB | Refills: 0 | Status: SHIPPED | OUTPATIENT
Start: 2020-03-11 | End: 2020-03-16

## 2020-04-07 ENCOUNTER — VIRTUAL VISIT (OUTPATIENT)
Dept: FAMILY MEDICINE CLINIC | Age: 75
End: 2020-04-07

## 2020-04-07 DIAGNOSIS — E78.2 MIXED HYPERLIPIDEMIA: ICD-10-CM

## 2020-04-07 DIAGNOSIS — I10 ESSENTIAL HYPERTENSION: Primary | ICD-10-CM

## 2020-04-07 DIAGNOSIS — T40.2X5A CONSTIPATION DUE TO OPIOID THERAPY: ICD-10-CM

## 2020-04-07 DIAGNOSIS — M48.061 SPINAL STENOSIS OF LUMBAR REGION, UNSPECIFIED WHETHER NEUROGENIC CLAUDICATION PRESENT: ICD-10-CM

## 2020-04-07 DIAGNOSIS — J45.30 MILD PERSISTENT ASTHMA WITHOUT COMPLICATION: ICD-10-CM

## 2020-04-07 DIAGNOSIS — N39.0 RECURRENT UTI: ICD-10-CM

## 2020-04-07 DIAGNOSIS — R73.03 PRE-DIABETES: ICD-10-CM

## 2020-04-07 DIAGNOSIS — K59.03 CONSTIPATION DUE TO OPIOID THERAPY: ICD-10-CM

## 2020-04-07 RX ORDER — ALBUTEROL SULFATE 90 UG/1
2 AEROSOL, METERED RESPIRATORY (INHALATION)
Qty: 1 INHALER | Refills: 2 | Status: SHIPPED | OUTPATIENT
Start: 2020-04-07 | End: 2022-07-28 | Stop reason: SDUPTHER

## 2020-04-07 RX ORDER — LISINOPRIL AND HYDROCHLOROTHIAZIDE 20; 25 MG/1; MG/1
TABLET ORAL
Qty: 90 TAB | Refills: 1 | Status: SHIPPED | OUTPATIENT
Start: 2020-04-07 | End: 2020-07-12

## 2020-04-07 RX ORDER — FLUTICASONE PROPIONATE AND SALMETEROL 100; 50 UG/1; UG/1
1 POWDER RESPIRATORY (INHALATION) EVERY 12 HOURS
Qty: 1 EACH | Refills: 3 | Status: SHIPPED | OUTPATIENT
Start: 2020-04-07 | End: 2022-03-21 | Stop reason: SDUPTHER

## 2020-04-07 NOTE — PROGRESS NOTES
Chief Complaint   Patient presents with    Follow-up       1. Have you been to the ER, urgent care clinic since your last visit? Hospitalized since your last visit? NO    2. Have you seen or consulted any other health care providers outside of the [address] [place] [address] since your last visit? Include any pap smears or colon screening.  NO

## 2020-04-07 NOTE — PROGRESS NOTES
Cons ent: Stepan Castillo, who was seen by synchronous (real-time) audio-video technology, and/or her healthcare decision maker, is aware that this patient-initiated, Telehealth encounter on 4/7/2020 is a billable service, with coverage as determined by her insurance carrier. She is aware that she may receive a bill and has provided verbal consent to proceed: Yes. Assessment & Plan:   Diagnoses and all orders for this visit:    1. Essential hypertension-continue with current meds and home monitoring  -     lisinopril-hydroCHLOROthiazide (PRINZIDE, ZESTORETIC) 20-25 mg per tablet; TAKE 1 TABLET BY MOUTH EVERY DAY    2. Mild persistent asthma without complication  -     albuterol (PROVENTIL HFA, VENTOLIN HFA, PROAIR HFA) 90 mcg/actuation inhaler; Take 2 Puffs by inhalation every six (6) hours as needed for Wheezing or Shortness of Breath. -     fluticasone propion-salmeteroL (Advair Diskus) 100-50 mcg/dose diskus inhaler; Take 1 Puff by inhalation every twelve (12) hours. 3. Pre-diabetes-advised to watch the carbs in her diet    4. Mixed hyperlipidemia-at goal on Zocor    5. Constipation due to opioid therapy-on Linzess, effective    6. Spinal stenosis of lumbar region, unspecified whether neurogenic claudication present-follows with Pain mgt/not a surgical candidate after consulting with several back doctors    7. Recurrent UTI-urogyn following          Follow-up and Dispositions    · Return in about 3 months (around 7/7/2020) for follow up. 712  Subjective:    Stepan Castillo is a 75 y.o. female who was seen for Follow-up    Has home BP cuff-BPs good    Needs refill of asthma inhaler-has not used this in a while  Needs refill of Advair    Sees Urogyn in May-no current UTI sxs    Pain mgt for her chronic back pain-same, no worse  Linzess is working well for her constipation    Last Point of Care HGB A1C  Hemoglobin A1c (POC)   Date Value Ref Range Status   01/07/2020 5.7 % Final    Denies polyuria, polydipsia and polyphagia    Lab Results   Component Value Date/Time    Cholesterol, total 161 10/07/2019 11:52 AM    HDL Cholesterol 82 (H) 10/07/2019 11:52 AM    LDL, calculated 65.4 10/07/2019 11:52 AM    VLDL, calculated 13.6 10/07/2019 11:52 AM    Triglyceride 68 10/07/2019 11:52 AM    CHOL/HDL Ratio 2.0 10/07/2019 11:52 AM   compliant with Zocor, no side effects      Health Maintenance Due   Topic Date Due    Shingrix Vaccine Age 49> (1 of 2) 07/04/1994    Pneumococcal 65+ years (1 of 1 - PPSV23) 07/04/2009    GLAUCOMA SCREENING Q2Y  08/18/2018     Prior to Admission medications    Medication Sig Start Date End Date Taking? Authorizing Provider   albuterol (PROVENTIL HFA, VENTOLIN HFA, PROAIR HFA) 90 mcg/actuation inhaler Take 2 Puffs by inhalation every six (6) hours as needed for Wheezing or Shortness of Breath. 4/7/20  Yes Stephy Winn MD   fluticasone propion-salmeteroL (Advair Diskus) 100-50 mcg/dose diskus inhaler Take 1 Puff by inhalation every twelve (12) hours. 4/7/20  Yes Stephy Winn MD   lisinopril-hydroCHLOROthiazide (PRINZIDE, ZESTORETIC) 20-25 mg per tablet TAKE 1 TABLET BY MOUTH EVERY DAY 4/7/20  Yes Jesus Ojeda MD   gabapentin (NEURONTIN) 300 mg capsule TAKE ONE CAPSULE BY MOUTH AT BEDTIME 2/21/20  Yes Jesus Ojeda MD   naproxen (NAPROSYN) 500 mg tablet Take 1 Tab by mouth two (2) times daily as needed for Pain. 2/21/20  Yes Stephy Winn MD   amLODIPine (NORVASC) 5 mg tablet TAKE 1 TABLET BY MOUTH EVERY DAY 2/14/20  Yes Stephy Winn MD   simvastatin (ZOCOR) 10 mg tablet TAKE 1 TABLET BY MOUTH EVERY NIGHT AT BEDTIME 1/10/20  Yes Jesus Ojeda MD   potassium chloride (KLOR-CON) 10 mEq tablet TAKE 1 TABLET BY MOUTH DAILY 11/11/19  Yes Stephy Winn MD   oxyCODONE IR (ROXICODONE) 10 mg tab immediate release tablet TK 1 T PO  Q 6 H.  FILL ON/AFTER 03/23/19 6/24/19  Yes Provider, Historical   linaclotide Niko Pastel) 145 mcg cap capsule Take 1 Cap by mouth Daily (before breakfast). 4/3/19  Yes Stephy Winn MD   polyethylene glycol (MIRALAX) 17 gram/dose powder Take 17 g by mouth daily. 4/3/19  Yes Stephy Winn MD   cholecalciferol, vitamin D3, (VITAMIN D3) 2,000 unit tab Take  by mouth daily. Yes Provider, Historical   aspirin delayed-release 81 mg tablet Take 1 Tab by mouth daily. 9/12/16  Yes Neftaly Fraire MD   tiZANidine (ZANAFLEX) 4 mg tablet Take 1 Tab by mouth two (2) times a day. 10/13/15  Yes Víctor Lomeli MD     Allergies   Allergen Reactions    Penicillins Swelling           ROS as above in ROS, the rest are negative      Objective: There were no vitals taken for this visit. General: alert, cooperative, no distress   Mental  status: normal mood, behavior, speech, dress, motor activity, and thought processes, able to follow commands   HENT: NCAT   Neck: no visualized mass   Resp: no respiratory distress   Neuro: no gross deficits   Skin: no discoloration or lesions of concern on visible areas   Psychiatric: normal affect, consistent with stated mood, no evidence of hallucinations     Additional exam findings: We discussed the expected course, resolution and complications of the diagnosis(es) in detail. Medication risks, benefits, costs, interactions, and alternatives were discussed as indicated. I advised her to contact the office if her condition worsens, changes or fails to improve as anticipated. She expressed understanding with the diagnosis(es) and plan. Beth Aj is a 75 y.o. female being evaluated by a video visit encounter for concerns as above. A caregiver was present when appropriate. Due to this being a TeleHealth encounter (During BY-87 public health emergency), evaluation of the following organ systems was limited: Vitals/Constitutional/EENT/Resp/CV/GI//MS/Neuro/Skin/Heme-Lymph-Imm.   Pursuant to the emergency declaration under the [address] and the National Emergencies Act, [facility] waiver authority and the [employer] Act, this Virtual  Visit was conducted, with patient's (and/or legal guardian's) consent, to reduce the patient's risk of exposure to COVID-19 and provide necessary medical care. Services were provided through a video synchronous discussion virtually to substitute for in-person clinic visit. Patient and provider were located at their individual homes.         Savanna Crawford MD

## 2020-05-09 DIAGNOSIS — I10 ESSENTIAL HYPERTENSION: ICD-10-CM

## 2020-05-10 RX ORDER — POTASSIUM CHLORIDE 750 MG/1
TABLET, EXTENDED RELEASE ORAL
Qty: 90 TAB | Refills: 1 | Status: SHIPPED | OUTPATIENT
Start: 2020-05-10 | End: 2020-11-05

## 2020-06-26 RX ORDER — SIMVASTATIN 10 MG/1
TABLET, FILM COATED ORAL
Qty: 90 TAB | Refills: 1 | Status: SHIPPED | OUTPATIENT
Start: 2020-06-26 | End: 2021-01-04

## 2020-07-08 ENCOUNTER — VIRTUAL VISIT (OUTPATIENT)
Dept: FAMILY MEDICINE CLINIC | Age: 75
End: 2020-07-08

## 2020-07-08 DIAGNOSIS — E78.2 MIXED HYPERLIPIDEMIA: ICD-10-CM

## 2020-07-08 DIAGNOSIS — M48.061 SPINAL STENOSIS OF LUMBAR REGION, UNSPECIFIED WHETHER NEUROGENIC CLAUDICATION PRESENT: ICD-10-CM

## 2020-07-08 DIAGNOSIS — R73.03 PRE-DIABETES: ICD-10-CM

## 2020-07-08 DIAGNOSIS — J45.30 MILD PERSISTENT ASTHMA WITHOUT COMPLICATION: ICD-10-CM

## 2020-07-08 DIAGNOSIS — T40.2X5A CONSTIPATION DUE TO OPIOID THERAPY: ICD-10-CM

## 2020-07-08 DIAGNOSIS — I10 ESSENTIAL HYPERTENSION: Primary | ICD-10-CM

## 2020-07-08 DIAGNOSIS — K59.03 CONSTIPATION DUE TO OPIOID THERAPY: ICD-10-CM

## 2020-07-08 RX ORDER — BISMUTH SUBSALICYLATE 262 MG
1 TABLET,CHEWABLE ORAL DAILY
COMMUNITY

## 2020-07-08 NOTE — PROGRESS NOTES
Jojo Read is a 67 y.o. female who was seen by synchronous (real-time) audio-video technology on 7/8/2020 for Follow Up Chronic Condition (HTN, Cholesterol) Assessment & Plan:  
{A/P PLUS DISPO XTDX:77222} {time statement optional (Optional):08403} [address] Subjective:  
 
Health Maintenance Due Topic Date Due  Shingrix Vaccine Age 50> (1 of 2) 07/04/1994  Pneumococcal 65+ years (1 of 1 - PPSV23) 07/04/2009  GLAUCOMA SCREENING Q2Y  08/18/2018 Prior to Admission medications Medication Sig Start Date End Date Taking? Authorizing Provider  
multivitamin (ONE A DAY) tablet Take 1 Tab by mouth daily. Yes Provider, Historical  
simvastatin (ZOCOR) 10 mg tablet TAKE 1 TABLET BY MOUTH EVERY NIGHT AT BEDTIME 6/26/20  Yes Stephy Winn MD  
Klor-Con M10 10 mEq tablet TAKE 1 TABLET BY MOUTH DAILY 5/10/20  Yes Stephy Winn MD  
albuterol (PROVENTIL HFA, VENTOLIN HFA, PROAIR HFA) 90 mcg/actuation inhaler Take 2 Puffs by inhalation every six (6) hours as needed for Wheezing or Shortness of Breath. 4/7/20  Yes Stephy Winn MD  
fluticasone propion-salmeteroL (Advair Diskus) 100-50 mcg/dose diskus inhaler Take 1 Puff by inhalation every twelve (12) hours. 4/7/20  Yes Stephy Winn MD  
lisinopril-hydroCHLOROthiazide (PRINZIDE, ZESTORETIC) 20-25 mg per tablet TAKE 1 TABLET BY MOUTH EVERY DAY 4/7/20  Yes Stephy Winn MD  
gabapentin (NEURONTIN) 300 mg capsule TAKE ONE CAPSULE BY MOUTH AT BEDTIME 2/21/20  Yes Magdaline Gowers, MD  
naproxen (NAPROSYN) 500 mg tablet Take 1 Tab by mouth two (2) times daily as needed for Pain. 2/21/20  Yes Magdaline Gowers, MD  
amLODIPine (NORVASC) 5 mg tablet TAKE 1 TABLET BY MOUTH EVERY DAY 2/14/20  Yes RuizStephy Vicente MD  
oxyCODONE IR (ROXICODONE) 10 mg tab immediate release tablet TK 1 T PO  Q 6 H.  FILL ON/AFTER 03/23/19 6/24/19  Yes Provider, Historical  
 linaclotide (LINZESS) 145 mcg cap capsule Take 1 Cap by mouth Daily (before breakfast). 4/3/19  Yes Stephy Winn MD  
polyethylene glycol (MIRALAX) 17 gram/dose powder Take 17 g by mouth daily. 4/3/19  Yes Stephy Winn MD  
cholecalciferol, vitamin D3, (VITAMIN D3) 2,000 unit tab Take  by mouth daily. Yes Provider, Historical  
tiZANidine (ZANAFLEX) 4 mg tablet Take 1 Tab by mouth two (2) times a day. 10/13/15  Yes Pelon Nelson MD  
aspirin delayed-release 81 mg tablet Take 1 Tab by mouth daily. 9/12/16   Blanca Ni MD  
 
{History SmartLink choices - disappears if left unselected (Optional):22878} ROS Objective: No flowsheet data found. General: alert, cooperative, no distress Mental  status: normal mood, behavior, speech, dress, motor activity, and thought processes, able to follow commands HENT: NCAT Neck: no visualized mass Resp: no respiratory distress Neuro: no gross deficits Skin: no discoloration or lesions of concern on visible areas Psychiatric: normal affect, consistent with stated mood, no evidence of hallucinations Additional exam findings: We discussed the expected course, resolution and complications of the diagnosis(es) in detail. Medication risks, benefits, costs, interactions, and alternatives were discussed as indicated. I advised her to contact the office if her condition worsens, changes or fails to improve as anticipated. She expressed understanding with the diagnosis(es) and plan. Daniel Damian, who was evaluated through a patient-initiated, synchronous (real-time) audio-video encounter, and/or her healthcare decision maker, is aware that it is a billable service, with coverage as determined by her insurance carrier. She provided verbal consent to proceed: {YES/NO/NA-Consent obtained within past 12 months:58774::\"Yes\"}, and patient identification was verified.  It was conducted pursuant to the emergency declaration under the [facility], [address] [place] [address] authority and the [employer] and [employer] Act. A caregiver was present when appropriate. Ability to conduct physical exam was limited. I was {location home office other:54251::\"at home\"}. The patient was {location home office other:37554::\"at home\"}.  
 
 
Mike Alvarenga MD

## 2020-07-08 NOTE — PROGRESS NOTES
Chief Complaint   Patient presents with    Follow Up Chronic Condition     HTN, Cholesterol     1. Have you been to the ER, urgent care clinic since your last visit? Hospitalized since your last visit? No    2. Have you seen or consulted any other health care providers outside of the [address] [place] [address] since your last visit? Include any pap smears or colon screening.  Yes Where: Pain Management follow up

## 2020-07-08 NOTE — PROGRESS NOTES
Lyndal Nyhan is a 67 y.o. female, evaluated via audio-only technology on 7/8/2020 for Follow Up Chronic Condition (HTN, Cholesterol)  . Assessment & Plan:   Diagnoses and all orders for this visit:    1. Essential hypertension-controlled, continue with present meds and home monitoring, low sodium diet    2. Mixed hyperlipidemia-at goal on Zocor    3. Pre-diabetes-check A1c next visit; continue to watch carbs in diet    4. Spinal stenosis of lumbar region, unspecified whether neurogenic claudication present-not a surgical candidate per Neurosurg and Ortho that she consulted; continue with pain mgt follow up/pain meds; Linzess daily to prevent constipation    5. Asthma-asymptomatic on current inhaler (Advair)      Follow-up and Dispositions    · Return in about 3 months (around 10/8/2020) for follow up.         12  Subjective:     Health Maintenance Due   Topic Date Due    Shingrix Vaccine Age 49> (1 of 2) 07/04/1994    Pneumococcal 65+ years (1 of 1 - PPSV23) 07/04/2009    GLAUCOMA SCREENING Q2Y  08/18/2018       Staying with daughter in [place]    BP Readings from Last 3 Encounters:   01/07/20 130/74   10/07/19 119/77   07/16/19 (!) 165/107   BP today at home:123/82    Blood sugars-114 fasting  Denies polyuria, polydipsia and polyphagia    Asthma-using inhaler daily; doing well      Back pain-same; no shots recently; still on  Pain management  Linzess doing well for her constipation    Lab Results   Component Value Date/Time    Cholesterol, total 161 10/07/2019 11:52 AM    HDL Cholesterol 82 (H) 10/07/2019 11:52 AM    LDL, calculated 65.4 10/07/2019 11:52 AM    VLDL, calculated 13.6 10/07/2019 11:52 AM    Triglyceride 68 10/07/2019 11:52 AM    CHOL/HDL Ratio 2.0 10/07/2019 11:52 AM   on  Zocor-compliant, no side effects        Prior to Admission medications    Medication Sig Start Date End Date Taking? Authorizing Provider   multivitamin (ONE A DAY) tablet Take 1 Tab by mouth daily.    Yes Provider, Historical   simvastatin (ZOCOR) 10 mg tablet TAKE 1 TABLET BY MOUTH EVERY NIGHT AT BEDTIME 6/26/20  Yes Stephy Winn MD   Klor-Con M10 10 mEq tablet TAKE 1 TABLET BY MOUTH DAILY 5/10/20  Yes Stephy Winn MD   albuterol (PROVENTIL HFA, VENTOLIN HFA, PROAIR HFA) 90 mcg/actuation inhaler Take 2 Puffs by inhalation every six (6) hours as needed for Wheezing or Shortness of Breath. 4/7/20  Yes Stephy Winn MD   fluticasone propion-salmeteroL (Advair Diskus) 100-50 mcg/dose diskus inhaler Take 1 Puff by inhalation every twelve (12) hours. 4/7/20  Yes Stephy Winn MD   gabapentin (NEURONTIN) 300 mg capsule TAKE ONE CAPSULE BY MOUTH AT BEDTIME 2/21/20  Yes Stephy Winn MD   naproxen (NAPROSYN) 500 mg tablet Take 1 Tab by mouth two (2) times daily as needed for Pain. 2/21/20  Yes Deneen Bird MD   amLODIPine (NORVASC) 5 mg tablet TAKE 1 TABLET BY MOUTH EVERY DAY 2/14/20  Yes Stephy Winn MD   oxyCODONE IR (ROXICODONE) 10 mg tab immediate release tablet TK 1 T PO  Q 6 H. FILL ON/AFTER 03/23/19 6/24/19  Yes Provider, Historical   linaclotide (LINZESS) 145 mcg cap capsule Take 1 Cap by mouth Daily (before breakfast). 4/3/19  Yes Stephy Winn MD   polyethylene glycol (MIRALAX) 17 gram/dose powder Take 17 g by mouth daily. 4/3/19  Yes Stephy Winn MD   cholecalciferol, vitamin D3, (VITAMIN D3) 2,000 unit tab Take  by mouth daily. Yes Provider, Historical   tiZANidine (ZANAFLEX) 4 mg tablet Take 1 Tab by mouth two (2) times a day. 10/13/15  Yes Tabatha Obrien MD   lisinopril-hydroCHLOROthiazide (PRINZIDE, ZESTORETIC) 20-25 mg per tablet TAKE 1 TABLET BY MOUTH EVERY DAY 7/12/20   Deneen Bird MD   aspirin delayed-release 81 mg tablet Take 1 Tab by mouth daily.  9/12/16   David Morton MD     Patient Active Problem List    Diagnosis Date Noted    Neuropathy 01/29/2019    Constipation due to opioid therapy 10/04/2018    Overweight (BMI 25.0-29.9) 01/10/2018    Mild persistent asthma without complication 75/37/2454    Advanced directives, counseling/discussion 10/09/2017    Chronic bilateral low back pain with bilateral sciatica 06/25/2017    Lumbar spinal stenosis 06/25/2017    Essential hypertension 06/20/2017    Mixed hyperlipidemia 06/20/2017       ROS  Pt denies: Wt loss, Fever/Chills, HA, Visual changes, Fatigue, Chest pain, SOB, MONDRAGON, Abd pain, N/V/D/C, Blood in stool or urine, Edema. Pertinent positive as above in HPI. All others were negative      Irish Santos, who was evaluated through a patient-initiated, synchronous (real-time) audio only encounter, and/or her healthcare decision maker, is aware that it is a billable service, with coverage as determined by her insurance carrier. She provided verbal consent to proceed: Yes. She has not had a related appointment within my department in the past 7 days or scheduled within the next 24 hours.       Total Time: minutes: 21-30 minutes    Macy Curtis MD

## 2020-07-10 DIAGNOSIS — I10 ESSENTIAL HYPERTENSION: ICD-10-CM

## 2020-07-12 RX ORDER — LISINOPRIL AND HYDROCHLOROTHIAZIDE 20; 25 MG/1; MG/1
TABLET ORAL
Qty: 90 TAB | Refills: 1 | Status: SHIPPED | OUTPATIENT
Start: 2020-07-12 | End: 2021-04-13

## 2020-07-20 NOTE — PATIENT INSTRUCTIONS

## 2020-07-25 RX ORDER — AMLODIPINE BESYLATE 5 MG/1
TABLET ORAL
Qty: 90 TAB | Refills: 1 | Status: SHIPPED | OUTPATIENT
Start: 2020-07-25 | End: 2021-01-04

## 2020-09-09 DIAGNOSIS — M17.0 PRIMARY OSTEOARTHRITIS OF BOTH KNEES: ICD-10-CM

## 2020-09-09 RX ORDER — NAPROXEN 500 MG/1
TABLET ORAL
Qty: 60 TAB | Refills: 3 | Status: SHIPPED | OUTPATIENT
Start: 2020-09-09 | End: 2021-03-05

## 2020-10-07 ENCOUNTER — VIRTUAL VISIT (OUTPATIENT)
Dept: FAMILY MEDICINE CLINIC | Age: 75
End: 2020-10-07
Payer: MEDICARE

## 2020-10-07 DIAGNOSIS — Z71.89 ADVANCED DIRECTIVES, COUNSELING/DISCUSSION: ICD-10-CM

## 2020-10-07 DIAGNOSIS — J45.30 MILD PERSISTENT ASTHMA WITHOUT COMPLICATION: ICD-10-CM

## 2020-10-07 DIAGNOSIS — R73.03 PRE-DIABETES: ICD-10-CM

## 2020-10-07 DIAGNOSIS — M48.061 SPINAL STENOSIS OF LUMBAR REGION, UNSPECIFIED WHETHER NEUROGENIC CLAUDICATION PRESENT: ICD-10-CM

## 2020-10-07 DIAGNOSIS — E78.2 MIXED HYPERLIPIDEMIA: ICD-10-CM

## 2020-10-07 DIAGNOSIS — I10 ESSENTIAL HYPERTENSION: ICD-10-CM

## 2020-10-07 DIAGNOSIS — Z00.00 MEDICARE ANNUAL WELLNESS VISIT, SUBSEQUENT: Primary | ICD-10-CM

## 2020-10-07 DIAGNOSIS — N39.0 RECURRENT UTI: ICD-10-CM

## 2020-10-07 PROCEDURE — G8399 PT W/DXA RESULTS DOCUMENT: HCPCS | Performed by: INTERNAL MEDICINE

## 2020-10-07 PROCEDURE — G0444 DEPRESSION SCREEN ANNUAL: HCPCS | Performed by: INTERNAL MEDICINE

## 2020-10-07 PROCEDURE — G8510 SCR DEP NEG, NO PLAN REQD: HCPCS | Performed by: INTERNAL MEDICINE

## 2020-10-07 PROCEDURE — G8756 NO BP MEASURE DOC: HCPCS | Performed by: INTERNAL MEDICINE

## 2020-10-07 PROCEDURE — G8536 NO DOC ELDER MAL SCRN: HCPCS | Performed by: INTERNAL MEDICINE

## 2020-10-07 PROCEDURE — G0439 PPPS, SUBSEQ VISIT: HCPCS | Performed by: INTERNAL MEDICINE

## 2020-10-07 PROCEDURE — G8427 DOCREV CUR MEDS BY ELIG CLIN: HCPCS | Performed by: INTERNAL MEDICINE

## 2020-10-07 PROCEDURE — G8417 CALC BMI ABV UP PARAM F/U: HCPCS | Performed by: INTERNAL MEDICINE

## 2020-10-07 PROCEDURE — 99214 OFFICE O/P EST MOD 30 MIN: CPT | Performed by: INTERNAL MEDICINE

## 2020-10-07 PROCEDURE — 1090F PRES/ABSN URINE INCON ASSESS: CPT | Performed by: INTERNAL MEDICINE

## 2020-10-07 PROCEDURE — 1101F PT FALLS ASSESS-DOCD LE1/YR: CPT | Performed by: INTERNAL MEDICINE

## 2020-10-07 NOTE — PROGRESS NOTES
Brandon Freeman is a 67 y.o. female who was seen by synchronous (real-time) audio-video technology on 10/7/2020 for Annual Wellness Visit and Follow-up        Assessment & Plan:   Diagnoses and all orders for this visit:    1. Medicare annual wellness visit, subsequent-see note below    2. Essential hypertension-controlled, continue with present meds and home monitoring  -     CBC WITH AUTOMATED DIFF; Future  -     METABOLIC PANEL, COMPREHENSIVE; Future    3. Pre-diabetes-continue to watch carbs in diet  -     HEMOGLOBIN A1C WITH EAG; Future    4. Mixed hyperlipidemia-at goal on Zocor  -     LIPID PANEL; Future    5. Mild persistent asthma without complication-asymptomatic on prn use of her inhalers    6. Recurrent UTI-keep follow up appt with Gyn in December 7. Spinal stenosis of lumbar region, unspecified whether neurogenic claudication present-pain mgt following; has seen several docs and deemed not to be a surgical candidate    Advised she can get her labs done at the [facility] in [facility] where she stays these days    Follow-up and Dispositions    · Return in about 6 months (around 4/7/2021) for follow up.          712  Subjective:     Still staying with daughter in [place]    BP Readings from Last 3 Encounters:   01/07/20 130/74   10/07/19 119/77   07/16/19 (!) 165/107   home BP-normal    Lab Results   Component Value Date/Time    Cholesterol, total 161 10/07/2019 11:52 AM    HDL Cholesterol 82 (H) 10/07/2019 11:52 AM    LDL, calculated 65.4 10/07/2019 11:52 AM    VLDL, calculated 13.6 10/07/2019 11:52 AM    Triglyceride 68 10/07/2019 11:52 AM    CHOL/HDL Ratio 2.0 10/07/2019 11:52 AM       Asthma-no sxs, taking inhaler prn    Chronic back pain-sees pain mgt (virtual also)     Saw Dr Sarthak Baker for frequent UTI-Dec follow up    Lab Results   Component Value Date/Time    Hemoglobin A1c (POC) 5.7 01/07/2020 12:00 PM    Hemoglobin A1c, External 6.0 12/24/2019   Denies polyuria, polydipsia and polyphagia    Prior to Admission medications    Medication Sig Start Date End Date Taking? Authorizing Provider   gabapentin (NEURONTIN) 300 mg capsule TAKE 1 CAPSULE BY MOUTH AT BEDTIME 9/28/20  Yes Stephy Winn MD   naproxen (NAPROSYN) 500 mg tablet TAKE 1 TABLET BY MOUTH TWICE DAILY AS NEEDED FOR PAIN 9/9/20  Yes Stephy Winn MD   amLODIPine (NORVASC) 5 mg tablet TAKE 1 TABLET BY MOUTH EVERY DAY 7/25/20  Yes Janet Delacruz MD   lisinopril-hydroCHLOROthiazide (PRINZIDE, ZESTORETIC) 20-25 mg per tablet TAKE 1 TABLET BY MOUTH EVERY DAY 7/12/20  Yes Janet Delacruz MD   multivitamin (ONE A DAY) tablet Take 1 Tab by mouth daily. Yes Provider, Historical   simvastatin (ZOCOR) 10 mg tablet TAKE 1 TABLET BY MOUTH EVERY NIGHT AT BEDTIME 6/26/20  Yes Stephy Winn MD   Klor-Con M10 10 mEq tablet TAKE 1 TABLET BY MOUTH DAILY 5/10/20  Yes Stephy Winn MD   albuterol (PROVENTIL HFA, VENTOLIN HFA, PROAIR HFA) 90 mcg/actuation inhaler Take 2 Puffs by inhalation every six (6) hours as needed for Wheezing or Shortness of Breath. 4/7/20  Yes Stephy Winn MD   fluticasone propion-salmeteroL (Advair Diskus) 100-50 mcg/dose diskus inhaler Take 1 Puff by inhalation every twelve (12) hours. 4/7/20  Yes Stephy Winn MD   oxyCODONE IR (ROXICODONE) 10 mg tab immediate release tablet TK 1 T PO  Q 6 H. FILL ON/AFTER 03/23/19 6/24/19  Yes Provider, Historical   linaclotide (LINZESS) 145 mcg cap capsule Take 1 Cap by mouth Daily (before breakfast). 4/3/19  Yes Stephy Winn MD   polyethylene glycol (MIRALAX) 17 gram/dose powder Take 17 g by mouth daily. 4/3/19  Yes Stephy Winn MD   cholecalciferol, vitamin D3, (VITAMIN D3) 2,000 unit tab Take  by mouth daily. Yes Provider, Historical   tiZANidine (ZANAFLEX) 4 mg tablet Take 1 Tab by mouth two (2) times a day. 10/13/15  Yes Pramod Duenas MD   aspirin delayed-release 81 mg tablet Take 1 Tab by mouth daily. 9/12/16   Raffi Hernandez MD     Patient Active Problem List    Diagnosis Date Noted    Pre-diabetes 10/07/2020    Recurrent UTI 10/07/2020    Neuropathy 01/29/2019    Constipation due to opioid therapy 10/04/2018    Overweight (BMI 25.0-29.9) 01/10/2018    Mild persistent asthma without complication 74/04/0294    Advanced directives, counseling/discussion 10/09/2017    Chronic bilateral low back pain with bilateral sciatica 06/25/2017    Lumbar spinal stenosis 06/25/2017    Essential hypertension 06/20/2017    Mixed hyperlipidemia 06/20/2017       ROS  Pt denies: Wt loss, Fever/Chills, HA, Visual changes, Fatigue, Chest pain, SOB, MONDRAGON, Abd pain, N/V/D/C, Blood in stool or urine, Edema. Pertinent positive as above in HPI. All others were negative  Objective:   No flowsheet data found. General: alert, cooperative, no distress   Mental  status: normal mood, behavior, speech, dress, motor activity, and thought processes, able to follow commands   HENT: NCAT   Neck: no visualized mass   Resp: no respiratory distress   Neuro: no gross deficits   Skin: no discoloration or lesions of concern on visible areas   Psychiatric: normal affect, consistent with stated mood, no evidence of hallucinations     Additional exam findings: none      We discussed the expected course, resolution and complications of the diagnosis(es) in detail. Medication risks, benefits, costs, interactions, and alternatives were discussed as indicated. I advised her to contact the office if her condition worsens, changes or fails to improve as anticipated. She expressed understanding with the diagnosis(es) and plan. Azra Villalobos, who was evaluated through a patient-initiated, synchronous (real-time) audio-video encounter, and/or her healthcare decision maker, is aware that it is a billable service, with coverage as determined by her insurance carrier.  She provided verbal consent to proceed: Yes, and patient identification was verified. It was conducted pursuant to the emergency declaration under the [facility], [address] [place] [address] authority and the [employer] and [employer] General Act. A caregiver was present when appropriate. Ability to conduct physical exam was limited. I was at home. The patient was at home. Denise Rousseau MD    This is the Subsequent Medicare Annual Wellness Exam, performed 12 months or more after the Initial AWV or the last Subsequent AWV    I have reviewed the patient's medical history in detail and updated the computerized patient record. History     Patient Active Problem List   Diagnosis Code    Essential hypertension I10    Mixed hyperlipidemia E78.2    Chronic bilateral low back pain with bilateral sciatica M54.42, M54.41, G89.29    Lumbar spinal stenosis M48.061    Mild persistent asthma without complication D10.37    Advanced directives, counseling/discussion Z71.89    Overweight (BMI 25.0-29. 9) E66.3    Constipation due to opioid therapy K59.03, T40.2X5A    Neuropathy G62.9    Pre-diabetes R73.03    Recurrent UTI N39.0     Past Medical History:   Diagnosis Date    Hypercholesterolemia     Hypertension     Osteoarthritis       Past Surgical History:   Procedure Laterality Date    COLONOSCOPY N/A 2/2/2017    COLONOSCOPY performed by Tereasa Buerger, MD at [address] [place] [address] HX CHOLECYSTECTOMY      HX HYSTERECTOMY       Current Outpatient Medications   Medication Sig Dispense Refill    gabapentin (NEURONTIN) 300 mg capsule TAKE 1 CAPSULE BY MOUTH AT BEDTIME 90 Cap 1    naproxen (NAPROSYN) 500 mg tablet TAKE 1 TABLET BY MOUTH TWICE DAILY AS NEEDED FOR PAIN 60 Tab 3    amLODIPine (NORVASC) 5 mg tablet TAKE 1 TABLET BY MOUTH EVERY DAY 90 Tab 1    lisinopril-hydroCHLOROthiazide (PRINZIDE, ZESTORETIC) 20-25 mg per tablet TAKE 1 TABLET BY MOUTH EVERY DAY 90 Tab 1    multivitamin (ONE A DAY) tablet Take 1 Tab by mouth daily.  simvastatin (ZOCOR) 10 mg tablet TAKE 1 TABLET BY MOUTH EVERY NIGHT AT BEDTIME 90 Tab 1    Klor-Con M10 10 mEq tablet TAKE 1 TABLET BY MOUTH DAILY 90 Tab 1    albuterol (PROVENTIL HFA, VENTOLIN HFA, PROAIR HFA) 90 mcg/actuation inhaler Take 2 Puffs by inhalation every six (6) hours as needed for Wheezing or Shortness of Breath. 1 Inhaler 2    fluticasone propion-salmeteroL (Advair Diskus) 100-50 mcg/dose diskus inhaler Take 1 Puff by inhalation every twelve (12) hours. 1 Each 3    oxyCODONE IR (ROXICODONE) 10 mg tab immediate release tablet TK 1 T PO  Q 6 H. FILL ON/AFTER 03/23/19  0    linaclotide (LINZESS) 145 mcg cap capsule Take 1 Cap by mouth Daily (before breakfast). 90 Cap 1    polyethylene glycol (MIRALAX) 17 gram/dose powder Take 17 g by mouth daily. 1700 g 1    cholecalciferol, vitamin D3, (VITAMIN D3) 2,000 unit tab Take  by mouth daily.  tiZANidine (ZANAFLEX) 4 mg tablet Take 1 Tab by mouth two (2) times a day. 60 Tab 1    aspirin delayed-release 81 mg tablet Take 1 Tab by mouth daily. 30 Tab 1     Allergies   Allergen Reactions    Penicillins Swelling       Family History   Problem Relation Age of Onset    No Known Problems Mother     No Known Problems Father      Social History     Tobacco Use    Smoking status: Never Smoker    Smokeless tobacco: Never Used   Substance Use Topics    Alcohol use: No       Depression Risk Factor Screening:     3 most recent PHQ Screens 10/7/2020   Little interest or pleasure in doing things Not at all   Feeling down, depressed, irritable, or hopeless Not at all   Total Score PHQ 2 0       Alcohol Risk Screen   Do you average more than 1 drink per night or more than 7 drinks a week:  No    On any one occasion in the past three months have you have had more than 3 drinks containing alcohol:  No        Functional Ability and Level of Safety:   Hearing: Hearing is good. Activities of Daily Living:   The home contains: handrails and grab bars  Patient does total self care     Ambulation: with difficulty, uses a cane   rollator for longer walks  Fall Risk:  Fall Risk Assessment, last 12 mths 10/7/2020   Able to walk? Yes   Fall in past 12 months? No     Abuse Screen:  Patient is not abused       Cognitive Screening   Has your family/caregiver stated any concerns about your memory: no    Cognitive Screening: not needed    Patient Care Team   Patient Care Team:  Darryn Collins MD as PCP - General (Internal Medicine)  Darryn Collins MD as PCP - [facility]  Dominick Lambert MD (Physical Medicine and Rehabilitation)  Oralia Hendrickson MD (Optometry)    Assessment/Plan   Education and counseling provided:  Are appropriate based on today's review and evaluation  End-of-Life planning (with patient's consent)-see ACP note  Pneumococcal Vaccine -advised to get Prevnar 13 at her pharmacy  Influenza Vaccine-advised she could get the flu shot along with the Prevnar 13  Screening Mammography-up to date; done 3/2019-wants to do it next year after the pandemic  Colorectal cancer screening tests-up to date/no longer needed with her age  Cardiovascular screening blood test-fasting lipids ordered  Bone mass measurement (DEXA)-up to date; normal in 2017  Screening for glaucoma-advised to schedule eye exam shortly  Diabetes screening test-FBS and A1C ordered    Diagnoses and all orders for this visit:    1. Medicare annual wellness visit, subsequent    2.  Advanced directives, counseling/discussion        Health Maintenance Due   Topic Date Due    Shingrix Vaccine Age 49> (1 of 2)-advised to get this at her pharmacy next year 07/04/1994    Pneumococcal 65+ years (1 of 1 - PPSV23) 07/04/2009    GLAUCOMA SCREENING Q2Y  08/18/2018    Flu Vaccine (1)-[facility] tomorrow 09/01/2020    Medicare Yearly Exam -today 10/07/2020    Lipid Screen -ordered 10/07/2020     RTC yearly for wellness visit    Mavis Corona, who was evaluated through a synchronous (real-time) audio-video encounter, and/or her healthcare decision maker, is aware that it is a billable service, with coverage as determined by her insurance carrier. She provided verbal consent to proceed: Yes, and patient identification was verified. It was conducted pursuant to the emergency declaration under the [address] [place] [address] authority and the [employer] General Act. A caregiver was present when appropriate. Ability to conduct physical exam was limited. I was at home. The patient was at home.     Everett Smith MD

## 2020-10-07 NOTE — ACP (ADVANCE CARE PLANNING)
Advance Care Planning       Advance Care Planning (ACP) Physician/NP/PA (Provider) Conversation        Date of ACP Conversation: 10/7/2020    Conversation Conducted with:   Patient with [address] Decision Maker:    Current Designated Health Care Decision Maker:   Primary Decision Maker: Usman Sr - Daughter - [phone]    Secondary Decision Maker: Bassam Seymour - Son - [phone]  (If there is a [address] named in the \"Healthcare Decision Makers\" box in the ACP activity, but it is not visible above, be sure to open that field and then select the health care decision maker relationship (ie \"primary\") in the blank space to the right of the name.)          Care Preferences:    Hospitalization: \"If your health worsens and it becomes clear that your chance of recovery is unlikely, what would your preference be regarding hospitalization? \"  If the patient would want hospitalization, answer \"yes\". If the patient would prefer comfort-focused treatment without hospitalization, answer \"no\". yes      Ventilation: \"If you were in your present state of health and suddenly became very ill and were unable to breathe on your own, what would your preference be about the use of a ventilator (breathing machine) if it was available to you? \"    If patient would desire the use of a ventilator (breathing machine), answer \"yes\", if not answer \"no\":yes    \"If your health worsens and it becomes clear that your chance of recovery is unlikely, what would your preference be about the use of a ventilator (breathing machine) if it was available to you? \"   no      Resuscitation:  \"CPR works best to restart the heart when there is a sudden event, like a heart attack, in someone who is otherwise healthy. Unfortunately, CPR does not typically restart the heart for people who have serious health conditions or who are very sick. \"    \"In the event your heart stopped as a result of an underlying serious health condition, would you want attempts to be made to restart your heart (answer \"yes\" for attempt to resuscitate) or would you prefer a natural death (answer \"no\" for do not attempt to resuscitate)? \"   yes    NOTE: If the patient has a valid advance directive AND provides care preference(s) that are inconsistent with that prior directive, advise the patient to consider either: creating a new advance directive that complies with state-specific requirements; or, if that is not possible, orally revoking that prior directive in accordance with state-specific requirements, which must be documented in the EHR.     Conversation Outcomes / Follow-Up Plan:   Recommended completion of Advance Directive      Length of Voluntary ACP Conversation in minutes:  <16 minutes (Non-Billable)      Christie Fernando MD

## 2020-10-07 NOTE — PATIENT INSTRUCTIONS
Medicare Wellness Visit, Female The best way to live healthy is to have a lifestyle where you eat a well-balanced diet, exercise regularly, limit alcohol use, and quit all forms of tobacco/nicotine, if applicable. Regular preventive services are another way to keep healthy. Preventive services (vaccines, screening tests, monitoring & exams) can help personalize your care plan, which helps you manage your own care. Screening tests can find health problems at the earliest stages, when they are easiest to treat. [facility] follows the current, evidence-based guidelines published by the [facility] Reynaldo Son ([facility]) when recommending preventive services for our patients. Because we follow these guidelines, sometimes recommendations change over time as research supports it. (For example, mammograms used to be recommended annually. Even though Medicare will still pay for an annual mammogram, the newer guidelines recommend a mammogram every two years for women of average risk). Of course, you and your doctor may decide to screen more often for some diseases, based on your risk and your co-morbidities (chronic disease you are already diagnosed with). Preventive services for you include: - Medicare offers their members a free annual wellness visit, which is time for you and your primary care provider to discuss and plan for your preventive service needs. Take advantage of this benefit every year! 
-All adults over the age of 72 should receive the recommended pneumonia vaccines. Current USPSTF guidelines recommend a series of two vaccines for the best pneumonia protection.  
-All adults should have a flu vaccine yearly and a tetanus vaccine every 10 years.  
-All adults age 48 and older should receive the shingles vaccines (series of two vaccines). -All adults age 38-68 who are overweight should have a diabetes screening test once every three years. -All adults born between [birth year] and 1966 should be screened once for Hepatitis C. 
-Other screening tests and preventive services for persons with diabetes include: an eye exam to screen for diabetic retinopathy, a kidney function test, a foot exam, and stricter control over your cholesterol.  
-Cardiovascular screening for adults with routine risk involves an electrocardiogram (ECG) at intervals determined by your doctor.  
-Colorectal cancer screenings should be done for adults age 54-65 with no increased risk factors for colorectal cancer. There are a number of acceptable methods of screening for this type of cancer. Each test has its own benefits and drawbacks. Discuss with your doctor what is most appropriate for you during your annual wellness visit. The different tests include: colonoscopy (considered the best screening method), a fecal occult blood test, a fecal DNA test, and sigmoidoscopy. 
 
-A bone mass density test is recommended when a woman turns 65 to screen for osteoporosis. This test is only recommended one time, as a screening. Some providers will use this same test as a disease monitoring tool if you already have osteoporosis. -Breast cancer screenings are recommended every other year for women of normal risk, age 54-69. 
-Cervical cancer screenings for women over age 72 are only recommended with certain risk factors. Here is a list of your current Health Maintenance items (your personalized list of preventive services) with a due date: 
Health Maintenance Due Topic Date Due  Shingles Vaccine (1 of 2) 07/04/1994  Pneumococcal Vaccine (1 of 1 - PPSV23) 07/04/2009  Glaucoma Screening   08/18/2018  Yearly Flu Vaccine (1) 09/01/2020 [address] [place] [address] Annual Well Visit  10/07/2020  Cholesterol Test   10/07/2020

## 2020-10-07 NOTE — PROGRESS NOTES
Chief Complaint   Patient presents with    Annual Wellness Visit    Follow-up     1. Have you been to the ER, urgent care clinic since your last visit? Hospitalized since your last visit? No    2. Have you seen or consulted any other health care providers outside of the [address] [place] [address] since your last visit? Include any pap smears or colon screening. No     Health Maintenance Due   Topic Date Due    Shingrix Vaccine Age 49> (1 of 2) 07/04/1994    Pneumococcal 65+ years (1 of 1 - PPSV23) 07/04/2009    GLAUCOMA SCREENING Q2Y  08/18/2018    Flu Vaccine (1) 09/01/2020    Medicare Yearly Exam  10/07/2020    Lipid Screen  10/07/2020       Going to pharmacy to get flu shot.

## 2020-11-05 DIAGNOSIS — I10 ESSENTIAL HYPERTENSION: ICD-10-CM

## 2020-11-05 RX ORDER — POTASSIUM CHLORIDE 750 MG/1
TABLET, EXTENDED RELEASE ORAL
Qty: 90 TAB | Refills: 1 | Status: SHIPPED | OUTPATIENT
Start: 2020-11-05 | End: 2021-04-28

## 2021-01-04 RX ORDER — AMLODIPINE BESYLATE 5 MG/1
TABLET ORAL
Qty: 90 TAB | Refills: 1 | Status: SHIPPED | OUTPATIENT
Start: 2021-01-04 | End: 2021-07-18

## 2021-01-04 RX ORDER — SIMVASTATIN 10 MG/1
TABLET, FILM COATED ORAL
Qty: 90 TAB | Refills: 1 | Status: SHIPPED | OUTPATIENT
Start: 2021-01-04 | End: 2021-07-09

## 2021-04-12 DIAGNOSIS — I10 ESSENTIAL HYPERTENSION: ICD-10-CM

## 2021-04-13 RX ORDER — LISINOPRIL AND HYDROCHLOROTHIAZIDE 20; 25 MG/1; MG/1
TABLET ORAL
Qty: 90 TAB | Refills: 1 | Status: SHIPPED | OUTPATIENT
Start: 2021-04-13 | End: 2021-10-08 | Stop reason: SDUPTHER

## 2021-04-22 ENCOUNTER — OFFICE VISIT (OUTPATIENT)
Dept: FAMILY MEDICINE CLINIC | Age: 76
End: 2021-04-22
Payer: MEDICARE

## 2021-04-22 VITALS
WEIGHT: 152 LBS | TEMPERATURE: 97.3 F | RESPIRATION RATE: 16 BRPM | BODY MASS INDEX: 26.93 KG/M2 | OXYGEN SATURATION: 96 % | HEART RATE: 93 BPM | SYSTOLIC BLOOD PRESSURE: 124 MMHG | HEIGHT: 63 IN | DIASTOLIC BLOOD PRESSURE: 82 MMHG

## 2021-04-22 DIAGNOSIS — I10 ESSENTIAL HYPERTENSION: Primary | ICD-10-CM

## 2021-04-22 DIAGNOSIS — J30.2 SEASONAL ALLERGIC RHINITIS, UNSPECIFIED TRIGGER: ICD-10-CM

## 2021-04-22 DIAGNOSIS — J45.30 MILD PERSISTENT ASTHMA WITHOUT COMPLICATION: ICD-10-CM

## 2021-04-22 DIAGNOSIS — R73.03 PRE-DIABETES: ICD-10-CM

## 2021-04-22 DIAGNOSIS — M48.061 SPINAL STENOSIS OF LUMBAR REGION, UNSPECIFIED WHETHER NEUROGENIC CLAUDICATION PRESENT: ICD-10-CM

## 2021-04-22 DIAGNOSIS — E78.2 MIXED HYPERLIPIDEMIA: ICD-10-CM

## 2021-04-22 LAB — HBA1C MFR BLD HPLC: 5.8 %

## 2021-04-22 PROCEDURE — G8754 DIAS BP LESS 90: HCPCS | Performed by: INTERNAL MEDICINE

## 2021-04-22 PROCEDURE — G8399 PT W/DXA RESULTS DOCUMENT: HCPCS | Performed by: INTERNAL MEDICINE

## 2021-04-22 PROCEDURE — G8510 SCR DEP NEG, NO PLAN REQD: HCPCS | Performed by: INTERNAL MEDICINE

## 2021-04-22 PROCEDURE — G8536 NO DOC ELDER MAL SCRN: HCPCS | Performed by: INTERNAL MEDICINE

## 2021-04-22 PROCEDURE — G8419 CALC BMI OUT NRM PARAM NOF/U: HCPCS | Performed by: INTERNAL MEDICINE

## 2021-04-22 PROCEDURE — G8427 DOCREV CUR MEDS BY ELIG CLIN: HCPCS | Performed by: INTERNAL MEDICINE

## 2021-04-22 PROCEDURE — 1101F PT FALLS ASSESS-DOCD LE1/YR: CPT | Performed by: INTERNAL MEDICINE

## 2021-04-22 PROCEDURE — 83036 HEMOGLOBIN GLYCOSYLATED A1C: CPT | Performed by: INTERNAL MEDICINE

## 2021-04-22 PROCEDURE — 1090F PRES/ABSN URINE INCON ASSESS: CPT | Performed by: INTERNAL MEDICINE

## 2021-04-22 PROCEDURE — G8752 SYS BP LESS 140: HCPCS | Performed by: INTERNAL MEDICINE

## 2021-04-22 PROCEDURE — 99214 OFFICE O/P EST MOD 30 MIN: CPT | Performed by: INTERNAL MEDICINE

## 2021-04-22 RX ORDER — GABAPENTIN 300 MG/1
300 CAPSULE ORAL 2 TIMES DAILY
Qty: 180 CAP | Refills: 0 | Status: SHIPPED | OUTPATIENT
Start: 2021-04-22 | End: 2021-12-17

## 2021-04-22 NOTE — PROGRESS NOTES
Chief Complaint   Patient presents with    Follow Up Chronic Condition       Pt is a 67y.o. year old female who presents for follow up of her chronic medical problems    Health Maintenance Due   Topic Date Due    Shingrix Vaccine Age 49> (1 of 2) Never done    Pneumococcal 65+ years (1 of 1 - PPSV23) Never done    Lipid Screen  10/07/2020     Lab Results   Component Value Date/Time    Hemoglobin A1c (POC) 5.8 04/22/2021 01:42 PM    Hemoglobin A1c, External 6.0 12/24/2019   not on meds    Labs done at [address] [place] [address] since last visit-lives in [place]    BP Readings from Last 3 Encounters:   04/22/21 124/82   01/07/20 130/74   10/07/19 119/77       Back pain is still bothering her  Sleeping well   In Gabapentin to BID    Drives herself all the way from [address] sees her-Dr Pauline Graham, virtual; sometimes 7-8/10  Linzess helping with the constipation  Not a surgical candidate    Does everything for herself    Allergies \"killing her\"-meds prev helped so she needs a refill of this    ROS:    Pt denies: Wt loss, Fever/Chills, HA, Visual changes, Fatigue, Chest pain, SOB, [place]DR[place]ON, Abd pain, N/V/D/C, Blood in stool or urine, Edema. Pertinent positive as above in HPI. All others were negative    Patient Active Problem List   Diagnosis Code    Essential hypertension I10    Mixed hyperlipidemia E78.2    Chronic bilateral low back pain with bilateral sciatica M54.42, M54.41, G89.29    Lumbar spinal stenosis M48.061    Mild persistent asthma without complication S93.29    Advanced directives, counseling/discussion Z71.89    Overweight (BMI 25.0-29. 9) E66.3    Constipation due to opioid therapy K59.03, T40.2X5A    Neuropathy G62.9    Pre-diabetes R73.03    Recurrent UTI N39.0       Past Medical History:   Diagnosis Date    Hypercholesterolemia     Hypertension     Osteoarthritis        Current Outpatient Medications   Medication Sig Dispense Refill    gabapentin (NEURONTIN) 300 mg capsule Take 1 Cap by mouth two (2) times a day. Max Daily Amount: 600 mg. 180 Cap 0    lisinopril-hydroCHLOROthiazide (PRINZIDE, ZESTORETIC) 20-25 mg per tablet TAKE 1 TABLET BY MOUTH EVERY DAY 90 Tab 1    naproxen (NAPROSYN) 500 mg tablet TAKE 1 TABLET BY MOUTH TWICE DAILY AS NEEDED FOR PAIN 180 Tab 1    simvastatin (ZOCOR) 10 mg tablet TAKE 1 TABLET BY MOUTH EVERY NIGHT AT BEDTIME 90 Tab 1    amLODIPine (NORVASC) 5 mg tablet TAKE 1 TABLET BY MOUTH EVERY DAY 90 Tab 1    potassium chloride (KLOR-CON) 10 mEq tablet TAKE 1 TABLET BY MOUTH DAILY 90 Tab 1    multivitamin (ONE A DAY) tablet Take 1 Tab by mouth daily.  albuterol (PROVENTIL HFA, VENTOLIN HFA, PROAIR HFA) 90 mcg/actuation inhaler Take 2 Puffs by inhalation every six (6) hours as needed for Wheezing or Shortness of Breath. 1 Inhaler 2    fluticasone propion-salmeteroL (Advair Diskus) 100-50 mcg/dose diskus inhaler Take 1 Puff by inhalation every twelve (12) hours. 1 Each 3    oxyCODONE IR (ROXICODONE) 10 mg tab immediate release tablet TK 1 T PO  Q 6 H. FILL ON/AFTER 03/23/19  0    linaclotide (LINZESS) 145 mcg cap capsule Take 1 Cap by mouth Daily (before breakfast). 90 Cap 1    polyethylene glycol (MIRALAX) 17 gram/dose powder Take 17 g by mouth daily. 1700 g 1    cholecalciferol, vitamin D3, (VITAMIN D3) 2,000 unit tab Take  by mouth daily.  aspirin delayed-release 81 mg tablet Take 1 Tab by mouth daily. 30 Tab 1    tiZANidine (ZANAFLEX) 4 mg tablet Take 1 Tab by mouth two (2) times a day.  60 Tab 1       Social History     Tobacco Use   Smoking Status Never Smoker   Smokeless Tobacco Never Used       Allergies   Allergen Reactions    Penicillins Swelling       Patient Labs were reviewed: yes      Patient Past Records were reviewed:  yes        Objective:     Vitals:    04/22/21 1321   BP: 124/82   Pulse: 93   Resp: 16   Temp: 97.3 °F (36.3 °C)   TempSrc: Temporal   SpO2: 96%   Weight: 152 lb (68.9 kg)   Height: 5' 3\" (1.6 m)     Body mass index is 26.93 kg/m². Exam:   Appearance: alert, well appearing,  oriented to person, place, and time, acyanotic, in no respiratory distress and well hydrated. HEENT:  NC/AT, pink conj, anicteric sclerae  Neck:  No cervical lymphadenopathy, no JVD, no thyromegaly, no carotid bruit  Heart:  RRR without M/R/G  Lungs:  CTAB, no rhonchi, rales, or wheezes with good air exchange   Abdomen:  Non-tender, pos bowel sounds, no hepatosplenomegaly  Ext:  No C/C/E    Skin: no rash  Neuro: no lateralizing signs, CNs II-XII intact    Lab Results   Component Value Date/Time    Hemoglobin A1c (POC) 5.8 04/22/2021 01:42 PM    Hemoglobin A1c, External 6.0 12/24/2019     Assessment/ Plan:   Diagnoses and all orders for this visit:    1. Essential hypertension-controlled, continue with present meds    2. Pre-diabetes-continue to watch diet  -     AMB POC HEMOGLOBIN A1C    3. Spinal stenosis of lumbar region, unspecified whether neurogenic claudication present-sees pain mgt as well as she is deemed not to be a surgical candidate by several different doctors she saw (both Ortho and Neurosurgery)  -     gabapentin (NEURONTIN) 300 mg capsule; Take 1 Cap by mouth two (2) times a day. Max Daily Amount: 600 mg.    4. Mixed hyperlipidemia-on Zocor    5. Mild persistent asthma without complication-continue with low dose Advair    6. Seasonal allergic rhinitis, unspecified trigger-will try Singulair       Plan is to transition ehr care to [facility] in [place] where she is currently living with her daughter  Follow-up and Dispositions    · Return in about 3 months (around 7/22/2021) for follow up-virtual.   Routing History              I have discussed the diagnosis with the patient and the intended plan as seen in the above orders. The patient has received an After-Visit Summary and questions were answered concerning future plans.      Medication Side Effects and Warnings were discussed with patient: yes    Patient verbalized understanding of above instructions.     Viktor Mckeon MD  Internal Medicine  [facility]

## 2021-04-22 NOTE — Clinical Note
Who can we refer her to for outreach;  I need her to transfer care to [facility] in [place] where she currently lives with her daughter so she does not have to drive her for ehr appointments

## 2021-04-28 DIAGNOSIS — I10 ESSENTIAL HYPERTENSION: ICD-10-CM

## 2021-04-28 RX ORDER — MONTELUKAST SODIUM 10 MG/1
10 TABLET ORAL DAILY
Qty: 30 TAB | Refills: 3 | Status: SHIPPED | OUTPATIENT
Start: 2021-04-28 | End: 2021-10-27

## 2021-04-28 RX ORDER — POTASSIUM CHLORIDE 750 MG/1
TABLET, EXTENDED RELEASE ORAL
Qty: 90 TAB | Refills: 1 | Status: SHIPPED | OUTPATIENT
Start: 2021-04-28 | End: 2021-08-10

## 2021-04-29 NOTE — PATIENT INSTRUCTIONS

## 2021-04-30 ENCOUNTER — TRANSCRIBE ORDER (OUTPATIENT)
Dept: SCHEDULING | Age: 76
End: 2021-04-30

## 2021-04-30 DIAGNOSIS — Z12.31 VISIT FOR SCREENING MAMMOGRAM: Primary | ICD-10-CM

## 2021-05-05 ENCOUNTER — HOME HEALTH ADMISSION (OUTPATIENT)
Dept: HOME HEALTH SERVICES | Facility: HOME HEALTH | Age: 76
End: 2021-05-05

## 2021-05-07 ENCOUNTER — HOME CARE VISIT (OUTPATIENT)
Dept: SCHEDULING | Facility: HOME HEALTH | Age: 76
End: 2021-05-07

## 2021-05-20 ENCOUNTER — HOSPITAL ENCOUNTER (OUTPATIENT)
Dept: MAMMOGRAPHY | Age: 76
Discharge: HOME | End: 2021-05-20
Payer: MEDICARE

## 2021-05-20 ENCOUNTER — HOSPITAL ENCOUNTER (OUTPATIENT)
Dept: MAMMOGRAPHY | Age: 76
End: 2021-05-20
Payer: MEDICARE

## 2021-05-20 DIAGNOSIS — Z12.31 VISIT FOR SCREENING MAMMOGRAM: ICD-10-CM

## 2021-05-20 PROCEDURE — 77067 SCR MAMMO BI INCL CAD: CPT

## 2021-05-25 ENCOUNTER — DOCUMENTATION ONLY (OUTPATIENT)
Dept: FAMILY MEDICINE CLINIC | Age: 76
End: 2021-05-25

## 2021-05-25 NOTE — PROGRESS NOTES
Patient had labs drawn at [facility] Draw site in [place] [address]. The phone number to obtain results is [phone].

## 2021-05-25 NOTE — PROGRESS NOTES
Contacted Draw site number was obtained from them of where to call for results. Called and requested lab results. Per Zaid Workman she will fax them.

## 2021-05-27 ENCOUNTER — HOSPITAL ENCOUNTER (OUTPATIENT)
Dept: WOUND CARE | Age: 76
Discharge: HOME | End: 2021-05-27
Payer: MEDICARE

## 2021-05-27 VITALS
WEIGHT: 156.53 LBS | SYSTOLIC BLOOD PRESSURE: 143 MMHG | TEMPERATURE: 96.9 F | HEART RATE: 88 BPM | BODY MASS INDEX: 27.73 KG/M2 | DIASTOLIC BLOOD PRESSURE: 83 MMHG | HEIGHT: 63 IN | RESPIRATION RATE: 16 BRPM

## 2021-05-27 PROBLEM — R32 URINARY INCONTINENCE: Status: ACTIVE | Noted: 2021-05-27

## 2021-05-27 PROBLEM — L89.153 PRESSURE INJURY OF COCCYGEAL REGION, STAGE 3 (HCC): Status: ACTIVE | Noted: 2021-05-27

## 2021-05-27 PROCEDURE — 99203 OFFICE O/P NEW LOW 30 MIN: CPT

## 2021-05-27 PROCEDURE — 11042 DBRDMT SUBQ TIS 1ST 20SQCM/<: CPT

## 2021-05-27 PROCEDURE — 74011000250 HC RX REV CODE- 250: Performed by: FAMILY MEDICINE

## 2021-05-27 RX ADMIN — Medication: at 14:38

## 2021-05-27 NOTE — WOUND CARE
05/27/21 1512 Wound Sacrum #1 Date First Assessed/Time First Assessed: 05/27/21 1434   Present on Hospital Admission: Yes  Primary Wound Type: Pressure Injury  Location: Sacrum  Wound Description: #1 Dressing/Treatment Alginate with Ag;Gauze dressing/dressing sponge;Tape/Soft cloth adhesive tape Discharge Condition: Stable Pain: 0 Ambulatory Status: Walking and Walker Discharge Destination: Home Transportation: Car Accompanied by: Self Discharge instructions reviewed with Patient and copy or written instructions have been provided. All questions/concerns have been addressed at this time.

## 2021-05-27 NOTE — WOUND CARE
Wound Center  Progress Note / Procedure Note    Subjective:     Chief Complaint:  Brooks Clemente is a 67 y.o.  female  with sacral wound of >1 months duration. 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 Eyes: Negative. Respiratory: Negative. Cardiovascular: Negative. Gastrointestinal: Negative. Genitourinary: urinary incontinence  Musculoskeletal: Negative. Skin: Wound  Neurological: Negative. Endo/Heme/Allergies: Negative. Psychiatric/Behavioral: Negative. Objective:     Physical Exam:   See flowsheet / nursing notes for vitals  Visit Vitals  BP (!) 143/83 (BP 1 Location: Left upper arm, BP Patient Position: At rest)   Pulse 88   Temp 96.9 °F (36.1 °C)   Resp 16   Ht 5' 3\" (1.6 m)   Wt 71 kg (156 lb 8.4 oz)   BMI 27.73 kg/m²     General: NAD. Hygiene good  Psych: cooperative. No anxiety or depression. Normal mood and affect. Neuro: alert and oriented to person/place/situation. Otherwise nonfocal.  Derm: Normal  turgor for age, dry skin  HEENT: Normocephalic, atraumatic. EOMI. Conjunctiva clear. No scleral icterus. Neck: Normal range of motion. No masses. Chest: Respirations nonlabored  Lower extremities: color normal; temperature normal.   Ulcer Description:   See Flowsheet           Data Review:              Assessment/Plan     67 y.o. female with recent h/o urinary incontinence    -sacral ulcer. Pressure stage 3  Full thickness  Very macerated  Slough/ necrotic  Necessitates debridement  for wound healing and to prevent/heal infection  See below        Urinary incontience  Improving  Cont straight cath 4x/day    Offloading  Discussed in more detail  Reposition every 15 min in chair, walk every hour  Get back in bed to lay on sides as much as poss  brin gin cushion for us to eval      Following discussed with patient / dtr  Needs :  Serial debridement- debrided today- see note below    Good local wound care  Dressing: Aquacel Ag  Frequency : three times a week     Order supplies    -Nutrition optimization    -Good Diabetic control    -Good offloading    Patient/ dtr understood and agrees with plan. Questions answered. Serial visits and serial debridements also discussed. Follow up with me in 1 week        Procedure:     Ulcer assessment: Due to presence of necrotic tissue within the wound bed, ulcer requires debridement. Procedure: Debridement:   The indication for debridement was reviewed with patient. Risks of procedure (bleeding, infection, pain) were discussed with patient/and consent signed on first visit.  Questions were answered  Subcutaneous excisional debridement  Indication: to remove necrotic tissue/ vitalized and devitalized tissue/ infected tissue/ macerated tissuethrough skin and subcutaneous layer of wound bed  Time out done  Consent in chart   Anesthesia: Topical  lidocaine   Instrument: curette   Residual Necrosis: Present and scored   Bleeding: <1ml   Hemostasis: Pressure   Patient tolerated procedure well   Procedural Pain: 0  Post - procedural pain: 0    Post debridement measurements:  see below  Surface area debrided: <20 sq. cm    WOUND POA CONDITIONS    Wound Sacrum #1 (Active)   Wound Image   05/27/21 7894   Dressing/Treatment Alginate with Ag;Gauze dressing/dressing sponge;Tape/Soft cloth adhesive tape 05/27/21 1512   Wound Length (cm) 2.6 cm 05/27/21 1434   Wound Width (cm) 1 cm 05/27/21 1434   Wound Depth (cm) 0.5 cm 05/27/21 1434   Wound Surface Area (cm^2) 2.6 cm^2 05/27/21 1434   Wound Volume (cm^3) 1.3 cm^3 05/27/21 1434   Post-Procedure Length (cm) 3 cm 05/27/21 1449   Post-Procedure Width (cm) 1.5 cm 05/27/21 1449   Post-Procedure Depth (cm) 0.7 cm 05/27/21 1449   Post-Procedure Surface Area (cm^2) 4.5 cm^2 05/27/21 1449   Post-Procedure Volume (cm^3) 3.15 cm^3 05/27/21 1449   Wound Assessment Pink/red/slough 05/27/21 1434   Drainage Amount Small 05/27/21 1434   Drainage Description Serous 05/27/21 1434   Wound Odor None 05/27/21 1434   Ginny-Wound/Incision Assessment Maceration 05/27/21 1434   Number of days: 0               -------    Past Medical History:   Diagnosis Date    Asthma     Hypercholesterolemia     Hypertension     Osteoarthritis       Past Surgical History:   Procedure Laterality Date    COLONOSCOPY N/A 2/2/2017    COLON[facility]as [facility] ENDOSCOPY    HX CHOLECYSTECTOMY      HX HYSTERECTOMY       Family History   Problem Relation Age of Onset    No Known Problems Mother     No Known Problems Father     Diabetes Sister       Social History     Tobacco Use    Smoking status: Never Smoker    Smokeless tobacco: Never Used   Substance Use Topics    Alcohol use: No       Prior to Admission medications    Medication Sig Start Date End Date Taking? Authorizing Provider   potassium chloride (KLOR-CON) 10 mEq tablet TAKE 1 TABLET BY MOUTH DAILY 4/28/21  Yes Stephy Winn MD   montelukast (SINGULAIR) 10 mg tablet Take 1 Tab by mouth daily. For allergies 4/28/21  Yes Stephy Winn MD   gabapentin (NEURONTIN) 300 mg capsule Take 1 Cap by mouth two (2) times a day.  Max Daily Amount: 600 mg. 4/22/21  Yes Stephy Winn MD   lisinopril-hydroCHLOROthiazide (PRINZIDE, ZESTORETIC) 20-25 mg per tablet TAKE 1 TABLET BY MOUTH EVERY DAY 4/13/21  Yes Tatum, MD Stephy   naproxen (NAPROSYN) 500 mg tablet TAKE 1 TABLET BY MOUTH TWICE DAILY AS NEEDED FOR PAIN 3/5/21  Yes Stephy Winn MD   simvastatin (ZOCOR) 10 mg tablet TAKE 1 TABLET BY MOUTH EVERY NIGHT AT BEDTIME 1/4/21  Yes Stephy Winn MD   amLODIPine (NORVASC) 5 mg tablet TAKE 1 TABLET BY MOUTH EVERY DAY 1/4/21  Yes Nilda Lyle MD   multivitamin (ONE A DAY) tablet Take 1 Tab by mouth daily. Yes Provider, Historical   albuterol (PROVENTIL HFA, VENTOLIN HFA, PROAIR HFA) 90 mcg/actuation inhaler Take 2 Puffs by inhalation every six (6) hours as needed for Wheezing or Shortness of Breath. 4/7/20  Yes Stephy Winn MD   fluticasone propion-salmeteroL (Advair Diskus) 100-50 mcg/dose diskus inhaler Take 1 Puff by inhalation every twelve (12) hours. 4/7/20  Yes Stephy Winn MD   oxyCODONE IR (ROXICODONE) 10 mg tab immediate release tablet TK 1 T PO  Q 6 H. FILL ON/AFTER 03/23/19 6/24/19  Yes Provider, Historical   linaclotide (LINZESS) 145 mcg cap capsule Take 1 Cap by mouth Daily (before breakfast). 4/3/19  Yes Stephy Winn MD   polyethylene glycol (MIRALAX) 17 gram/dose powder Take 17 g by mouth daily. 4/3/19  Yes Stephy Winn MD   cholecalciferol, vitamin D3, (VITAMIN D3) 2,000 unit tab Take  by mouth daily. Yes Provider, Historical   aspirin delayed-release 81 mg tablet Take 1 Tab by mouth daily. 9/12/16  Yes Heike Hernandez MD   tiZANidine (ZANAFLEX) 4 mg tablet Take 1 Tab by mouth two (2) times a day.  10/13/15  Yes Mayte Sofia MD     Allergies   Allergen Reactions    Penicillins Swelling          Signed By: Jason Toledo MD     May 27, 2021

## 2021-05-27 NOTE — WOUND CARE
[facility] [address] [place], [address] [facility] Telephone: [phone] NAME:  Ange Mendoza YOB: 1945 DATE:  5/27/2021 Case Management Note Wound Care Management Outside of Clinic: 
[x] Wound and dressing supply provider: Bon  
[x] Patient/family performs own wound care 
[] Home Healthcare:  
[] Dressing changes performed once a week at wound care center Advanced/Additional Wound Treatment (If applicable):  
[] Vascular Referral:  
[] SNAP Vac: 
[] Wound Vac: 
[] Skin Substitute:  
[] Pressure Reducing Surfaces: 
[] Wheelchair Assessment:  
[] HBO Therapy:  
[] IV Antibiotics: 
[] Plastic Surgery Referral: 
[] Other: 
 
Other Notes: 
[]

## 2021-05-27 NOTE — WOUND CARE
05/27/21 1434 Wound Sacrum #1 Date First Assessed/Time First Assessed: 05/27/21 1434   Present on Hospital Admission: Yes  Primary Wound Type: Pressure Injury  Location: Sacrum  Wound Description: #1 Wound Image Wound Length (cm) 2.6 cm Wound Width (cm) 1 cm Wound Depth (cm) 0.5 cm Wound Surface Area (cm^2) 2.6 cm^2 Wound Volume (cm^3) 1.3 cm^3 Wound Assessment Pink/red Drainage Amount Small Drainage Description Serous Wound Odor None Ginny-Wound/Incision Assessment Maceration Blood pressure (!) 143/83, pulse 88, temperature 96.9 °F (36.1 °C), resp. rate 16, height 5' 3\" (1.6 m), weight 71 kg (156 lb 8.4 oz).

## 2021-06-03 ENCOUNTER — HOSPITAL ENCOUNTER (OUTPATIENT)
Dept: WOUND CARE | Age: 76
Discharge: HOME | End: 2021-06-03
Payer: MEDICARE

## 2021-06-03 VITALS
SYSTOLIC BLOOD PRESSURE: 138 MMHG | DIASTOLIC BLOOD PRESSURE: 78 MMHG | RESPIRATION RATE: 16 BRPM | TEMPERATURE: 98.2 F | HEART RATE: 83 BPM

## 2021-06-03 PROCEDURE — 11042 DBRDMT SUBQ TIS 1ST 20SQCM/<: CPT | Performed by: FAMILY MEDICINE

## 2021-06-03 PROCEDURE — 74011000250 HC RX REV CODE- 250: Performed by: FAMILY MEDICINE

## 2021-06-03 RX ADMIN — Medication: at 14:37

## 2021-06-03 NOTE — WOUND CARE
06/03/21 1430 Anesthetic Anesthetic 4% Lidocaine Liquid Topical  
Wound Sacrum #1 Date First Assessed/Time First Assessed: 05/27/21 1434   Present on Hospital Admission: Yes  Primary Wound Type: Pressure Injury  Location: Sacrum  Wound Description: #1 Wound Image Wound Length (cm) 3.3 cm Wound Width (cm) 1.5 cm Wound Depth (cm) 0.5 cm Wound Surface Area (cm^2) 4.95 cm^2 Change in Wound Size % -90.38 Wound Volume (cm^3) 2.48 cm^3 Wound Healing % -91 Wound Assessment Pink/red Drainage Amount Small Drainage Description Serous Wound Odor None Ginny-Wound/Incision Assessment Maceration Pain 1 Pain Scale 1 Numeric (0 - 10) Pain Intensity 1 0 Visit Vitals /78 (BP 1 Location: Left upper arm, BP Patient Position: At rest) Pulse 83 Temp 98.2 °F (36.8 °C) Resp 16

## 2021-06-03 NOTE — DISCHARGE INSTRUCTIONS
Discharge Instructions for  [facility]  [address] [place], [address] [facility]  Telephone: [phone]    NAME:  Nathalie Hope OF BIRTH:  1945  DATE:  5/27/2021    [x] Wound and dressing supply provider: Bon     Wound Cleansing:   Do not scrub or use excessive force. Cleanse wound prior to applying a clean dressing with:  [] Normal Saline   [] Keep Wound Dry in Shower      [] Wound Cleanser   [x] Cleanse wound with Mild Soap & Water    [x] May Shower at Discharge - remove dressing 1st, wash, pat dry, re-dress right away    [] Do not shower  [] cleanse with baby shampoo lather leave 2-3 then rinse    Topical Treatments:  Do not apply lotions, creams, or ointments to wound bed unless directed. [] Apply moisturizing lotion {Yasuu lotions :23207} to skin surrounding the wound prior to dressing change.  [] Bactroban/Mupirocin  [] Gentamicin ointment    [] Gentian violet to wound bed and periwound  [] Other:     Dressings:                   Wound Location Sacrum     Apply Primary Dressing:      [x]  pack wound with aquacel ag    Cover and Secure with:  [x] Gauze multiple layers [] ABD [] exudry     [] Margaret [] Kerlix [] Mepilex Border  [] Ace Wrap [x] Roll Tape   [] Other:      Change dressing:   [] Daily      [x] Every Other Day - if becomes wet change right away   [] Three times per week  [] Once a week   [] Do Not Change Dressing     [] Other:    Pressure Relief:  [x] When sitting, shift position or do seat lifts every 15 minutes.  [] Wheelchair cushion   [] Specialty Bed/Mattress  [x] Turn every 2 hours when in bed. Avoid position directing pressure on wound site.       Off-Loading:   [x] Off-loading when [] walking  [x] in bed [x] sitting    Dietary:  [x] Diet as tolerated [] Diabetic Diet   [x] Increase Protein: examples (Meat, cheese, eggs, greek yogurt, fish, nuts)   [] Alistair Therapeutic Nutrition Powder  [] Other:  [] Dial a Dietician : Call Flora Reyes at [phone] enter code [phone]) when prompted. M-F 9am-5pm EST. Return Appointment:  [] Nurse Visit at wound center in *** days   [x] Return Appointment: With Dr. Coy Pandey 1 week(s)  [] Ordered tests:     Electronically signed on 5/27/2021 at 8:24 AM     Britney Oliver 281: Should you experience any significant changes in your wound(s) or have questions about your wound care, please contact the [facility] at [address] [place] [address] 8:00 am - 4:30. If you need help with your wound outside these hours and cannot wait until we are again available, contact your PCP or go to the hospital emergency room. PLEASE NOTE: IF YOU ARE UNABLE TO OBTAIN WOUND SUPPLIES, CONTINUE TO USE THE SUPPLIES YOU HAVE AVAILABLE UNTIL YOU ARE ABLE TO REACH US. IT IS MOST IMPORTANT TO KEEP THE WOUND COVERED AT ALL TIMES.      Physician Signature:_______________________  Dr. Coy Pandey

## 2021-06-03 NOTE — WOUND CARE
06/03/21 1520 Wound Sacrum #1 Date First Assessed/Time First Assessed: 05/27/21 1434   Present on Hospital Admission: Yes  Primary Wound Type: Pressure Injury  Location: Sacrum  Wound Description: #1 Dressing/Treatment Honey gel/honey paste; Alginate with Ag;Gauze dressing/dressing sponge;Tape/Soft cloth adhesive tape Discharge Condition: Stable Pain: 0 Ambulatory Status: [address] Discharge Destination: Home Transportation: Car Accompanied by: Family/Caregiver Discharge instructions reviewed with Patient and Family/Caregiver  and copy or written instructions have been provided. All questions/concerns have been addressed at this time.

## 2021-06-03 NOTE — WOUND CARE
Wound Center Progress Note / Procedure Note Subjective: Chief Complaint: Mauro Siddiqui is a 67 y.o.  female  with sacral wound of >1 months duration. HPI:    
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 
No new issues Offloading Wound caused by: pressure / moisture Current wound care:See flowsheet Offloading wound: yes Appetite: good Wound associated pain: See flowsheet Diabetic: no 
Smoker: no 
ROS: no N/V/D, no T/chills; no local rash, no chest pain or shortness of breath, no headache or dizzyness +urinary incontinence Objective:  
 
Physical Exam:  
See flowsheet / nursing notes for vitals Visit Vitals /78 (BP 1 Location: Left upper arm, BP Patient Position: At rest) Pulse 83 Temp 98.2 °F (36.8 °C) Resp 16 General: NAD. Hygiene good Psych: cooperative. No anxiety or depression. Normal mood and affect. Neuro: alert and oriented to person/place/situation. Otherwise nonfocal. 
Derm: Normal  turgor for age, dry skin HEENT: Normocephalic, atraumatic. EOMI. Conjunctiva clear. No scleral icterus. Neck: Normal range of motion. No masses. Chest: Respirations nonlabored Lower extremities: color normal; temperature normal.  
Ulcer Description:  
See Flowsheet Data Review:  
 
 
  
 
 
Assessment/Plan  
 
67 y.o. female with recent h/o urinary incontinence 
 
-sacral ulcer. Pressure stage 3 Full thickness Very macerated Mild Slough Necessitates debridement  for wound healing and to prevent/heal infection 
maceratd tissue also pared See below Culture done Urinary incontience Improved Cont straight cath 4x/day Offloading Discussed in more detail 
 cushion assessed- foam with cutout sacral area- will monitor progress with this Following discussed with patient / Yaquelin Thomas Needs : 
Serial debridement- debrided today- see note below Good local wound care Dressing: ADD medihoney gel, Aquacel Ag Frequency : three times a week Change dressing on tues/sat We will do here on thurs Patient/ granddtr understood and agrees with plan. Questions answered. Follow up with me in 1 week Procedure:  
 
Ulcer assessment: Due to presence of necrotic tissue within the wound bed, ulcer requires debridement. Procedure: Debridement:  
The indication for debridement was reviewed with patient. Risks of procedure (bleeding, infection, pain) were discussed with patient/and consent signed on first visit. Questions were answered Subcutaneous excisional debridement Indication: to remove necrotic tissue/ vitalized and devitalized tissue/ infected tissue/ macerated tissue through skin and subcutaneous layer of wound bed Time out done Consent in chart Anesthesia: Topical  lidocaine Instrument: curette Residual Necrosis: Present and scored Bleeding: <1ml Hemostasis: Pressure Patient tolerated procedure well Procedural Pain: 0 Post - procedural pain: 0 Post debridement measurements:  see below Surface area debrided: <20 sq. cm 
 
WOUND POA CONDITIONS Wound Sacrum #1 (Active) Wound Image   06/03/21 1430 Wound Etiology Pressure Stage 3 05/27/21 1512 Dressing/Treatment Honey gel/honey paste; Alginate with Ag;Gauze dressing/dressing sponge;Tape/Soft cloth adhesive tape 06/03/21 1520 Wound Length (cm) 3.3 cm 06/03/21 1430 Wound Width (cm) 1.5 cm 06/03/21 1430 Wound Depth (cm) 0.5 cm 06/03/21 1430 Wound Surface Area (cm^2) 4.95 cm^2 06/03/21 1430 Change in Wound Size % -90.38 06/03/21 1430 Wound Volume (cm^3) 2.48 cm^3 06/03/21 1430 Wound Healing % -91 06/03/21 1430 Post-Procedure Length (cm) 3.5 cm 06/03/21 1430 Post-Procedure Width (cm) 1.7 cm 06/03/21 1430  
Post-Procedure Depth (cm) 0.7 cm 06/03/21 1430 Post-Procedure Surface Area (cm^2) 5.95 cm^2 06/03/21 1430 Post-Procedure Volume (cm^3) 4.16 cm^3 06/03/21 1430 Wound Assessment Pink/red/slough 06/03/21 1430 Drainage Amount Small 06/03/21 1430 Drainage Description Serous 06/03/21 1430 Wound Odor None 06/03/21 1430 Ginny-Wound/Incision Assessment Maceration 06/03/21 1430 Number of days: 7  
   
 
 
 
------- Past Medical History:  
Diagnosis Date  Asthma  Hypercholesterolemia  Hypertension  Osteoarthritis Past Surgical History:  
Procedure Laterality Date  COLONOSCOPY N/A 2/2/2017 COLONOSCOPY performed by Anthony Whitaker MD at [address]  HX HYSTERECTOMY Family History Problem Relation Age of Onset  No Known Problems Mother  No Known Problems Father  Diabetes Sister Social History Tobacco Use  Smoking status: Never Smoker  Smokeless tobacco: Never Used Substance Use Topics  Alcohol use: No  
   
Prior to Admission medications Medication Sig Start Date End Date Taking? Authorizing Provider  
potassium chloride (KLOR-CON) 10 mEq tablet TAKE 1 TABLET BY MOUTH DAILY 4/28/21  Yes Stephy Winn MD  
montelukast (SINGULAIR) 10 mg tablet Take 1 Tab by mouth daily. For allergies 4/28/21  Yes Stephy Winn MD  
gabapentin (NEURONTIN) 300 mg capsule Take 1 Cap by mouth two (2) times a day.  Max Daily Amount: 600 mg. 4/22/21  Yes Stephy Winn MD  
lisinopril-hydroCHLOROthiazide (PRINZIDE, ZESTORETIC) 20-25 mg per tablet TAKE 1 TABLET BY MOUTH EVERY DAY 4/13/21  Yes Stephy Winn MD  
naproxen (NAPROSYN) 500 mg tablet TAKE 1 TABLET BY MOUTH TWICE DAILY AS NEEDED FOR PAIN 3/5/21  Yes Stephy Winn MD  
simvastatin (ZOCOR) 10 mg tablet TAKE 1 TABLET BY MOUTH EVERY NIGHT AT BEDTIME 1/4/21  Yes Stephy Winn MD  
amLODIPine (NORVASC) 5 mg tablet TAKE 1 TABLET BY MOUTH EVERY DAY 1/4/21  Yes Lidia Galaviz MD  
multivitamin (ONE A DAY) tablet Take 1 Tab by mouth daily. Yes Provider, Historical  
fluticasone propion-salmeteroL (Advair Diskus) 100-50 mcg/dose diskus inhaler Take 1 Puff by inhalation every twelve (12) hours. 4/7/20  Yes Stephy Winn MD  
oxyCODONE IR (ROXICODONE) 10 mg tab immediate release tablet TK 1 T PO  Q 6 H. FILL ON/AFTER 03/23/19 6/24/19  Yes Provider, Historical  
linaclotide (LINZESS) 145 mcg cap capsule Take 1 Cap by mouth Daily (before breakfast). 4/3/19  Yes Stephy Winn MD  
polyethylene glycol (MIRALAX) 17 gram/dose powder Take 17 g by mouth daily. 4/3/19  Yes Stephy Winn MD  
cholecalciferol, vitamin D3, (VITAMIN D3) 2,000 unit tab Take  by mouth daily. Yes Provider, Historical  
aspirin delayed-release 81 mg tablet Take 1 Tab by mouth daily. 9/12/16  Yes Mikey Hart MD  
tiZANidine (ZANAFLEX) 4 mg tablet Take 1 Tab by mouth two (2) times a day. 10/13/15  Yes Landon Hernandez MD  
albuterol (PROVENTIL HFA, VENTOLIN HFA, PROAIR HFA) 90 mcg/actuation inhaler Take 2 Puffs by inhalation every six (6) hours as needed for Wheezing or Shortness of Breath. 4/7/20   Lidia Galaviz MD  
 
Allergies Allergen Reactions  Penicillins Swelling Signed By: Laurel Zhu MD   
 Zahira 3, 2021

## 2021-06-10 ENCOUNTER — HOSPITAL ENCOUNTER (OUTPATIENT)
Dept: WOUND CARE | Age: 76
Discharge: HOME | End: 2021-06-10
Payer: MEDICARE

## 2021-06-10 VITALS
HEART RATE: 77 BPM | RESPIRATION RATE: 16 BRPM | DIASTOLIC BLOOD PRESSURE: 81 MMHG | SYSTOLIC BLOOD PRESSURE: 128 MMHG | TEMPERATURE: 97.8 F

## 2021-06-10 PROCEDURE — 11042 DBRDMT SUBQ TIS 1ST 20SQCM/<: CPT

## 2021-06-10 PROCEDURE — 74011000250 HC RX REV CODE- 250: Performed by: FAMILY MEDICINE

## 2021-06-10 RX ORDER — MUPIROCIN 20 MG/G
OINTMENT TOPICAL DAILY
Qty: 22 G | Refills: 1 | Status: SHIPPED | OUTPATIENT
Start: 2021-06-10 | End: 2021-11-04

## 2021-06-10 RX ADMIN — Medication: at 08:28

## 2021-06-10 NOTE — WOUND CARE
06/10/21 0450 Wound Sacrum #1 Date First Assessed/Time First Assessed: 05/27/21 1434   Present on Hospital Admission: Yes  Primary Wound Type: Pressure Injury  Location: Sacrum  Wound Description: #1 Wound Image Wound Length (cm) 3 cm Wound Width (cm) 1 cm Wound Depth (cm) 0.5 cm Wound Surface Area (cm^2) 3 cm^2 Change in Wound Size % -15.38 Wound Volume (cm^3) 1.5 cm^3 Wound Healing % -15 Wound Assessment Pink/red Drainage Amount Moderate Drainage Description Serosanguinous Wound Odor None Ginny-Wound/Incision Assessment Maceration Edges Flat/open edges Wound Thickness Description Full thickness Visit Vitals /81 (BP 1 Location: Left upper arm, BP Patient Position: At rest) Pulse 77 Temp 97.8 °F (36.6 °C) Resp 16

## 2021-06-10 NOTE — WOUND CARE
Wound Center Progress Note / Procedure Note Subjective: Chief Complaint: Tyrese Samson is a 67 y.o.  female  with sacral wound of >1 months duration. HPI:    
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 
No new issues Wound caused by: pressure / moisture Current wound care:See flowsheet Offloading wound: yes Appetite: good Wound associated pain: See flowsheet Diabetic: no 
Smoker: no 
ROS: no N/V/D, no T/chills; no local rash, no chest pain or shortness of breath, no headache or dizzyness +urinary incontinence Objective:  
 
Physical Exam:  
See flowsheet / nursing notes for vitals Visit Vitals /81 (BP 1 Location: Left upper arm, BP Patient Position: At rest) Pulse 77 Temp 97.8 °F (36.6 °C) Resp 16 General: NAD. Hygiene good Psych: cooperative. No anxiety or depression. Normal mood and affect. Neuro: alert and oriented to person/place/situation. Otherwise nonfocal. 
Derm: Normal  turgor for age, dry skin HEENT: Normocephalic, atraumatic. EOMI. Conjunctiva clear. No scleral icterus. Neck: Normal range of motion. No masses. Chest: Respirations nonlabored Lower extremities: color normal; temperature normal.  
Ulcer Description:  
See Flowsheet Data Review:  
Culture 6/2 Scant staph aureus Assessment/Plan  
 
67 y.o. female with recent h/o urinary incontinence 
 
-sacral ulcer. Pressure stage 3 Full thickness 
 maceration improved Mild Slough Necessitates debridement  for wound healing and to prevent/heal infection 
maceratd tissue also pared See below Culture reviewed Urinary incontience 
 straight cath 4x/day Offloading Limit movement, disrupting dressing and healing process- getting displaced or coming off Following discussed with patient / Patricia Martin Needs : 
Serial debridement- debrided today- see note below Good local wound care Dressing: D/C medihoney gel, Aquacel Ag Start mupirocin Frequency : daily Patient/  understood and agrees with plan. Questions answered. Follow up with me in 1 week Procedure:  
 
Ulcer assessment: Due to presence of necrotic tissue within the wound bed, ulcer requires debridement. Procedure: Debridement:  
The indication for debridement was reviewed with patient. Risks of procedure (bleeding, infection, pain) were discussed with patient/and consent signed on first visit. Questions were answered Subcutaneous excisional debridement Indication: to remove necrotic tissue/ vitalized and devitalized tissue/ infected tissue/ macerated tissue through skin and subcutaneous layer of wound bed Time out done Consent in chart Anesthesia: Topical  lidocaine Instrument: curette Residual Necrosis: Present and scored Bleeding: <1ml Hemostasis: Pressure Patient tolerated procedure well Procedural Pain: 0 Post - procedural pain: 0 Post debridement measurements:  see below Surface area debrided: <20 sq. cm 
 
WOUND POA CONDITIONS Wound Sacrum #1 (Active) Wound Image   06/10/21 7910 Wound Etiology Pressure Stage 3 05/27/21 1512 Dressing/Treatment Gauze dressing/dressing sponge 06/10/21 0849 Wound Length (cm) 3 cm 06/10/21 1556 Wound Width (cm) 1 cm 06/10/21 1562 Wound Depth (cm) 0.5 cm 06/10/21 0824 Wound Surface Area (cm^2) 3 cm^2 06/10/21 7146 Change in Wound Size % -15.38 06/10/21 4182 Wound Volume (cm^3) 1.5 cm^3 06/10/21 8945 Wound Healing % -15 06/10/21 0824 Post-Procedure Length (cm) 3.1 cm 06/10/21 0840 Post-Procedure Width (cm) 1.1 cm 06/10/21 0840 Post-Procedure Depth (cm) 0.6 cm 06/10/21 0840 Post-Procedure Surface Area (cm^2) 3.41 cm^2 06/10/21 0840 Post-Procedure Volume (cm^3) 2.05 cm^3 06/10/21 0840 Wound Assessment Pink/red/slough 06/10/21 3901 Drainage Amount Moderate 06/10/21 0824 Drainage Description Serosanguinous 06/10/21 1441 Wound Odor None 06/10/21 0824 Ginny-Wound/Incision Assessment Maceration 06/10/21 0824 Edges Flat/open edges 06/10/21 9257 Wound Thickness Description Full thickness 06/10/21 0824 Number of days: 14  
   
 
 
------- Past Medical History:  
Diagnosis Date  Asthma  Hypercholesterolemia  Hypertension  Osteoarthritis Past Surgical History:  
Procedure Laterality Date  COLONOSCOPY N/A 2/2/2017 COLONOSCOPY performed by Hamida Gan MD at [address]  HX HYSTERECTOMY Family History Problem Relation Age of Onset  No Known Problems Mother  No Known Problems Father  Diabetes Sister Social History Tobacco Use  Smoking status: Never Smoker  Smokeless tobacco: Never Used Substance Use Topics  Alcohol use: No  
   
Prior to Admission medications Medication Sig Start Date End Date Taking? Authorizing Provider  
potassium chloride (KLOR-CON) 10 mEq tablet TAKE 1 TABLET BY MOUTH DAILY 4/28/21  Yes Stephy Winn MD  
montelukast (SINGULAIR) 10 mg tablet Take 1 Tab by mouth daily. For allergies 4/28/21  Yes Stephy Winn MD  
gabapentin (NEURONTIN) 300 mg capsule Take 1 Cap by mouth two (2) times a day.  Max Daily Amount: 600 mg. 4/22/21  Yes Stephy Winn MD  
lisinopril-hydroCHLOROthiazide (PRINZIDE, ZESTORETIC) 20-25 mg per tablet TAKE 1 TABLET BY MOUTH EVERY DAY 4/13/21  Yes Stephy Winn MD  
naproxen (NAPROSYN) 500 mg tablet TAKE 1 TABLET BY MOUTH TWICE DAILY AS NEEDED FOR PAIN 3/5/21  Yes Stephy Winn MD  
simvastatin (ZOCOR) 10 mg tablet TAKE 1 TABLET BY MOUTH EVERY NIGHT AT BEDTIME 1/4/21  Yes Stephy Winn MD  
amLODIPine (NORVASC) 5 mg tablet TAKE 1 TABLET BY MOUTH EVERY DAY 1/4/21  Yes Nilda Lyle MD  
multivitamin (ONE A DAY) tablet Take 1 Tab by mouth daily. Yes Provider, Historical  
albuterol (PROVENTIL HFA, VENTOLIN HFA, PROAIR HFA) 90 mcg/actuation inhaler Take 2 Puffs by inhalation every six (6) hours as needed for Wheezing or Shortness of Breath. 4/7/20  Yes Stephy Winn MD  
fluticasone propion-salmeteroL (Advair Diskus) 100-50 mcg/dose diskus inhaler Take 1 Puff by inhalation every twelve (12) hours. 4/7/20  Yes Stephy Winn MD  
oxyCODONE IR (ROXICODONE) 10 mg tab immediate release tablet TK 1 T PO  Q 6 H. FILL ON/AFTER 03/23/19 6/24/19  Yes Provider, Historical  
linaclotide (LINZESS) 145 mcg cap capsule Take 1 Cap by mouth Daily (before breakfast). 4/3/19  Yes Stephy Winn MD  
polyethylene glycol (MIRALAX) 17 gram/dose powder Take 17 g by mouth daily. 4/3/19  Yes Stephy Winn MD  
cholecalciferol, vitamin D3, (VITAMIN D3) 2,000 unit tab Take  by mouth daily. Yes Provider, Historical  
aspirin delayed-release 81 mg tablet Take 1 Tab by mouth daily. 9/12/16  Yes Heike Hernandez MD  
tiZANidine (ZANAFLEX) 4 mg tablet Take 1 Tab by mouth two (2) times a day. 10/13/15  Yes Mayte Sofia MD  
 
Allergies Allergen Reactions  Penicillins Swelling Signed By: Jason Toledo MD   
 Zahira 10, 2021

## 2021-06-10 NOTE — DISCHARGE INSTRUCTIONS
Discharge Instructions for  [facility]  [address] [place], [address] [facility]  Telephone: [phone]    NAME:  Robby Carrasco OF BIRTH:  1945  DATE:  6/3/2021    [x] Wound and dressing supply provider: Bon     Wound Cleansing:   Do not scrub or use excessive force. Cleanse wound prior to applying a clean dressing with:  [] Normal Saline   [] Keep Wound Dry in Shower      [] Wound Cleanser   [x] Cleanse wound with Mild Soap & Water    [x] May Shower at Discharge - remove dressing 1st, wash, pat dry, re-dress right away    [] Do not shower  [] cleanse with baby shampoo lather leave 2-3 then rinse    Topical Treatments:  Do not apply lotions, creams, or ointments to wound bed unless directed. [] Apply moisturizing lotion {play140 lotions :66257} to skin surrounding the wound prior to dressing change.  [] Bactroban/Mupirocin  [] Gentamicin ointment    [] Gentian violet to wound bed and periwound  [x] Other: MEDIHONEY GEL    Dressings:                   Wound Location Sacrum     Apply Primary Dressing:      [x]  pack wound with aquacel ag    Cover and Secure with:  [x] Gauze multiple layers [] ABD [] exudry     [] Margaret [] Kerlix [] Mepilex Border  [] Ace Wrap [x] Roll Tape   [] Other:      Change dressing:   [] Daily      [x] Every Other Day - if becomes wet change right away   [] Three times per week  [] Once a week   [] Do Not Change Dressing     [] Other:    Pressure Relief:  [x] When sitting, shift position or do seat lifts every 15 minutes.  [] Wheelchair cushion   [] Specialty Bed/Mattress  [x] Turn every 2 hours when in bed. Avoid position directing pressure on wound site.       Off-Loading:   [x] Off-loading when [] walking  [x] in bed [x] sitting    Dietary:  [x] Diet as tolerated [] Diabetic Diet   [x] Increase Protein: examples (Meat, cheese, eggs, greek yogurt, fish, nuts)   [] Alistair Therapeutic Nutrition Powder  [] Other:  [] Dial a Dietician : Call [employer] at [phone] enter code [phone] when prompted. M-F 9am-5pm EST. Return Appointment:  [] Nurse Visit at wound center in *** days   [x] Return Appointment: With Dr. Debbie Noguera 1 week(s)  [] Ordered tests:     Electronically signed on 6/3/2021 at 8:24 AM     Britney Oliver 281: Should you experience any significant changes in your wound(s) or have questions about your wound care, please contact the [facility] Main at [address] [place] [address] 8:00 am - 4:30. If you need help with your wound outside these hours and cannot wait until we are again available, contact your PCP or go to the hospital emergency room. PLEASE NOTE: IF YOU ARE UNABLE TO OBTAIN WOUND SUPPLIES, CONTINUE TO USE THE SUPPLIES YOU HAVE AVAILABLE UNTIL YOU ARE ABLE TO REACH US. IT IS MOST IMPORTANT TO KEEP THE WOUND COVERED AT ALL TIMES.      Physician Signature:_______________________  Dr. Debbie Noguera

## 2021-06-10 NOTE — WOUND CARE
06/10/21 0704 Wound Sacrum #1 Date First Assessed/Time First Assessed: 05/27/21 1434   Present on Hospital Admission: Yes  Primary Wound Type: Pressure Injury  Location: Sacrum  Wound Description: #1 Dressing/Treatment Gauze dressing/dressing sponge Indiana Mcdaniel) Discharge Condition: Stable Pain: 0 Ambulatory Status: Walking Discharge Destination: Home Transportation: Car Accompanied by: Self Discharge instructions reviewed with Patient and copy or written instructions have been provided. All questions/concerns have been addressed at this time.

## 2021-06-17 ENCOUNTER — HOSPITAL ENCOUNTER (OUTPATIENT)
Dept: WOUND CARE | Age: 76
Discharge: HOME | End: 2021-06-17
Payer: MEDICARE

## 2021-06-17 VITALS
RESPIRATION RATE: 18 BRPM | SYSTOLIC BLOOD PRESSURE: 123 MMHG | DIASTOLIC BLOOD PRESSURE: 83 MMHG | HEART RATE: 72 BPM | TEMPERATURE: 97.8 F

## 2021-06-17 PROCEDURE — 11042 DBRDMT SUBQ TIS 1ST 20SQCM/<: CPT

## 2021-06-17 PROCEDURE — 74011000250 HC RX REV CODE- 250: Performed by: FAMILY MEDICINE

## 2021-06-17 RX ORDER — LIDOCAINE HYDROCHLORIDE 20 MG/ML
JELLY TOPICAL
Status: COMPLETED | OUTPATIENT
Start: 2021-06-17 | End: 2021-06-17

## 2021-06-17 RX ADMIN — LIDOCAINE HYDROCHLORIDE: 20 JELLY TOPICAL at 14:00

## 2021-06-17 NOTE — DISCHARGE INSTRUCTIONS
Discharge Instructions for  [address] [place], [address] [facility]  Telephone: [phone]    NAME:  Laura Chavarria OF BIRTH:  1945  DATE:  6/10/2021    [x] Wound and dressing supply provider: Bon     Wound Cleansing:   Do not scrub or use excessive force. Cleanse wound prior to applying a clean dressing with:  [] Normal Saline   [] Keep Wound Dry in Shower      [] Wound Cleanser   [x] Cleanse wound with Mild Soap & Water    [x] May Shower at Discharge - remove dressing 1st, wash, pat dry, re-dress right away    [] Do not shower  [] cleanse with baby shampoo lather leave 2-3 then rinse    Topical Treatments:  Do not apply lotions, creams, or ointments to wound bed unless directed. [] Apply moisturizing lotion {Audible Magic lotions :49653} to skin surrounding the wound prior to dressing change. [x] Bactroban/Mupirocin  [] Gentamicin ointment    [] Gentian violet to wound bed and periwound  [] Other:     Dressings:                   Wound Location Sacrum     Apply Primary Dressing:      [x]  Mupirocin ointment      Cover and Secure with:  [x] Gauze multiple layers [] ABD [] exudry     [] Margaret [] Kerlix [] Mepilex Border   [] Ace Wrap [x] Roll Tape   [] Other:       Change dressing:   [x] Daily      [] Every Other Day - if becomes wet change right away   [] Three times per week  [] Once a week   [] Do Not Change Dressing     [] Other:    Pressure Relief:  [x] When sitting, shift position or do seat lifts every 15 minutes.  [] Wheelchair cushion   [] Specialty Bed/Mattress  [x] Turn every 2 hours when in bed. Avoid position directing pressure on wound site.       Off-Loading:   [x] Off-loading when [] walking  [x] in bed [x] sitting    Dietary:  [x] Diet as tolerated [] Diabetic Diet   [x] Increase Protein: examples (Meat, cheese, eggs, greek yogurt, fish, nuts)   [] Alistair Therapeutic Nutrition Powder  [] Other:  [] Dial a Dietician : Call [facility] at [phone] enter code [phone]) when prompted. M-F 9am-5pm EST. Return Appointment:  [] Nurse Visit at wound center in *** days   [x] Return Appointment: With Dr. Myron Wild 1 week(s)  [] Ordered tests:     Electronically signed on 6/10/2021 at 8:24 AM     [address] [place] [address] Information: Should you experience any significant changes in your wound(s) or have questions about your wound care, please contact the [facility] Main at [address] [place] [address] 8:00 am - 4:30. If you need help with your wound outside these hours and cannot wait until we are again available, contact your PCP or go to the hospital emergency room. PLEASE NOTE: IF YOU ARE UNABLE TO OBTAIN WOUND SUPPLIES, CONTINUE TO USE THE SUPPLIES YOU HAVE AVAILABLE UNTIL YOU ARE ABLE TO REACH US. IT IS MOST IMPORTANT TO KEEP THE WOUND COVERED AT ALL TIMES.      Physician Signature:_______________________  Dr. Myron Wild

## 2021-06-17 NOTE — WOUND CARE
[facility] [address] [facility], [address] [facility] Telephone: [phone] NAME:  Mauro Siddiqui YOB: 1945 DATE:  6/17/2021 Case Management Note Wound Care Management Outside of Clinic: 
[x] Wound and dressing supply provider: Bon  
[x] Patient/family performs own wound care 
[] Home Healthcare 
[] Dressing changes performed once a week at wound care center Advanced/Additional Wound Treatment (If applicable):  
[] Vascular Referral:  
[] SNAP Vac: 
[] Wound Vac: 
[] Skin Substitute:  
[] Pressure Reducing Surfaces: 
[] Wheelchair Assessment:  
[] HBO Therapy:  
[] IV Antibiotics: 
[] Plastic Surgery Referral: 
[] Other: 
 
Other Notes: 
[]

## 2021-06-17 NOTE — WOUND CARE
06/17/21 1404 Wound Sacrum #1 Date First Assessed/Time First Assessed: 05/27/21 1434   Present on Hospital Admission: Yes  Primary Wound Type: Pressure Injury  Location: Sacrum  Wound Description: #1 Dressing/Treatment Gauze dressing/dressing sponge;Tape/Soft cloth adhesive tape (mupirocin) Discharge Condition: Stable Pain: 0 Ambulatory Status: Walking and Walker Discharge Destination: Home Transportation: Car Accompanied by: Self Discharge instructions reviewed with Patient and copy or written instructions have been provided. All questions/concerns have been addressed at this time.

## 2021-06-17 NOTE — WOUND CARE
Wound Center  Progress Note / Procedure Note    Subjective:     Chief Complaint:  Jose Ingram is a 67 y.o.  female  with sacral wound of >1 months duration. 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 fair bit but gets up and walks around every 2 hours    Has a cushion    History/Chart/Medications reviewed    Since last visit:  No new issues, needs more supplies      Wound caused by: pressure / moisture  Current wound care:See flowsheet  Offloading wound: yes  Appetite: good  Wound associated pain: See flowsheet  Diabetic: no  Smoker: no  ROS: no N/V/D, no T/chills; no local rash, no chest pain or shortness of breath, no headache or dizzyness  +urinary incontinence      Objective:     Physical Exam:   See flowsheet / nursing notes for vitals  Visit Vitals  /83 (BP 1 Location: Left arm)   Pulse 72   Temp 97.8 °F (36.6 °C)   Resp 18     General: NAD. Hygiene good  Psych: cooperative. No anxiety or depression. Normal mood and affect. Neuro: alert and oriented to person/place/situation. Otherwise nonfocal.  Derm: Normal  turgor for age, dry skin  HEENT: Normocephalic, atraumatic. EOMI. Conjunctiva clear. No scleral icterus. Neck: Normal range of motion. No masses. Chest: Respirations nonlabored  Lower extremities: color normal; temperature normal.   Ulcer Description:   See Flowsheet           Data Review:   Culture 6/2  Scant staph aureus           Assessment/Plan     67 y.o. female with recent h/o urinary incontinence    -sacral ulcer.   Pressure stage 3  Full thickness   maceration improved, size about same  Mild Slough  Necessitates debridement  for wound healing and to prevent/heal infection  maceratd tissue also pared  See below  Culture reviewed      Urinary incontience   straight cath 4x/day    Offloading      Following discussed with patient / dtr  Needs :  Serial debridement- debrided today- see note below    Good local wound care  Dressing:    mupirocin   Frequency : daily        Patient/  understood and agrees with plan. Questions answered. Follow up with me in 1 week        Procedure:     Ulcer assessment: Due to presence of necrotic tissue within the wound bed, ulcer requires debridement. Procedure: Debridement:   The indication for debridement was reviewed with patient. Risks of procedure (bleeding, infection, pain) were discussed with patient/and consent signed on first visit.  Questions were answered  Subcutaneous excisional debridement  Indication: to remove necrotic tissue/ vitalized and devitalized tissue/ infected tissue/ macerated tissue through skin and subcutaneous layer of wound bed  Time out done  Consent in chart   Anesthesia: Topical  lidocaine   Instrument: curette   Residual Necrosis: Present and scored   Bleeding: <1ml   Hemostasis: Pressure   Patient tolerated procedure well   Procedural Pain: 0  Post - procedural pain: 0    Post debridement measurements:  see below  Surface area debrided: <20 sq. cm    WOUND POA CONDITIONS    Wound Sacrum #1 (Active)   Wound Image   06/17/21 1331   Wound Etiology Pressure Stage 3 06/17/21 1331   Dressing/Treatment Gauze dressing/dressing sponge 06/10/21 0849   Wound Length (cm) 3 cm 06/17/21 1331   Wound Width (cm) 1.3 cm 06/17/21 1331   Wound Depth (cm) 0.8 cm 06/17/21 1331   Wound Surface Area (cm^2) 3.9 cm^2 06/17/21 1331   Change in Wound Size % -50 06/17/21 1331   Wound Volume (cm^3) 3.12 cm^3 06/17/21 1331   Wound Healing % -140 06/17/21 1331   Post-Procedure Length (cm) 3.2 cm 06/17/21 1351   Post-Procedure Width (cm) 1.5 cm 06/17/21 1351   Post-Procedure Depth (cm) 1 cm 06/17/21 1351   Post-Procedure Surface Area (cm^2) 4.8 cm^2 06/17/21 1351   Post-Procedure Volume (cm^3) 4.8 cm^3 06/17/21 1351   Wound Assessment Pale granulation tissue;Slough 06/17/21 1331   Drainage Amount Moderate 06/17/21 1331   Drainage Description Serosanguinous 06/17/21 1331   Wound Odor None 06/17/21 1331   Ginny-Wound/Incision Assessment Maceration 06/17/21 1331   Edges Flat/open edges 06/17/21 1331   Wound Thickness Description Full thickness 06/17/21 1331   Number of days: 21         -------    Past Medical History:   Diagnosis Date    Asthma     Hypercholesterolemia     Hypertension     Osteoarthritis       Past Surgical History:   Procedure Laterality Date    COLONOSCOPY N/A 2/2/2017    COLONOSCOPY performed by Valerie Escobar MD at [facility] ENDOSCOPY    HX CHOLECYSTECTOMY      HX HYSTERECTOMY       Family History   Problem Relation Age of Onset    No Known Problems Mother     No Known Problems Father     Diabetes Sister       Social History     Tobacco Use    Smoking status: Never Smoker    Smokeless tobacco: Never Used   Substance Use Topics    Alcohol use: No       Prior to Admission medications    Medication Sig Start Date End Date Taking? Authorizing Provider   mupirocin (BACTROBAN) 2 % ointment Apply  to affected area daily. 6/10/21  Yes Manjit Mcghee MD   potassium chloride (KLOR-CON) 10 mEq tablet TAKE 1 TABLET BY MOUTH DAILY 4/28/21  Yes Stephy Winn MD   montelukast (SINGULAIR) 10 mg tablet Take 1 Tab by mouth daily. For allergies 4/28/21  Yes Stephy Winn MD   gabapentin (NEURONTIN) 300 mg capsule Take 1 Cap by mouth two (2) times a day.  Max Daily Amount: 600 mg. 4/22/21  Yes Stephy Winn MD   lisinopril-hydroCHLOROthiazide (PRINZIDE, ZESTORETIC) 20-25 mg per tablet TAKE 1 TABLET BY MOUTH EVERY DAY 4/13/21  Yes Stephy Winn MD   naproxen (NAPROSYN) 500 mg tablet TAKE 1 TABLET BY MOUTH TWICE DAILY AS NEEDED FOR PAIN 3/5/21  Yes Stephy Winn MD   simvastatin (ZOCOR) 10 mg tablet TAKE 1 TABLET BY MOUTH EVERY NIGHT AT BEDTIME 1/4/21  Yes Stephy Winn MD   amLODIPine (NORVASC) 5 mg tablet TAKE 1 TABLET BY MOUTH EVERY DAY 1/4/21  Yes Tatum MD Stephy   multivitamin (ONE A DAY) tablet Take 1 Tab by mouth daily. Yes Provider, Historical   albuterol (PROVENTIL HFA, VENTOLIN HFA, PROAIR HFA) 90 mcg/actuation inhaler Take 2 Puffs by inhalation every six (6) hours as needed for Wheezing or Shortness of Breath. 4/7/20  Yes Stephy Winn MD   fluticasone propion-salmeteroL (Advair Diskus) 100-50 mcg/dose diskus inhaler Take 1 Puff by inhalation every twelve (12) hours. 4/7/20  Yes Stephy Winn MD   oxyCODONE IR (ROXICODONE) 10 mg tab immediate release tablet TK 1 T PO  Q 6 H. FILL ON/AFTER 03/23/19 6/24/19  Yes Provider, Historical   linaclotide (LINZESS) 145 mcg cap capsule Take 1 Cap by mouth Daily (before breakfast). 4/3/19  Yes Stephy Winn MD   polyethylene glycol (MIRALAX) 17 gram/dose powder Take 17 g by mouth daily. 4/3/19  Yes Stephy Winn MD   cholecalciferol, vitamin D3, (VITAMIN D3) 2,000 unit tab Take  by mouth daily. Yes Provider, Historical   aspirin delayed-release 81 mg tablet Take 1 Tab by mouth daily. 9/12/16  Yes Luana Ash MD   tiZANidine (ZANAFLEX) 4 mg tablet Take 1 Tab by mouth two (2) times a day.  10/13/15  Yes Skylar Al MD     Allergies   Allergen Reactions    Penicillins Swelling          Signed By: Katalina Allen MD     June 17, 2021

## 2021-06-24 ENCOUNTER — HOSPITAL ENCOUNTER (OUTPATIENT)
Dept: WOUND CARE | Age: 76
Discharge: HOME | End: 2021-06-24
Payer: MEDICARE

## 2021-06-24 VITALS
SYSTOLIC BLOOD PRESSURE: 143 MMHG | RESPIRATION RATE: 18 BRPM | HEART RATE: 83 BPM | DIASTOLIC BLOOD PRESSURE: 77 MMHG | TEMPERATURE: 98.7 F

## 2021-06-24 PROCEDURE — 11042 DBRDMT SUBQ TIS 1ST 20SQCM/<: CPT

## 2021-06-24 PROCEDURE — 74011000250 HC RX REV CODE- 250: Performed by: FAMILY MEDICINE

## 2021-06-24 RX ADMIN — Medication: at 15:21

## 2021-06-24 NOTE — WOUND CARE
06/24/21 1519   Anesthetic   Anesthetic 4% Lidocaine Liquid Topical   Wound Sacrum #1   Date First Assessed/Time First Assessed: 05/27/21 1434   Present on Hospital Admission: Yes  Primary Wound Type: Pressure Injury  Location: Sacrum  Wound Description: #1   Wound Image    Wound Length (cm) 2.5 cm   Wound Width (cm) 1.2 cm   Wound Depth (cm) 0.3 cm   Wound Surface Area (cm^2) 3 cm^2   Change in Wound Size % -15.38   Wound Volume (cm^3) 0.9 cm^3   Wound Healing % 31   Wound Assessment [place]/red;Slough   Drainage Amount Moderate   Drainage Description Serosanguinous   Wound Odor None   Ginny-Wound/Incision Assessment Maceration   Edges Epibole (rolled edges)     Visit Vitals  BP (!) 143/77 (BP 1 Location: Left upper arm, BP Patient Position: Sitting)   Pulse 83   Temp 98.7 °F (37.1 °C)   Resp 18

## 2021-06-24 NOTE — WOUND CARE
Wound Center  Progress Note / Procedure Note    Subjective:     Chief Complaint:  Jd Haro is a 67 y.o.  female  with sacral wound of >1 months duration. 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 fair bit but gets up and walks around every 2 hours    Has a cushion    History/Chart/Medications reviewed    Since last visit:  No new issues      Wound caused by: pressure / moisture  Current wound care:See flowsheet  Offloading wound: yes  Appetite: good  Wound associated pain: See flowsheet  Diabetic: no  Smoker: no  ROS: no N/V/D, no T/chills; no local rash, no chest pain or shortness of breath, no headache or dizzyness  +urinary incontinence      Objective:     Physical Exam:   See flowsheet / nursing notes for vitals  Visit Vitals  BP (!) 143/77 (BP 1 Location: Left upper arm, BP Patient Position: Sitting)   Pulse 83   Temp 98.7 °F (37.1 °C)   Resp 18     General: NAD. Hygiene good  Psych: cooperative. No anxiety or depression. Normal mood and affect. Neuro: alert and oriented to person/place/situation. Otherwise nonfocal.  Derm: Normal  turgor for age, dry skin  HEENT: Normocephalic, atraumatic. EOMI. Conjunctiva clear. No scleral icterus. Neck: Normal range of motion. No masses. Chest: Respirations nonlabored  Lower extremities: color normal; temperature normal.   Ulcer Description:   See Flowsheet           Data Review:   Culture 6/2  Scant staph aureus           Assessment/Plan     67 y.o. female with recent h/o urinary incontinence    -sacral ulcer.   Pressure stage 3  Full thickness   maceration improved, slightly smaller  Mild Slough  Necessitates debridement  for wound healing and to prevent/heal infection  macerated tissue also pared  See below        Urinary incontience   straight cath 4x/day    Offloading      Following discussed with patient / dtr  Needs :  Serial debridement- debrided today- see note below    Good local wound care  Dressing:    mupirocin   Frequency : daily        Patient/  understood and agrees with plan. Questions answered. Follow up with me in 1 week      Procedure:     Ulcer assessment: Due to presence of necrotic tissue within the wound bed, ulcer requires debridement. Procedure: Debridement:   The indication for debridement was reviewed with patient. Risks of procedure (bleeding, infection, pain) were discussed with patient/and consent signed on first visit.  Questions were answered    Subcutaneous excisional debridement  Indication: to remove necrotic tissue/ vitalized and devitalized tissue/ infected tissue/ macerated tissue through skin and subcutaneous layer of wound bed  Time out done  Consent in chart   Anesthesia: Topical  lidocaine   Instrument: curette   Residual Necrosis: Present and scored   Bleeding: <1ml   Hemostasis: Pressure   Patient tolerated procedure well   Procedural Pain: 0  Post - procedural pain: 0    Post debridement measurements:  see below  Surface area debrided: <20 sq. cm    WOUND POA CONDITIONS    Wound Sacrum #1 (Active)   Wound Image   06/24/21 1519   Wound Etiology Pressure Stage 3 06/17/21 1331   Dressing/Treatment Gauze dressing/dressing sponge;Tape/Soft cloth adhesive tape 06/24/21 1534   Wound Length (cm) 2.5 cm 06/24/21 1519   Wound Width (cm) 1.2 cm 06/24/21 1519   Wound Depth (cm) 0.3 cm 06/24/21 1519   Wound Surface Area (cm^2) 3 cm^2 06/24/21 1519   Change in Wound Size % -15.38 06/24/21 1519   Wound Volume (cm^3) 0.9 cm^3 06/24/21 1519   Wound Healing % 31 06/24/21 1519   Post-Procedure Length (cm) 2.6 cm 06/24/21 1526   Post-Procedure Width (cm) 1.3 cm 06/24/21 1526   Post-Procedure Depth (cm) 0.4 cm 06/24/21 1526   Post-Procedure Surface Area (cm^2) 3.38 cm^2 06/24/21 1526   Post-Procedure Volume (cm^3) 1.352 cm^3 06/24/21 1526   Wound Assessment Pink/red;Slough 06/24/21 1519 Drainage Amount Moderate 06/24/21 1519   Drainage Description Serosanguinous 06/24/21 1519   Wound Odor None 06/24/21 1519   Ginny-Wound/Incision Assessment Maceration 06/24/21 1519   Edges Epibole (rolled edges) 06/24/21 1519   Wound Thickness Description Full thickness 06/17/21 1331   Number of days: 28         -------    Past Medical History:   Diagnosis Date    Asthma     Hypercholesterolemia     Hypertension     Osteoarthritis       Past Surgical History:   Procedure Laterality Date    COLONOSCOPY N/A 2/2/2017    COLONOSCOPY performed by Benito Osgood, MD at [facility] ENDOSCOPY    HX CHOLECYSTECTOMY      HX HYSTERECTOMY       Family History   Problem Relation Age of Onset    No Known Problems Mother     No Known Problems Father     Diabetes Sister       Social History     Tobacco Use    Smoking status: Never Smoker    Smokeless tobacco: Never Used   Substance Use Topics    Alcohol use: No       Prior to Admission medications    Medication Sig Start Date End Date Taking? Authorizing Provider   mupirocin (BACTROBAN) 2 % ointment Apply  to affected area daily. 6/10/21  Yes Lavon England MD   potassium chloride (KLOR-CON) 10 mEq tablet TAKE 1 TABLET BY MOUTH DAILY 4/28/21  Yes Stephy Winn MD   montelukast (SINGULAIR) 10 mg tablet Take 1 Tab by mouth daily. For allergies 4/28/21  Yes Stephy Winn MD   gabapentin (NEURONTIN) 300 mg capsule Take 1 Cap by mouth two (2) times a day.  Max Daily Amount: 600 mg. 4/22/21  Yes Stephy Winn MD   lisinopril-hydroCHLOROthiazide (PRINZIDE, ZESTORETIC) 20-25 mg per tablet TAKE 1 TABLET BY MOUTH EVERY DAY 4/13/21  Yes Stephy Winn MD   naproxen (NAPROSYN) 500 mg tablet TAKE 1 TABLET BY MOUTH TWICE DAILY AS NEEDED FOR PAIN 3/5/21  Yes Stephy Winn MD   simvastatin (ZOCOR) 10 mg tablet TAKE 1 TABLET BY MOUTH EVERY NIGHT AT BEDTIME 1/4/21  Yes Stephy Winn MD   amLODIPine (NORVASC) 5 mg tablet TAKE 1 TABLET BY MOUTH EVERY DAY 1/4/21  Yes Koki Swanson MD   multivitamin (ONE A DAY) tablet Take 1 Tab by mouth daily. Yes Provider, Historical   albuterol (PROVENTIL HFA, VENTOLIN HFA, PROAIR HFA) 90 mcg/actuation inhaler Take 2 Puffs by inhalation every six (6) hours as needed for Wheezing or Shortness of Breath. 4/7/20  Yes Stephy Winn MD   fluticasone propion-salmeteroL (Advair Diskus) 100-50 mcg/dose diskus inhaler Take 1 Puff by inhalation every twelve (12) hours. 4/7/20  Yes Stephy Winn MD   oxyCODONE IR (ROXICODONE) 10 mg tab immediate release tablet TK 1 T PO  Q 6 H. FILL ON/AFTER 03/23/19 6/24/19  Yes Provider, Historical   linaclotide (LINZESS) 145 mcg cap capsule Take 1 Cap by mouth Daily (before breakfast). 4/3/19  Yes Stephy Winn MD   polyethylene glycol (MIRALAX) 17 gram/dose powder Take 17 g by mouth daily. 4/3/19  Yes Stephy Winn MD   cholecalciferol, vitamin D3, (VITAMIN D3) 2,000 unit tab Take  by mouth daily. Yes Provider, Historical   aspirin delayed-release 81 mg tablet Take 1 Tab by mouth daily. 9/12/16  Yes Sumit Almanzar MD   tiZANidine (ZANAFLEX) 4 mg tablet Take 1 Tab by mouth two (2) times a day.  10/13/15  Yes Boston Alcantara MD     Allergies   Allergen Reactions    Penicillins Swelling          Signed By: Carina Pizarro MD     June 24, 2021

## 2021-06-24 NOTE — DISCHARGE INSTRUCTIONS
Discharge Instructions for  [facility]  [place] [address] [place], [address] [facility]  Telephone: [phone]    NAME:  Henrik Medina OF BIRTH:  1945  DATE:  6/17/2021    [x] Wound and dressing supply provider: Bon     Wound Cleansing:   Do not scrub or use excessive force. Cleanse wound prior to applying a clean dressing with:  [] Normal Saline   [] Keep Wound Dry in Shower      [] Wound Cleanser   [x] Cleanse wound with Mild Soap & Water    [x] May Shower at Discharge - remove dressing 1st, wash, pat dry, re-dress right away    [] Do not shower  [] cleanse with baby shampoo lather leave 2-3 then rinse    Topical Treatments:  Do not apply lotions, creams, or ointments to wound bed unless directed. [] Apply moisturizing lotion {StyleQ lotions :89633} to skin surrounding the wound prior to dressing change. [x] Bactroban/Mupirocin  [] Gentamicin ointment    [] Gentian violet to wound bed and periwound  [] Other:     Dressings:                   Wound Location Sacrum   Apply Primary Dressing:        [x]  Mupirocin ointment      Cover and Secure with:  [x] Gauze multiple layers [] ABD [] exudry     [] Margaret [] Kerlix [] Mepilex Border   [] Ace Wrap [x] Roll Tape   [] Other:       Change dressing:   [x] Daily      [] Every Other Day - if becomes wet change right away   [] Three times per week  [] Once a week   [] Do Not Change Dressing     [] Other:    Pressure Relief:  [x] When sitting, shift position or do seat lifts every 15 minutes.  [] Wheelchair cushion   [] Specialty Bed/Mattress  [x] Turn every 2 hours when in bed. Avoid position directing pressure on wound site.    Off-Loading:   [x] Off-loading when [] walking  [x] in bed [x] sitting    Dietary:  [x] Diet as tolerated [] Diabetic Diet   [x] Increase Protein: examples (Meat, cheese, eggs, greek yogurt, fish, nuts)   [] [employer] Nutrition Powder  [] Other:  [] Dial a Dietician : Call [facility] at [phone] enter code ([phone]) when prompted. M-F 9am-5pm EST. Return Appointment:  [] Nurse Visit at wound center in *** days   [x] Return Appointment: With Dr. Ara Martinez 1 week(s)  [] Ordered tests:     Electronically signed on 6/17/2021 at 8:24 AM     [address] [facility] Information: Should you experience any significant changes in your wound(s) or have questions about your wound care, please contact the [facility] at [address] [place] [address] 8:00 am - 4:30. If you need help with your wound outside these hours and cannot wait until we are again available, contact your PCP or go to the hospital emergency room. PLEASE NOTE: IF YOU ARE UNABLE TO OBTAIN WOUND SUPPLIES, CONTINUE TO USE THE SUPPLIES YOU HAVE AVAILABLE UNTIL YOU ARE ABLE TO REACH US. IT IS MOST IMPORTANT TO KEEP THE WOUND COVERED AT ALL TIMES.      Physician Signature:_______________________  Dr. Ara Martinez

## 2021-06-24 NOTE — WOUND CARE
06/24/21 1534   Wound Sacrum #1   Date First Assessed/Time First Assessed: 05/27/21 1434   Present on Hospital Admission: Yes  Primary Wound Type: Pressure Injury  Location: Sacrum  Wound Description: #1   Dressing/Treatment Gauze dressing/dressing sponge;Tape/Soft cloth adhesive tape  (mupiricin)   Discharge Condition: Stable     Pain: 0    Ambulatory Status: Walker     Discharge Destination: Home     Transportation: Car    Accompanied by: Family/Caregiver     Discharge instructions reviewed with Patient and Family/Caregiver  and copy or written instructions have been provided. All questions/concerns have been addressed at this time.

## 2021-07-01 ENCOUNTER — HOSPITAL ENCOUNTER (OUTPATIENT)
Dept: WOUND CARE | Age: 76
Discharge: HOME | End: 2021-07-01
Payer: MEDICARE

## 2021-07-01 VITALS
SYSTOLIC BLOOD PRESSURE: 127 MMHG | HEART RATE: 94 BPM | RESPIRATION RATE: 18 BRPM | DIASTOLIC BLOOD PRESSURE: 83 MMHG | TEMPERATURE: 98.7 F

## 2021-07-01 PROCEDURE — 11042 DBRDMT SUBQ TIS 1ST 20SQCM/<: CPT

## 2021-07-01 PROCEDURE — 74011000250 HC RX REV CODE- 250: Performed by: FAMILY MEDICINE

## 2021-07-01 RX ADMIN — Medication: at 15:38

## 2021-07-01 NOTE — WOUND CARE
07/01/21 1535   Anesthetic   Anesthetic 4% Lidocaine Liquid Topical   Wound Sacrum #1   Date First Assessed/Time First Assessed: 05/27/21 1434   Present on Hospital Admission: Yes  Primary Wound Type: Pressure Injury  Location: Sacrum  Wound Description: #1   Wound Image    Wound Length (cm) 2.7 cm   Wound Width (cm) 1.1 cm   Wound Depth (cm) 0.4 cm   Wound Surface Area (cm^2) 2.97 cm^2   Change in Wound Size % -14.23   Wound Volume (cm^3) 1.188 cm^3   Wound Healing % 9   Wound Assessment [place]/red;Slough   Drainage Amount Moderate   Drainage Description Serosanguinous   Wound Odor None   Ginny-Wound/Incision Assessment Maceration   Edges Epibole (rolled edges)   Pain 1   Pain Scale 1 Numeric (0 - 10)   Pain Intensity 1 0     Visit Vitals  /83 (BP 1 Location: Left upper arm, BP Patient Position: Sitting)   Pulse 94   Temp 98.7 °F (37.1 °C)   Resp 18

## 2021-07-01 NOTE — WOUND CARE
07/01/21 1551   Wound Sacrum #1   Date First Assessed/Time First Assessed: 05/27/21 1434   Present on Hospital Admission: Yes  Primary Wound Type: Pressure Injury  Location: Sacrum  Wound Description: #1   Dressing/Treatment Honey alginate; Honey gel/honey paste;Gauze dressing/dressing sponge;Tape/Soft cloth adhesive tape   Discharge Condition: Stable     Pain: 0    Ambulatory Status: Walking with walker    Discharge Destination: Home     Transportation: Car    Accompanied by: Family/Caregiver     Discharge instructions reviewed with Patient and Family/Caregiver  and copy or written instructions have been provided. All questions/concerns have been addressed at this time.

## 2021-07-01 NOTE — WOUND CARE
Wound Center  Progress Note / Procedure Note    Subjective:     Chief Complaint:  Dean Bamberger is a 67 y.o.  female  with sacral wound of >1 months duration. 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 fair bit but gets up and walks around every 2 hours    Has a cushion    History/Chart/Medications reviewed    Since last visit:  No new issues      Wound caused by: pressure / moisture  Current wound care:See flowsheet  Offloading wound: yes  Appetite: good  Wound associated pain: See flowsheet  Diabetic: no  Smoker: no  ROS: no N/V/D, no T/chills; no local rash, no chest pain or shortness of breath, no headache or dizzyness  +urinary incontinence      Objective:     Physical Exam:   See flowsheet / nursing notes for vitals  Visit Vitals  /83 (BP 1 Location: Left upper arm, BP Patient Position: Sitting)   Pulse 94   Temp 98.7 °F (37.1 °C)   Resp 18     General: NAD. Hygiene good  Psych: cooperative. No anxiety or depression. Normal mood and affect. Neuro: alert and oriented to person/place/situation. Otherwise nonfocal.  Derm: Normal  turgor for age, dry skin  HEENT: Normocephalic, atraumatic. EOMI. Conjunctiva clear. No scleral icterus. Neck: Normal range of motion. No masses. Chest: Respirations nonlabored  Lower extremities: color normal; temperature normal.   Ulcer Description:   See Flowsheet           Data Review:   Culture 6/2  Scant staph aureus           Assessment/Plan     67 y.o. female with recent h/o urinary incontinence    -sacral ulcer.   Pressure stage 3  Full thicknessstable  Mild Slough  Necessitates debridement  for wound healing and to prevent/heal infection  macerated tissue also pared  See below        Urinary incontience   straight cath 4x/day    Offloading      Following discussed with patient / dtr  Needs :  Serial debridement- debrided today- see note below    Good local wound care  Dressing:    D/C mupirocin   START medihoney gel, medihoney alginate  Frequency : every other day        Patient/  understood and agrees with plan. Questions answered. Follow up with me in 1 week      Procedure:     Ulcer assessment: Due to presence of necrotic tissue within the wound bed, ulcer requires debridement. Procedure: Debridement:   The indication for debridement was reviewed with patient. Risks of procedure (bleeding, infection, pain) were discussed with patient/and consent signed on first visit. Questions were answered    Subcutaneous excisional debridement  Indication: to remove necrotic tissue/ vitalized and devitalized tissue/ infected tissue/ macerated tissue through skin and subcutaneous layer of wound bed  Time out done  Consent in chart   Anesthesia: Topical  lidocaine   Instrument: curette   Residual Necrosis: Present and scored   Bleeding: <1ml   Hemostasis: Pressure   Patient tolerated procedure well   Procedural Pain: 0  Post - procedural pain: 0    Post debridement measurements:  see below  Surface area debrided: <20 sq. cm    WOUND POA CONDITIONS    Wound Sacrum #1 (Active)   Wound Image   07/01/21 1535   Wound Etiology Pressure Stage 3 06/17/21 1331   Dressing/Treatment Honey alginate; Honey gel/honey paste;Gauze dressing/dressing sponge;Tape/Soft cloth adhesive tape 07/01/21 1551   Wound Length (cm) 2.7 cm 07/01/21 1535   Wound Width (cm) 1.1 cm 07/01/21 1535   Wound Depth (cm) 0.4 cm 07/01/21 1535   Wound Surface Area (cm^2) 2.97 cm^2 07/01/21 1535   Change in Wound Size % -14.23 07/01/21 1535   Wound Volume (cm^3) 1.188 cm^3 07/01/21 1535   Wound Healing % 9 07/01/21 1535   Post-Procedure Length (cm) 2.9 cm 07/01/21 1549   Post-Procedure Width (cm) 1.3 cm 07/01/21 1549   Post-Procedure Depth (cm) 0.5 cm 07/01/21 1549   Post-Procedure Surface Area (cm^2) 3.77 cm^2 07/01/21 1549   Post-Procedure Volume (cm^3) 1.885 cm^3 07/01/21 1549   Wound Assessment Pink/red;Slough 07/01/21 1535   Drainage Amount Moderate 07/01/21 1535   Drainage Description Serosanguinous 07/01/21 1535   Wound Odor None 07/01/21 1535   Ginny-Wound/Incision Assessment Maceration 07/01/21 1535   Edges Epibole (rolled edges) 07/01/21 1535   Wound Thickness Description Full thickness 06/17/21 1331   Number of days: 28         -------    Past Medical History:   Diagnosis Date    Asthma     Hypercholesterolemia     Hypertension     Osteoarthritis       Past Surgical History:   Procedure Laterality Date    COLONOSCOPY N/A 2/2/2017    COLONOSCOPY performed by Gabriel Georges MD at [facility] ENDOSCOPY    HX CHOLECYSTECTOMY      HX HYSTERECTOMY       Family History   Problem Relation Age of Onset    No Known Problems Mother     No Known Problems Father     Diabetes Sister       Social History     Tobacco Use    Smoking status: Never Smoker    Smokeless tobacco: Never Used   Substance Use Topics    Alcohol use: No       Prior to Admission medications    Medication Sig Start Date End Date Taking? Authorizing Provider   mupirocin (BACTROBAN) 2 % ointment Apply  to affected area daily. 6/10/21  Yes Priscilla Long MD   potassium chloride (KLOR-CON) 10 mEq tablet TAKE 1 TABLET BY MOUTH DAILY 4/28/21  Yes Stephy Winn MD   montelukast (SINGULAIR) 10 mg tablet Take 1 Tab by mouth daily. For allergies 4/28/21  Yes Stephy Winn MD   gabapentin (NEURONTIN) 300 mg capsule Take 1 Cap by mouth two (2) times a day.  Max Daily Amount: 600 mg. 4/22/21  Yes Samantha Saavedra MD   lisinopril-hydroCHLOROthiazide (PRINZIDE, ZESTORETIC) 20-25 mg per tablet TAKE 1 TABLET BY MOUTH EVERY DAY 4/13/21  Yes Stephy Winn MD   naproxen (NAPROSYN) 500 mg tablet TAKE 1 TABLET BY MOUTH TWICE DAILY AS NEEDED FOR PAIN 3/5/21  Yes Stephy Winn MD   simvastatin (ZOCOR) 10 mg tablet TAKE 1 TABLET BY MOUTH EVERY NIGHT AT BEDTIME 1/4/21  Yes Samantha Saavedra MD amLODIPine (NORVASC) 5 mg tablet TAKE 1 TABLET BY MOUTH EVERY DAY 1/4/21  Yes Stephy Winn MD   multivitamin (ONE A DAY) tablet Take 1 Tab by mouth daily. Yes Provider, Historical   albuterol (PROVENTIL HFA, VENTOLIN HFA, PROAIR HFA) 90 mcg/actuation inhaler Take 2 Puffs by inhalation every six (6) hours as needed for Wheezing or Shortness of Breath. 4/7/20  Yes Stephy Winn MD   fluticasone propion-salmeteroL (Advair Diskus) 100-50 mcg/dose diskus inhaler Take 1 Puff by inhalation every twelve (12) hours. 4/7/20  Yes Stephy Winn MD   oxyCODONE IR (ROXICODONE) 10 mg tab immediate release tablet TK 1 T PO  Q 6 H. FILL ON/AFTER 03/23/19 6/24/19  Yes Provider, Historical   linaclotide (LINZESS) 145 mcg cap capsule Take 1 Cap by mouth Daily (before breakfast). 4/3/19  Yes Stephy Winn MD   polyethylene glycol (MIRALAX) 17 gram/dose powder Take 17 g by mouth daily. 4/3/19  Yes Stephy Winn MD   cholecalciferol, vitamin D3, (VITAMIN D3) 2,000 unit tab Take  by mouth daily. Yes Provider, Historical   aspirin delayed-release 81 mg tablet Take 1 Tab by mouth daily. 9/12/16  Yes Janny Pendleton MD   tiZANidine (ZANAFLEX) 4 mg tablet Take 1 Tab by mouth two (2) times a day.  10/13/15  Yes Manju Brian MD     Allergies   Allergen Reactions    Penicillins Swelling          Signed By: Constance Mckeon MD     July 1, 2021

## 2021-07-01 NOTE — DISCHARGE INSTRUCTIONS
Discharge Instructions for  [facility]  [address] [place], [address] [facility]  Telephone: [phone]    NAME:  Lion Child OF BIRTH:  1945  DATE:  6/24/2021    [x] Wound and dressing supply provider: Bon     Wound Cleansing:   Do not scrub or use excessive force. Cleanse wound prior to applying a clean dressing with:  [] Normal Saline   [] Keep Wound Dry in Shower      [] Wound Cleanser   [x] Cleanse wound with Mild Soap & Water    [x] May Shower at Discharge - remove dressing 1st, wash, pat dry, re-dress right away    [] Do not shower  [] cleanse with baby shampoo lather leave 2-3 then rinse    Topical Treatments:  Do not apply lotions, creams, or ointments to wound bed unless directed. [] Apply moisturizing lotion {[employer] lotions :90642} to skin surrounding the wound prior to dressing change. [x] Bactroban/Mupirocin  [] Gentamicin ointment    [] Gentian violet to wound bed and periwound  [] Other:     Dressings:                   Wound Location Sacrum   Apply Primary Dressing:        [x]  Mupirocin ointment      Cover and Secure with:  [x] Gauze multiple layers [] ABD [] exudry     [] Margaret [] Kerlix [] Mepilex Border   [] Ace Wrap [x] Roll Tape   [] Other:       Change dressing:   [x] Daily      [] Every Other Day - if becomes wet change right away   [] Three times per week  [] Once a week   [] Do Not Change Dressing     [] Other:    Pressure Relief:  [x] When sitting, shift position or do seat lifts every 15 minutes.  [] Wheelchair cushion   [] Specialty Bed/Mattress  [x] Turn every 2 hours when in bed. Avoid position directing pressure on wound site.    Off-Loading:   [x] Off-loading when [] walking  [x] in bed [x] sitting    Dietary:  [x] Diet as tolerated [] Diabetic Diet   [x] Increase Protein: examples (Meat, cheese, eggs, greek yogurt, fish, nuts)   [] Alistair Therapeutic Nutrition Powder  [] Other:  [] Dial a Dietician : Call [employer] at [phone] enter code [phone]) when prompted. M-F 9am-5pm EST. Return Appointment:  [] Nurse Visit at wound center in *** days   [x] Return Appointment: With Dr. Katalina Allen 1 week(s)  [] Ordered tests:     Electronically signed on 6/24/2021 at 8:24 AM     Britney Oliver 281: Should you experience any significant changes in your wound(s) or have questions about your wound care, please contact the [facility] Main at [address] [place] [address] 8:00 am - 4:30. If you need help with your wound outside these hours and cannot wait until we are again available, contact your PCP or go to the hospital emergency room. PLEASE NOTE: IF YOU ARE UNABLE TO OBTAIN WOUND SUPPLIES, CONTINUE TO USE THE SUPPLIES YOU HAVE AVAILABLE UNTIL YOU ARE ABLE TO REACH US. IT IS MOST IMPORTANT TO KEEP THE WOUND COVERED AT ALL TIMES.      Physician Signature:_______________________  Dr. Katalina Allen

## 2021-07-08 ENCOUNTER — HOSPITAL ENCOUNTER (OUTPATIENT)
Dept: WOUND CARE | Age: 76
Discharge: HOME | End: 2021-07-08
Payer: MEDICARE

## 2021-07-08 VITALS
RESPIRATION RATE: 18 BRPM | DIASTOLIC BLOOD PRESSURE: 78 MMHG | TEMPERATURE: 97.8 F | SYSTOLIC BLOOD PRESSURE: 116 MMHG | HEART RATE: 92 BPM

## 2021-07-08 PROCEDURE — 11042 DBRDMT SUBQ TIS 1ST 20SQCM/<: CPT

## 2021-07-08 PROCEDURE — 74011000250 HC RX REV CODE- 250: Performed by: FAMILY MEDICINE

## 2021-07-08 RX ADMIN — Medication: at 14:17

## 2021-07-08 NOTE — WOUND CARE
07/08/21 1410   Wound Sacrum #1   Date First Assessed/Time First Assessed: 05/27/21 1434   Present on Hospital Admission: Yes  Primary Wound Type: Pressure Injury  Location: Sacrum  Wound Description: #1   Wound Image    Wound Length (cm) 3.2 cm   Wound Width (cm) 1 cm   Wound Depth (cm) 0.4 cm   Wound Surface Area (cm^2) 3.2 cm^2   Change in Wound Size % -23.08   Wound Volume (cm^3) 1.28 cm^3   Wound Healing % 2   Wound Assessment Pink/red   Drainage Amount Moderate   Drainage Description Serosanguinous   Wound Odor None   Ginny-Wound/Incision Assessment Maceration     Visit Vitals  /78   Pulse 92   Temp 97.8 °F (36.6 °C)   Resp 18

## 2021-07-08 NOTE — WOUND CARE
Wound Center Progress Note / Procedure Note Subjective: Chief Complaint: Osmani Kent is a 67 y.o.  female  with sacral wound of >1 months duration. HPI:    
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 
No new issues Wound caused by: pressure / moisture Current wound care:See flowsheet Offloading wound: yes Appetite: good Wound associated pain: See flowsheet Diabetic: no 
Smoker: no 
ROS: no N/V/D, no T/chills; no local rash, no chest pain or shortness of breath, no headache or dizzyness +urinary incontinence Objective:  
 
Physical Exam:  
See flowsheet / nursing notes for vitals Visit Vitals /78 Pulse 92 Temp 97.8 °F (36.6 °C) Resp 18 General: NAD. Hygiene good Psych: cooperative. No anxiety or depression. Normal mood and affect. Neuro: alert and oriented to person/place/situation. Otherwise nonfocal. 
Derm: Normal  turgor for age, dry skin HEENT: Normocephalic, atraumatic. EOMI. Conjunctiva clear. No scleral icterus. Neck: Normal range of motion. No masses. Chest: Respirations nonlabored Lower extremities: color normal; temperature normal.  
Ulcer Description:  
See Flowsheet Data Review:  
Culture 6/2 Scant staph aureus Assessment/Plan  
 
67 y.o. female with recent h/o urinary incontinence 
 
-sacral ulcer. Pressure stage 3 Full thickness Stable- wound hasn't really made much progress since her first visit here- culture done, consider biopsy after trial of collagen Still thicknened macerated tissue periwound Mild Slough Necessitates debridement  for wound healing and to prevent/heal infection 
macerated tissue also pared thoroughly, will see if it builds up again See below Culture repeated again d/t non healing wound Urinary incontience 
 straight cath 4x/day Offloading as instructed Following discussed with patient / Shree Packw Needs : 
Serial debridement- debrided today- see note below Good local wound care Dressing: D/C medihoney gel,  
Collagen, medihoney alginate Frequency : every  day Patient/  understood and agrees with plan. Questions answered. Follow up with me in 1 week Procedure:  
 
Ulcer assessment: Due to presence of necrotic tissue within the wound bed, ulcer requires debridement. Procedure: Debridement:  
The indication for debridement was reviewed with patient. Risks of procedure (bleeding, infection, pain) were discussed with patient/and consent signed on first visit. Questions were answered Subcutaneous excisional debridement Indication: to remove necrotic tissue/ vitalized and devitalized tissue/ infected tissue/ PARE macerated tissue through skin and subcutaneous layer of wound bed Time out done Consent in chart Anesthesia: Topical  lidocaine Instrument: curette Residual Necrosis: Present and scored Bleeding: <1ml Hemostasis: Pressure Patient tolerated procedure well Procedural Pain: 0 Post - procedural pain: 0 Post debridement measurements:  see below Surface area debrided: <20 sq. cm 
 
WOUND POA CONDITIONS Wound Sacrum #1 (Active) Wound Image   07/08/21 1410 Wound Etiology Pressure Stage 3 06/17/21 1331 Dressing/Treatment Collagen with Ag;Honey alginate;Gauze dressing/dressing sponge;Tape/Soft cloth adhesive tape 07/08/21 1447 Wound Length (cm) 3.2 cm 07/08/21 1410 Wound Width (cm) 1 cm 07/08/21 1410 Wound Depth (cm) 0.4 cm 07/08/21 1410 Wound Surface Area (cm^2) 3.2 cm^2 07/08/21 1410 Change in Wound Size % -23.08 07/08/21 1410 Wound Volume (cm^3) 1.28 cm^3 07/08/21 1410 Wound Healing % 2 07/08/21 1410 Post-Procedure Length (cm) 3.4 cm 07/08/21 1425 Post-Procedure Width (cm) 1.2 cm 07/08/21 1425 Post-Procedure Depth (cm) 0.6 cm 07/08/21 1425 Post-Procedure Surface Area (cm^2) 4.08 cm^2 07/08/21 1425 Post-Procedure Volume (cm^3) 2.448 cm^3 07/08/21 1425 Wound Assessment Pink/red/mild slough 07/08/21 1410 Drainage Amount Moderate 07/08/21 1410 Drainage Description Serosanguinous 07/08/21 1410 Wound Odor None 07/08/21 1410 Ginny-Wound/Incision Assessment Maceration 07/08/21 1410 Edges Epibole (rolled edges) 07/01/21 1535 Wound Thickness Description Full thickness 06/17/21 1331 Number of days: 42  
   
 
------- Past Medical History:  
Diagnosis Date  Asthma  Hypercholesterolemia  Hypertension  Osteoarthritis Past Surgical History:  
Procedure Laterality Date  COLONOSCOPY N/A 2/2/2017 COLONOSCOPY performed by Ivonne Yang MD at [address]  HX HYSTERECTOMY Family History Problem Relation Age of Onset  No Known Problems Mother  No Known Problems Father  Diabetes Sister Social History Tobacco Use  Smoking status: Never Smoker  Smokeless tobacco: Never Used Substance Use Topics  Alcohol use: No  
   
Prior to Admission medications Medication Sig Start Date End Date Taking? Authorizing Provider  
mupirocin (BACTROBAN) 2 % ointment Apply  to affected area daily. 6/10/21   Tonia Montague MD  
potassium chloride (KLOR-CON) 10 mEq tablet TAKE 1 TABLET BY MOUTH DAILY 4/28/21   Elmer Garner MD  
montelukast (SINGULAIR) 10 mg tablet Take 1 Tab by mouth daily. For allergies 4/28/21   Elmer Garner MD  
gabapentin (NEURONTIN) 300 mg capsule Take 1 Cap by mouth two (2) times a day.  Max Daily Amount: 600 mg. 4/22/21   Elmer Garner MD  
lisinopril-hydroCHLOROthiazide (PRINZIDE, ZESTORETIC) 20-25 mg per tablet TAKE 1 TABLET BY MOUTH EVERY DAY 4/13/21   Stephy Winn MD  
naproxen (NAPROSYN) 500 mg tablet TAKE 1 TABLET BY MOUTH TWICE DAILY AS NEEDED FOR PAIN 3/5/21   Cristal Walters MD  
simvastatin (ZOCOR) 10 mg tablet TAKE 1 TABLET BY MOUTH EVERY NIGHT AT BEDTIME 1/4/21   Stephy Winn MD  
amLODIPine (NORVASC) 5 mg tablet TAKE 1 TABLET BY MOUTH EVERY DAY 1/4/21   Cristal Walters MD  
multivitamin (ONE A DAY) tablet Take 1 Tab by mouth daily. Provider, Historical  
albuterol (PROVENTIL HFA, VENTOLIN HFA, PROAIR HFA) 90 mcg/actuation inhaler Take 2 Puffs by inhalation every six (6) hours as needed for Wheezing or Shortness of Breath. 4/7/20   Cristal Walters MD  
fluticasone propion-salmeteroL (Advair Diskus) 100-50 mcg/dose diskus inhaler Take 1 Puff by inhalation every twelve (12) hours. 4/7/20   Cristal Walters MD  
oxyCODONE IR (ROXICODONE) 10 mg tab immediate release tablet TK 1 T PO  Q 6 H. FILL ON/AFTER 03/23/19 6/24/19   Provider, Historical  
linaclotide Allen Sneddon) 145 mcg cap capsule Take 1 Cap by mouth Daily (before breakfast). 4/3/19   Cristal Walters MD  
polyethylene glycol (MIRALAX) 17 gram/dose powder Take 17 g by mouth daily. 4/3/19   Cristal Walters MD  
cholecalciferol, vitamin D3, (VITAMIN D3) 2,000 unit tab Take  by mouth daily. Provider, Historical  
aspirin delayed-release 81 mg tablet Take 1 Tab by mouth daily. 9/12/16   Lauren Roblero MD  
tiZANidine (ZANAFLEX) 4 mg tablet Take 1 Tab by mouth two (2) times a day. 10/13/15   Beena Andrea MD  
 
Allergies Allergen Reactions  Penicillins Swelling Signed By: Matteo Salas MD   
 July 8, 2021

## 2021-07-08 NOTE — WOUND CARE
07/08/21 1447 Wound Sacrum #1 Date First Assessed/Time First Assessed: 05/27/21 1434   Present on Hospital Admission: Yes  Primary Wound Type: Pressure Injury  Location: Sacrum  Wound Description: #1 Dressing/Treatment Collagen with Ag;Honey alginate;Gauze dressing/dressing sponge;Tape/Soft cloth adhesive tape Discharge Condition: Stable Pain: 0 Ambulatory Status: Walking and Walker Discharge Destination: Home Transportation: Car Accompanied by: Family/Caregiver Discharge instructions reviewed with Patient and Family/Caregiver  and copy or written instructions have been provided. All questions/concerns have been addressed at this time.

## 2021-07-08 NOTE — DISCHARGE INSTRUCTIONS
Discharge Instructions for  [facility]  [address] [place], [address] [facility]  Telephone: [phone]    NAME:  Beverly Preston OF BIRTH:  1945  DATE:  7/1/2021    [x] Wound and dressing supply provider: Bon     Wound Cleansing:   Do not scrub or use excessive force. Cleanse wound prior to applying a clean dressing with:  [] Normal Saline   [] Keep Wound Dry in Shower      [] Wound Cleanser   [x] Cleanse wound with Mild Soap & Water    [x] May Shower at Discharge - remove dressing 1st, wash, pat dry, re-dress right away    [] Do not shower  [] cleanse with baby shampoo lather leave 2-3 then rinse    Topical Treatments:  Do not apply lotions, creams, or ointments to wound bed unless directed. [] Apply moisturizing lotion {Fluid-1 lotions :38290} to skin surrounding the wound prior to dressing change.  [] Bactroban/Mupirocin  [] Gentamicin ointment    [] Gentian violet to wound bed and periwound  [] Other:     Dressings:                   Wound Location Sacrum   Apply Primary Dressing:    [x]  Medi-honey gel, medi-honey alginate on top    Cover and Secure with:  [x] Gauze multiple layers [] ABD [] exudry     [] Margaret [] Kerlix [] Mepilex Border   [] Ace Wrap [x] Roll Tape   [] Other:       Change dressing:   [] Daily      [x] Every Other Day   [] Three times per week  [] Once a week   [] Do Not Change Dressing     [] Other:    Pressure Relief:  [x] When sitting, shift position or do seat lifts every 15 minutes.  [] Wheelchair cushion   [] Specialty Bed/Mattress  [x] Turn every 2 hours when in bed. Avoid position directing pressure on wound site.    Off-Loading:   [x] Off-loading when [] walking  [x] in bed [x] sitting    Dietary:  [x] Diet as tolerated [] Diabetic Diet   [x] Increase Protein: examples (Meat, cheese, eggs, greek yogurt, fish, nuts)   [] Alistair Therapeutic Nutrition Powder  [] Other:  [] Dial a Dietician : Call [employer] at [phone] enter code [phone]) when prompted. M-F 9am-5pm EST. Return Appointment:  [] Nurse Visit at wound center in *** days   [x] Return Appointment: With Dr. Wagner Hurt 1 week(s)  [] Ordered tests:     Electronically signed on 7/1/2021 at 8:24 AM     Britney Oliver 281: Should you experience any significant changes in your wound(s) or have questions about your wound care, please contact the [facility] Main at [address] [place] [address] 8:00 am - 4:30. If you need help with your wound outside these hours and cannot wait until we are again available, contact your PCP or go to the hospital emergency room. PLEASE NOTE: IF YOU ARE UNABLE TO OBTAIN WOUND SUPPLIES, CONTINUE TO USE THE SUPPLIES YOU HAVE AVAILABLE UNTIL YOU ARE ABLE TO REACH US. IT IS MOST IMPORTANT TO KEEP THE WOUND COVERED AT ALL TIMES.      Physician Signature:_______________________  Dr. Wagner Hurt

## 2021-07-09 RX ORDER — SIMVASTATIN 10 MG/1
TABLET, FILM COATED ORAL
Qty: 90 TABLET | Refills: 1 | Status: SHIPPED | OUTPATIENT
Start: 2021-07-09 | End: 2022-01-17

## 2021-07-18 RX ORDER — AMLODIPINE BESYLATE 5 MG/1
TABLET ORAL
Qty: 90 TABLET | Refills: 1 | Status: SHIPPED | OUTPATIENT
Start: 2021-07-18 | End: 2021-11-05

## 2021-07-22 ENCOUNTER — HOSPITAL ENCOUNTER (OUTPATIENT)
Dept: WOUND CARE | Age: 76
Discharge: HOME | End: 2021-07-22
Payer: MEDICARE

## 2021-07-22 VITALS
DIASTOLIC BLOOD PRESSURE: 77 MMHG | SYSTOLIC BLOOD PRESSURE: 115 MMHG | HEART RATE: 91 BPM | TEMPERATURE: 97.8 F | RESPIRATION RATE: 14 BRPM

## 2021-07-22 PROCEDURE — 74011000250 HC RX REV CODE- 250: Performed by: FAMILY MEDICINE

## 2021-07-22 PROCEDURE — 11042 DBRDMT SUBQ TIS 1ST 20SQCM/<: CPT

## 2021-07-22 RX ADMIN — Medication: at 14:44

## 2021-07-22 NOTE — DISCHARGE INSTRUCTIONS
Discharge Instructions for  [facility]  [place] [address], [address] [facility]  Telephone: [phone]    NAME:  Inga Matos OF BIRTH:  1945  DATE:  7/8/2021    [x] Wound and dressing supply provider: Bon     Wound Cleansing:   Do not scrub or use excessive force. Cleanse wound prior to applying a clean dressing with:  [] Normal Saline   [] Keep Wound Dry in Shower      [] Wound Cleanser   [x] Cleanse wound with Mild Soap & Water    [x] May Shower at Discharge - remove dressing 1st, wash, pat dry, re-dress right away    [] Do not shower  [] cleanse with baby shampoo lather leave 2-3 then rinse    Topical Treatments:  Do not apply lotions, creams, or ointments to wound bed unless directed. [] Apply moisturizing lotion {InVitae lotions :30709} to skin surrounding the wound prior to dressing change.  [] Bactroban/Mupirocin  [] Gentamicin ointment    [] Gentian violet to wound bed and periwound  [] Other:     Dressings:                   Wound Location Sacrum   Apply Primary Dressing:    [x] Place Judy into wound base 1st (only put of small piece of judy 1/3 size of the wound) Then cover Judy with Medi-honey Alginate     Cover and Secure with:  [x] Gauze multiple layers of 2X2 then 4X4 on top   [] ABD [] exudry     [] Margaret [] Kerlix [] Mepilex Border   [] Ace Wrap [x] Roll Tape   [] Other:       Change dressing:   [x] Daily      [] Every Other Day   [] Three times per week  [] Once a week   [] Do Not Change Dressing     [] Other:    Pressure Relief:  [x] When sitting, shift position or do seat lifts every 15 minutes.  [] Wheelchair cushion   [] Specialty Bed/Mattress  [x] Turn every 2 hours when in bed. Avoid position directing pressure on wound site.    Off-Loading:   [x] Off-loading when [] walking  [x] in bed [x] sitting    Dietary:  [x] Diet as tolerated [] Diabetic Diet   [x] Increase Protein: examples (Meat, cheese, eggs, greek yogurt, fish, nuts)   [] Alistair Therapeutic Nutrition Powder  [] Other:  [] Dial a Dietician : Call [facility] at [phone] enter code ([phone]) when prompted. M-F 9am-5pm EST. Return Appointment:  [] Nurse Visit at wound center in *** days   [x] Return Appointment: With Dr. Jose Alejandro Hoskins 1 week(s)  [] Ordered tests:     Electronically signed on 7/8/2021 at 8:24 AM     Britney Oliver 281: Should you experience any significant changes in your wound(s) or have questions about your wound care, please contact the [facility] at [address] [place] [address] 8:00 am - 4:30. If you need help with your wound outside these hours and cannot wait until we are again available, contact your PCP or go to the hospital emergency room. PLEASE NOTE: IF YOU ARE UNABLE TO OBTAIN WOUND SUPPLIES, CONTINUE TO USE THE SUPPLIES YOU HAVE AVAILABLE UNTIL YOU ARE ABLE TO REACH US. IT IS MOST IMPORTANT TO KEEP THE WOUND COVERED AT ALL TIMES.      Physician Signature:_______________________  Dr. Jose Alejandro Hoskins

## 2021-07-22 NOTE — WOUND CARE
07/22/21 1503   Wound Sacrum #1   Date First Assessed/Time First Assessed: 05/27/21 1434   Present on Hospital Admission: Yes  Primary Wound Type: Pressure Injury  Location: Sacrum  Wound Description: #1   Dressing/Treatment Collagen with Ag;Foam;Honey alginate;Gauze dressing/dressing sponge;Tape/Soft cloth adhesive tape   Discharge Condition: Stable     Pain: 0    Ambulatory Status: Walking and Walker     Discharge Destination: Home     Transportation: Car    Accompanied by: Self     Discharge instructions reviewed with Patient and Family/Caregiver  and copy or written instructions have been provided. All questions/concerns have been addressed at this time.

## 2021-07-22 NOTE — WOUND CARE
07/22/21 1440   Wound Sacrum #1   Date First Assessed/Time First Assessed: 05/27/21 1434   Present on Hospital Admission: Yes  Primary Wound Type: Pressure Injury  Location: Sacrum  Wound Description: #1   Wound Image    Dressing Status Old drainage noted   Cleansed Cleansed with saline   Wound Length (cm) 2.5 cm   Wound Width (cm) 1 cm   Wound Depth (cm) 0.4 cm   Wound Surface Area (cm^2) 2.5 cm^2   Change in Wound Size % 3.85   Wound Volume (cm^3) 1 cm^3   Wound Healing % 23   Wound Assessment Stanford/red;Slough   Drainage Amount Moderate   Drainage Description Serosanguinous   Wound Odor None   Ginny-Wound/Incision Assessment Maceration     Visit Vitals  /77   Pulse 91   Temp 97.8 °F (36.6 °C)   Resp 14

## 2021-07-22 NOTE — WOUND CARE
Wound Center Progress Note / Procedure Note Subjective: Chief Complaint: Thalia Escobar is a 67 y.o.  female  with sacral wound of >1 months duration. HPI:    
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 
No new issues Wound caused by: pressure / moisture Current wound care:See flowsheet Offloading wound: yes Appetite: good Wound associated pain: See flowsheet Diabetic: no 
Smoker: no 
ROS: no N/V/D, no T/chills; no local rash, no chest pain or shortness of breath, no headache or dizzyness +urinary incontinence Objective:  
 
Physical Exam:  
See flowsheet / nursing notes for vitals Visit Vitals /77 Pulse 91 Temp 97.8 °F (36.6 °C) Resp 14 General: NAD. Hygiene good Psych: cooperative. No anxiety or depression. Normal mood and affect. Neuro: alert and oriented to person/place/situation. Otherwise nonfocal. 
Derm: Normal  turgor for age, dry skin HEENT: Normocephalic, atraumatic. EOMI. Conjunctiva clear. No scleral icterus. Neck: Normal range of motion. No masses. Chest: Respirations nonlabored Lower extremities: color normal; temperature normal.  
Ulcer Description:  
See Flowsheet Data Review:  
 
 
  
 
 
Assessment/Plan  
 
67 y.o. female with recent h/o urinary incontinence 
 
-sacral ulcer. Pressure stage 3 Full thickness Sl smaller Still thicknened macerated tissue periwound Mild Slough Necessitates debridement  for wound healing and to prevent/heal infection 
macerated tissue also pared thoroughly, will see if it builds up again See below Culture neg Questioned further Does slide in chair to get up- that princess be cauiung the thickening in the periwound Will minimize that 
 
Also will frame wound with foam cutout to see if that helps Biopsy next visit, if not improvement Urinary incontience 
 straight cath 4x/day Offloading as instructed Following discussed with patient / Gudelia Erasmo Needs : 
Serial debridement- debrided today- see note below Good local wound care Dressing:  
 
Collagen, medihoney alginate Frequency : every  day Patient/  understood and agrees with plan. Questions answered. Follow up with me in 1 week Procedure:  
 
Ulcer assessment: Due to presence of necrotic tissue within the wound bed, ulcer requires debridement. Procedure: Debridement:  
The indication for debridement was reviewed with patient. Risks of procedure (bleeding, infection, pain) were discussed with patient/and consent signed on first visit. Questions were answered Subcutaneous excisional debridement Indication: to remove necrotic tissue/ vitalized and devitalized tissue/ infected tissue/ PARE macerated tissue through skin and subcutaneous layer of wound bed Time out done Consent in chart Anesthesia: Topical  lidocaine Instrument: curette Residual Necrosis: Present and scored Bleeding: <1ml Hemostasis: Pressure Patient tolerated procedure well Procedural Pain: 0 Post - procedural pain: 0 Post debridement measurements:  see below Surface area debrided: <20 sq. cm 
 
WOUND POA CONDITIONS Wound Sacrum #1 (Active) Wound Image   07/22/21 1440 Wound Etiology Pressure Stage 3 06/17/21 1331 Dressing Status Old drainage noted 07/22/21 1440 Cleansed Cleansed with saline 07/22/21 1440 Dressing/Treatment Collagen with Ag;Foam;Honey alginate;Gauze dressing/dressing sponge;Tape/Soft cloth adhesive tape 07/22/21 1503 Wound Length (cm) 2.5 cm 07/22/21 1440 Wound Width (cm) 1 cm 07/22/21 1440 Wound Depth (cm) 0.4 cm 07/22/21 1440 Wound Surface Area (cm^2) 2.5 cm^2 07/22/21 1440 Change in Wound Size % 3.85 07/22/21 1440 Wound Volume (cm^3) 1 cm^3 07/22/21 1440 Wound Healing % 23 07/22/21 1440 Post-Procedure Length (cm) 2.7 cm 07/22/21 1454 Post-Procedure Width (cm) 1.2 cm 07/22/21 1454 Post-Procedure Depth (cm) 0.5 cm 07/22/21 1454 Post-Procedure Surface Area (cm^2) 3.24 cm^2 07/22/21 1454 Post-Procedure Volume (cm^3) 1.62 cm^3 07/22/21 1454 Wound Assessment [place]/red;Slough 07/22/21 1440 Drainage Amount Moderate 07/22/21 1440 Drainage Description Serosanguinous 07/22/21 1440 Wound Odor None 07/22/21 1440 Ginny-Wound/Incision Assessment Maceration 07/22/21 1440 Edges Epibole (rolled edges) 07/01/21 1535 Wound Thickness Description Full thickness 06/17/21 1331 Number of days: 56  
   
 
------- Past Medical History:  
Diagnosis Date  Asthma  Hypercholesterolemia  Hypertension  Osteoarthritis Past Surgical History:  
Procedure Laterality Date  COLONOSCOPY N/A 2/2/2017 COLONOSCOPY performed by Adams Gerard MD at [address]  HX HYSTERECTOMY Family History Problem Relation Age of Onset  No Known Problems Mother  No Known Problems Father  Diabetes Sister Social History Tobacco Use  Smoking status: Never Smoker  Smokeless tobacco: Never Used Substance Use Topics  Alcohol use: No  
   
Prior to Admission medications Medication Sig Start Date End Date Taking? Authorizing Provider  
amLODIPine (NORVASC) 5 mg tablet TAKE 1 TABLET BY MOUTH EVERY DAY 7/18/21  Yes Stephy Winn MD  
simvastatin (ZOCOR) 10 mg tablet TAKE 1 TABLET BY MOUTH EVERY NIGHT AT BEDTIME 7/9/21  Yes Stephy Winn MD  
mupirocin (BACTROBAN) 2 % ointment Apply  to affected area daily. 6/10/21  Yes Diana Dixon MD  
potassium chloride (KLOR-CON) 10 mEq tablet TAKE 1 TABLET BY MOUTH DAILY 4/28/21  Yes Stephy Winn MD  
montelukast (SINGULAIR) 10 mg tablet Take 1 Tab by mouth daily.  For allergies 4/28/21  Yes Wade Reyna MD gabapentin (NEURONTIN) 300 mg capsule Take 1 Cap by mouth two (2) times a day. Max Daily Amount: 600 mg. 4/22/21  Yes Dirk Vaz MD  
lisinopril-hydroCHLOROthiazide (PRINZIDE, ZESTORETIC) 20-25 mg per tablet TAKE 1 TABLET BY MOUTH EVERY DAY 4/13/21  Yes Stephy Winn MD  
naproxen (NAPROSYN) 500 mg tablet TAKE 1 TABLET BY MOUTH TWICE DAILY AS NEEDED FOR PAIN 3/5/21  Yes Stephy Winn MD  
multivitamin (ONE A DAY) tablet Take 1 Tab by mouth daily. Yes Provider, Historical  
albuterol (PROVENTIL HFA, VENTOLIN HFA, PROAIR HFA) 90 mcg/actuation inhaler Take 2 Puffs by inhalation every six (6) hours as needed for Wheezing or Shortness of Breath. 4/7/20  Yes Stephy Winn MD  
fluticasone propion-salmeteroL (Advair Diskus) 100-50 mcg/dose diskus inhaler Take 1 Puff by inhalation every twelve (12) hours. 4/7/20  Yes Stephy Winn MD  
oxyCODONE IR (ROXICODONE) 10 mg tab immediate release tablet TK 1 T PO  Q 6 H. FILL ON/AFTER 03/23/19 6/24/19  Yes Provider, Historical  
linaclotide (LINZESS) 145 mcg cap capsule Take 1 Cap by mouth Daily (before breakfast). 4/3/19  Yes Stephy Winn MD  
polyethylene glycol (MIRALAX) 17 gram/dose powder Take 17 g by mouth daily. 4/3/19  Yes Stephy Winn MD  
cholecalciferol, vitamin D3, (VITAMIN D3) 2,000 unit tab Take  by mouth daily. Yes Provider, Historical  
aspirin delayed-release 81 mg tablet Take 1 Tab by mouth daily. 9/12/16  Yes Romaine Jimenez MD  
tiZANidine (ZANAFLEX) 4 mg tablet Take 1 Tab by mouth two (2) times a day. 10/13/15  Yes Ouida Hashimoto, MD  
 
Allergies Allergen Reactions  Penicillins Swelling Signed By: Bartolome Romero MD   
 July 22, 2021

## 2021-07-26 ENCOUNTER — VIRTUAL VISIT (OUTPATIENT)
Dept: FAMILY MEDICINE CLINIC | Age: 76
End: 2021-07-26
Payer: MEDICARE

## 2021-07-26 DIAGNOSIS — M48.061 SPINAL STENOSIS OF LUMBAR REGION, UNSPECIFIED WHETHER NEUROGENIC CLAUDICATION PRESENT: ICD-10-CM

## 2021-07-26 DIAGNOSIS — L89.153 PRESSURE INJURY OF SACRAL REGION, STAGE 3 (HCC): Primary | ICD-10-CM

## 2021-07-26 DIAGNOSIS — I10 ESSENTIAL HYPERTENSION: ICD-10-CM

## 2021-07-26 PROCEDURE — 99214 OFFICE O/P EST MOD 30 MIN: CPT | Performed by: INTERNAL MEDICINE

## 2021-07-26 PROCEDURE — G8510 SCR DEP NEG, NO PLAN REQD: HCPCS | Performed by: INTERNAL MEDICINE

## 2021-07-26 PROCEDURE — G8399 PT W/DXA RESULTS DOCUMENT: HCPCS | Performed by: INTERNAL MEDICINE

## 2021-07-26 PROCEDURE — 1090F PRES/ABSN URINE INCON ASSESS: CPT | Performed by: INTERNAL MEDICINE

## 2021-07-26 PROCEDURE — G8756 NO BP MEASURE DOC: HCPCS | Performed by: INTERNAL MEDICINE

## 2021-07-26 PROCEDURE — 1101F PT FALLS ASSESS-DOCD LE1/YR: CPT | Performed by: INTERNAL MEDICINE

## 2021-07-26 PROCEDURE — G8427 DOCREV CUR MEDS BY ELIG CLIN: HCPCS | Performed by: INTERNAL MEDICINE

## 2021-07-26 NOTE — PATIENT INSTRUCTIONS
Learning About Preventing Pressure Injuries  What are pressure injuries? A pressure injury to the skin is caused by constant pressure over a period of time. The constant pressure blocks the blood supply to the skin. This causes skin cells to die and creates a sore. Pressure injuries are also called bedsores. Pressure injuries usually occur over bony areas, such as the hips, lower back, elbows, heels, and shoulders. Pressure injuries can also occur in places where the skin folds over on itself, or where medical equipment presses on the skin, such as when oxygen tubes press on the ears or cheeks. Pressure injuries can range from red areas on the surface of the skin to severe tissue damage that goes deep into muscle and bone. Severe sores are hard to treat and slow to heal. When pressure injuries do not heal properly, problems such as bone, blood, and skin infections can develop. What causes pressure injuries? Things that cause pressure injuries include:  · Pressure on the skin and tissues. For example, the sores may happen when a person lies in bed or sits in a wheelchair for a long time. This is the most common cause of pressure injuries. · Sliding down in a bed or chair, forcing the skin to fold over itself (shear force). · Being pulled across bed sheets or other surfaces (friction burns). As we get older, our skin gets more thin and dry and less elastic, so it is easier to damage. Poor nutrition and not getting enough fluids make these natural changes in the skin worse. Skin in this condition may easily develop a pressure injury. Skin can also be damaged by sweat, feces, or urine, making pressure injuries more likely and harder to heal.  How can you help prevent pressure injuries? If you are not able to do these things yourself, ask a family member or friend for help. Change position often  · In a bed, change position every 2 hours.   · In a wheelchair or other type of chair, shift your weight every 15 minutes, and give yourself a full relief of weight every hour. ? For a weight shift, lean forward and to the left and right. Push up out of the chair with your arms. If you have a chair that tilts, use the tilt control to help relieve pressure. ? For a full relief of weight, stand up or move to another chair or bed if you are able to. Personal care  · Check your skin every day, especially around bony areas. When a pressure injury is forming, skin temperature can be different than nearby skin. It might be warmer or cooler. The skin can feel either firmer or softer than the surrounding skin. · Keep your skin clean and free of sweat, wound drainage, urine, and feces. · Use skin lotions to keep your skin from drying out and cracking. Barrier lotions or creams have ingredients that can act as a shield to help protect the skin from moisture or irritation. · Try not to slide or slump across sheets in a chair or bed. And try not to sleep in a recliner chair. Lifestyle choices  · Eat healthy foods with plenty of protein to help heal damaged skin and to help new skin grow. · Get plenty of fluids. · Stay at a healthy weight. Being either overweight or underweight can make pressure injuries more likely. · If you smoke, stop. Smoking dries the skin and reduces its blood supply. If you need help quitting, talk to your doctor about stop-smoking programs and medicines. These can increase your chances of quitting for good. Ask about using cushions or pads  · Overlays are special pads you put on top of a mattress. They provide a softer surface that will fit your body's shape better than a regular mattress. · Cushions or devices can be used to reduce pressure on certain areas of the body. For example, you can use a \"medical heel pillow\" to help prevent pressure injuries on heels. You can also get cushions for seating surfaces, such as wheelchair seats. Talk with your doctor about cushions and pads.  Some products, such as doughnut-type devices, may actually cause pressure injuries or make them worse. Where can you learn more? Go to http://www.gray.com/  Enter H403 in the search box to learn more about \"Learning About Preventing Pressure Injuries. \"  Current as of: March 4, 2020               Content Version: 12.8  © 5900-8977 [employer]. Care instructions adapted under license by [employer] (which disclaims liability or warranty for this information). If you have questions about a medical condition or this instruction, always ask your healthcare professional. [place] [address] any warranty or liability for your use of this information.

## 2021-07-26 NOTE — PROGRESS NOTES
Patient seen virtually for routine follow up     1. Have you been to the ER, urgent care clinic since your last visit? Hospitalized since your last visit? No    2. Have you seen or consulted any other health care providers outside of the [address] [place] [address] since your last visit? Include any pap smears or colon screening.  No     Health Maintenance Due   Topic Date Due    Shingrix Vaccine Age 49> (1 of 2) Never done    Pneumococcal 65+ years (1 of 1 - PPSV23) Never done    Lipid Screen  10/07/2020

## 2021-07-26 NOTE — PROGRESS NOTES
Paula Loya is a 67 y.o. female who was seen by synchronous (real-time) audio-video technology on 7/26/2021 for Follow Up Chronic Condition        Assessment & Plan:   Diagnoses and all orders for this visit:    1. Pressure injury of sacral region, stage 3 ([address]    2. Essential hypertension    3. Spinal stenosis of lumbar region, unspecified whether neurogenic claudication present            712  Subjective:     Health Maintenance Due   Topic Date Due    Shingrix Vaccine Age 49> (1 of 2) Never done    Pneumococcal 65+ years (1 of 1 - PPSV23) Never done    Lipid Screen  10/07/2020     Has been to wound care-buttocks getting better-sitting too much and urinary incontinence  Saw Gyn, Dr Patsy Quintana who saw it and advised to get it treated  Initially thought the pain was from her back    In [place] with her daughter now    GWEN [address] 55 she already got her pneumonia shot at [facility] in [address] on virtual visit/office visit with Dr Javier Almaraz    Prior to Admission medications    Medication Sig Start Date End Date Taking? Authorizing Provider   amLODIPine (NORVASC) 5 mg tablet TAKE 1 TABLET BY MOUTH EVERY DAY 7/18/21  Yes Stephy Winn MD   simvastatin (ZOCOR) 10 mg tablet TAKE 1 TABLET BY MOUTH EVERY NIGHT AT BEDTIME 7/9/21  Yes Stephy Winn MD   mupirocin (BACTROBAN) 2 % ointment Apply  to affected area daily. 6/10/21  Yes Jodi Holliday MD   potassium chloride (KLOR-CON) 10 mEq tablet TAKE 1 TABLET BY MOUTH DAILY 4/28/21  Yes Stephy Winn MD   montelukast (SINGULAIR) 10 mg tablet Take 1 Tab by mouth daily. For allergies 4/28/21  Yes Stephy Winn MD   gabapentin (NEURONTIN) 300 mg capsule Take 1 Cap by mouth two (2) times a day.  Max Daily Amount: 600 mg. 4/22/21  Yes Andrade Echeverria MD   lisinopril-hydroCHLOROthiazide (PRINZIDE, ZESTORETIC) 20-25 mg per tablet TAKE 1 TABLET BY MOUTH EVERY DAY 4/13/21  Yes Stephy Winn MD   naproxen (NAPROSYN) 500 mg tablet TAKE 1 TABLET BY MOUTH TWICE DAILY AS NEEDED FOR PAIN 3/5/21  Yes Bimal Bee MD   multivitamin (ONE A DAY) tablet Take 1 Tab by mouth daily. Yes Provider, Historical   albuterol (PROVENTIL HFA, VENTOLIN HFA, PROAIR HFA) 90 mcg/actuation inhaler Take 2 Puffs by inhalation every six (6) hours as needed for Wheezing or Shortness of Breath. 4/7/20  Yes Stephy Winn MD   fluticasone propion-salmeteroL (Advair Diskus) 100-50 mcg/dose diskus inhaler Take 1 Puff by inhalation every twelve (12) hours. 4/7/20  Yes Stephy Winn MD   oxyCODONE IR (ROXICODONE) 10 mg tab immediate release tablet TK 1 T PO  Q 6 H. FILL ON/AFTER 03/23/19 6/24/19  Yes Provider, Historical   linaclotide (LINZESS) 145 mcg cap capsule Take 1 Cap by mouth Daily (before breakfast). 4/3/19  Yes Stephy Winn MD   polyethylene glycol (MIRALAX) 17 gram/dose powder Take 17 g by mouth daily. 4/3/19  Yes Stephy Winn MD   cholecalciferol, vitamin D3, (VITAMIN D3) 2,000 unit tab Take  by mouth daily. Yes Provider, Historical   tiZANidine (ZANAFLEX) 4 mg tablet Take 1 Tab by mouth two (2) times a day. 10/13/15  Yes Nilesh Perera MD   aspirin delayed-release 81 mg tablet Take 1 Tab by mouth daily.   Patient not taking: Reported on 7/26/2021 9/12/16 7/26/21  German Nina MD     Patient Active Problem List    Diagnosis Date Noted    Urinary incontinence 05/27/2021    Pre-diabetes 10/07/2020    Recurrent UTI 10/07/2020    Neuropathy 01/29/2019    Constipation due to opioid therapy 10/04/2018    Overweight (BMI 25.0-29.9) 01/10/2018    Mild persistent asthma without complication 87/09/0100    Advanced directives, counseling/discussion 10/09/2017    Chronic bilateral low back pain with bilateral sciatica 06/25/2017    Lumbar spinal stenosis 06/25/2017    Essential hypertension 06/20/2017    Mixed hyperlipidemia 06/20/2017       ROS  Pt denies: Wt loss, Fever/Chills, HA, Visual changes, Fatigue, Chest pain, SOB, MONDRAGON, Abd pain, N/V/D/C, Blood in stool or urine, Edema. Pertinent positive as above in HPI. All others were negative  Objective:   No flowsheet data found. General: alert, cooperative, no distress   Mental  status: normal mood, behavior, speech, dress, motor activity, and thought processes, able to follow commands   HENT: NCAT   Neck: no visualized mass   Resp: no respiratory distress   Neuro: no gross deficits   Skin: no discoloration or lesions of concern on visible areas   Psychiatric: normal affect, consistent with stated mood, no evidence of hallucinations     Additional exam findings: We discussed the expected course, resolution and complications of the diagnosis(es) in detail. Medication risks, benefits, costs, interactions, and alternatives were discussed as indicated. I advised her to contact the office if her condition worsens, changes or fails to improve as anticipated. She expressed understanding with the diagnosis(es) and plan. Concepción Cote, was evaluated through a synchronous (real-time) audio-video encounter. The patient (or guardian if applicable) is aware that this is a billable service. Verbal consent to proceed has been obtained within the past 12 months. The visit was conducted pursuant to the emergency declaration under the [facility], [address] [place] [address] authority and the [employer] General Act. Patient identification was verified, and a caregiver was present when appropriate. The patient was located in a state where the provider was credentialed to provide care.     Yovani Griffin MD

## 2021-07-29 ENCOUNTER — HOSPITAL ENCOUNTER (OUTPATIENT)
Dept: WOUND CARE | Age: 76
Discharge: HOME | End: 2021-07-29
Payer: MEDICARE

## 2021-07-29 VITALS
DIASTOLIC BLOOD PRESSURE: 74 MMHG | RESPIRATION RATE: 18 BRPM | HEART RATE: 82 BPM | TEMPERATURE: 97.8 F | SYSTOLIC BLOOD PRESSURE: 143 MMHG

## 2021-07-29 PROCEDURE — 11042 DBRDMT SUBQ TIS 1ST 20SQCM/<: CPT

## 2021-07-29 PROCEDURE — 74011000250 HC RX REV CODE- 250: Performed by: FAMILY MEDICINE

## 2021-07-29 PROCEDURE — 11104 PUNCH BX SKIN SINGLE LESION: CPT

## 2021-07-29 RX ADMIN — Medication: at 15:09

## 2021-07-29 NOTE — DISCHARGE INSTRUCTIONS
Discharge Instructions for  [facility]  [address] [place], [address] [facility]  Telephone: [phone]    NAME:  Austin Ashley OF BIRTH:  1945  DATE:  7/29/2021    [x] Wound and dressing supply provider: Bon     Wound Cleansing:   Do not scrub or use excessive force. Cleanse wound prior to applying a clean dressing with:  [] Normal Saline   [] Keep Wound Dry in Shower      [] Wound Cleanser   [x] Cleanse wound with Mild Soap & Water    [x] May Shower at Discharge - remove dressing 1st, wash, pat dry, re-dress right away    [] Do not shower  [] cleanse with baby shampoo lather leave 2-3 then rinse    Topical Treatments:  Do not apply lotions, creams, or ointments to wound bed unless directed. [] Apply moisturizing lotion {[employer] lotions :51642} to skin surrounding the wound prior to dressing change.  [] Bactroban/Mupirocin  [] Gentamicin ointment    [] Gentian violet to wound bed and periwound  [] Other:     Dressings:                   Wound Location Sacrum   Apply Primary Dressing:    [x] Hydrafera blue Foam to window wound, Place Judy into wound base 1st (only put of small piece of judy 1/3 size of the wound) Then cover with Medi-honey Alginate     Cover and Secure with:  [x] Gauze multiple layers of 2X2 then Mepilex 4X4   [] ABD [] exudry     [] Margaret [] Kerlix [] Mepilex Border   [] Ace Wrap [x] Roll Tape   [] Other:       Change dressing:   [x] Daily      [] Every Other Day   [] Three times per week  [] Once a week   [] Do Not Change Dressing     [] Other:    Pressure Relief:  [x] When sitting, shift position or do seat lifts every 15 minutes.  [] Wheelchair cushion   [] Specialty Bed/Mattress  [x] Turn every 2 hours when in bed. Avoid position directing pressure on wound site.      Off-Loading:   [x] Off-loading when [] walking  [x] in bed [x] sitting    Dietary:  [x] Diet as tolerated [] Diabetic Diet   [x] Increase Protein: examples (Meat, cheese, eggs, greek yogurt, fish, nuts)   [] Alistair Therapeutic Nutrition Powder  [] Other:  [] Dial a Dietician : Call [employer] at [phone] enter code ([phone]) when prompted. M-F 9am-5pm EST. Return Appointment:  [] Nurse Visit at wound center in *** days   [x] Return Appointment: With Dr. Matteo Salas 1 week(s)  [] Ordered tests:     Electronically signed on 7/23/2021     [address] [place] [address] Information: Should you experience any significant changes in your wound(s) or have questions about your wound care, please contact the [facility] Main at [address] [place] [address] 8:00 am - 4:30. If you need help with your wound outside these hours and cannot wait until we are again available, contact your PCP or go to the hospital emergency room. PLEASE NOTE: IF YOU ARE UNABLE TO OBTAIN WOUND SUPPLIES, CONTINUE TO USE THE SUPPLIES YOU HAVE AVAILABLE UNTIL YOU ARE ABLE TO REACH US. IT IS MOST IMPORTANT TO KEEP THE WOUND COVERED AT ALL TIMES.      Physician Signature:_______________________  Dr. Matteo Salas

## 2021-07-29 NOTE — WOUND CARE
Wound Center  Progress Note / Procedure Note    Subjective:     Chief Complaint:  Melanie Gary is a 67 y.o.  female  with sacral wound of >1 months duration. 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 fair bit but gets up and walks around every 2 hours    Has a cushion    History/Chart/Medications reviewed    Since last visit:  No new issues  Following instructions to the T      Wound caused by: pressure / moisture  Current wound care:See flowsheet  Offloading wound: yes  Appetite: good  Wound associated pain: See flowsheet  Diabetic: no  Smoker: no  ROS: no N/V/D, no T/chills; no local rash, no chest pain or shortness of breath, no headache or dizzyness  +urinary incontinence      Objective:     Physical Exam:   See flowsheet / nursing notes for vitals  Visit Vitals  BP (!) 143/74 (BP 1 Location: Left upper arm, BP Patient Position: Sitting)   Pulse 82   Temp 97.8 °F (36.6 °C)   Resp 18     General: NAD. Hygiene good  Psych: cooperative. No anxiety or depression. Normal mood and affect. Neuro: alert and oriented to person/place/situation. Otherwise nonfocal.  Derm: Normal  turgor for age, dry skin  HEENT: Normocephalic, atraumatic. EOMI. Conjunctiva clear. No scleral icterus. Neck: Normal range of motion. No masses. Chest: Respirations nonlabored  Lower extremities: color normal; temperature normal.   Ulcer Description:   See Flowsheet           Data Review:              Assessment/Plan     67 y.o. female with recent h/o urinary incontinence    -sacral ulcer.   Pressure stage 3  Full thickness  Stable  Still thicknened macerated tissue periwound- drier now, also some honey residue there, not as much of hyperkeratosis  Mild Slough  Necessitates debridement  for wound healing and to prevent/heal infection  See below  Culture neg    Questioned further  Does slide in chair to get up- that princess be cauiung the thickening in the periwound    Will minimize that    Also will frame wound with foam cutout to see if that helps - use HBR readiy this time    Biopsy done      Get preauth for Snap vac- d/w pt- how it works    Urinary incontience   straight cath 4x/day    Offloading as instructed      Following discussed with patient / dtr  Needs :  Serial debridement- debrided today- see note below    Good local wound care  Dressing:     Collagen, medihoney alginate  Frequency : every  day        Patient/  understood and agrees with plan. Questions answered. Follow up with me in 1 week      Procedure:     Ulcer assessment: Due to presence of necrotic tissue within the wound bed, ulcer requires debridement. Procedure: Debridement:   The indication for debridement was reviewed with patient. Risks of procedure (bleeding, infection, pain) were discussed with patient/and consent signed on first visit. Questions were answered    Subcutaneous excisional debridement  Indication: to remove necrotic tissue/ vitalized and devitalized tissue/ infected tissue/ PARE macerated tissue through skin and subcutaneous layer of wound bed  Time out done  Consent in chart   Anesthesia: Topical  lidocaine   Instrument: curette   Residual Necrosis: Present and scored   Bleeding: <1ml   Hemostasis: Pressure   Patient tolerated procedure well   Procedural Pain: 0  Post - procedural pain: 0    Post debridement measurements:  see below  Surface area debrided: <20 sq.  Cm    Biopsy  SQ tissue biopsy done  Indication:non-healing chronic painful ulcer; R/O CA, pyoderma gangrenosum  Consent in chart   Anesthesia: Topical  lidocaine  Instrument: 3mm punch biopsy; pickups, 15 blade  Bleedin-3ml   Hemostasis: Pressure   Patient tolerated procedure well   Procedural Pain: 0  Post - procedural pain: 0      WOUND POA CONDITIONS    Wound Sacrum #1 (Active)   Wound Image   21 1505   Wound Etiology Pressure Stage 3 06/17/21 1331   Dressing Status Old drainage noted 07/22/21 1440   Cleansed Cleansed with saline 07/22/21 1440   Dressing/Treatment Collagen with Ag;Foam;Honey alginate;Gauze dressing/dressing sponge;Tape/Soft cloth adhesive tape 07/22/21 1503   Wound Length (cm) 2.6 cm 07/29/21 1505   Wound Width (cm) 0.6 cm 07/29/21 1505   Wound Depth (cm) 0.3 cm 07/29/21 1505   Wound Surface Area (cm^2) 1.56 cm^2 07/29/21 1505   Change in Wound Size % 40 07/29/21 1505   Wound Volume (cm^3) 0.468 cm^3 07/29/21 1505   Wound Healing % 64 07/29/21 1505   Post-Procedure Length (cm) 2.7 cm 07/29/21 1536   Post-Procedure Width (cm) 0.7 cm 07/29/21 1536   Post-Procedure Depth (cm) 0.4 cm 07/29/21 1536   Post-Procedure Surface Area (cm^2) 1.89 cm^2 07/29/21 1536   Post-Procedure Volume (cm^3) 0.756 cm^3 07/29/21 1536   Wound Assessment Pink/red;Slough 07/29/21 1505   Drainage Amount Moderate 07/29/21 1505   Drainage Description Serosanguinous 07/29/21 1505   Wound Odor None 07/29/21 1505   Ginny-Wound/Incision Assessment Maceration 07/29/21 1505   Edges Epibole (rolled edges) 07/29/21 1505   Wound Thickness Description Full thickness 06/17/21 1331   Number of days: 61           -------    Past Medical History:   Diagnosis Date    Asthma     Hypercholesterolemia     Hypertension     Osteoarthritis       Past Surgical History:   Procedure Laterality Date    COLONOSCOPY N/A 2/2/2017    COLON[facility] by Caridad [facility]bitt [facility] at [facility] ENDOSCOPY    HX CHOLECYSTECTOMY      HX HYSTERECTOMY       Family History   Problem Relation Age of Onset    No Known Problems Mother     No Known Problems Father     Diabetes Sister       Social History     Tobacco Use    Smoking status: Never Smoker    Smokeless tobacco: Never Used   Substance Use Topics    Alcohol use: No       Prior to Admission medications    Medication Sig Start Date End Date Taking?  Authorizing Provider   amLODIPine (NORVASC) 5 mg tablet TAKE 1 TABLET BY MOUTH EVERY DAY 7/18/21  Yes Stephy Winn MD   simvastatin (ZOCOR) 10 mg tablet TAKE 1 TABLET BY MOUTH EVERY NIGHT AT BEDTIME 7/9/21  Yes Stephy Winn MD   mupirocin (BACTROBAN) 2 % ointment Apply  to affected area daily. 6/10/21  Yes Pete Looney MD   potassium chloride (KLOR-CON) 10 mEq tablet TAKE 1 TABLET BY MOUTH DAILY 4/28/21  Yes Stephy Winn MD   montelukast (SINGULAIR) 10 mg tablet Take 1 Tab by mouth daily. For allergies 4/28/21  Yes Stephy Winn MD   gabapentin (NEURONTIN) 300 mg capsule Take 1 Cap by mouth two (2) times a day. Max Daily Amount: 600 mg. 4/22/21  Yes Fabrizio Bird MD   lisinopril-hydroCHLOROthiazide (PRINZIDE, ZESTORETIC) 20-25 mg per tablet TAKE 1 TABLET BY MOUTH EVERY DAY 4/13/21  Yes Stephy Winn MD   naproxen (NAPROSYN) 500 mg tablet TAKE 1 TABLET BY MOUTH TWICE DAILY AS NEEDED FOR PAIN 3/5/21  Yes Stephy Winn MD   multivitamin (ONE A DAY) tablet Take 1 Tab by mouth daily. Yes Provider, Historical   albuterol (PROVENTIL HFA, VENTOLIN HFA, PROAIR HFA) 90 mcg/actuation inhaler Take 2 Puffs by inhalation every six (6) hours as needed for Wheezing or Shortness of Breath. 4/7/20  Yes Stephy Winn MD   fluticasone propion-salmeteroL (Advair Diskus) 100-50 mcg/dose diskus inhaler Take 1 Puff by inhalation every twelve (12) hours. 4/7/20  Yes Stephy Winn MD   oxyCODONE IR (ROXICODONE) 10 mg tab immediate release tablet TK 1 T PO  Q 6 H. FILL ON/AFTER 03/23/19 6/24/19  Yes Provider, Historical   linaclotide (LINZESS) 145 mcg cap capsule Take 1 Cap by mouth Daily (before breakfast). 4/3/19  Yes Stephy Winn MD   polyethylene glycol (MIRALAX) 17 gram/dose powder Take 17 g by mouth daily. 4/3/19  Yes Stephy Winn MD   cholecalciferol, vitamin D3, (VITAMIN D3) 2,000 unit tab Take  by mouth daily. Yes Provider, Historical   tiZANidine (ZANAFLEX) 4 mg tablet Take 1 Tab by mouth two (2) times a day.  10/13/15  Yes Maria A Nobles MD     Allergies   Allergen Reactions    Penicillins Swelling          Signed By: Emerald Valdez MD     July 29, 2021

## 2021-08-05 ENCOUNTER — HOSPITAL ENCOUNTER (OUTPATIENT)
Dept: WOUND CARE | Age: 76
Discharge: HOME | End: 2021-08-05
Payer: MEDICARE

## 2021-08-05 VITALS
SYSTOLIC BLOOD PRESSURE: 127 MMHG | HEART RATE: 89 BPM | DIASTOLIC BLOOD PRESSURE: 83 MMHG | RESPIRATION RATE: 16 BRPM | TEMPERATURE: 97.8 F

## 2021-08-05 PROBLEM — L85.9: Status: ACTIVE | Noted: 2021-08-05

## 2021-08-05 PROCEDURE — 11042 DBRDMT SUBQ TIS 1ST 20SQCM/<: CPT

## 2021-08-05 PROCEDURE — 74011000250 HC RX REV CODE- 250: Performed by: FAMILY MEDICINE

## 2021-08-05 RX ADMIN — Medication: at 15:20

## 2021-08-05 NOTE — WOUND CARE
08/05/21 1600 Wound Sacrum #1 Date First Assessed/Time First Assessed: 05/27/21 1434   Present on Hospital Admission: Yes  Primary Wound Type: Pressure Injury  Location: Sacrum  Wound Description: #1 Dressing/Treatment  
(snap vac) Discharge Condition: Stable Pain: 3 Ambulatory Status: Gale Asper Discharge Destination: Home Transportation: Car Accompanied by: Family/Caregiver Discharge instructions reviewed with Family/Caregiver  and copy or written instructions have been provided. All questions/concerns have been addressed at this time.

## 2021-08-05 NOTE — WOUND CARE
Wound Center Progress Note / Procedure Note Subjective: Chief Complaint: Verne Rubinstein is a 67 y.o.  female  with sacral wound of >1 months duration. HPI:    
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 
No new issues Wound caused by: pressure / moisture Current wound care:See flowsheet Offloading wound: yes Appetite: good Wound associated pain: See flowsheet Diabetic: no 
Smoker: no 
ROS: no N/V/D, no T/chills; no local rash, no chest pain or shortness of breath, no headache or dizzyness +urinary incontinence Objective:  
 
Physical Exam:  
See flowsheet / nursing notes for vitals Visit Vitals /83 (BP 1 Location: Left upper arm, BP Patient Position: At rest) Pulse 89 Temp 97.8 °F (36.6 °C) Resp 16 General: NAD. Hygiene good Psych: cooperative. No anxiety or depression. Normal mood and affect. Neuro: alert and oriented to person/place/situation. Otherwise nonfocal. 
Derm: Normal  turgor for age, dry skin HEENT: Normocephalic, atraumatic. EOMI. Conjunctiva clear. No scleral icterus. Neck: Normal range of motion. No masses. Chest: Respirations nonlabored Lower extremities: color normal; temperature normal.  
Ulcer Description:  
See Flowsheet Data Review:  
Biopsy from 7/29 
pseudoepithileomatous hyperplasia Assessment/Plan  
 
67 y.o. female with recent h/o urinary incontinence 
 
-sacral ulcer. Pressure stage 3 Full thickness Stable Slowly improving hyperkeratosis around wound Mild Slough Necessitates debridement  for wound healing and to prevent/heal infection See below Biopsy reviewed with patient and daughter Will call dermatopathologist to discuss further In the meantime, will have Dr Shila Tan have a look at the wound to see if he suggests something diffierent Approved for SNAp vac- will apply today, to return on Monday to change, NV Urinary incontience 
 straight cath 4x/day Wants to get purewick for nighttime use, will see about how to get it for home use Offloading as instructed Following discussed with patient / Lion Cutler Needs : 
Serial debridement- debrided today- see note below Good local wound care Dressing:  
 
Collagen, SNAP vac Frequency : twice weekly Patient/  understood and agrees with plan. Questions answered. Follow up with me in 1 week Procedure:  
 
Ulcer assessment: Due to presence of necrotic tissue within the wound bed, ulcer requires debridement. Procedure: Debridement:  
The indication for debridement was reviewed with patient. Risks of procedure (bleeding, infection, pain) were discussed with patient/and consent signed on first visit. Questions were answered Subcutaneous excisional debridement Indication: to remove necrotic tissue/ vitalized and devitalized tissue/ infected tissue/ PARE macerated tissue through skin and subcutaneous layer of wound bed Time out done Consent in chart Anesthesia: Topical  lidocaine Instrument: curette Residual Necrosis: none Bleeding: <1ml Hemostasis: Pressure Patient tolerated procedure well Procedural Pain: 0 Post - procedural pain: 0 Post debridement measurements:  see below Surface area debrided: <20 sq. Cm 
 
WOUND POA CONDITIONS Wound Sacrum #1 (Active) Wound Image   08/05/21 1516 Wound Etiology Pressure Stage 3 06/17/21 1331 Dressing Status Old drainage noted 08/05/21 1516 Cleansed Cleansed with saline 08/05/21 1516 Dressing/Treatment Collagen with Ag;Foam;Honey alginate;Gauze dressing/dressing sponge;Tape/Soft cloth adhesive tape 07/22/21 1503 Wound Length (cm) 3.2 cm 08/05/21 1516 Wound Width (cm) 0.7 cm 08/05/21 1516  
Wound Depth (cm) 0.4 cm 08/05/21 1516 Wound Surface Area (cm^2) 2.24 cm^2 08/05/21 1516 Change in Wound Size % 13.85 08/05/21 1516 Wound Volume (cm^3) 0.896 cm^3 08/05/21 1516 Wound Healing % 31 08/05/21 1516 Post-Procedure Length (cm) 3.3 cm 08/05/21 1533 Post-Procedure Width (cm) 0.8 cm 08/05/21 1533 Post-Procedure Depth (cm) 0.5 cm 08/05/21 1533 Post-Procedure Surface Area (cm^2) 2.64 cm^2 08/05/21 1533 Post-Procedure Volume (cm^3) 1.32 cm^3 08/05/21 1533 Wound Assessment Pink/red;Denuded/pale slough 08/05/21 1516 Drainage Amount Moderate 08/05/21 1516 Drainage Description Serosanguinous 08/05/21 1516 Wound Odor None 08/05/21 1516 Ginny-Wound/Incision Assessment Maceration 08/05/21 1516 Edges Epibole (rolled edges) 08/05/21 1516 Wound Thickness Description Full thickness 08/05/21 1516 Number of days: 70  
   
 
------- Past Medical History:  
Diagnosis Date  Asthma  Hypercholesterolemia  Hypertension  Osteoarthritis Past Surgical History:  
Procedure Laterality Date  COLONOSCOPY N/A 2/2/2017 COLONOSCOPY performed by Cesario Nayak MD at [address]  HX HYSTERECTOMY Family History Problem Relation Age of Onset  No Known Problems Mother  No Known Problems Father  Diabetes Sister Social History Tobacco Use  Smoking status: Never Smoker  Smokeless tobacco: Never Used Substance Use Topics  Alcohol use: No  
   
Prior to Admission medications Medication Sig Start Date End Date Taking? Authorizing Provider  
amLODIPine (NORVASC) 5 mg tablet TAKE 1 TABLET BY MOUTH EVERY DAY 7/18/21  Yes Stephy Winn MD  
simvastatin (ZOCOR) 10 mg tablet TAKE 1 TABLET BY MOUTH EVERY NIGHT AT BEDTIME 7/9/21  Yes Stephy Winn MD  
mupirocin (BACTROBAN) 2 % ointment Apply  to affected area daily.  6/10/21  Yes Concha Jovel MD  
potassium chloride (KLOR-CON) 10 mEq tablet TAKE 1 TABLET BY MOUTH DAILY 4/28/21  Yes Stephy Winn MD  
montelukast (SINGULAIR) 10 mg tablet Take 1 Tab by mouth daily. For allergies 4/28/21  Yes Stephy Winn MD  
gabapentin (NEURONTIN) 300 mg capsule Take 1 Cap by mouth two (2) times a day. Max Daily Amount: 600 mg. 4/22/21  Yes Arnulfo Lee MD  
lisinopril-hydroCHLOROthiazide (PRINZIDE, ZESTORETIC) 20-25 mg per tablet TAKE 1 TABLET BY MOUTH EVERY DAY 4/13/21  Yes Stephy Winn MD  
naproxen (NAPROSYN) 500 mg tablet TAKE 1 TABLET BY MOUTH TWICE DAILY AS NEEDED FOR PAIN 3/5/21  Yes Stephy Winn MD  
multivitamin (ONE A DAY) tablet Take 1 Tab by mouth daily. Yes Provider, Historical  
fluticasone propion-salmeteroL (Advair Diskus) 100-50 mcg/dose diskus inhaler Take 1 Puff by inhalation every twelve (12) hours. 4/7/20  Yes Stephy Winn MD  
oxyCODONE IR (ROXICODONE) 10 mg tab immediate release tablet TK 1 T PO  Q 6 H. FILL ON/AFTER 03/23/19 6/24/19  Yes Provider, Historical  
linaclotide (LINZESS) 145 mcg cap capsule Take 1 Cap by mouth Daily (before breakfast). 4/3/19  Yes Stephy Winn MD  
polyethylene glycol (MIRALAX) 17 gram/dose powder Take 17 g by mouth daily. 4/3/19  Yes Stephy Winn MD  
cholecalciferol, vitamin D3, (VITAMIN D3) 2,000 unit tab Take  by mouth daily. Yes Provider, Historical  
tiZANidine (ZANAFLEX) 4 mg tablet Take 1 Tab by mouth two (2) times a day. 10/13/15  Yes Marcellus Hanson MD  
albuterol (PROVENTIL HFA, VENTOLIN HFA, PROAIR HFA) 90 mcg/actuation inhaler Take 2 Puffs by inhalation every six (6) hours as needed for Wheezing or Shortness of Breath. 4/7/20   Arnulfo Lee MD  
 
Allergies Allergen Reactions  Penicillins Swelling Signed By: Sammy Vazquez MD   
 August 5, 2021

## 2021-08-05 NOTE — DISCHARGE INSTRUCTIONS
Discharge Instructions for  [facility]  [address] [place], [address] [facility]  Telephone: [phone]    NAME:  Adilia Lr OF BIRTH:  1945  DATE:  7/22/2021    [x] Wound and dressing supply provider: Bon     Wound Cleansing:   Do not scrub or use excessive force. Cleanse wound prior to applying a clean dressing with:  [] Normal Saline   [] Keep Wound Dry in Shower      [] Wound Cleanser   [x] Cleanse wound with Mild Soap & Water    [x] May Shower at Discharge - remove dressing 1st, wash, pat dry, re-dress right away    [] Do not shower  [] cleanse with baby shampoo lather leave 2-3 then rinse    Topical Treatments:  Do not apply lotions, creams, or ointments to wound bed unless directed. [] Apply moisturizing lotion {[employer] lotions :00930} to skin surrounding the wound prior to dressing change.  [] Bactroban/Mupirocin  [] Gentamicin ointment    [] Gentian violet to wound bed and periwound  [] Other:     Dressings:                   Wound Location Sacrum   Apply Primary Dressing:    [x] Foam to window wound, Place Judy into wound base 1st (only put of small piece of judy 1/3 size of the wound) Then cover with Medi-honey Alginate     Cover and Secure with:  [x] Gauze multiple layers of 2X2 then 4X4 on top   [] ABD [] exudry     [] Margaret [] Kerlix [] Mepilex Border   [] Ace Wrap [x] Roll Tape   [] Other:       Change dressing:   [x] Daily      [] Every Other Day   [] Three times per week  [] Once a week   [] Do Not Change Dressing     [] Other:    Pressure Relief:  [x] When sitting, shift position or do seat lifts every 15 minutes.  [] Wheelchair cushion   [] Specialty Bed/Mattress  [x] Turn every 2 hours when in bed. Avoid position directing pressure on wound site.      Off-Loading:   [x] Off-loading when [] walking  [x] in bed [x] sitting    Dietary:  [x] Diet as tolerated [] Diabetic Diet   [x] Increase Protein: examples (Meat, cheese, eggs, greek yogurt, fish, nuts)   [] Alistair Therapeutic Nutrition Powder  [] Other:  [] Dial a Dietician : Call [employer] at [phone] enter code ([phone]) when prompted. M-F 9am-5pm EST. Return Appointment:  [] Nurse Visit at wound center in *** days   [x] Return Appointment: With Dr. Alysia Donovan 1 week(s)  [] Ordered tests:     Electronically signed on 7/22/2021 at 8:24 AM     Britney Oliver 281: Should you experience any significant changes in your wound(s) or have questions about your wound care, please contact the [facility] Main at [address] [place] [address] 8:00 am - 4:30. If you need help with your wound outside these hours and cannot wait until we are again available, contact your PCP or go to the hospital emergency room. PLEASE NOTE: IF YOU ARE UNABLE TO OBTAIN WOUND SUPPLIES, CONTINUE TO USE THE SUPPLIES YOU HAVE AVAILABLE UNTIL YOU ARE ABLE TO REACH US. IT IS MOST IMPORTANT TO KEEP THE WOUND COVERED AT ALL TIMES.      Physician Signature:_______________________  Dr. Alysia Donovan

## 2021-08-05 NOTE — WOUND CARE
08/05/21 1516   Wound Sacrum #1   Date First Assessed/Time First Assessed: 05/27/21 1434   Present on Hospital Admission: Yes  Primary Wound Type: Pressure Injury  Location: Sacrum  Wound Description: #1   Wound Image    Dressing Status Old drainage noted   Cleansed Cleansed with saline   Wound Length (cm) 3.2 cm   Wound Width (cm) 0.7 cm   Wound Depth (cm) 0.4 cm   Wound Surface Area (cm^2) 2.24 cm^2   Change in Wound Size % 13.85   Wound Volume (cm^3) 0.896 cm^3   Wound Healing % 31   Wound Assessment Pink/red;Denuded   Drainage Amount Moderate   Drainage Description Serosanguinous   Wound Odor None   Ginny-Wound/Incision Assessment Maceration   Edges Epibole (rolled edges)   Wound Thickness Description Full thickness     Visit Vitals  /83 (BP 1 Location: Left upper arm, BP Patient Position: At rest)   Pulse 89   Temp 97.8 °F (36.6 °C)   Resp 16

## 2021-08-09 ENCOUNTER — HOSPITAL ENCOUNTER (OUTPATIENT)
Dept: WOUND CARE | Age: 76
Discharge: HOME | End: 2021-08-09
Payer: MEDICARE

## 2021-08-09 VITALS
SYSTOLIC BLOOD PRESSURE: 132 MMHG | TEMPERATURE: 96.9 F | DIASTOLIC BLOOD PRESSURE: 68 MMHG | HEART RATE: 87 BPM | RESPIRATION RATE: 18 BRPM

## 2021-08-09 PROCEDURE — 11042 DBRDMT SUBQ TIS 1ST 20SQCM/<: CPT

## 2021-08-09 PROCEDURE — 97607 NEG PRS WND THR NDME<=50SQCM: CPT

## 2021-08-09 NOTE — WOUND CARE
[facility]  Nurse Visit      08/09/21 1504   Wound Sacrum #1   Date First Assessed/Time First Assessed: 05/27/21 1434   Present on Hospital Admission: Yes  Primary Wound Type: Pressure Injury  Location: Sacrum  Wound Description: #1   Dressing Status New dressing applied   Cleansed Cleansed with saline   Dressing/Treatment Collagen with Ag;Tegaderm/Transparent film dressing  (SNAP VAC)     SNAP Vac came off Saturday. Daughter believes area might be getting wet due to patient's self catheterization. Working on getting information about [facility]. Visit Vitals  /68   Pulse 87   Temp 96.9 °F (36.1 °C)   Resp 18     Discharge Condition: Stable     Pain: 0    Ambulatory Status: Walking and Walker     Discharge Destination: Home     Transportation: Car    Accompanied by: Self  and Family/Caregiver     Discharge instructions reviewed with Patient and Family/Caregiver  and copy or written instructions have been provided. All questions/concerns have been addressed at this time.

## 2021-08-10 DIAGNOSIS — I10 ESSENTIAL HYPERTENSION: ICD-10-CM

## 2021-08-10 RX ORDER — POTASSIUM CHLORIDE 750 MG/1
TABLET, EXTENDED RELEASE ORAL
Qty: 90 TABLET | Refills: 1 | Status: SHIPPED | OUTPATIENT
Start: 2021-08-10 | End: 2022-05-09

## 2021-08-11 ENCOUNTER — HOSPITAL ENCOUNTER (OUTPATIENT)
Dept: WOUND CARE | Age: 76
Discharge: HOME | End: 2021-08-11
Payer: MEDICARE

## 2021-08-11 VITALS
TEMPERATURE: 96.8 F | SYSTOLIC BLOOD PRESSURE: 141 MMHG | HEART RATE: 83 BPM | DIASTOLIC BLOOD PRESSURE: 78 MMHG | RESPIRATION RATE: 18 BRPM

## 2021-08-11 PROBLEM — L89.153 PRESSURE INJURY OF SACRAL REGION, STAGE 3 (HCC): Status: ACTIVE | Noted: 2021-08-11

## 2021-08-11 PROCEDURE — 74011000250 HC RX REV CODE- 250: Performed by: EMERGENCY MEDICINE

## 2021-08-11 PROCEDURE — 11042 DBRDMT SUBQ TIS 1ST 20SQCM/<: CPT

## 2021-08-11 RX ADMIN — Medication: at 14:20

## 2021-08-11 NOTE — PROGRESS NOTES
Past Medical History:   Diagnosis Date    Asthma     Hypercholesterolemia     Hypertension     Osteoarthritis        Past Surgical History:   Procedure Laterality Date    COLONOSCOPY N/A 2/2/2017    COLONOSCOPY performed by Amelia Najera MD at [facility] ENDOSCOPY    HX CHOLECYSTECTOMY      HX HYSTERECTOMY         Family History   Problem Relation Age of Onset    No Known Problems Mother     No Known Problems Father     Diabetes Sister        Social History     Tobacco Use    Smoking status: Never Smoker    Smokeless tobacco: Never Used   Substance Use Topics    Alcohol use: No    Drug use: No       Allergies   Allergen Reactions    Penicillins Swelling       Current Outpatient Medications on File Prior to Encounter   Medication Sig Dispense Refill    potassium chloride (KLOR-CON) 10 mEq tablet TAKE 1 TABLET BY MOUTH DAILY 90 Tablet 1    amLODIPine (NORVASC) 5 mg tablet TAKE 1 TABLET BY MOUTH EVERY DAY 90 Tablet 1    simvastatin (ZOCOR) 10 mg tablet TAKE 1 TABLET BY MOUTH EVERY NIGHT AT BEDTIME 90 Tablet 1    mupirocin (BACTROBAN) 2 % ointment Apply  to affected area daily. 22 g 1    montelukast (SINGULAIR) 10 mg tablet Take 1 Tab by mouth daily. For allergies 30 Tab 3    gabapentin (NEURONTIN) 300 mg capsule Take 1 Cap by mouth two (2) times a day. Max Daily Amount: 600 mg. 180 Cap 0    lisinopril-hydroCHLOROthiazide (PRINZIDE, ZESTORETIC) 20-25 mg per tablet TAKE 1 TABLET BY MOUTH EVERY DAY 90 Tab 1    naproxen (NAPROSYN) 500 mg tablet TAKE 1 TABLET BY MOUTH TWICE DAILY AS NEEDED FOR PAIN 180 Tab 1    multivitamin (ONE A DAY) tablet Take 1 Tab by mouth daily.  albuterol (PROVENTIL HFA, VENTOLIN HFA, PROAIR HFA) 90 mcg/actuation inhaler Take 2 Puffs by inhalation every six (6) hours as needed for Wheezing or Shortness of Breath. 1 Inhaler 2    fluticasone propion-salmeteroL (Advair Diskus) 100-50 mcg/dose diskus inhaler Take 1 Puff by inhalation every twelve (12) hours.  1 Each 3    oxyCODONE IR (ROXICODONE) 10 mg tab immediate release tablet TK 1 T PO  Q 6 H. FILL ON/AFTER 03/23/19  0    linaclotide (LINZESS) 145 mcg cap capsule Take 1 Cap by mouth Daily (before breakfast). 90 Cap 1    polyethylene glycol (MIRALAX) 17 gram/dose powder Take 17 g by mouth daily. 1700 g 1    cholecalciferol, vitamin D3, (VITAMIN D3) 2,000 unit tab Take  by mouth daily.  tiZANidine (ZANAFLEX) 4 mg tablet Take 1 Tab by mouth two (2) times a day. 60 Tab 1     No current facility-administered medications on file prior to encounter. No components found for: LABA1C    . Christiana Randhawa   Registered Nurse   Wound Care   Wound Care       Signed   Date of Service:  08/11/21 1400                    []Hide copied text    []Rayshawn for details       08/11/21 1417   Wound Sacrum #1   Date First Assessed/Time First Assessed: 05/27/21 1434   Present on Hospital Admission: Yes  Primary Wound Type: Pressure Injury  Location: Sacrum  Wound Description: #1   Wound Image    Wound Length (cm) 2.8 cm   Wound Width (cm) 1 cm   Wound Depth (cm) 0.3 cm   Wound Surface Area (cm^2) 2.8 cm^2   Change in Wound Size % -7.69   Wound Volume (cm^3) 0.84 cm^3   Wound Healing % 35   Wound Assessment Pink/red   Drainage Amount Moderate   Drainage Description Serosanguinous   Wound Odor None   Ginny-Wound/Incision Assessment Maceration      Visit Vitals  BP (!) 141/78   Pulse 83   Temp 96.8 °F (36 °C)   Resp 18                   Chief Complaint (CC): non healing ulcer sacrum. Present Illness (PI): patient with persistant ulcer, pressure type over sacrum not responding over 3 months. Pertinent Past History (PMedHx): note pre diabetes, hyperlipidemia, . Also noted:                         Medications and Allergies: as per [address]. I have reviewed and concur. Illnesses: as per '[facility]' recorded data noted today.                          Surgeries and Injuries: as per '[facility]' recorded data noted today. As above. Review of Systems (ROS):                        Integumentary: Other than as noted in 'PI'; skin hair and nails normal for age, with no new rash, lumps, bumps, eruptions or bleeding. Lymph: no new prominent nodes or drainage near lymph nodes. Bones, Joints, and Muscles: Other than as noted in 'PI' no new fractures, dislocations, weakness or pain. Shaun Marks old spinal pain                        Hematopoietic: no new bleeding or bruising or anemia changes. .                        Eyes: no recent trauma or inflammation. has. Eye glasses. no. Intra Occular Lens Implants (IOLI)                        Ears: Hearing is unchanged and usually good. Nose: no new drainage, rhinorhea or epistaxis. Mouth, and throat: no soreness, drainage or lesions. no. Dentures. Neck: no new masses, drainage or pain                        Respiratory; no hemoptysis, current shortness of breath or pain with breath. Cardiovascular: No chest pain, palpitation or tachycardia. .                        Gastrointestinal: no recent change in appetite, stools or food tolerance. No jaundice, hematemesis, vomiting or diarrhea                        Genito-Urinary: urine color, frequency, sensation unchanged                        Neurologic: no syncope, dizzyness or unusual sensations. Psychologic and Mental Status: no change in mood, sleep or memory recently     Social History: '[facility]' data today is noted. Daughter helps with care. Family History: '[employer]' data today is noted. Shaun Marks Physical Exam:      General:  alert cooperative NAD. Head, Eyes, Ears, Nose and Throat: normocephalic, wearing glasses, no wounds, skin surfaces benign. Neck: supple without masses or adenopathy. .  Chest: full excursion without deformity, .   Lungs: no audible sounds. Heart: RSR. Abdomen: soft round. Neurology: Cranial nerves 2-12 grossly intact . Nadyne Nakul Vascular:                         Pulses:                                            R                    L                                               Radial                        +.                 +.                                              Femoral                     -. --.                                              DP                             -. -.                                              PT                             -.                 -.  Extremities: supple no lesions or edema. Other: pre sacral ulcer with raised borders, maceration, slough over ulcer base. Vital signs and data recorded in '[facility]' for this patient today is noted, reviewed and considered. Patient notes today: no pain. Procedure Note     Name of Procedure: sharp excisional debridement. PreOp diagnosis: sacral decub. 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 Tissue level of debridement: subQ. Post debridement dimensions changed as noted:    Depth, add 1.0 mm;    Width, add 0.0 mm   Length, add 0.0 mm. Blood Loss: 2. CCs. Bleeding abated post treatment . Post Op Diagnosis, and condition: no pain with RX. Follow Up Plan: RTC 2 weeks Dr. Maddox, continue wound vac, . Specimens: 0. Counseled regarding/Discussed: as above, slow but appropriate response,   Might benefit from PuraPly as well, used with wound vac. Long discussion of nutritional elements. Clinical considerations: alert to infection, nutrition, wet control, pressure control. Prognosis: expect 6-10 weeks, . Dx Codes: I69490.

## 2021-08-11 NOTE — WOUND CARE
08/11/21 1417   Wound Sacrum #1   Date First Assessed/Time First Assessed: 05/27/21 1434   Present on Hospital Admission: Yes  Primary Wound Type: Pressure Injury  Location: Sacrum  Wound Description: #1   Wound Image    Wound Length (cm) 2.8 cm   Wound Width (cm) 1 cm   Wound Depth (cm) 0.3 cm   Wound Surface Area (cm^2) 2.8 cm^2   Change in Wound Size % -7.69   Wound Volume (cm^3) 0.84 cm^3   Wound Healing % 35   Wound Assessment Pink/red   Drainage Amount Moderate   Drainage Description Serosanguinous   Wound Odor None   Ginny-Wound/Incision Assessment Maceration     Visit Vitals  BP (!) 141/78   Pulse 83   Temp 96.8 °F (36 °C)   Resp 18

## 2021-08-11 NOTE — DISCHARGE INSTRUCTIONS
Discharge Instructions for  [facility]  [address] [place], [address] [place]  Telephone: [phone]    NAME:  India Grover OF BIRTH:  1945  DATE:  8/11/2021    B COMPLEX  once daily make sure to have 1,000 , Magnesium argentate, threonate, 200 mcg  x once  a day , OMEGA 3 300 mg fish oil once daily , VIT A 10,000 international units  2 x a day , stop taking after three months , , 1g  once daily , VIT D 3,000 IU or 0  international units   once x  a day , Vit C 1,000mg x day,  ZINC  30 mg take once  x a day (may use gummy that has elderberry, if zinc alone hard to find) and Protein in take 1g-1.2g g per KG of weight   May find at [employer], [facility], [facility], or Amarjit (online), alpha lipoic acid 600 mg x day may take 300 mg if not tolerated. [x] Wound and dressing supply provider: Bon     Working on [address] [place] [address]:   Do not scrub or use excessive force. Cleanse wound prior to applying a clean dressing with:  [] Normal Saline   [] Keep Wound Dry in Shower      [] Wound Cleanser   [x] Cleanse wound with Mild Soap & Water    [x] May Shower at Discharge - remove dressing 1st, wash, pat dry, re-dress right away    [] Do not shower  [] cleanse with baby shampoo lather leave 2-3 then rinse      Dressings:                   Wound Location Sacrum   Apply Primary Dressing:    [x] Place Marianna into wound base 1st, SNAP VAC      Change dressing:   [] Daily      [] Every Other Day   [] Three times per week  [] Once a week   [x] Do Not Change Dressing     [] Other:    Pressure Relief:  [x] When sitting, shift position or do seat lifts every 15 minutes.  [] Wheelchair cushion   [] Specialty Bed/Mattress  [x] Turn every 2 hours when in bed. Avoid position directing pressure on wound site.      Off-Loading:   [x] Off-loading when [] walking  [x] in bed [x] sitting    Dietary:  [x] Diet as tolerated [] Diabetic Diet   [x] Increase Protein: examples (Meat, cheese, eggs, greek yogurt, fish, nuts)   [] Alistair [employer] Nutrition Powder  [] Other:  [] Dial a Dietician : Call [facility] at [phone] enter code ([phone]) when prompted. M-F 9am-5pm EST. Return Appointment:  [x] Nurse Visit at wound center on Monday Afternoon  [x] Return Appointment: With Dr. Fransisco Kumar 1 week(s) on Wednesday  [] Ordered tests:     Electronically signed on 8/11/2021    [address] [place] [address] Information: Should you experience any significant changes in your wound(s) or have questions about your wound care, please contact the [facility] at [address] [place] [address] 8:00 am - 4:30. If you need help with your wound outside these hours and cannot wait until we are again available, contact your PCP or go to the hospital emergency room. PLEASE NOTE: IF YOU ARE UNABLE TO OBTAIN WOUND SUPPLIES, CONTINUE TO USE THE SUPPLIES YOU HAVE AVAILABLE UNTIL YOU ARE ABLE TO REACH US. IT IS MOST IMPORTANT TO KEEP THE WOUND COVERED AT ALL TIMES.      Physician Signature:_______________________  Dr. Juan David Fonseca

## 2021-08-16 ENCOUNTER — HOSPITAL ENCOUNTER (OUTPATIENT)
Dept: WOUND CARE | Age: 76
Discharge: HOME | End: 2021-08-16
Payer: MEDICARE

## 2021-08-16 VITALS
HEART RATE: 83 BPM | DIASTOLIC BLOOD PRESSURE: 68 MMHG | RESPIRATION RATE: 18 BRPM | TEMPERATURE: 98 F | SYSTOLIC BLOOD PRESSURE: 118 MMHG

## 2021-08-16 PROCEDURE — 97607 NEG PRS WND THR NDME<=50SQCM: CPT

## 2021-08-16 NOTE — WOUND CARE
[facility]  Nurse Visit      08/16/21 1542   Wound Sacrum #1   Date First Assessed/Time First Assessed: 05/27/21 1434   Present on Hospital Admission: Yes  Primary Wound Type: Pressure Injury  Location: Sacrum  Wound Description: #1   Dressing Status New dressing applied   Dressing/Treatment Other (Comment)  (SNAP VAC)     Visit Vitals  /68 (BP 1 Location: Left upper arm)   Pulse 83   Temp 98 °F (36.7 °C)   Resp 18     Discharge Condition: Stable     Pain: 0    Ambulatory Status: Walking and Walker     Discharge Destination: Home     Transportation: Car    Accompanied by: Self  and Family/Caregiver     Discharge instructions reviewed with Patient and Family/Caregiver  and copy or written instructions have been provided. All questions/concerns have been addressed at this time.

## 2021-08-23 ENCOUNTER — HOSPITAL ENCOUNTER (OUTPATIENT)
Dept: WOUND CARE | Age: 76
Discharge: HOME | End: 2021-08-23
Payer: MEDICARE

## 2021-08-23 VITALS
SYSTOLIC BLOOD PRESSURE: 128 MMHG | RESPIRATION RATE: 17 BRPM | TEMPERATURE: 98.2 F | DIASTOLIC BLOOD PRESSURE: 81 MMHG | HEART RATE: 90 BPM

## 2021-08-23 PROCEDURE — 97607 NEG PRS WND THR NDME<=50SQCM: CPT

## 2021-08-26 ENCOUNTER — HOSPITAL ENCOUNTER (OUTPATIENT)
Dept: WOUND CARE | Age: 76
Discharge: HOME | End: 2021-08-26
Payer: MEDICARE

## 2021-08-26 VITALS
TEMPERATURE: 98.2 F | RESPIRATION RATE: 16 BRPM | HEART RATE: 84 BPM | SYSTOLIC BLOOD PRESSURE: 114 MMHG | DIASTOLIC BLOOD PRESSURE: 72 MMHG

## 2021-08-26 PROCEDURE — 74011000250 HC RX REV CODE- 250: Performed by: FAMILY MEDICINE

## 2021-08-26 PROCEDURE — 11042 DBRDMT SUBQ TIS 1ST 20SQCM/<: CPT

## 2021-08-26 RX ADMIN — Medication: at 15:06

## 2021-08-26 NOTE — DISCHARGE INSTRUCTIONS
Discharge Instructions for  [facility]  [place] [address], [address] [facility]  Telephone: [phone]    NAME:  Inocente Ceron OF BIRTH:  1945  DATE:  8/5/2021    [x] Wound and dressing supply provider: Bon     Working on Purewick    Wound Cleansing:   Do not scrub or use excessive force. Cleanse wound prior to applying a clean dressing with:  [] Normal Saline   [] Keep Wound Dry in Shower      [] Wound Cleanser   [x] Cleanse wound with Mild Soap & Water    [x] May Shower at Discharge - remove dressing 1st, wash, pat dry, re-dress right away    [] Do not shower  [] cleanse with baby shampoo lather leave 2-3 then rinse    Topical Treatments:  Do not apply lotions, creams, or ointments to wound bed unless directed. [] Apply moisturizing lotion {mcadams lotions :92983} to skin surrounding the wound prior to dressing change.  [] Bactroban/Mupirocin  [] Gentamicin ointment    [] Gentian violet to wound bed and periwound  [] Other:     Dressings:                   Wound Location Sacrum   Apply Primary Dressing:    [x] Place Marianna into wound base 1st, SNAP VAC      Change dressing:   [] Daily      [] Every Other Day   [] Three times per week  [] Once a week   [x] Do Not Change Dressing     [] Other:    Pressure Relief:  [x] When sitting, shift position or do seat lifts every 15 minutes.  [] Wheelchair cushion   [] Specialty Bed/Mattress  [x] Turn every 2 hours when in bed. Avoid position directing pressure on wound site. Off-Loading:   [x] Off-loading when [] walking  [x] in bed [x] sitting    Dietary:  [x] Diet as tolerated [] Diabetic Diet   [x] Increase Protein: examples (Meat, cheese, eggs, greek yogurt, fish, nuts)   [] Alistair Therapeutic Nutrition Powder  [] Other:  [] Dial a Dietician : Call [employer] at [phone] enter code [phone]) when prompted. M-F 9am-5pm EST.      Return Appointment:  [x] Nurse Visit at wound center on Monday Afternoon  [x] Return Appointment: With Dr. Nyla Fernando 1 week(s) on Wednesday  [] Ordered tests:     Electronically signed on 8/5/2021 at 8:24 AM     [facility] Information: Should you experience any significant changes in your wound(s) or have questions about your wound care, please contact the [address] at [address] [place] [address] 8:00 am - 4:30. If you need help with your wound outside these hours and cannot wait until we are again available, contact your PCP or go to the hospital emergency room. PLEASE NOTE: IF YOU ARE UNABLE TO OBTAIN WOUND SUPPLIES, CONTINUE TO USE THE SUPPLIES YOU HAVE AVAILABLE UNTIL YOU ARE ABLE TO REACH US. IT IS MOST IMPORTANT TO KEEP THE WOUND COVERED AT ALL TIMES.      Physician Signature:_______________________  Dr. Tasneem Fontana

## 2021-08-26 NOTE — WOUND CARE
Wound Center  Progress Note / Procedure Note    Subjective:     Chief Complaint:  Davin Bean is a 67 y.o.  female  with sacral wound of >1 months duration. 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 fair bit but gets up and walks around every 2 hours    Has a cushion    History/Chart/Medications reviewed    Since last visit:  Have since discovered that some urine does leak  Into wound area at night time, contributing to periwound changes as seen  Trying to get purewick  SNAP working mostly well        Wound caused by: pressure / moisture  Current wound care:See flowsheet  Offloading wound: yes  Appetite: good  Wound associated pain: See flowsheet  Diabetic: no  Smoker: no  ROS: no N/V/D, no T/chills; no local rash, no chest pain or shortness of breath, no headache or dizzyness  +urinary incontinence      Objective:     Physical Exam:   See flowsheet / nursing notes for vitals  Visit Vitals  /72 (BP 1 Location: Left upper arm, BP Patient Position: At rest)   Pulse 84   Temp 98.2 °F (36.8 °C)   Resp 16     General: NAD. Hygiene good  Psych: cooperative. No anxiety or depression. Normal mood and affect. Neuro: alert and oriented to person/place/situation. Otherwise nonfocal.  Derm: Normal  turgor for age, dry skin  HEENT: Normocephalic, atraumatic. EOMI. Conjunctiva clear. No scleral icterus. Neck: Normal range of motion. No masses. Chest: Respirations nonlabored  Lower extremities: color normal; temperature normal.   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 female with recent h/o urinary incontinence    -sacral ulcer.   Pressure stage 3  Full thickness  SMALLER, less macerated  Slowly improving hyperkeratosis around wound  Mild Rohit Bush  Necessitates debridement  for wound healing and to prevent/heal infection  See below          Parature w/ dermatopathologist- suggested another deeper biopsy or fungal culture if not getting smaller  As smaller today, will monitor and do biopsy/cx as/when needed    s/b Dr Hawkins Ba recommended supplements          Urinary incontience   straight cath 4x/day  Wants to get purewick for nighttime use, will see about how to get it for home use  We will try to get preauth for snap vac    Offloading as instructed      Following discussed with patient / dtr  Needs :  Serial debridement- debrided today- see note below    Good local wound care  Dressing:     Collagen, SNAP vac  Frequency : twice weekly      Patient/  understood and agrees with plan. Questions answered. Follow up with me in 1 week      Procedure:     Ulcer assessment: Due to presence of necrotic tissue within the wound bed, ulcer requires debridement. Procedure: Debridement:   The indication for debridement was reviewed with patient. Risks of procedure (bleeding, infection, pain) were discussed with patient/and consent signed on first visit. Questions were answered    Subcutaneous excisional debridement  Indication: to remove necrotic tissue/ vitalized and devitalized tissue/ infected tissue/ PARE macerated tissue and epibole through skin and subcutaneous layer of wound bed  Time out done  Consent in chart   Anesthesia: Topical  lidocaine   Instrument: curette   Residual Necrosis: none  Bleeding: <1ml   Hemostasis: Pressure   Patient tolerated procedure well   Procedural Pain: 0  Post - procedural pain: 0    Post debridement measurements:  see below  Surface area debrided: <20 sq.  Cm    WOUND POA CONDITIONS    Wound Sacrum #1 (Active)   Wound Image   08/26/21 1457   Wound Etiology Pressure Stage 3 06/17/21 1331   Dressing Status Old drainage noted 08/26/21 1457   Cleansed Cleansed with saline 08/26/21 1457   Dressing/Treatment Other (Comment) 08/16/21 1542   Wound Length (cm) 2.5 cm 08/26/21 1457   Wound Width (cm) 0.7 cm 08/26/21 1457   Wound Depth (cm) 0.3 cm 08/26/21 1457   Wound Surface Area (cm^2) 1.75 cm^2 08/26/21 1457   Change in Wound Size % 32.69 08/26/21 1457   Wound Volume (cm^3) 0.525 cm^3 08/26/21 1457   Wound Healing % 60 08/26/21 1457   Post-Procedure Length (cm) 2.6 cm 08/26/21 1522   Post-Procedure Width (cm) 0.8 cm 08/26/21 1522   Post-Procedure Depth (cm) 0.4 cm 08/26/21 1522   Post-Procedure Surface Area (cm^2) 2.08 cm^2 08/26/21 1522   Post-Procedure Volume (cm^3) 0.832 cm^3 08/26/21 1522   Wound Assessment Pink/red;Granulation tissue/slough 08/26/21 1457   Drainage Amount Moderate 08/26/21 1457   Drainage Description Serosanguinous 08/26/21 1457   Wound Odor None 08/26/21 1457   Ginny-Wound/Incision Assessment Maceration 08/26/21 1457   Edges Epibole (rolled edges) 08/26/21 1457   Wound Thickness Description Full thickness 08/26/21 1457   Number of days: 91         -------    Past Medical History:   Diagnosis Date    Asthma     Hypercholesterolemia     Hypertension     Osteoarthritis       Past Surgical History:   Procedure Laterality Date    COLONOSCOPY N/A 2/2/2017    [facility] ENDOSCOPY    HX CHOLECYSTECTOMY      HX HYSTERECTOMY       Family History   Problem Relation Age of Onset    No Known Problems Mother     No Known Problems Father     Diabetes Sister       Social History     Tobacco Use    Smoking status: Never Smoker    Smokeless tobacco: Never Used   Substance Use Topics    Alcohol use: No       Prior to Admission medications    Medication Sig Start Date End Date Taking?  Authorizing Provider   potassium chloride (KLOR-CON) 10 mEq tablet TAKE 1 TABLET BY MOUTH DAILY 8/10/21  Yes Stephy Winn MD   amLODIPine (NORVASC) 5 mg tablet TAKE 1 TABLET BY MOUTH EVERY DAY 7/18/21  Yes Stephy Winn MD   simvastatin (ZOCOR) 10 mg tablet TAKE 1 TABLET BY MOUTH EVERY NIGHT AT BEDTIME 7/9/21  Yes Stephy Winn MD   mupirocin (BACTROBAN) 2 % ointment Apply  to affected area daily. 6/10/21  Yes Jodi Holliday MD   montelukast (SINGULAIR) 10 mg tablet Take 1 Tab by mouth daily. For allergies 4/28/21  Yes Stephy Winn MD   gabapentin (NEURONTIN) 300 mg capsule Take 1 Cap by mouth two (2) times a day. Max Daily Amount: 600 mg. 4/22/21  Yes Andrade Echeverria MD   lisinopril-hydroCHLOROthiazide (PRINZIDE, ZESTORETIC) 20-25 mg per tablet TAKE 1 TABLET BY MOUTH EVERY DAY 4/13/21  Yes Stephy Winn MD   naproxen (NAPROSYN) 500 mg tablet TAKE 1 TABLET BY MOUTH TWICE DAILY AS NEEDED FOR PAIN 3/5/21  Yes Stephy Winn MD   multivitamin (ONE A DAY) tablet Take 1 Tab by mouth daily. Yes Provider, Historical   fluticasone propion-salmeteroL (Advair Diskus) 100-50 mcg/dose diskus inhaler Take 1 Puff by inhalation every twelve (12) hours. 4/7/20  Yes Stephy Winn MD   oxyCODONE IR (ROXICODONE) 10 mg tab immediate release tablet TK 1 T PO  Q 6 H. FILL ON/AFTER 03/23/19 6/24/19  Yes Provider, Historical   linaclotide (LINZESS) 145 mcg cap capsule Take 1 Cap by mouth Daily (before breakfast). 4/3/19  Yes Stephy Winn MD   polyethylene glycol (MIRALAX) 17 gram/dose powder Take 17 g by mouth daily. 4/3/19  Yes Stephy Winn MD   cholecalciferol, vitamin D3, (VITAMIN D3) 2,000 unit tab Take  by mouth daily. Yes Provider, Historical   tiZANidine (ZANAFLEX) 4 mg tablet Take 1 Tab by mouth two (2) times a day. 10/13/15  Yes Pacheco Hidalgo MD   albuterol (PROVENTIL HFA, VENTOLIN HFA, PROAIR HFA) 90 mcg/actuation inhaler Take 2 Puffs by inhalation every six (6) hours as needed for Wheezing or Shortness of Breath.  4/7/20   Andrade Echeverria MD     Allergies   Allergen Reactions    Penicillins Swelling          Signed By: Morgan Gamble MD     August 26, 2021

## 2021-08-26 NOTE — WOUND CARE
08/26/21 1457   Wound Sacrum #1   Date First Assessed/Time First Assessed: 05/27/21 1434   Present on Hospital Admission: Yes  Primary Wound Type: Pressure Injury  Location: Sacrum  Wound Description: #1   Wound Image    Dressing Status Old drainage noted   Cleansed Cleansed with saline   Wound Length (cm) 2.5 cm   Wound Width (cm) 0.7 cm   Wound Depth (cm) 0.3 cm   Wound Surface Area (cm^2) 1.75 cm^2   Change in Wound Size % 32.69   Wound Volume (cm^3) 0.525 cm^3   Wound Healing % 60   Wound Assessment Pink/red;Granulation tissue   Drainage Amount Moderate   Drainage Description Serosanguinous   Wound Odor None   Ginny-Wound/Incision Assessment Maceration   Edges Epibole (rolled edges)   Wound Thickness Description Full thickness     Visit Vitals  /72 (BP 1 Location: Left upper arm, BP Patient Position: At rest)   Pulse 84   Temp 98.2 °F (36.8 °C)   Resp 16

## 2021-08-30 NOTE — WOUND CARE
[facility]  [address] [place], [address] [facility]  Telephone: [phone]    NAME:  Meño Zhang  YOB: 1945  DATE:  8/30/2021    Case Management Note    Wound Care Management Outside of Clinic:  [] Wound and dressing supply provider:  [x] Patient/family performs own wound care if snap fails  [] Home Healthcare:  [x] Dressing changes performed once a week at wound care center    Advanced/Additional Wound Treatment (If applicable):   [] Vascular Referral:   [x] SNAP Vac: being applied  [] Wound Vac:  [] Skin Substitute:   [] Pressure Reducing Surfaces:  [] Wheelchair Assessment:   [] HBO Therapy:   [] IV Antibiotics:   [] Plastic Surgery Referral:  [x] Other: attempting to get purwick    Other Notes:  [x] Patient and patient's family is attempting to get purwick for patient to aid in wound healing by reducing wetness. Daughter informed RN that we would need to send in a prior-authorization to insurance to attempt to get insurance coverage. This RN spoke to [employer] rep. The representative stated that there is an automatic denial for the at home purwick system and supplies by medicare at the time. The rep stated that if we were to attempt to send one it, it would automatically get denied for both supplies and the system. Medicare is only covering the hospital system, not the at home one. The only option for the family and patient at this time is out of pocket.

## 2021-09-02 ENCOUNTER — HOSPITAL ENCOUNTER (OUTPATIENT)
Dept: WOUND CARE | Age: 76
Discharge: HOME | End: 2021-09-02
Payer: MEDICARE

## 2021-09-02 VITALS
SYSTOLIC BLOOD PRESSURE: 110 MMHG | HEART RATE: 79 BPM | TEMPERATURE: 97.3 F | RESPIRATION RATE: 18 BRPM | DIASTOLIC BLOOD PRESSURE: 61 MMHG

## 2021-09-02 PROCEDURE — 11042 DBRDMT SUBQ TIS 1ST 20SQCM/<: CPT

## 2021-09-02 PROCEDURE — 74011000250 HC RX REV CODE- 250: Performed by: FAMILY MEDICINE

## 2021-09-02 RX ADMIN — Medication: at 15:21

## 2021-09-02 NOTE — WOUND CARE
Wound Center  Progress Note / Procedure Note    Subjective:     Chief Complaint:  Osmani Kent is a 67 y.o.  female  with sacral wound of >1 months duration. 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 fair bit but gets up and walks around every 2 hours    Has a cushion    History/Chart/Medications reviewed    Since last visit:    SNAP came off friday        Wound caused by: pressure / moisture  Current wound care:See flowsheet  Offloading wound: yes  Appetite: good  Wound associated pain: See flowsheet  Diabetic: no  Smoker: no  ROS: no N/V/D, no T/chills; no local rash, no chest pain or shortness of breath, no headache or dizzyness  +urinary incontinence      Objective:     Physical Exam:   See flowsheet / nursing notes for vitals  Visit Vitals  /61 (BP 1 Location: Left upper arm, BP Patient Position: Sitting)   Pulse 79   Temp 97.3 °F (36.3 °C)   Resp 18     General: NAD. Hygiene good  Psych: cooperative. No anxiety or depression. Normal mood and affect. Neuro: alert and oriented to person/place/situation. Otherwise nonfocal.  Derm: Normal  turgor for age, dry skin  HEENT: Normocephalic, atraumatic. EOMI. Conjunctiva clear. No scleral icterus. Neck: Normal range of motion. No masses. Chest: Respirations nonlabored  Lower extremities: color normal; temperature normal.   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 female with recent h/o urinary incontinence    -sacral ulcer.   Pressure stage 3  Full thickness  Stable, MORE macerated    Mild Slough  Necessitates debridement  for wound healing and to prevent/heal infection  See below            Urinary incontience   straight cath 4x/day  Unable to get snap- medicare does not allow it  Other options discssed including kang  Pt will speak with gyne    Offloading as instructed      Following discussed with patient / dtr  Needs :  Serial debridement- debrided today- see note below    Good local wound care  Dressing:     Collagen, SNAP vac  Frequency : twice weekly      Patient/  understood and agrees with plan. Questions answered. Follow up with me in 1 week      Procedure:     Ulcer assessment: Due to presence of necrotic tissue within the wound bed, ulcer requires debridement. Procedure: Debridement:   The indication for debridement was reviewed with patient. Risks of procedure (bleeding, infection, pain) were discussed with patient/and consent signed on first visit. Questions were answered    Subcutaneous excisional debridement  Indication: to remove necrotic tissue/ vitalized and devitalized tissue/ infected tissue/ PARE macerated tissue and epibole through skin and subcutaneous layer of wound bed  Time out done  Consent in chart   Anesthesia: Topical  lidocaine   Instrument: curette   Residual Necrosis: none  Bleeding: <1ml   Hemostasis: Pressure   Patient tolerated procedure well   Procedural Pain: 0  Post - procedural pain: 0    Post debridement measurements:  see below  Surface area debrided: <20 sq.  Cm    WOUND POA CONDITIONS    Wound Sacrum #1 (Active)   Wound Image   09/02/21 1519   Wound Etiology Pressure Stage 3 06/17/21 1331   Dressing Status Old drainage noted 08/26/21 1457   Cleansed Cleansed with saline 08/26/21 1457   Dressing/Treatment Other (Comment) 08/16/21 1542   Wound Length (cm) 2.4 cm 09/02/21 1519   Wound Width (cm) 0.8 cm 09/02/21 1519   Wound Depth (cm) 0.2 cm 09/02/21 1519   Wound Surface Area (cm^2) 1.92 cm^2 09/02/21 1519   Change in Wound Size % 26.15 09/02/21 1519   Wound Volume (cm^3) 0.384 cm^3 09/02/21 1519   Wound Healing % 70 09/02/21 1519   Post-Procedure Length (cm) 2.5 cm 09/02/21 1540   Post-Procedure Width (cm) 0.9 cm 09/02/21 1540   Post-Procedure Depth (cm) 0.3 cm 09/02/21 1540   Post-Procedure Surface Area (cm^2) 2.25 cm^2 09/02/21 1540   Post-Procedure Volume (cm^3) 0.675 cm^3 09/02/21 1540   Wound Assessment Pink/red 09/02/21 1519   Drainage Amount Moderate 09/02/21 1519   Drainage Description Serosanguinous 09/02/21 1519   Wound Odor None 09/02/21 1519   Ginny-Wound/Incision Assessment Maceration 09/02/21 1519   Edges Epibole (rolled edges) 09/02/21 1519   Wound Thickness Description Full thickness 08/26/21 1457   Number of days: 98             -------    Past Medical History:   Diagnosis Date    Asthma     Hypercholesterolemia     Hypertension     Osteoarthritis       Past Surgical History:   Procedure Laterality Date    COLONOSCOPY N/A 2/2/2017    COL[facility]im [facility]OSCOPY    HX CHOLECYSTECTOMY      HX HYSTERECTOMY       Family History   Problem Relation Age of Onset    No Known Problems Mother     No Known Problems Father     Diabetes Sister       Social History     Tobacco Use    Smoking status: Never Smoker    Smokeless tobacco: Never Used   Substance Use Topics    Alcohol use: No       Prior to Admission medications    Medication Sig Start Date End Date Taking? Authorizing Provider   potassium chloride (KLOR-CON) 10 mEq tablet TAKE 1 TABLET BY MOUTH DAILY 8/10/21  Yes Stephy Winn MD   amLODIPine (NORVASC) 5 mg tablet TAKE 1 TABLET BY MOUTH EVERY DAY 7/18/21  Yes Stephy Winn MD   simvastatin (ZOCOR) 10 mg tablet TAKE 1 TABLET BY MOUTH EVERY NIGHT AT BEDTIME 7/9/21  Yes Stephy Winn MD   mupirocin (BACTROBAN) 2 % ointment Apply  to affected area daily. 6/10/21  Yes Cynthia Abdalla MD   montelukast (SINGULAIR) 10 mg tablet Take 1 Tab by mouth daily. For allergies 4/28/21  Yes Stephy Winn MD   gabapentin (NEURONTIN) 300 mg capsule Take 1 Cap by mouth two (2) times a day.  Max Daily Amount: 600 mg. 4/22/21  Yes Lisa Preston MD   lisinopril-hydroCHLOROthiazide (Dahiana Shade) 20-25 mg per tablet TAKE 1 TABLET BY MOUTH EVERY DAY 4/13/21  Yes Stephy Winn MD   naproxen (NAPROSYN) 500 mg tablet TAKE 1 TABLET BY MOUTH TWICE DAILY AS NEEDED FOR PAIN 3/5/21  Yes Stephy Winn MD   multivitamin (ONE A DAY) tablet Take 1 Tab by mouth daily. Yes Provider, Historical   fluticasone propion-salmeteroL (Advair Diskus) 100-50 mcg/dose diskus inhaler Take 1 Puff by inhalation every twelve (12) hours. 4/7/20  Yes Santos Mckeon MD   linaclotide (LINZESS) 145 mcg cap capsule Take 1 Cap by mouth Daily (before breakfast). 4/3/19  Yes Stephy Winn MD   polyethylene glycol (MIRALAX) 17 gram/dose powder Take 17 g by mouth daily. 4/3/19  Yes Stephy Winn MD   cholecalciferol, vitamin D3, (VITAMIN D3) 2,000 unit tab Take  by mouth daily. Yes Provider, Historical   tiZANidine (ZANAFLEX) 4 mg tablet Take 1 Tab by mouth two (2) times a day. 10/13/15  Yes Lawson Braxton MD   albuterol (PROVENTIL HFA, VENTOLIN HFA, PROAIR HFA) 90 mcg/actuation inhaler Take 2 Puffs by inhalation every six (6) hours as needed for Wheezing or Shortness of Breath. 4/7/20   Santos Mckeon MD   oxyCODONE IR (ROXICODONE) 10 mg tab immediate release tablet TK 1 T PO  Q 6 H.  FILL ON/AFTER 03/23/19  Patient not taking: Reported on 9/2/2021 6/24/19   Provider, Historical     Allergies   Allergen Reactions    Penicillins Swelling          Signed By: Dinorah Tristan MD     September 2, 2021

## 2021-09-02 NOTE — DISCHARGE INSTRUCTIONS
Discharge Instructions for  [facility] [place]  [address] [place], [address] [facility]  Telephone: [phone]    NAME:  Edgardo Bansal  YOB: 1945  DATE:  8/26/2021    [x] Wound and dressing supply provider: Bon     Working on [address]  once daily make sure to have 1,000 , Magnesium argentate, threonate, 200 mcg  x once  a day , OMEGA 3 300 mg fish oil once daily , VIT A 10,000 international units 2 x a day, stop taking after three months, 1g  once daily, VIT D 3,000 IU or 0 international units once x  a day, Vit C 1,000mg x day,  ZINC 30 mg take once  x a day (may use gummy that has elderberry, if zinc alone hard to find) and Protein in take 1g-1.2g g per KG of weight   May find at [facility], [facility], [facility], or [employer] (online), alpha lipoic acid 600 mg x day may take 300 mg if not tolerated. Wound Cleansing:    Do not scrub or use excessive force. Cleanse wound prior to applying a clean dressing with:  [] Normal Saline   [] Keep Wound Dry in Shower      [] Wound Cleanser   [x] Cleanse wound with Mild Soap & Water    [x] May Shower at Discharge - remove dressing 1st, wash, pat dry, re-dress right away    [] Do not shower  [] cleanse with baby shampoo lather leave 2-3 then rinse    Topical Treatments:  Do not apply lotions, creams, or ointments to wound bed unless directed. [] Apply moisturizing lotion {mcadams lotions :33186} to skin surrounding the wound prior to dressing change.  [] Bactroban/Mupirocin  [] Gentamicin ointment    [] Gentian violet to wound bed and periwound  [] Other:     Dressings:                   Wound Location Sacrum     Apply Primary Dressing:    [x] Place [place] into wound base 1st, SNAP VAC      Change dressing:   [] Daily      [] Every Other Day   [] Three times per week  [] Once a week   [x] Do Not Change Dressing     [] Other:    SNAP VAC.  THERAPY CARE INSTRUCTIONS:  The Snap Vac device is placed at the wound care clinic or with home health. If home health is applying the snap vac, we cannot apply it here at the wound care center; therefore please coordinate with home health to apply snap vac after clinic wound care visit. The SNAP VAC therapy system works by creating suction or negative pressure. Therefore it is important that the [facility] VAC holds its seal.  If the SNAP VAC loses it seal, a red line will appear at the top of the canister. Follow the next set of instructions to trouble shoot loss of suction/seal:    1. Place \"Key\" into top of canister and press down a few times to try and \"recharge\" or re-activate the seal.   2. If red line still persists after pressing the key in and down, try to tape around the edges of the snap vac dressing to close up any leaks in the seal.  Once taped, press down key again to see if a seal was reactivated. 3. If red line is still present at the top of the canister contact the wound care center and/or home health agency and completely remove the snap vac dressing including the blue foam in the wound bed. Cover wound with gauze or previously used dressing. It is very important to remove dressing completely if the seal cannot be reactivated. Pressure Relief:  [x] When sitting, shift position or do seat lifts every 15 minutes.  [] Wheelchair cushion   [] Specialty Bed/Mattress  [x] Turn every 2 hours when in bed. Avoid position directing pressure on wound site. Off-Loading:   [x] Off-loading when [] walking  [x] in bed [x] sitting    Dietary:  [x] Diet as tolerated [] Diabetic Diet   [x] Increase Protein: examples (Meat, cheese, eggs, greek yogurt, fish, nuts)   [] Alistair Therapeutic Nutrition Powder  [] Other:  [] Dial a Dietician : Call [employer] at [phone] enter code ([phone]) when prompted. M-F 9am-5pm EST.      Return Appointment:  [] Nurse Visit at wound center   [x] Return Appointment: With Dr. Lu Pang in 1 week(s) - not open Monday afternoon this coming week for a nurse visit. If snap vac comes off dress with judy, gauze, and tape  [] Ordered tests:     Electronically signed on 8/26/2021 at 8:24 AM     [facility] Information: Should you experience any significant changes in your wound(s) or have questions about your wound care, please contact the [facility] Main at [address] [place] [address] 8:00 am - 4:30. If you need help with your wound outside these hours and cannot wait until we are again available, contact your PCP or go to the hospital emergency room. PLEASE NOTE: IF YOU ARE UNABLE TO OBTAIN WOUND SUPPLIES, CONTINUE TO USE THE SUPPLIES YOU HAVE AVAILABLE UNTIL YOU ARE ABLE TO REACH US. IT IS MOST IMPORTANT TO KEEP THE WOUND COVERED AT ALL TIMES.      Physician Signature:_______________________  Dr. Rajat Holcomb

## 2021-09-02 NOTE — WOUND CARE
09/02/21 1519   Anesthetic   Anesthetic 4% Lidocaine Liquid Topical   Wound Sacrum #1   Date First Assessed/Time First Assessed: 05/27/21 1434   Present on Hospital Admission: Yes  Primary Wound Type: Pressure Injury  Location: Sacrum  Wound Description: #1   Wound Image    Wound Length (cm) 2.4 cm   Wound Width (cm) 0.8 cm   Wound Depth (cm) 0.2 cm   Wound Surface Area (cm^2) 1.92 cm^2   Change in Wound Size % 26.15   Wound Volume (cm^3) 0.384 cm^3   Wound Healing % 70   Wound Assessment Pink/red   Drainage Amount Moderate   Drainage Description Serosanguinous   Wound Odor None   Ginny-Wound/Incision Assessment Maceration   Edges Epibole (rolled edges)     Visit Vitals  /61 (BP 1 Location: Left upper arm, BP Patient Position: Sitting)   Pulse 79   Temp 97.3 °F (36.3 °C)   Resp 18

## 2021-09-09 ENCOUNTER — HOSPITAL ENCOUNTER (OUTPATIENT)
Dept: WOUND CARE | Age: 76
Discharge: HOME | End: 2021-09-09
Payer: MEDICARE

## 2021-09-09 VITALS
SYSTOLIC BLOOD PRESSURE: 115 MMHG | HEART RATE: 80 BPM | DIASTOLIC BLOOD PRESSURE: 63 MMHG | RESPIRATION RATE: 18 BRPM | TEMPERATURE: 98.2 F

## 2021-09-09 PROCEDURE — 74011000250 HC RX REV CODE- 250: Performed by: FAMILY MEDICINE

## 2021-09-09 PROCEDURE — 11042 DBRDMT SUBQ TIS 1ST 20SQCM/<: CPT | Performed by: FAMILY MEDICINE

## 2021-09-09 RX ADMIN — Medication: at 15:34

## 2021-09-09 NOTE — WOUND CARE
09/09/21 1556   Wound Sacrum #1   Date First Assessed/Time First Assessed: 05/27/21 1434   Present on Hospital Admission: Yes  Primary Wound Type: Pressure Injury  Location: Sacrum  Wound Description: #1   Dressing/Treatment Collagen with Ag; Other (Comment)  (snap vac)   Discharge Condition: Stable     Pain: 0    Ambulatory Status: Walking and Walker     Discharge Destination: Home     Transportation: Car    Accompanied by: Family/Caregiver     Discharge instructions reviewed with Patient and Family/Caregiver  and copy or written instructions have been provided. All questions/concerns have been addressed at this time.

## 2021-09-09 NOTE — WOUND CARE
Wound Center  Progress Note / Procedure Note    Subjective:     Chief Complaint:  Melanie Gary is a 67 y.o.  female  with sacral wound of >1 months duration. 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 fair bit but gets up and walks around every 2 hours    Has a cushion    History/Chart/Medications reviewed    Since last visit:    No new issues        Wound caused by: pressure / moisture  Current wound care:See flowsheet  Offloading wound: yes  Appetite: good  Wound associated pain: See flowsheet  Diabetic: no  Smoker: no  ROS: no N/V/D, no T/chills; no local rash, no chest pain or shortness of breath, no headache or dizzyness  +urinary incontinence      Objective:     Physical Exam:   See flowsheet / nursing notes for vitals  Visit Vitals  /63 (BP 1 Location: Left upper arm, BP Patient Position: Sitting)   Pulse 80   Temp 98.2 °F (36.8 °C)   Resp 18     General: NAD. Hygiene good  Psych: cooperative. No anxiety or depression. Normal mood and affect. Neuro: alert and oriented to person/place/situation. Otherwise nonfocal.  Derm: Normal  turgor for age, dry skin  HEENT: Normocephalic, atraumatic. EOMI. Conjunctiva clear. No scleral icterus. Neck: Normal range of motion. No masses. Chest: Respirations nonlabored  Lower extremities: color normal; temperature normal.   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 female with recent h/o urinary incontinence    -sacral ulcer.   Pressure stage 3  Full thickness  Stable  Does appear smaller, narrowed in places  Macerated, thickened edges  Mild Slough  Necessitates debridement  for wound healing and to prevent/heal infection  See below            Urinary incontience   straight cath 4x/day  Unable to get snap- medicare does not allow it   discssed  Doe again  Pt will speak with gyne    Offloading as instructed      Following discussed with patient / dtr  Needs :  Serial debridement- debrided today- see note below    Good local wound care  Dressing:     Collagen, SNAP vac  Frequency : twice weekly      Patient/  understood and agrees with plan. Questions answered. Follow up with me in 2 week; Nv 1 week      Procedure:     Ulcer assessment: Due to presence of necrotic tissue within the wound bed, ulcer requires debridement. Procedure: Debridement:   The indication for debridement was reviewed with patient. Risks of procedure (bleeding, infection, pain) were discussed with patient/and consent signed on first visit. Questions were answered    Subcutaneous excisional debridement  Indication: to remove necrotic tissue/ vitalized and devitalized tissue/ infected tissue/ PARE macerated tissue and epibole through skin and subcutaneous layer of wound bed  Time out done  Consent in chart   Anesthesia: Topical  lidocaine   Instrument: curette   Residual Necrosis: none  Bleeding: <1ml   Hemostasis: Pressure   Patient tolerated procedure well   Procedural Pain: 0  Post - procedural pain: 0    Post debridement measurements:  see below  Surface area debrided: <20 sq. Cm    WOUND POA CONDITIONS    Wound Sacrum #1 (Active)   Wound Image   09/09/21 1529   Wound Etiology Pressure Stage 3 06/17/21 1331   Dressing Status Old drainage noted 08/26/21 1457   Cleansed Cleansed with saline 08/26/21 1457   Dressing/Treatment Collagen with Ag; Other (Comment) 09/09/21 1556   Wound Length (cm) 2.7 cm 09/09/21 1529   Wound Width (cm) 1.1 cm 09/09/21 1529   Wound Depth (cm) 0.2 cm 09/09/21 1529   Wound Surface Area (cm^2) 2.97 cm^2 09/09/21 1529   Change in Wound Size % -14.23 09/09/21 1529   Wound Volume (cm^3) 0.594 cm^3 09/09/21 1529   Wound Healing % 54 09/09/21 1529   Post-Procedure Length (cm) 2.8 cm 09/09/21 1545   Post-Procedure Width (cm) 1.2 cm 09/09/21 1545   Post-Procedure Depth (cm) 0.3 cm 09/09/21 1545   Post-Procedure Surface Area (cm^2) 3.36 cm^2 09/09/21 1545   Post-Procedure Volume (cm^3) 1.008 cm^3 09/09/21 1545   Wound Assessment [place]/red;Slough 09/09/21 1529   Drainage Amount None 09/09/21 1529   Drainage Description Serosanguinous 09/02/21 1519   Wound Odor None 09/09/21 1529   Ginny-Wound/Incision Assessment Maceration 09/09/21 1529   Edges Epibole (rolled edges) 09/09/21 1529   Wound Thickness Description Full thickness 08/26/21 1457   Number of days: 80           -------    Past Medical History:   Diagnosis Date    Asthma     Hypercholesterolemia     Hypertension     Osteoarthritis       Past Surgical History:   Procedure Laterality Date    COLONOSCOPY N/A 2/2/2017    COL[facility] Pino [facility] at [facility] ENDOSCOPY    HX CHOLECYSTECTOMY      HX HYSTERECTOMY       Family History   Problem Relation Age of Onset    No Known Problems Mother     No Known Problems Father     Diabetes Sister       Social History     Tobacco Use    Smoking status: Never Smoker    Smokeless tobacco: Never Used   Substance Use Topics    Alcohol use: No       Prior to Admission medications    Medication Sig Start Date End Date Taking? Authorizing Provider   potassium chloride (KLOR-CON) 10 mEq tablet TAKE 1 TABLET BY MOUTH DAILY 8/10/21   Stephy Winn MD   amLODIPine (NORVASC) 5 mg tablet TAKE 1 TABLET BY MOUTH EVERY DAY 7/18/21   Santos Mckeon MD   simvastatin (ZOCOR) 10 mg tablet TAKE 1 TABLET BY MOUTH EVERY NIGHT AT BEDTIME 7/9/21   Stephy Winn MD   mupirocin (BACTROBAN) 2 % ointment Apply  to affected area daily. 6/10/21   Darrick Officer, MD   montelukast (SINGULAIR) 10 mg tablet Take 1 Tab by mouth daily. For allergies 4/28/21   Santos Mckeon MD   gabapentin (NEURONTIN) 300 mg capsule Take 1 Cap by mouth two (2) times a day.  Max Daily Amount: 600 mg. 4/22/21   Santos Mckeon MD lisinopril-hydroCHLOROthiazide (PRINZIDE, ZESTORETIC) 20-25 mg per tablet TAKE 1 TABLET BY MOUTH EVERY DAY 4/13/21   Stephy Winn MD   naproxen (NAPROSYN) 500 mg tablet TAKE 1 TABLET BY MOUTH TWICE DAILY AS NEEDED FOR PAIN 3/5/21   Missy Beth MD   multivitamin (ONE A DAY) tablet Take 1 Tab by mouth daily. Provider, Historical   albuterol (PROVENTIL HFA, VENTOLIN HFA, PROAIR HFA) 90 mcg/actuation inhaler Take 2 Puffs by inhalation every six (6) hours as needed for Wheezing or Shortness of Breath. 4/7/20   Missy Beth MD   fluticasone propion-salmeteroL (Advair Diskus) 100-50 mcg/dose diskus inhaler Take 1 Puff by inhalation every twelve (12) hours. 4/7/20   Missy Beth MD   oxyCODONE IR (ROXICODONE) 10 mg tab immediate release tablet TK 1 T PO  Q 6 H. FILL ON/AFTER 03/23/19  Patient not taking: Reported on 9/2/2021 6/24/19   Provider, Historical   linaclotide (LINZESS) 145 mcg cap capsule Take 1 Cap by mouth Daily (before breakfast). 4/3/19   Missy Beth MD   polyethylene glycol (MIRALAX) 17 gram/dose powder Take 17 g by mouth daily. 4/3/19   Missy Beth MD   cholecalciferol, vitamin D3, (VITAMIN D3) 2,000 unit tab Take  by mouth daily. Provider, Historical   tiZANidine (ZANAFLEX) 4 mg tablet Take 1 Tab by mouth two (2) times a day.  10/13/15   Milagro Riley MD     Allergies   Allergen Reactions    Penicillins Swelling          Signed By: Thanh Jaimes MD     September 9, 2021

## 2021-09-09 NOTE — PROGRESS NOTES
09/09/21 1529   Anesthetic   Anesthetic 4% Lidocaine Liquid Topical   Wound Sacrum #1   Date First Assessed/Time First Assessed: 05/27/21 1434   Present on Hospital Admission: Yes  Primary Wound Type: Pressure Injury  Location: Sacrum  Wound Description: #1   Wound Image    Wound Length (cm) 2.7 cm   Wound Width (cm) 1.1 cm   Wound Depth (cm) 0.2 cm   Wound Surface Area (cm^2) 2.97 cm^2   Change in Wound Size % -14.23   Wound Volume (cm^3) 0.594 cm^3   Wound Healing % 54   Wound Assessment [place]/red;Slough   Drainage Amount None   Wound Odor None   Ginny-Wound/Incision Assessment Maceration   Edges Epibole (rolled edges)     Visit Vitals  /63 (BP 1 Location: Left upper arm, BP Patient Position: Sitting)   Pulse 80   Temp 98.2 °F (36.8 °C)   Resp 18

## 2021-09-09 NOTE — DISCHARGE INSTRUCTIONS
Discharge Instructions for  [facility]  [address] [place], [address] [facility]  Telephone: [phone]    NAME:  Osmani Kent  YOB: 1945  DATE:  9/2/2021    [x] Wound and dressing supply provider: [employer]     B COMPLEX  once daily make sure to have 1,000 , Magnesium argentate, threonate, 200 mcg  x once  a day , OMEGA 3 300 mg fish oil once daily , VIT A 10,000 international units 2 x a day, stop taking after three months, 1g  once daily, VIT D 3,000 IU or 0 international units once x  a day, Vit C 1,000mg x day,  ZINC 30 mg take once  x a day (may use gummy that has elderberry, if zinc alone hard to find) and Protein in take 1g-1.2g g per KG of weight   May find at [employer], [facility], [facility], or [employer] (online), alpha lipoic acid 600 mg x day may take 300 mg if not tolerated. Wound Cleansing:    Do not scrub or use excessive force. Cleanse wound prior to applying a clean dressing with:  [] Normal Saline   [] Keep Wound Dry in Shower      [] Wound Cleanser   [x] Cleanse wound with Mild Soap & Water    [x] May Shower at Discharge - remove dressing 1st, wash, pat dry, re-dress right away    [] Do not shower  [] cleanse with baby shampoo lather leave 2-3 then rinse    Topical Treatments:  Do not apply lotions, creams, or ointments to wound bed unless directed. [] Apply moisturizing lotion {mcadams lotions :15641} to skin surrounding the wound prior to dressing change.  [] Bactroban/Mupirocin  [] Gentamicin ointment    [] Gentian violet to wound bed and periwound  [] Other:     Dressings:                   Wound Location Sacrum     Apply Primary Dressing:    [x] Place Marianna into wound base 1st, SNAP VAC      Change dressing:   [] Daily      [] Every Other Day   [] Three times per week  [] Once a week   [x] Do Not Change Dressing     [] Other:    SNAP VAC.  THERAPY CARE INSTRUCTIONS:  The Snap Vac device is placed at the wound care clinic or with home health. If home health is applying the snap vac, we cannot apply it here at the wound care center; therefore please coordinate with home health to apply snap vac after clinic wound care visit. The SNAP VAC therapy system works by creating suction or negative pressure. Therefore it is important that the [facility] VAC holds its seal.  If the SNAP VAC loses it seal, a red line will appear at the top of the canister. Follow the next set of instructions to trouble shoot loss of suction/seal:    1. Place \"Key\" into top of canister and press down a few times to try and \"recharge\" or re-activate the seal.   2. If red line still persists after pressing the key in and down, try to tape around the edges of the snap vac dressing to close up any leaks in the seal.  Once taped, press down key again to see if a seal was reactivated. 3. If red line is still present at the top of the canister contact the wound care center and/or home health agency and completely remove the snap vac dressing including the blue foam in the wound bed. Cover wound with gauze or previously used dressing. It is very important to remove dressing completely if the seal cannot be reactivated. Pressure Relief:  [x] When sitting, shift position or do seat lifts every 15 minutes.  [] Wheelchair cushion   [] Specialty Bed/Mattress  [x] Turn every 2 hours when in bed. Avoid position directing pressure on wound site. Off-Loading:   [x] Off-loading when [] walking  [x] in bed [x] sitting    Dietary:  [x] Diet as tolerated [] Diabetic Diet   [x] Increase Protein: examples (Meat, cheese, eggs, greek yogurt, fish, nuts)   [] Alistair Therapeutic Nutrition Powder  [] Other:  [] Dial a Dietician : Call [employer] at [phone] enter code ([phone]) when prompted. M-F 9am-5pm EST.      Return Appointment:  [] Nurse Visit at wound center   [x] Return Appointment: With Dr. Dolan Standing in 1 week(s) - not open Monday this coming week for a nurse visit. If snap vac comes off dress with judy, gauze, and tape  [] Ordered tests:     Electronically signed on 9/2/2021 at 8:24 GWEN Hawkins 8 Information: Should you experience any significant changes in your wound(s) or have questions about your wound care, please contact the [address] at [address] [place] [address] 8:00 am - 4:30. If you need help with your wound outside these hours and cannot wait until we are again available, contact your PCP or go to the hospital emergency room. PLEASE NOTE: IF YOU ARE UNABLE TO OBTAIN WOUND SUPPLIES, CONTINUE TO USE THE SUPPLIES YOU HAVE AVAILABLE UNTIL YOU ARE ABLE TO REACH US. IT IS MOST IMPORTANT TO KEEP THE WOUND COVERED AT ALL TIMES.      Physician Signature:_______________________  Dr. Amy Saxena

## 2021-09-13 ENCOUNTER — HOSPITAL ENCOUNTER (OUTPATIENT)
Dept: WOUND CARE | Age: 76
Discharge: HOME | End: 2021-09-13
Payer: MEDICARE

## 2021-09-13 VITALS
SYSTOLIC BLOOD PRESSURE: 103 MMHG | TEMPERATURE: 97.2 F | HEART RATE: 91 BPM | DIASTOLIC BLOOD PRESSURE: 65 MMHG | RESPIRATION RATE: 16 BRPM

## 2021-09-13 PROCEDURE — 97607 NEG PRS WND THR NDME<=50SQCM: CPT

## 2021-09-13 NOTE — WOUND CARE
Late entry for nurse visit to change out snap vac therapy to patient's sacral pressure injury. Old dressing removed and area cleansed with baby shampoo and water and prepped with no-sting skin prep then new snap vac applied per MD order from 8/19/2021. Patient tolerated well and will return for MD follow up 8/26/2021. SNAP VAC. THERAPY CARE INSTRUCTIONS:  The Snap Vac device is placed at the wound care clinic or with home health. If home health is applying the snap vac, we cannot apply it here at the wound care center; therefore please coordinate with home health to apply snap vac after clinic wound care visit. The SNAP VAC therapy system works by creating suction or negative pressure. Therefore it is important that the [facility] VAC holds its seal.  If the SNAP VAC loses it seal, a red line will appear at the top of the canister. Follow the next set of instructions to trouble shoot loss of suction/seal:    1. Place \"Key\" into top of canister and press down a few times to try and \"recharge\" or re-activate the seal.   2. If red line still persists after pressing the key in and down, try to tape around the edges of the snap vac dressing to close up any leaks in the seal.  Once taped, press down key again to see if a seal was reactivated. 3. If red line is still present at the top of the canister contact the wound care center and/or home health agency and completely remove the snap vac dressing including the blue foam in the wound bed. Cover wound with gauze or previously used dressing. It is very important to remove dressing completely if the seal cannot be reactivated.

## 2021-09-13 NOTE — WOUND CARE
Patient in for nurse visit to have snap vac placed, upon patient arrival daughter had removed snap vac on Sunday 9/12, one piece of blue foam removed, optilock and tape in wound center that was placed on by daughter. Wound cleansed with soap and water, then skin prep applied. Ostomy barrier ring applied around wound and then one piece of blue foam applied to wound surface and snap vac appliance to cover. Patient with no pain or complaints at this time. D/C home ambulatory in [facility].

## 2021-09-15 ENCOUNTER — HOSPITAL ENCOUNTER (OUTPATIENT)
Dept: WOUND CARE | Age: 76
Discharge: HOME | End: 2021-09-15
Payer: MEDICARE

## 2021-09-15 VITALS
SYSTOLIC BLOOD PRESSURE: 122 MMHG | TEMPERATURE: 98.2 F | DIASTOLIC BLOOD PRESSURE: 67 MMHG | RESPIRATION RATE: 16 BRPM | HEART RATE: 87 BPM

## 2021-09-15 PROCEDURE — 74011000250 HC RX REV CODE- 250: Performed by: EMERGENCY MEDICINE

## 2021-09-15 PROCEDURE — 11042 DBRDMT SUBQ TIS 1ST 20SQCM/<: CPT

## 2021-09-15 RX ADMIN — Medication: at 15:41

## 2021-09-15 NOTE — DISCHARGE INSTRUCTIONS
Discharge Instructions for  [facility]  [address] [place], [address] [facility]  Telephone: [phone]    NAME:  Keyon Swan  YOB: 1945  DATE:  9/15/2021    [x] Wound and dressing supply provider: [facility]     B COMPLEX  once daily make sure to have 1,000 , Magnesium argentate, threonate, 200 mcg  x once  a day , OMEGA 3 300 mg fish oil once daily , VIT A 10,000 international units 2 x a day, stop taking after three months, 1g  once daily, VIT D 3,000 IU or 0 international units once x  a day, Vit C 1,000mg x day,  ZINC 30 mg take once  x a day (may use gummy that has elderberry, if zinc alone hard to find) and Protein in take 1g-1.2g g per KG of weight   May find at [employer], [facility] [address], [facility], or [employer] (online), alpha lipoic acid 600 mg x day may take 300 mg if not tolerated. Wound Cleansing:    Do not scrub or use excessive force. Cleanse wound prior to applying a clean dressing with:  [] Normal Saline   [] Keep Wound Dry in Shower      [] Wound Cleanser   [x] Cleanse wound with Mild Soap & Water    [x] May Shower at Discharge - remove dressing 1st, wash, pat dry, re-dress right away    [] Do not shower  [] cleanse with baby shampoo lather leave 2-3 then rinse    T  Dressings:                   Wound Location Sacrum     Apply Primary Dressing:    [x] Place Marianna into wound base 1st, SNAP VAC      Change dressing:   [] Daily      [] Every Other Day   [] Three times per week  [] Once a week   [x] Do Not Change Dressing     [] Other:    SNAP VAC. THERAPY CARE INSTRUCTIONS:  The Snap Vac device is placed at the wound care clinic or with home health. If home health is applying the snap vac, we cannot apply it here at the wound care center; therefore please coordinate with home health to apply snap vac after clinic wound care visit. The SNAP VAC therapy system works by creating suction or negative pressure.   Therefore it is important that the [facility] VAC holds its seal.  If the SNAP VAC loses it seal, a red line will appear at the top of the canister. Follow the next set of instructions to trouble shoot loss of suction/seal:    1. Place \"Key\" into top of canister and press down a few times to try and \"recharge\" or re-activate the seal.   2. If red line still persists after pressing the key in and down, try to tape around the edges of the snap vac dressing to close up any leaks in the seal.  Once taped, press down key again to see if a seal was reactivated. 3. If red line is still present at the top of the canister contact the wound care center and/or home health agency and completely remove the snap vac dressing including the blue foam in the wound bed. Cover wound with gauze or previously used dressing. It is very important to remove dressing completely if the seal cannot be reactivated. Pressure Relief:  [x] When sitting, shift position or do seat lifts every 15 minutes.  [] Wheelchair cushion   [] Specialty Bed/Mattress  [x] Turn every 2 hours when in bed. Avoid position directing pressure on wound site. Off-Loading:   [x] Off-loading when [] walking  [x] in bed [x] sitting    Dietary:  [x] Diet as tolerated [] Diabetic Diet   [x] Increase Protein: examples (Meat, cheese, eggs, greek yogurt, fish, nuts)   [] Alistair Therapeutic Nutrition Powder  [] Other:  [] Dial a Dietician : Call [employer] at [phone] enter code ([phone]) when prompted. M-F 9am-5pm EST. Return Appointment:  [x] Nurse Visit at wound center 1 Monday pm  [x] Return Appointment: Jeovanny Ross in 1 week(s)          [address] Information: Should you experience any significant changes in your wound(s) or have questions about your wound care, please contact the [facility] at [address] [place] [address] Street 8:00 am - 4:30.   If you need help with your wound outside these hours and cannot wait until we are again available, contact your PCP or go to the hospital emergency room. PLEASE NOTE: IF YOU ARE UNABLE TO OBTAIN WOUND SUPPLIES, CONTINUE TO USE THE SUPPLIES YOU HAVE AVAILABLE UNTIL YOU ARE ABLE TO REACH US. IT IS MOST IMPORTANT TO KEEP THE WOUND COVERED AT ALL TIMES.      Physician Signature:_______________________  Dr. Sammy Vazquez

## 2021-09-15 NOTE — WOUND CARE
09/15/21 1532   Wound Sacrum #1   Date First Assessed/Time First Assessed: 05/27/21 1434   Present on Hospital Admission: Yes  Primary Wound Type: Pressure Injury  Location: Sacrum  Wound Description: #1   Wound Image    Cleansed Cleansed with saline   Wound Length (cm) 3.4 cm   Wound Width (cm) 0.8 cm   Wound Depth (cm) 0.1 cm   Wound Surface Area (cm^2) 2.72 cm^2   Change in Wound Size % -4.62   Wound Volume (cm^3) 0.272 cm^3   Wound Healing % 79   Wound Assessment Granulation tissue   Drainage Amount Moderate   Drainage Description Serous   Wound Odor Mild   Ginny-Wound/Incision Assessment Maceration     Visit Vitals  /67 (BP 1 Location: Left upper arm, BP Patient Position: Sitting)   Pulse 87   Temp 98.2 °F (36.8 °C)   Resp 16

## 2021-09-15 NOTE — PROGRESS NOTES
Genaro Burkett   Registered Nurse   Wound Care   Wound Care       Signed   Date of Service:  09/15/21 1515                    []Grupo copied text    []Rayshawn for details       09/15/21 1532   Wound Sacrum #1   Date First Assessed/Time First Assessed: 05/27/21 1434   Present on Hospital Admission: Yes  Primary Wound Type: Pressure Injury  Location: Sacrum  Wound Description: #1   Wound Image    Cleansed Cleansed with saline   Wound Length (cm) 3.4 cm   Wound Width (cm) 0.8 cm   Wound Depth (cm) 0.1 cm   Wound Surface Area (cm^2) 2.72 cm^2   Change in Wound Size % -4.62   Wound Volume (cm^3) 0.272 cm^3   Wound Healing % 79   Wound Assessment Granulation tissue   Drainage Amount Moderate   Drainage Description Serous   Wound Odor Mild   Ginny-Wound/Incision Assessment Maceration      Visit Vitals  /67 (BP 1 Location: Left upper arm, BP Patient Position: Sitting)   Pulse 87   Temp 98.2 °F (36.8 °C)   Resp 16                 I have noted, and reviewed today's data for this patient in [facility] and concur with same. The focused physical exam, other physical findings, Medical history, Review of Symptoms and Medications today remains unchanged except as noted below. Patient notes today: doing ok, has had wound vac for the first time now since Monday ( today is Wed. ). Lesion/Wound, focused exam on Presentation today: sacral ulcer with thick macerated edges, but shallow with slough over all open surface. Procedure:   Wound # sacral decub. Procedure name: sharp excisional debridement. Anaesthesia: Lidocaine; topical    Description: using a sharp curette I excised all non viable tissue to effect a clean bleeding base. Tissue Level/depth of debridement: subQ. Post debridement dimensions changed as noted:    Depth, add 1.0 mm;    Width, add 0.0 mm   Length, add 0.0 mm. Blood Loss: 2 CCs. Bleeding abated post treatment . Post Procedure Condition/ Diagnosis: good progress, wound vac will help.   Follow up and Future plans today: RTC 2 weeks wit Dr. Paulett Krabbe. Specimens: 0. Patient Counseled regarding/Discussed: as above, . Clinical Considerations: alert to infection, drainage/wet control . Dx Codes: G15.813.

## 2021-09-15 NOTE — WOUND CARE
09/15/21 1609   Wound Sacrum #1   Date First Assessed/Time First Assessed: 05/27/21 1434   Present on Hospital Admission: Yes  Primary Wound Type: Pressure Injury  Location: Sacrum  Wound Description: #1   Dressing/Treatment Foam;Collagen  (SNAP VAC with one piece of blue foam)   Discharge Condition: Stable     Pain: 0    Ambulatory Status: Walker     Discharge Destination: Home     Transportation: Car    Accompanied by: Family/Caregiver     Discharge instructions reviewed with Patient and copy or written instructions have been provided. All questions/concerns have been addressed at this time.

## 2021-09-20 ENCOUNTER — HOSPITAL ENCOUNTER (OUTPATIENT)
Dept: WOUND CARE | Age: 76
Discharge: HOME | End: 2021-09-20
Payer: MEDICARE

## 2021-09-20 VITALS
SYSTOLIC BLOOD PRESSURE: 104 MMHG | HEART RATE: 82 BPM | RESPIRATION RATE: 16 BRPM | DIASTOLIC BLOOD PRESSURE: 56 MMHG | TEMPERATURE: 97.8 F

## 2021-09-20 PROCEDURE — 99212 OFFICE O/P EST SF 10 MIN: CPT

## 2021-09-20 NOTE — WOUND CARE
[address] [facility]  Nurse Visit     Unfortunately, we were not able to put on another snap vac today due to inadequate stock. We hope to have replenishment by Thursday! For this nurse visit Kumar Zarate and DEBBIE Greenwood) have place judy, aquacel, gauze, and a mepilex foam border. We gave Ms. Shelley Fofana the extras in case the dressing needs to be changed, however it can remain intact till Thursday if not soiled. 09/20/21 1525   Wound Sacrum #1   Date First Assessed/Time First Assessed: 05/27/21 1434   Present on Hospital Admission: Yes  Primary Wound Type: Pressure Injury  Location: Sacrum  Wound Description: #1   Dressing Status New dressing applied   Cleansed Cleansed with saline   Dressing/Treatment Collagen with Ag;Alginate with Ag;Gauze dressing/dressing sponge;Silicone border      Visit Vitals  BP (!) 104/56 (BP 1 Location: Right upper arm, BP Patient Position: Sitting)   Pulse 82   Temp 97.8 °F (36.6 °C)   Resp 16     Discharge Condition: Stable     Pain: 0    Ambulatory Status: Walking and Walker     Discharge Destination: Home     Transportation: Car    Accompanied by: Self     Discharge instructions reviewed with Patient and copy or written instructions have been provided. All questions/concerns have been addressed at this time.

## 2021-09-23 ENCOUNTER — HOSPITAL ENCOUNTER (OUTPATIENT)
Dept: WOUND CARE | Age: 76
Discharge: HOME | End: 2021-09-23
Payer: MEDICARE

## 2021-09-23 VITALS
DIASTOLIC BLOOD PRESSURE: 67 MMHG | SYSTOLIC BLOOD PRESSURE: 107 MMHG | RESPIRATION RATE: 16 BRPM | HEART RATE: 82 BPM | TEMPERATURE: 97.9 F

## 2021-09-23 PROCEDURE — 11042 DBRDMT SUBQ TIS 1ST 20SQCM/<: CPT

## 2021-09-23 PROCEDURE — 74011000250 HC RX REV CODE- 250: Performed by: FAMILY MEDICINE

## 2021-09-23 PROCEDURE — 97607 NEG PRS WND THR NDME<=50SQCM: CPT

## 2021-09-23 RX ADMIN — Medication: at 15:23

## 2021-09-23 NOTE — WOUND CARE
Wound Center  Progress Note / Procedure Note    Subjective:     Chief Complaint:  Virginia Alvares is a 67 y.o.  female  with sacral wound of >1 months duration. 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 fair bit but gets up and walks around every 2 hours    Has a cushion    History/Chart/Medications reviewed    Since last visit:    No new issues        Wound caused by: pressure / moisture  Current wound care:See flowsheet  Offloading wound: yes  Appetite: good  Wound associated pain: See flowsheet  Diabetic: no  Smoker: no  ROS: no N/V/D, no T/chills; no local rash, no chest pain or shortness of breath, no headache or dizzyness  +urinary incontinence      Objective:     Physical Exam:   See flowsheet / nursing notes for vitals  Visit Vitals  /67 (BP 1 Location: Left upper arm, BP Patient Position: Sitting)   Pulse 82   Temp 97.9 °F (36.6 °C)   Resp 16     General: NAD. Hygiene good  Psych: cooperative. No anxiety or depression. Normal mood and affect. Neuro: alert and oriented to person/place/situation. Otherwise nonfocal.  Derm: Normal  turgor for age, dry skin  HEENT: Normocephalic, atraumatic. EOMI. Conjunctiva clear. No scleral icterus. Neck: Normal range of motion. No masses. Chest: Respirations nonlabored  Lower extremities: color normal; temperature normal.   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 female with recent h/o urinary incontinence    -sacral ulcer.   Pressure stage 3  Full thickness  Sl smaller    Macerated, thickened edges  Mild Slough  Necessitates debridement  for wound healing and to prevent/heal infection  See below            Urinary incontience   straight cath 4x/day  Gyne appt pending    Offloading as instructed      Following discussed with patient / dtr  Needs :  Serial debridement- debrided today- see note below    Good local wound care  Dressing:     Collagen, SNAP vac  Frequency : twice weekly      Patient/  understood and agrees with plan. Questions answered. Follow up with me in 1 week; Nv monday      Procedure:     Ulcer assessment: Due to presence of necrotic tissue within the wound bed, ulcer requires debridement. Procedure: Debridement:   The indication for debridement was reviewed with patient. Risks of procedure (bleeding, infection, pain) were discussed with patient/and consent signed on first visit. Questions were answered    Subcutaneous excisional debridement  Indication: to remove necrotic tissue/ vitalized and devitalized tissue/ infected tissue/ PARE macerated tissue and epibole through skin and subcutaneous layer of wound bed  Time out done  Consent in chart   Anesthesia: Topical  lidocaine   Instrument: curette   Residual Necrosis: none  Bleeding: <1ml   Hemostasis: Pressure   Patient tolerated procedure well   Procedural Pain: 0  Post - procedural pain: 0    Post debridement measurements:  see below  Surface area debrided: <20 sq.  Cm  WOUND POA CONDITIONS    Wound Sacrum #1 (Active)   Wound Image   09/23/21 1517   Wound Etiology Pressure Stage 3 06/17/21 1331   Dressing Status New dressing applied 09/20/21 1525   Cleansed Cleansed with saline 09/20/21 1525   Dressing/Treatment Collagen with Ag;Alginate with Ag;Gauze dressing/dressing sponge;Silicone border 20/85/98 1525   Wound Length (cm) 3 cm 09/23/21 1517   Wound Width (cm) 1.2 cm 09/23/21 1517   Wound Depth (cm) 0.7 cm 09/23/21 1517   Wound Surface Area (cm^2) 3.6 cm^2 09/23/21 1517   Change in Wound Size % -38.46 09/23/21 1517   Wound Volume (cm^3) 2.52 cm^3 09/23/21 1517   Wound Healing % -94 09/23/21 1517   Post-Procedure Length (cm) 3.1 cm 09/23/21 1545   Post-Procedure Width (cm) 1.3 cm 09/23/21 1545   Post-Procedure Depth (cm) 0.8 cm 09/23/21 1545   Post-Procedure Surface Area (cm^2) 4.03 cm^2 09/23/21 1545   Post-Procedure Volume (cm^3) 3.224 cm^3 09/23/21 1545   Wound Assessment Pink/red;Slough 09/23/21 1517   Drainage Amount Moderate 09/23/21 1517   Drainage Description Serous 09/23/21 1517   Wound Odor None 09/23/21 1517   Ginny-Wound/Incision Assessment Maceration 09/23/21 1517   Edges Epibole (rolled edges) 09/23/21 1517   Wound Thickness Description Full thickness 08/26/21 1457   Number of days: 80           -------    Past Medical History:   Diagnosis Date    Asthma     Hypercholesterolemia     Hypertension     Osteoarthritis       Past Surgical History:   Procedure Laterality Date    COLONOSCOPY N/A 2/2/2017    COLONOSCOPY performed by Yael Cheng MD at [address] [place] [address] ENDOSCOPY    HX CHOLECYSTECTOMY      HX HYSTERECTOMY       Family History   Problem Relation Age of Onset    No Known Problems Mother     No Known Problems Father     Diabetes Sister       Social History     Tobacco Use    Smoking status: Never Smoker    Smokeless tobacco: Never Used   Substance Use Topics    Alcohol use: No       Prior to Admission medications    Medication Sig Start Date End Date Taking? Authorizing Provider   potassium chloride (KLOR-CON) 10 mEq tablet TAKE 1 TABLET BY MOUTH DAILY 8/10/21  Yes Stephy Winn MD   amLODIPine (NORVASC) 5 mg tablet TAKE 1 TABLET BY MOUTH EVERY DAY 7/18/21  Yes Stephy Winn MD   simvastatin (ZOCOR) 10 mg tablet TAKE 1 TABLET BY MOUTH EVERY NIGHT AT BEDTIME 7/9/21  Yes Stephy Winn MD   mupirocin (BACTROBAN) 2 % ointment Apply  to affected area daily. 6/10/21  Yes Michelle Tsai MD   montelukast (SINGULAIR) 10 mg tablet Take 1 Tab by mouth daily. For allergies 4/28/21  Yes Stephy Winn MD   gabapentin (NEURONTIN) 300 mg capsule Take 1 Cap by mouth two (2) times a day.  Max Daily Amount: 600 mg. 4/22/21  Yes Dwight Paulino MD   lisinopril-hydroCHLOROthiazide (PRINZIDE, ZESTORETIC) 20-25 mg per tablet TAKE 1 TABLET BY MOUTH EVERY DAY 4/13/21  Yes Stephy Winn MD   naproxen (NAPROSYN) 500 mg tablet TAKE 1 TABLET BY MOUTH TWICE DAILY AS NEEDED FOR PAIN 3/5/21  Yes Stephy Winn MD   multivitamin (ONE A DAY) tablet Take 1 Tab by mouth daily. Yes Provider, Historical   albuterol (PROVENTIL HFA, VENTOLIN HFA, PROAIR HFA) 90 mcg/actuation inhaler Take 2 Puffs by inhalation every six (6) hours as needed for Wheezing or Shortness of Breath. 4/7/20  Yes Stephy Winn MD   fluticasone propion-salmeteroL (Advair Diskus) 100-50 mcg/dose diskus inhaler Take 1 Puff by inhalation every twelve (12) hours. 4/7/20  Yes Stephy Winn MD   oxyCODONE IR (ROXICODONE) 10 mg tab immediate release tablet TK 1 T PO  Q 6 H. FILL ON/AFTER 03/23/19 6/24/19  Yes Provider, Historical   linaclotide (LINZESS) 145 mcg cap capsule Take 1 Cap by mouth Daily (before breakfast). 4/3/19  Yes Stephy Winn MD   polyethylene glycol (MIRALAX) 17 gram/dose powder Take 17 g by mouth daily. 4/3/19  Yes Stephy Winn MD   cholecalciferol, vitamin D3, (VITAMIN D3) 2,000 unit tab Take  by mouth daily. Yes Provider, Historical   tiZANidine (ZANAFLEX) 4 mg tablet Take 1 Tab by mouth two (2) times a day.  10/13/15  Yes Rashid Curry MD     Allergies   Allergen Reactions    Penicillins Swelling          Signed By: Matteo Jones MD     September 23, 2021

## 2021-09-23 NOTE — DISCHARGE INSTRUCTIONS
Discharge Instructions for  [facility]  [address], [address] [facility]  Telephone: [phone]    NAME:  Candace Bojorquez  YOB: 1945  DATE:  9/9/2021    [x] Wound and dressing supply provider: [facility]     B COMPLEX  once daily make sure to have 1,000 , Magnesium argentate, threonate, 200 mcg  x once  a day , OMEGA 3 300 mg fish oil once daily , VIT A 10,000 international units 2 x a day, stop taking after three months, 1g  once daily, VIT D 3,000 IU or 0 international units once x  a day, Vit C 1,000mg x day,  ZINC 30 mg take once  x a day (may use gummy that has elderberry, if zinc alone hard to find) and Protein in take 1g-1.2g g per KG of weight   May find at [employer], [facility], [facility], or [facility] (online), alpha lipoic acid 600 mg x day may take 300 mg if not tolerated. Wound Cleansing:    Do not scrub or use excessive force. Cleanse wound prior to applying a clean dressing with:  [] Normal Saline   [] Keep Wound Dry in Shower      [] Wound Cleanser   [x] Cleanse wound with Mild Soap & Water    [x] May Shower at Discharge - remove dressing 1st, wash, pat dry, re-dress right away    [] Do not shower  [] cleanse with baby shampoo lather leave 2-3 then rinse    Topical Treatments:  Do not apply lotions, creams, or ointments to wound bed unless directed. [] Apply moisturizing lotion {mcadams lotions :00935} to skin surrounding the wound prior to dressing change.  [] Bactroban/Mupirocin  [] Gentamicin ointment    [] Gentian violet to wound bed and periwound  [] Other:     Dressings:                   Wound Location Sacrum     Apply Primary Dressing:    [x] Place Marianna into wound base 1st, SNAP VAC      Change dressing:   [] Daily      [] Every Other Day   [] Three times per week  [] Once a week   [x] Do Not Change Dressing     [] Other:    SNAP VAC.  THERAPY CARE INSTRUCTIONS:  The Snap Vac device is placed at the wound care clinic or with home health. If home health is applying the snap vac, we cannot apply it here at the wound care center; therefore please coordinate with home health to apply snap vac after clinic wound care visit. The SNAP VAC therapy system works by creating suction or negative pressure. Therefore it is important that the [facility] VAC holds its seal.  If the SNAP VAC loses it seal, a red line will appear at the top of the canister. Follow the next set of instructions to trouble shoot loss of suction/seal:    1. Place \"Key\" into top of canister and press down a few times to try and \"recharge\" or re-activate the seal.   2. If red line still persists after pressing the key in and down, try to tape around the edges of the snap vac dressing to close up any leaks in the seal.  Once taped, press down key again to see if a seal was reactivated. 3. If red line is still present at the top of the canister contact the wound care center and/or home health agency and completely remove the snap vac dressing including the blue foam in the wound bed. Cover wound with gauze or previously used dressing. It is very important to remove dressing completely if the seal cannot be reactivated. Pressure Relief:  [x] When sitting, shift position or do seat lifts every 15 minutes.  [] Wheelchair cushion   [] Specialty Bed/Mattress  [x] Turn every 2 hours when in bed. Avoid position directing pressure on wound site. Off-Loading:   [x] Off-loading when [] walking  [x] in bed [x] sitting    Dietary:  [x] Diet as tolerated [] Diabetic Diet   [x] Increase Protein: examples (Meat, cheese, eggs, greek yogurt, fish, nuts)   [] Alistair Therapeutic Nutrition Powder  [] Other:  [] Dial a Dietician : Call [employer] at [phone] enter code ([phone]) when prompted. M-F 9am-5pm EST.      Return Appointment:  [x] Nurse Visit at wound center 2 Mondays in a row  [x] Return Appointment: Jairo Calloway in 2 week(s)   [] Ordered tests: Electronically signed on 9/9/2021 at 8:24 [address]: Should you experience any significant changes in your wound(s) or have questions about your wound care, please contact the [address] at [address] [place] [address] 8:00 am - 4:30. If you need help with your wound outside these hours and cannot wait until we are again available, contact your PCP or go to the hospital emergency room. PLEASE NOTE: IF YOU ARE UNABLE TO OBTAIN WOUND SUPPLIES, CONTINUE TO USE THE SUPPLIES YOU HAVE AVAILABLE UNTIL YOU ARE ABLE TO REACH US. IT IS MOST IMPORTANT TO KEEP THE WOUND COVERED AT ALL TIMES.      Physician Signature:_______________________  Dr. Nabila Ballesteros

## 2021-09-23 NOTE — WOUND CARE
09/23/21 1559   Wound Sacrum #1   Date First Assessed/Time First Assessed: 05/27/21 1434   Present on Hospital Admission: Yes  Primary Wound Type: Pressure Injury  Location: Sacrum  Wound Description: #1   Dressing/Treatment Collagen  (SNAP VAC)   Discharge Condition: Stable     Pain: 0    Ambulatory Status: Walking    Discharge Destination: Home     Transportation: Car    Accompanied by: Self     Discharge instructions reviewed with Patient and copy or written instructions have been provided. All questions/concerns have been addressed at this time.

## 2021-09-23 NOTE — WOUND CARE
[facility]  [address] [place], [address] [facility]  Telephone: [phone]    NAME:  All May  YOB: 1945  DATE:  9/23/2021    Case Management Note    Wound Care Management Outside of Clinic:  [] Wound and dressing supply provider:  [] Patient/family performs own wound care  [] Home Healthcare:  [x] Dressing changes performed once a week at wound care center    Advanced/Additional Wound Treatment (If applicable):   [] Vascular Referral:   [x] SNAP Vac: currently applying  [] Wound Vac:  [x] Skin Substitute: submitted to [employer] for puraply am   [] Pressure Reducing Surfaces:  [] Wheelchair Assessment:   [] HBO Therapy:   [] IV Antibiotics:   [] Plastic Surgery Referral:  [] Other:    Other Notes:  []

## 2021-09-23 NOTE — WOUND CARE
09/23/21 1517   Anesthetic   Anesthetic 4% Lidocaine Liquid Topical   Wound Sacrum #1   Date First Assessed/Time First Assessed: 05/27/21 1434   Present on Hospital Admission: Yes  Primary Wound Type: Pressure Injury  Location: Sacrum  Wound Description: #1   Wound Image    Wound Length (cm) 3 cm   Wound Width (cm) 1.2 cm   Wound Depth (cm) 0.7 cm   Wound Surface Area (cm^2) 3.6 cm^2   Change in Wound Size % -38.46   Wound Volume (cm^3) 2.52 cm^3   Wound Healing % -94   Wound Assessment [place]/red;Slough   Drainage Amount Moderate   Drainage Description Serous   Wound Odor None   Ginny-Wound/Incision Assessment Maceration   Edges Epibole (rolled edges)   Pain 1   Pain Scale 1 Numeric (0 - 10)   Pain Intensity 1 0     Visit Vitals  /67 (BP 1 Location: Left upper arm, BP Patient Position: Sitting)   Pulse 82   Temp 97.9 °F (36.6 °C)   Resp 16

## 2021-09-27 ENCOUNTER — HOSPITAL ENCOUNTER (OUTPATIENT)
Dept: WOUND CARE | Age: 76
Discharge: HOME | End: 2021-09-27
Payer: MEDICARE

## 2021-09-27 VITALS
SYSTOLIC BLOOD PRESSURE: 106 MMHG | HEART RATE: 78 BPM | TEMPERATURE: 97.2 F | DIASTOLIC BLOOD PRESSURE: 61 MMHG | RESPIRATION RATE: 16 BRPM

## 2021-09-27 PROCEDURE — 97607 NEG PRS WND THR NDME<=50SQCM: CPT

## 2021-09-30 ENCOUNTER — HOSPITAL ENCOUNTER (OUTPATIENT)
Dept: WOUND CARE | Age: 76
Discharge: HOME | End: 2021-09-30
Payer: MEDICARE

## 2021-09-30 VITALS
RESPIRATION RATE: 16 BRPM | TEMPERATURE: 97.6 F | HEART RATE: 87 BPM | SYSTOLIC BLOOD PRESSURE: 111 MMHG | DIASTOLIC BLOOD PRESSURE: 63 MMHG

## 2021-09-30 PROCEDURE — 11042 DBRDMT SUBQ TIS 1ST 20SQCM/<: CPT

## 2021-09-30 PROCEDURE — 74011000250 HC RX REV CODE- 250: Performed by: FAMILY MEDICINE

## 2021-09-30 RX ADMIN — Medication: at 15:26

## 2021-09-30 NOTE — WOUND CARE
09/30/21 1606   Wound Sacrum #1   Date First Assessed/Time First Assessed: 05/27/21 1434   Present on Hospital Admission: Yes  Primary Wound Type: Pressure Injury  Location: Sacrum  Wound Description: #1   Dressing/Treatment Gauze dressing/dressing sponge;Betadine swabs/Povidone Iodine;Tape/Soft cloth adhesive tape   Discharge Condition: Stable     Pain: 0    Ambulatory Status: Walking and Walker     Discharge Destination: Home     Transportation: Car    Accompanied by: [employer] [place]    Discharge instructions reviewed with Patient and Family/Caregiver  and copy or written instructions have been provided. All questions/concerns have been addressed at this time.

## 2021-09-30 NOTE — WOUND CARE
09/30/21 1521   Anesthetic   Anesthetic 4% Lidocaine Liquid Topical   Wound Sacrum #1   Date First Assessed/Time First Assessed: 05/27/21 1434   Present on Hospital Admission: Yes  Primary Wound Type: Pressure Injury  Location: Sacrum  Wound Description: #1   Wound Image    Wound Length (cm) 3 cm   Wound Width (cm) 1 cm   Wound Depth (cm) 0.2 cm   Wound Surface Area (cm^2) 3 cm^2   Change in Wound Size % -15.38   Wound Volume (cm^3) 0.6 cm^3   Wound Healing % 54   Wound Assessment [place]/red;Slough   Drainage Amount Moderate   Drainage Description Serous   Wound Odor Mild   Ginny-Wound/Incision Assessment Maceration   Edges Epibole (rolled edges)     Visit Vitals  /63 (BP 1 Location: Left upper arm, BP Patient Position: Sitting)   Pulse 87   Temp 97.6 °F (36.4 °C)   Resp 16

## 2021-09-30 NOTE — DISCHARGE INSTRUCTIONS
Discharge Instructions for  [facility]  [address] [place], [address] [facility]  Telephone: [phone]    NAME:  Galileo Beckman  YOB: 1945  DATE:  9/23/2021    [x] Wound and dressing supply provider: [facility]     B COMPLEX  once daily make sure to have 1,000 , Magnesium argentate, threonate, 200 mcg  x once  a day , OMEGA 3 300 mg fish oil once daily , VIT A 10,000 international units 2 x a day, stop taking after three months, 1g  once daily, VIT D 3,000 IU or 0 international units once x  a day, Vit C 1,000mg x day,  ZINC 30 mg take once  x a day (may use gummy that has elderberry, if zinc alone hard to find) and Protein in take 1g-1.2g g per KG of weight   May find at [employer], [facility], [facility], or [employer] (online), alpha lipoic acid 600 mg x day may take 300 mg if not tolerated. Wound Cleansing:    Do not scrub or use excessive force. Cleanse wound prior to applying a clean dressing with:  [] Normal Saline   [] Keep Wound Dry in Shower      [] Wound Cleanser   [x] Cleanse wound with Mild Soap & Water    [x] May Shower at Discharge - remove dressing 1st, wash, pat dry, re-dress right away    [] Do not shower  [] cleanse with baby shampoo lather leave 2-3 then rinse    Topical Treatments:  Do not apply lotions, creams, or ointments to wound bed unless directed. [] Apply moisturizing lotion {mcadams lotions :02749} to skin surrounding the wound prior to dressing change.  [] Bactroban/Mupirocin  [] Gentamicin ointment    [] Gentian violet to wound bed and periwound  [] Other:     Dressings:                   Wound Location Sacrum     Apply Primary Dressing:    [x] Place Marianna into wound base 1st, SNAP VAC      Change dressing:   [] Daily      [] Every Other Day   [] Three times per week  [] Once a week   [x] Do Not Change Dressing     [] Other:    SNAP VAC.  THERAPY CARE INSTRUCTIONS:  The Snap Vac device is placed at the wound care clinic or with home health. If home health is applying the snap vac, we cannot apply it here at the wound care center; therefore please coordinate with home health to apply snap vac after clinic wound care visit. The SNAP VAC therapy system works by creating suction or negative pressure. Therefore it is important that the [facility] VAC holds its seal.  If the SNAP VAC loses it seal, a red line will appear at the top of the canister. Follow the next set of instructions to trouble shoot loss of suction/seal:    1. Place \"Key\" into top of canister and press down a few times to try and \"recharge\" or re-activate the seal.   2. If red line still persists after pressing the key in and down, try to tape around the edges of the snap vac dressing to close up any leaks in the seal.  Once taped, press down key again to see if a seal was reactivated. 3. If red line is still present at the top of the canister contact the wound care center and/or home health agency and completely remove the snap vac dressing including the blue foam in the wound bed. Cover wound with gauze or previously used dressing. It is very important to remove dressing completely if the seal cannot be reactivated. Pressure Relief:  [x] When sitting, shift position or do seat lifts every 15 minutes.  [] Wheelchair cushion   [] Specialty Bed/Mattress  [x] Turn every 2 hours when in bed. Avoid position directing pressure on wound site. Off-Loading:   [x] Off-loading when [] walking  [x] in bed [x] sitting    Dietary:  [x] Diet as tolerated [] Diabetic Diet   [x] Increase Protein: examples (Meat, cheese, eggs, greek yogurt, fish, nuts)   [] Alistair Therapeutic Nutrition Powder  [] Other:  [] Dial a Dietician : Call [employer] at [phone] enter code ([phone]) when prompted. M-F 9am-5pm EST.      Return Appointment:  [] Nurse Visit at wound center  [x] Return Appointment: Redd Nelson in 1 week(s)   [] Ordered tests:     Electronically signed on 9/23/2021 at 8:24 AM     [address] [facility] [address] Information: Should you experience any significant changes in your wound(s) or have questions about your wound care, please contact the [address] at [address] [place] [address] 8:00 am - 4:30. If you need help with your wound outside these hours and cannot wait until we are again available, contact your PCP or go to the hospital emergency room. PLEASE NOTE: IF YOU ARE UNABLE TO OBTAIN WOUND SUPPLIES, CONTINUE TO USE THE SUPPLIES YOU HAVE AVAILABLE UNTIL YOU ARE ABLE TO REACH US. IT IS MOST IMPORTANT TO KEEP THE WOUND COVERED AT ALL TIMES.      Physician Signature:_______________________  Dr. Brent Dykes

## 2021-09-30 NOTE — WOUND CARE
Wound Center  Progress Note / Procedure Note    Subjective:     Chief Complaint:  Gill Coe is a 67 y.o.  female  with sacral wound of >1 months duration. 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 fair bit but gets up and walks around every 2 hours    Has a cushion    History/Chart/Medications reviewed    Since last visit:    No new issues        Wound caused by: pressure / moisture  Current wound care:See flowsheet  Offloading wound: yes  Appetite: good  Wound associated pain: See flowsheet  Diabetic: no  Smoker: no  ROS: no N/V/D, no T/chills; no local rash, no chest pain or shortness of breath, no headache or dizzyness  +urinary incontinence      Objective:     Physical Exam:   See flowsheet / nursing notes for vitals  Visit Vitals  /63 (BP 1 Location: Left upper arm, BP Patient Position: Sitting)   Pulse 87   Temp 97.6 °F (36.4 °C)   Resp 16     General: NAD. Hygiene good  Psych: cooperative. No anxiety or depression. Normal mood and affect. Neuro: alert and oriented to person/place/situation. Otherwise nonfocal.  Derm: Normal  turgor for age, dry skin  HEENT: Normocephalic, atraumatic. EOMI. Conjunctiva clear. No scleral icterus. Neck: Normal range of motion. No masses. Chest: Respirations nonlabored  Lower extremities: color normal; temperature normal.   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 female with recent h/o urinary incontinence    -sacral ulcer.   Pressure stage 3  Full thickness  Stable, not much improvement since vac use    Macerated, thickened edges  Mild Slough  Necessitates debridement  for wound healing and to prevent/heal infection  See below            Urinary incontience   straight cath 4x/day  Gyne appt pending- has not gone  disucssed kang catheter again- pros/cons/application process  Not able to use until vac or skin subs until no urinary contamination    Offloading as instructed      Following discussed with patient / dtr  Needs :  Serial debridement- debrided today- see note below    Good local wound care  Dressing:     HOLD Collagen, SNAP vac  Frequency : twice weekly    Use betadine wet to dry change daily      Patient/  understood and agrees with plan. Questions answered. Follow up with me in 1 week;      Procedure:     Ulcer assessment: Due to presence of necrotic tissue within the wound bed, ulcer requires debridement. Procedure: Debridement:   The indication for debridement was reviewed with patient. Risks of procedure (bleeding, infection, pain) were discussed with patient/and consent signed on first visit. Questions were answered    Subcutaneous excisional debridement  Indication: to remove necrotic tissue/ vitalized and devitalized tissue/ infected tissue/ PARE macerated tissue and epibole through skin and subcutaneous layer of wound bed  Time out done  Consent in chart   Anesthesia: Topical  lidocaine   Instrument: curette   Residual Necrosis: none  Bleeding: <1ml   Hemostasis: Pressure   Patient tolerated procedure well   Procedural Pain: 0  Post - procedural pain: 0    Post debridement measurements:  see below  Surface area debrided: <20 sq.  Cm  WOUND POA CONDITIONS    Wound Sacrum #1 (Active)   Wound Image   09/30/21 1521   Wound Etiology Pressure Stage 3 06/17/21 1331   Dressing Status New dressing applied 09/27/21 1548   Cleansed Cleansed with saline 09/27/21 1548   Dressing/Treatment Collagen with Ag 09/27/21 1548   Wound Length (cm) 3 cm 09/30/21 1521   Wound Width (cm) 1 cm 09/30/21 1521   Wound Depth (cm) 0.2 cm 09/30/21 1521   Wound Surface Area (cm^2) 3 cm^2 09/30/21 1521   Change in Wound Size % -15.38 09/30/21 1521   Wound Volume (cm^3) 0.6 cm^3 09/30/21 1521   Wound Healing % 54 09/30/21 1521   Post-Procedure Length (cm) 3.1 cm 09/30/21 1549   Post-Procedure Width (cm) 1.1 cm 09/30/21 1549   Post-Procedure Depth (cm) 0.3 cm 09/30/21 1549   Post-Procedure Surface Area (cm^2) 3.41 cm^2 09/30/21 1549   Post-Procedure Volume (cm^3) 1.023 cm^3 09/30/21 1549   Wound Assessment Pink/red;Slough 09/30/21 1521   Drainage Amount Moderate 09/30/21 1521   Drainage Description Serous 09/30/21 1521   Wound Odor Mild 09/30/21 1521   Ginny-Wound/Incision Assessment Maceration 09/30/21 1521   Edges Epibole (rolled edges) 09/30/21 1521   Wound Thickness Description Full thickness 08/26/21 1457   Number of days: 126         -------    Past Medical History:   Diagnosis Date    Asthma     Hypercholesterolemia     Hypertension     Osteoarthritis       Past Surgical History:   Procedure Laterality Date    COLONOSCOPY N/A 2/2/2017    COLONOSCOPY performed [facility] at [facility] ENDOSCOPY    HX CHOLECYSTECTOMY      HX HYSTERECTOMY       Family History   Problem Relation Age of Onset    No Known Problems Mother     No Known Problems Father     Diabetes Sister       Social History     Tobacco Use    Smoking status: Never Smoker    Smokeless tobacco: Never Used   Substance Use Topics    Alcohol use: No       Prior to Admission medications    Medication Sig Start Date End Date Taking? Authorizing Provider   potassium chloride (KLOR-CON) 10 mEq tablet TAKE 1 TABLET BY MOUTH DAILY 8/10/21   Stephy Winn MD   amLODIPine (NORVASC) 5 mg tablet TAKE 1 TABLET BY MOUTH EVERY DAY 7/18/21   Lakisha Brannon MD   simvastatin (ZOCOR) 10 mg tablet TAKE 1 TABLET BY MOUTH EVERY NIGHT AT BEDTIME 7/9/21   Stephy Winn MD   mupirocin (BACTROBAN) 2 % ointment Apply  to affected area daily. 6/10/21   Liz Clancy MD   montelukast (SINGULAIR) 10 mg tablet Take 1 Tab by mouth daily. For allergies 4/28/21   Lakisha Brannon MD   gabapentin (NEURONTIN) 300 mg capsule Take 1 Cap by mouth two (2) times a day.  Max Daily Amount: 600 mg. 4/22/21   Stephy Winn MD   lisinopril-hydroCHLOROthiazide (PRINZIDE, ZESTORETIC) 20-25 mg per tablet TAKE 1 TABLET BY MOUTH EVERY DAY 4/13/21   Stephy Winn MD   naproxen (NAPROSYN) 500 mg tablet TAKE 1 TABLET BY MOUTH TWICE DAILY AS NEEDED FOR PAIN 3/5/21   Yee Nunes MD   multivitamin (ONE A DAY) tablet Take 1 Tab by mouth daily. Provider, Historical   albuterol (PROVENTIL HFA, VENTOLIN HFA, PROAIR HFA) 90 mcg/actuation inhaler Take 2 Puffs by inhalation every six (6) hours as needed for Wheezing or Shortness of Breath. 4/7/20   Yee Nunes MD   fluticasone propion-salmeteroL (Advair Diskus) 100-50 mcg/dose diskus inhaler Take 1 Puff by inhalation every twelve (12) hours. 4/7/20   Yee Nunes MD   oxyCODONE IR (ROXICODONE) 10 mg tab immediate release tablet TK 1 T PO  Q 6 H. FILL ON/AFTER 03/23/19 6/24/19   Provider, Historical   linaclotide Gerold Mech) 145 mcg cap capsule Take 1 Cap by mouth Daily (before breakfast). 4/3/19   Yee Nunes MD   polyethylene glycol (MIRALAX) 17 gram/dose powder Take 17 g by mouth daily. 4/3/19   Yee Nunes MD   cholecalciferol, vitamin D3, (VITAMIN D3) 2,000 unit tab Take  by mouth daily. Provider, Historical   tiZANidine (ZANAFLEX) 4 mg tablet Take 1 Tab by mouth two (2) times a day.  10/13/15   Víctor Lomeli MD     Allergies   Allergen Reactions    Penicillins Swelling          Signed By: Kiana Grover MD     September 30, 2021

## 2021-10-07 ENCOUNTER — HOSPITAL ENCOUNTER (OUTPATIENT)
Dept: WOUND CARE | Age: 76
Discharge: HOME | End: 2021-10-07
Payer: MEDICARE

## 2021-10-07 VITALS
TEMPERATURE: 97.2 F | DIASTOLIC BLOOD PRESSURE: 72 MMHG | SYSTOLIC BLOOD PRESSURE: 115 MMHG | RESPIRATION RATE: 16 BRPM | HEART RATE: 86 BPM

## 2021-10-07 DIAGNOSIS — I10 ESSENTIAL HYPERTENSION: ICD-10-CM

## 2021-10-07 PROCEDURE — 74011000250 HC RX REV CODE- 250: Performed by: FAMILY MEDICINE

## 2021-10-07 PROCEDURE — 11042 DBRDMT SUBQ TIS 1ST 20SQCM/<: CPT

## 2021-10-07 RX ORDER — MUPIROCIN 20 MG/G
OINTMENT TOPICAL DAILY
Qty: 22 G | Refills: 1 | Status: SHIPPED | OUTPATIENT
Start: 2021-10-07

## 2021-10-07 RX ADMIN — Medication: at 15:30

## 2021-10-07 NOTE — WOUND CARE
10/07/21 1526   Anesthetic   Anesthetic 4% Lidocaine Liquid Topical   Wound Sacrum #1   Date First Assessed/Time First Assessed: 05/27/21 1434   Present on Hospital Admission: Yes  Primary Wound Type: Pressure Injury  Location: Sacrum  Wound Description: #1   Wound Image    Wound Length (cm) 2.9 cm   Wound Width (cm) 1 cm   Wound Depth (cm) 0.3 cm   Wound Surface Area (cm^2) 2.9 cm^2   Change in Wound Size % -11.54   Wound Volume (cm^3) 0.87 cm^3   Wound Healing % 33   Wound Assessment Level Green/red;Slough   Drainage Amount Moderate   Drainage Description Serous   Wound Odor None   Ginny-Wound/Incision Assessment Maceration   Edges Epibole (rolled edges)     Visit Vitals  /72 (BP 1 Location: Left upper arm, BP Patient Position: Sitting)   Pulse 86   Temp 97.2 °F (36.2 °C)   Resp 16

## 2021-10-07 NOTE — DISCHARGE INSTRUCTIONS
Discharge Instructions for  [facility]  [place] [address], [address] [facility]  Telephone: [phone]    NAME:  Gill Coe  YOB: 1945  DATE:  9/30/2021    [x] Wound and dressing supply provider: [employer]     B COMPLEX  once daily make sure to have 1,000 , Magnesium argentate, threonate, 200 mcg  x once  a day , OMEGA 3 300 mg fish oil once daily , VIT A 10,000 international units 2 x a day, stop taking after three months, 1g  once daily, VIT D 3,000 IU or 0 international units once x  a day, Vit C 1,000mg x day,  ZINC 30 mg take once  x a day (may use gummy that has elderberry, if zinc alone hard to find) and Protein in take 1g-1.2g g per KG of weight   May find at [employer], [facility], [employer], or [employer] (online), alpha lipoic acid 600 mg x day may take 300 mg if not tolerated. Wound Cleansing:    Do not scrub or use excessive force. Cleanse wound prior to applying a clean dressing with:  [] Normal Saline   [] Keep Wound Dry in Shower      [] Wound Cleanser   [x] Cleanse wound with Mild Soap & Water    [x] May Shower at Discharge - remove dressing 1st, wash, pat dry, re-dress right away    [] Do not shower  [] cleanse with baby shampoo lather leave 2-3 then rinse    Topical Treatments:  Do not apply lotions, creams, or ointments to wound bed unless directed.    [] Apply moisturizing lotion {mcadams lotions :64507} to skin surrounding the wound prior to dressing change.  [] Bactroban/Mupirocin  [] Gentamicin ointment    [] Gentian violet to wound bed and periwound  [] Other:     Dressings:                   Wound Location Sacrum     Apply Primary Dressing:    [x] Betadine moistened gauze, betadine to periwound too, dry gauze, tape      Change dressing:   [x] Daily      [] Every Other Day   [] Three times per week  [] Once a week   [] Do Not Change Dressing     [] Other:    Pressure Relief:  [x] When sitting, shift position or do seat lifts every 15 minutes.  [] Wheelchair cushion   [] Specialty Bed/Mattress  [x] Turn every 2 hours when in bed. Avoid position directing pressure on wound site. Off-Loading:   [x] Off-loading when [] walking  [x] in bed [x] sitting    Dietary:  [x] Diet as tolerated [] Diabetic Diet   [x] Increase Protein: examples (Meat, cheese, eggs, greek yogurt, fish, nuts)   [] Alistair Therapeutic Nutrition Powder  [] Dial a Dietician : Call [employer] at [phone] enter code [phone]) when prompted. M-F 9am-5pm EST. Return Appointment:  [] Nurse Visit at wound center  [x] Return Appointment: Agustin Galvez in 1 week(s)   [] Ordered tests:     Electronically signed on 9/30/2021 at 8:24 AM     Britney Oliver 281: Should you experience any significant changes in your wound(s) or have questions about your wound care, please contact the [facility] Main at [address] [place] [address] 8:00 am - 4:30. If you need help with your wound outside these hours and cannot wait until we are again available, contact your PCP or go to the hospital emergency room. PLEASE NOTE: IF YOU ARE UNABLE TO OBTAIN WOUND SUPPLIES, CONTINUE TO USE THE SUPPLIES YOU HAVE AVAILABLE UNTIL YOU ARE ABLE TO REACH US. IT IS MOST IMPORTANT TO KEEP THE WOUND COVERED AT ALL TIMES.      Physician Signature:_______________________  Dr. Jelena Naidu

## 2021-10-07 NOTE — WOUND CARE
10/07/21 1616   Wound Sacrum #1   Date First Assessed/Time First Assessed: 05/27/21 1434   Present on Hospital Admission: Yes  Primary Wound Type: Pressure Injury  Location: Sacrum  Wound Description: #1   Dressing/Treatment Gauze dressing/dressing sponge;Tape/Soft cloth adhesive tape  (mupirocin)   Discharge Condition: Stable     Pain: 0    Ambulatory Status: Walking/walker    Discharge Destination: Home     Transportation: Car    Accompanied by: Self     Discharge instructions reviewed with Patient and Family/Caregiver  and copy or written instructions have been provided. All questions/concerns have been addressed at this time.

## 2021-10-07 NOTE — WOUND CARE
Wound Center  Progress Note / Procedure Note    Subjective:     Chief Complaint:  Layton Briones is a 67 y.o.  female  with sacral wound of >1 months duration. 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 fair bit but gets up and walks around every 2 hours    Has a cushion    History/Chart/Medications reviewed    Since last visit:    No new issues, rose MILLER on vacation- will be seeing her next week- have decided to get kang catheter    Betadine caused burning        Wound caused by: pressure / moisture  Current wound care:See flowsheet  Offloading wound: yes  Appetite: good  Wound associated pain: See flowsheet  Diabetic: no  Smoker: no  ROS: no N/V/D, no T/chills; no local rash, no chest pain or shortness of breath, no headache or dizzyness  +urinary incontinence      Objective:     Physical Exam:   See flowsheet / nursing notes for vitals  Visit Vitals  /72 (BP 1 Location: Left upper arm, BP Patient Position: Sitting)   Pulse 86   Temp 97.2 °F (36.2 °C)   Resp 16     General: NAD. Hygiene good  Psych: cooperative. No anxiety or depression. Normal mood and affect. Neuro: alert and oriented to person/place/situation. Otherwise nonfocal.  Derm: Normal  turgor for age, dry skin  HEENT: Normocephalic, atraumatic. EOMI. Conjunctiva clear. No scleral icterus. Neck: Normal range of motion. No masses. Chest: Respirations nonlabored  Lower extremities: color normal; temperature normal.   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 female with recent h/o urinary incontinence    -sacral ulcer.   Pressure stage 3  Full thickness    Macerated, thickened edges  Mild Slough  Necessitates debridement  for wound healing and to prevent/heal infection  See below            Urinary incontience   straight cath 4x/day  Not able to use until vac or skin subs until no urinary contamination    Offloading as instructed      Following discussed with patient / dtr  Needs :  Serial debridement- debrided today- see note below    Good local wound care  Dressing:     HOLD Collagen, SNAP vac  Frequency : twice weekly    mupirocin change daily      Patient/  understood and agrees with plan. Questions answered. Follow up with me in 2 week;      Procedure:     Ulcer assessment: Due to presence of necrotic tissue within the wound bed, ulcer requires debridement. Procedure: Debridement:   The indication for debridement was reviewed with patient. Risks of procedure (bleeding, infection, pain) were discussed with patient/and consent signed on first visit. Questions were answered    Subcutaneous excisional debridement  Indication: to remove necrotic tissue/ vitalized and devitalized tissue/ infected tissue/ PARE macerated tissue and epibole through skin and subcutaneous layer of wound bed  Time out done  Consent in chart   Anesthesia: Topical  lidocaine   Instrument: curette   Residual Necrosis: none  Bleeding: <1ml   Hemostasis: Pressure   Patient tolerated procedure well   Procedural Pain: 0  Post - procedural pain: 0    Post debridement measurements:  see below  Surface area debrided: <20 sq.  Cm  WOUND POA CONDITIONS    Wound Sacrum #1 (Active)   Wound Image   10/07/21 1526   Wound Etiology Pressure Stage 3 06/17/21 1331   Dressing Status New dressing applied 09/27/21 1548   Cleansed Cleansed with saline 09/27/21 1548   Dressing/Treatment Gauze dressing/dressing sponge;Tape/Soft cloth adhesive tape 10/07/21 1616   Wound Length (cm) 2.9 cm 10/07/21 1526   Wound Width (cm) 1 cm 10/07/21 1526   Wound Depth (cm) 0.3 cm 10/07/21 1526   Wound Surface Area (cm^2) 2.9 cm^2 10/07/21 1526   Change in Wound Size % -11.54 10/07/21 1526   Wound Volume (cm^3) 0.87 cm^3 10/07/21 1526   Wound Healing % 33 10/07/21 1526   Post-Procedure Length (cm) 3 cm 10/07/21 1610   Post-Procedure Width (cm) 1.1 cm 10/07/21 1610   Post-Procedure Depth (cm) 0.4 cm 10/07/21 1610   Post-Procedure Surface Area (cm^2) 3.3 cm^2 10/07/21 1610   Post-Procedure Volume (cm^3) 1.32 cm^3 10/07/21 1610   Wound Assessment Pink/red;Slough 10/07/21 1526   Drainage Amount Moderate 10/07/21 1526   Drainage Description Serous 10/07/21 1526   Wound Odor None 10/07/21 1526   Ginny-Wound/Incision Assessment Maceration 10/07/21 1526   Edges Epibole (rolled edges) 10/07/21 1526   Wound Thickness Description Full thickness 08/26/21 1457   Number of days: 80         -------    Past Medical History:   Diagnosis Date    Asthma     Hypercholesterolemia     Hypertension     Osteoarthritis       Past Surgical History:   Procedure Laterality Date    COLONOSCOPY N/A 2/2/2017    [facility] ENDOSCOPY    HX CHOLECYSTECTOMY      HX HYSTERECTOMY       Family History   Problem Relation Age of Onset    No Known Problems Mother     No Known Problems Father     Diabetes Sister       Social History     Tobacco Use    Smoking status: Never Smoker    Smokeless tobacco: Never Used   Substance Use Topics    Alcohol use: No       Prior to Admission medications    Medication Sig Start Date End Date Taking? Authorizing Provider   potassium chloride (KLOR-CON) 10 mEq tablet TAKE 1 TABLET BY MOUTH DAILY 8/10/21   Stephy Winn MD   amLODIPine (NORVASC) 5 mg tablet TAKE 1 TABLET BY MOUTH EVERY DAY 7/18/21   Channing Mccormack MD   simvastatin (ZOCOR) 10 mg tablet TAKE 1 TABLET BY MOUTH EVERY NIGHT AT BEDTIME 7/9/21   Stephy Winn MD   mupirocin (BACTROBAN) 2 % ointment Apply  to affected area daily. 6/10/21   Debbie Herrera MD   montelukast (SINGULAIR) 10 mg tablet Take 1 Tab by mouth daily.  For allergies 4/28/21   Channing Mccormack MD   gabapentin (NEURONTIN) 300 mg capsule Take 1 Cap by mouth two (2) times a day. Max Daily Amount: 600 mg. 4/22/21   Stephy Winn MD   lisinopril-hydroCHLOROthiazide (PRINZIDE, ZESTORETIC) 20-25 mg per tablet TAKE 1 TABLET BY MOUTH EVERY DAY 4/13/21   Stephy Winn MD   naproxen (NAPROSYN) 500 mg tablet TAKE 1 TABLET BY MOUTH TWICE DAILY AS NEEDED FOR PAIN 3/5/21   Patel Gutiérrez MD   multivitamin (ONE A DAY) tablet Take 1 Tab by mouth daily. Provider, Historical   albuterol (PROVENTIL HFA, VENTOLIN HFA, PROAIR HFA) 90 mcg/actuation inhaler Take 2 Puffs by inhalation every six (6) hours as needed for Wheezing or Shortness of Breath. 4/7/20   Patel Gutiérrez MD   fluticasone propion-salmeteroL (Advair Diskus) 100-50 mcg/dose diskus inhaler Take 1 Puff by inhalation every twelve (12) hours. 4/7/20   Patel Gutiérrez MD   oxyCODONE IR (ROXICODONE) 10 mg tab immediate release tablet TK 1 T PO  Q 6 H. FILL ON/AFTER 03/23/19 6/24/19   Provider, Historical   linaclotide Kelsie Rm) 145 mcg cap capsule Take 1 Cap by mouth Daily (before breakfast). 4/3/19   Patel Gutiérrez MD   polyethylene glycol (MIRALAX) 17 gram/dose powder Take 17 g by mouth daily. 4/3/19   Patel Gutiérrez MD   cholecalciferol, vitamin D3, (VITAMIN D3) 2,000 unit tab Take  by mouth daily. Provider, Historical   tiZANidine (ZANAFLEX) 4 mg tablet Take 1 Tab by mouth two (2) times a day.  10/13/15   Morgan Ruvalcaba MD     Allergies   Allergen Reactions    Penicillins Swelling          Signed By: Teetee Vergara MD     October 7, 2021

## 2021-10-08 RX ORDER — LISINOPRIL AND HYDROCHLOROTHIAZIDE 20; 25 MG/1; MG/1
TABLET ORAL
Qty: 90 TABLET | Refills: 1 | Status: SHIPPED | OUTPATIENT
Start: 2021-10-08 | End: 2022-03-22 | Stop reason: SDUPTHER

## 2021-10-21 ENCOUNTER — HOSPITAL ENCOUNTER (OUTPATIENT)
Dept: WOUND CARE | Age: 76
Discharge: HOME | End: 2021-10-21
Payer: MEDICARE

## 2021-10-21 VITALS
HEART RATE: 82 BPM | TEMPERATURE: 97 F | DIASTOLIC BLOOD PRESSURE: 72 MMHG | SYSTOLIC BLOOD PRESSURE: 115 MMHG | RESPIRATION RATE: 16 BRPM

## 2021-10-21 PROCEDURE — 11042 DBRDMT SUBQ TIS 1ST 20SQCM/<: CPT | Performed by: FAMILY MEDICINE

## 2021-10-21 PROCEDURE — 74011000250 HC RX REV CODE- 250: Performed by: FAMILY MEDICINE

## 2021-10-21 RX ADMIN — Medication: at 15:23

## 2021-10-21 NOTE — WOUND CARE
10/21/21 1541   Wound Sacrum #1   Date First Assessed/Time First Assessed: 05/27/21 1434   Present on Hospital Admission: Yes  Primary Wound Type: Pressure Injury  Location: Sacrum  Wound Description: #1   Dressing Status New dressing applied   Dressing/Treatment Gauze dressing/dressing sponge;Tape/Soft cloth adhesive tape; Other (Comment)  (mupirocin)     Discharge Condition: Stable     Pain: 0    Ambulatory Status: Walking, Walker    Discharge Destination: Home     Transportation: Car    Accompanied by: Self  and Family/Caregiver     Discharge instructions reviewed with Patient and Family/Caregiver  and copy or written instructions have been provided. All questions/concerns have been addressed at this time.

## 2021-10-21 NOTE — DISCHARGE INSTRUCTIONS
Discharge Instructions for  [facility]  [place] [address], [address] [place]  Telephone: [phone]    NAME:  Osei Reavesvard:  1945  DATE:  10/7/2021    [x] Wound and dressing supply provider: [facility]     B COMPLEX  once daily make sure to have 1,000 , Magnesium argentate, threonate, 200 mcg  x once  a day , OMEGA 3 300 mg fish oil once daily , VIT A 10,000 international units 2 x a day, stop taking after three months, 1g  once daily, VIT D 3,000 IU or 0 international units once x  a day, Vit C 1,000mg x day,  ZINC 30 mg take once  x a day (may use gummy that has elderberry, if zinc alone hard to find) and Protein in take 1g-1.2g g per KG of weight   May find at [employer], [facility], [facility], or [employer] (online), alpha lipoic acid 600 mg x day may take 300 mg if not tolerated. Wound Cleansing:    Do not scrub or use excessive force. Cleanse wound prior to applying a clean dressing with:  [] Normal Saline   [] Keep Wound Dry in Shower      [] Wound Cleanser   [x] Cleanse wound with Mild Soap & Water    [x] May Shower at Discharge - remove dressing 1st, wash, pat dry, re-dress right away    [] Do not shower  [] cleanse with baby shampoo lather leave 2-3 then rinse    Topical Treatments:  Do not apply lotions, creams, or ointments to wound bed unless directed. [] Apply moisturizing lotion {mcadams lotions :57383} to skin surrounding the wound prior to dressing change.   [x] Bactroban/Mupirocin  [] Gentamicin ointment    [] Gentian violet to wound bed and periwound  [] Other:     Dressings:                   Wound Location Sacrum     Apply Primary Dressing:    [x] Mupirocin ointment, gauze, tape      Change dressing:   [x] Daily      [] Every Other Day   [] Three times per week  [] Once a week   [] Do Not Change Dressing     [] Other:    Pressure Relief:  [x] When sitting, shift position or do seat lifts every 15 minutes.  [] Wheelchair cushion   [] Specialty Bed/Mattress  [x] Turn every 2 hours when in bed. Avoid position directing pressure on wound site. Off-Loading:   [x] Off-loading when [] walking  [x] in bed [x] sitting    Dietary:  [x] Diet as tolerated [] Diabetic Diet   [x] Increase Protein: examples (Meat, cheese, eggs, greek yogurt, fish, nuts)   [] Alistair Therapeutic Nutrition Powder  [] Dial a Dietician : Call [employer] at [phone] enter code ([phone]) when prompted. M-F 9am-5pm EST. Return Appointment:  [] Nurse Visit at wound center  [x] Return Appointment: Frances Duenas in 2 week(s)   [] Ordered tests:     Electronically signed on 10/7/2021 at 8:24 AM     Britney Oliver 281: Should you experience any significant changes in your wound(s) or have questions about your wound care, please contact the [facility] Main at [address] [place] [address] 8:00 am - 4:30. If you need help with your wound outside these hours and cannot wait until we are again available, contact your PCP or go to the hospital emergency room. PLEASE NOTE: IF YOU ARE UNABLE TO OBTAIN WOUND SUPPLIES, CONTINUE TO USE THE SUPPLIES YOU HAVE AVAILABLE UNTIL YOU ARE ABLE TO REACH US. IT IS MOST IMPORTANT TO KEEP THE WOUND COVERED AT ALL TIMES.        Physician Signature:_______________________  Dr. Charlene Hill

## 2021-10-21 NOTE — WOUND CARE
Wound Center Progress Note / Procedure Note Subjective: Chief Complaint: Guillermo Trent is a 67 y.o.  female  with sacral wound of >1 months duration. HPI:    
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 
 
Went to see gyne MD- prefers suprapubic catheter- less infection risk, less odor Getting it on 11/3 Also going to the bathroom/self cath q2h around clock Wound caused by: pressure / moisture Current wound care:See flowsheet Offloading wound: yes Appetite: good Wound associated pain: See flowsheet Diabetic: no 
Smoker: no 
ROS: no N/V/D, no T/chills; no local rash, no chest pain or shortness of breath, no headache or dizzyness +urinary incontinence Objective:  
 
Physical Exam:  
See flowsheet / nursing notes for vitals Visit Vitals /72 (BP 1 Location: Left upper arm, BP Patient Position: Sitting) Pulse 82 Temp 97 °F (36.1 °C) Resp 16 General: NAD. Hygiene good Psych: cooperative. No anxiety or depression. Normal mood and affect. Neuro: alert and oriented to person/place/situation. Otherwise nonfocal. 
Derm: Normal  turgor for age, dry skin HEENT: Normocephalic, atraumatic. EOMI. Conjunctiva clear. No scleral icterus. Neck: Normal range of motion. No masses. Chest: Respirations nonlabored Lower extremities: color normal; temperature normal.  
Ulcer Description:  
See Flowsheet Data Review:  
Biopsy from 7/29 
pseudoepithileomatous hyperplasia Assessment/Plan  
 
67 y.o. female with recent h/o urinary incontinence 
 
-sacral ulcer. Pressure stage 3 Full thickness Macerated, mild thickened edges (not as much as usual 
[facility] Necessitates debridement  for wound healing and to prevent/heal infection See below Urinary incontience Get suprapubic 11/3 Offloading as instructed Following discussed with patient / Chris Aver Needs : 
Serial debridement- debrided today- see note below Good local wound care Dressing: HOLD Collagen, SNAP vac Frequency : twice weekly 
 
mupirocin change daily Patient/  understood and agrees with plan. Questions answered. Follow up with me in 2 week; 
 
 
Procedure:  
 
Ulcer assessment: Due to presence of necrotic tissue within the wound bed, ulcer requires debridement. Procedure: Debridement:  
The indication for debridement was reviewed with patient. Risks of procedure (bleeding, infection, pain) were discussed with patient/and consent signed on first visit. Questions were answered Subcutaneous excisional debridement Indication: to remove necrotic tissue/ vitalized and devitalized tissue/ infected tissue/ PARE macerated tissue and epibole through skin and subcutaneous layer of wound bed Time out done Consent in chart Anesthesia: Topical  lidocaine Instrument: curette Residual Necrosis: none Bleeding: <1ml Hemostasis: Pressure Patient tolerated procedure well Procedural Pain: 0 Post - procedural pain: 0 Post debridement measurements:  see below Surface area debrided: <20 sq. Cm 
WOUND POA CONDITIONS Wound Sacrum #1 (Active) Wound Image   10/21/21 1520 Wound Etiology Pressure Stage 3 06/17/21 1331 Dressing Status New dressing applied 09/27/21 1548 Cleansed Cleansed with saline 09/27/21 1548 Dressing/Treatment Gauze dressing/dressing sponge;Tape/Soft cloth adhesive tape 10/07/21 1616 Wound Length (cm) 3.2 cm 10/21/21 1520 Wound Width (cm) 1.1 cm 10/21/21 1520 Wound Depth (cm) 0.5 cm 10/21/21 1520 Wound Surface Area (cm^2) 3.52 cm^2 10/21/21 1520 Change in Wound Size % -35.38 10/21/21 1520 Wound Volume (cm^3) 1.76 cm^3 10/21/21 1520 Wound Healing % -35 10/21/21 1520  
Post-Procedure Length (cm) 3.3 cm 10/21/21 1520 Post-Procedure Width (cm) 1.2 cm 10/21/21 1520 Post-Procedure Depth (cm) 0.6 cm 10/21/21 1520 Post-Procedure Surface Area (cm^2) 3.96 cm^2 10/21/21 1520 Post-Procedure Volume (cm^3) 2.376 cm^3 10/21/21 1520 Wound Assessment Pink/red 10/21/21 1520 Drainage Amount Moderate 10/21/21 1520 Drainage Description Serous 10/21/21 1520 Wound Odor None 10/21/21 1520 Ginny-Wound/Incision Assessment Maceration 10/21/21 1520 Edges Epibole (rolled edges) 10/21/21 1520 Wound Thickness Description Full thickness 08/26/21 1457 Number of days: 147  
   
 
------- Past Medical History:  
Diagnosis Date  Asthma  Hypercholesterolemia  Hypertension  Osteoarthritis Past Surgical History:  
Procedure Laterality Date  COLONOSCOPY N/A 2/2/2017 COLONOSCOPY performed by Lainey Marks MD at [address] [place] [address] Dr  HX HYSTERECTOMY Family History Problem Relation Age of Onset  No Known Problems Mother  No Known Problems Father  Diabetes Sister Social History Tobacco Use  Smoking status: Never Smoker  Smokeless tobacco: Never Used Substance Use Topics  Alcohol use: No  
   
Prior to Admission medications Medication Sig Start Date End Date Taking? Authorizing Provider  
lisinopril-hydroCHLOROthiazide (PRINZIDE, ZESTORETIC) 20-25 mg per tablet TAKE 1 TABLET BY MOUTH EVERY DAY 10/8/21  Yes Stephy Winn MD  
mupirocin (BACTROBAN) 2 % ointment Apply  to affected area daily.  10/7/21  Yes Mikaela Del Toro MD  
potassium chloride (KLOR-CON) 10 mEq tablet TAKE 1 TABLET BY MOUTH DAILY 8/10/21  Yes Stephy Winn MD  
amLODIPine (NORVASC) 5 mg tablet TAKE 1 TABLET BY MOUTH EVERY DAY 7/18/21  Yes Stephy Winn MD  
simvastatin (ZOCOR) 10 mg tablet TAKE 1 TABLET BY MOUTH EVERY NIGHT AT BEDTIME 7/9/21  Yes Stephy Winn MD  
mupirocin (BACTROBAN) 2 % ointment Apply  to affected area daily. 6/10/21  Yes Dania Ignacio MD  
montelukast (SINGULAIR) 10 mg tablet Take 1 Tab by mouth daily. For allergies 4/28/21  Yes Stephy Winn MD  
gabapentin (NEURONTIN) 300 mg capsule Take 1 Cap by mouth two (2) times a day. Max Daily Amount: 600 mg. 4/22/21  Yes Stephy Winn MD  
multivitamin (ONE A DAY) tablet Take 1 Tab by mouth daily. Yes Provider, Historical  
linaclotide (LINZESS) 145 mcg cap capsule Take 1 Cap by mouth Daily (before breakfast). 4/3/19  Yes Stephy Winn MD  
polyethylene glycol (MIRALAX) 17 gram/dose powder Take 17 g by mouth daily. 4/3/19  Yes Stephy Winn MD  
cholecalciferol, vitamin D3, (VITAMIN D3) 2,000 unit tab Take  by mouth daily. Yes Provider, Historical  
tiZANidine (ZANAFLEX) 4 mg tablet Take 1 Tab by mouth two (2) times a day. 10/13/15  Yes Víctor Lomeli MD  
lisinopril-hydroCHLOROthiazide (PRINZIDE, ZESTORETIC) 20-25 mg per tablet TAKE 1 TABLET BY MOUTH EVERY DAY 4/13/21   Stephy Winn MD  
naproxen (NAPROSYN) 500 mg tablet TAKE 1 TABLET BY MOUTH TWICE DAILY AS NEEDED FOR PAIN 3/5/21   Yee Nunes MD  
albuterol (PROVENTIL HFA, VENTOLIN HFA, PROAIR HFA) 90 mcg/actuation inhaler Take 2 Puffs by inhalation every six (6) hours as needed for Wheezing or Shortness of Breath. 4/7/20   Yee Nunes MD  
fluticasone propion-salmeteroL (Advair Diskus) 100-50 mcg/dose diskus inhaler Take 1 Puff by inhalation every twelve (12) hours. 4/7/20   Yee Nunes MD  
oxyCODONE IR (ROXICODONE) 10 mg tab immediate release tablet TK 1 T PO  Q 6 H. FILL ON/AFTER 03/23/19 6/24/19   Provider, Historical  
 
Allergies Allergen Reactions  Penicillins Swelling Signed By: Kiana Grover MD   
 October 21, 2021 done

## 2021-10-21 NOTE — WOUND CARE
10/21/21 1520   Anesthetic   Anesthetic 4% Lidocaine Liquid Topical   Wound Sacrum #1   Date First Assessed/Time First Assessed: 05/27/21 1434   Present on Hospital Admission: Yes  Primary Wound Type: Pressure Injury  Location: Sacrum  Wound Description: #1   Wound Image    Wound Length (cm) 3.2 cm   Wound Width (cm) 1.1 cm   Wound Depth (cm) 0.5 cm   Wound Surface Area (cm^2) 3.52 cm^2   Change in Wound Size % -35.38   Wound Volume (cm^3) 1.76 cm^3   Wound Healing % -35   Wound Assessment Pink/red   Drainage Amount Moderate   Drainage Description Serous   Wound Odor None   Ginny-Wound/Incision Assessment Maceration   Edges Epibole (rolled edges)     Visit Vitals  /72 (BP 1 Location: Left upper arm, BP Patient Position: Sitting)   Pulse 82   Temp 97 °F (36.1 °C)   Resp 16

## 2021-11-01 ENCOUNTER — TRANSCRIBE ORDER (OUTPATIENT)
Dept: REGISTRATION | Age: 76
End: 2021-11-01

## 2021-11-01 ENCOUNTER — HOSPITAL ENCOUNTER (OUTPATIENT)
Dept: NON INVASIVE DIAGNOSTICS | Age: 76
Discharge: HOME | End: 2021-11-01
Payer: MEDICARE

## 2021-11-01 DIAGNOSIS — Z01.812 BLOOD TESTS PRIOR TO TREATMENT OR PROCEDURE: ICD-10-CM

## 2021-11-01 DIAGNOSIS — Z01.812 BLOOD TESTS PRIOR TO TREATMENT OR PROCEDURE: Primary | ICD-10-CM

## 2021-11-01 LAB
ATRIAL RATE: 90 BPM
CALCULATED P AXIS, ECG09: 33 DEGREES
CALCULATED R AXIS, ECG10: 18 DEGREES
CALCULATED T AXIS, ECG11: 56 DEGREES
DIAGNOSIS, 93000: NORMAL
P-R INTERVAL, ECG05: 178 MS
Q-T INTERVAL, ECG07: 362 MS
QRS DURATION, ECG06: 74 MS
QTC CALCULATION (BEZET), ECG08: 442 MS
VENTRICULAR RATE, ECG03: 90 BPM

## 2021-11-01 PROCEDURE — 93005 ELECTROCARDIOGRAM TRACING: CPT

## 2021-11-02 ENCOUNTER — TELEPHONE (OUTPATIENT)
Dept: FAMILY MEDICINE CLINIC | Age: 76
End: 2021-11-02

## 2021-11-02 NOTE — TELEPHONE ENCOUNTER
Pt wanted Dr. Henrry Fofana to know that she is scheduled to have surgery tomorrow and she would like to speak to Dr. Henrry Fofana about it. Pt wouldn't give further details. Please advise.  Thank you!!!

## 2021-11-03 ENCOUNTER — HOSPITAL ENCOUNTER (OUTPATIENT)
Dept: LAB | Age: 76
Discharge: HOME | End: 2021-11-03
Payer: MEDICARE

## 2021-11-03 PROCEDURE — 88305 TISSUE EXAM BY PATHOLOGIST: CPT

## 2021-11-04 ENCOUNTER — HOSPITAL ENCOUNTER (OUTPATIENT)
Dept: WOUND CARE | Age: 76
Discharge: HOME | End: 2021-11-04
Payer: MEDICARE

## 2021-11-04 VITALS
SYSTOLIC BLOOD PRESSURE: 133 MMHG | HEART RATE: 85 BPM | TEMPERATURE: 98.8 F | DIASTOLIC BLOOD PRESSURE: 79 MMHG | RESPIRATION RATE: 18 BRPM

## 2021-11-04 PROCEDURE — 11042 DBRDMT SUBQ TIS 1ST 20SQCM/<: CPT | Performed by: FAMILY MEDICINE

## 2021-11-04 PROCEDURE — 74011000250 HC RX REV CODE- 250: Performed by: FAMILY MEDICINE

## 2021-11-04 RX ADMIN — Medication: at 15:36

## 2021-11-04 NOTE — DISCHARGE INSTRUCTIONS
Discharge Instructions for  [facility]  [address] [place], [address] [facility]  Telephone: [phone]    NAME:  Nino Cunningham  YOB: 1945  DATE:  10/21/2021    [x] Wound and dressing supply provider: [facility]     B COMPLEX  once daily make sure to have 1,000 , Magnesium argentate, threonate, 200 mcg  x once  a day , OMEGA 3 300 mg fish oil once daily , VIT A 10,000 international units 2 x a day, stop taking after three months, 1g  once daily, VIT D 3,000 IU or 0 international units once x  a day, Vit C 1,000mg x day,  ZINC 30 mg take once  x a day (may use gummy that has elderberry, if zinc alone hard to find) and Protein in take 1g-1.2g g per KG of weight   May find at [employer], [facility], [facility], or Amarjit (online), alpha lipoic acid 600 mg x day may take 300 mg if not tolerated. Wound Cleansing:    Do not scrub or use excessive force. Cleanse wound prior to applying a clean dressing with:  [] Normal Saline   [] Keep Wound Dry in Shower      [] Wound Cleanser   [x] Cleanse wound with Mild Soap & Water    [x] May Shower at Discharge - remove dressing 1st, wash, pat dry, re-dress right away    [] Do not shower  [] cleanse with baby shampoo lather leave 2-3 then rinse    Topical Treatments:  Do not apply lotions, creams, or ointments to wound bed unless directed. [] Apply moisturizing lotion {mcadams lotions :[id number]} to skin surrounding the wound prior to dressing change.   [x] Bactroban/Mupirocin  [] Gentamicin ointment    [] Gentian violet to wound bed and periwound  [] Other:     Dressings:                   Wound Location Sacrum     Apply Primary Dressing:    [x] Mupirocin ointment, gauze, tape      Change dressing:   [x] Daily      [] Every Other Day   [] Three times per week  [] Once a week   [] Do Not Change Dressing     [] Other:    Pressure Relief:  [x] When sitting, shift position or do seat lifts every 15 minutes.  [] Wheelchair cushion   [] Specialty Bed/Mattress  [x] Turn every 2 hours when in bed. Avoid position directing pressure on wound site. Off-Loading:   [x] Off-loading when [] walking  [x] in bed [x] sitting    Dietary:  [x] Diet as tolerated [] Diabetic Diet   [x] Increase Protein: examples (Meat, cheese, eggs, greek yogurt, fish, nuts)   [] Alistair Therapeutic Nutrition Powder  [] Dial a Dietician : Call [employer] at [phone] enter code ([phone]) when prompted. M-F 9am-5pm EST. Return Appointment:  [] Nurse Visit at wound center  [x] Return Appointment: Dalila Blakely in 2 week(s)   [] Ordered tests:     Electronically signed on 10/21/2021 at 8:24 AM     Britney Oliver 281: Should you experience any significant changes in your wound(s) or have questions about your wound care, please contact the [facility] Main at [address] [place] [address] 8:00 am - 4:30. If you need help with your wound outside these hours and cannot wait until we are again available, contact your PCP or go to the hospital emergency room. PLEASE NOTE: IF YOU ARE UNABLE TO OBTAIN WOUND SUPPLIES, CONTINUE TO USE THE SUPPLIES YOU HAVE AVAILABLE UNTIL YOU ARE ABLE TO REACH US. IT IS MOST IMPORTANT TO KEEP THE WOUND COVERED AT ALL TIMES.        Physician Signature:_______________________  Dr. Rita Alfred

## 2021-11-04 NOTE — WOUND CARE
[facility] [address] [place], [address] [facility] Telephone: [phone] NAME:  Christian Mckeon YOB: 1945 DATE:  11/4/2021 Case Management Note Wound Care Management Outside of Clinic: 
[x] Wound and dressing supply provider: [facility] - gauze and tape order sent [x] Patient/family performs own wound care 
[] Home Healthcare:  
[] Dressing changes performed once a week at wound care center Advanced/Additional Wound Treatment (If applicable):  
[] Vascular Referral:  
[] SNAP Vac: on hold 
[] Wound Vac: 
[] Skin Substitute:  
[] Pressure Reducing Surfaces: 
[] Wheelchair Assessment:  
[] HBO Therapy:  
[] IV Antibiotics:  
[] Plastic Surgery Referral: 
[] Other: 
 
Other Notes: 
[]

## 2021-11-04 NOTE — WOUND CARE
11/04/21 1600 Wound Sacrum #1 Date First Assessed/Time First Assessed: 05/27/21 1434   Present on Hospital Admission: Yes  Primary Wound Type: Pressure Injury  Location: Sacrum  Wound Description: #1 Dressing Status New dressing applied Dressing/Treatment Other (Comment); Gauze dressing/dressing sponge; Tape/Soft cloth adhesive tape (mupirocin) Discharge Condition: Stable Pain: 3 Ambulatory Status: Walking and Walker Discharge Destination: Home Transportation: Car Accompanied by: Self  and Family/Caregiver Discharge instructions reviewed with Patient and Family/Caregiver  and copy or written instructions have been provided. All questions/concerns have been addressed at this time.

## 2021-11-04 NOTE — WOUND CARE
Wound Center  Progress Note / Procedure Note    Subjective:     Chief Complaint:  Gill Coe is a 67 y.o.  female  with sacral wound of >1 months duration. 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 fair bit but gets up and walks around every 2 hours    Has a cushion    History/Chart/Medications reviewed    Since last visit:     suprapubic catheter- has now        Wound caused by: pressure / moisture  Current wound care:See flowsheet  Offloading wound: yes  Appetite: good  Wound associated pain: See flowsheet  Diabetic: no  Smoker: no  ROS: no N/V/D, no T/chills; no local rash, no chest pain or shortness of breath, no headache or dizzyness  +urinary incontinence      Objective:     Physical Exam:   See flowsheet / nursing notes for vitals  Visit Vitals  /79 (BP 1 Location: Left upper arm, BP Patient Position: Sitting)   Pulse 85   Temp 98.8 °F (37.1 °C)   Resp 18     General: NAD. Hygiene good  Psych: cooperative. No anxiety or depression. Normal mood and affect. Neuro: alert and oriented to person/place/situation. Otherwise nonfocal.  Derm: Normal  turgor for age, dry skin  HEENT: Normocephalic, atraumatic. EOMI. Conjunctiva clear. No scleral icterus. Neck: Normal range of motion. No masses. Chest: Respirations nonlabored  Lower extremities: color normal; temperature normal.   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 female with recent h/o urinary incontinence    -sacral ulcer.   Pressure stage 3  Full thickness  stable  Macerated, mild thickened edges     Necessitates debridement  for wound healing and to prevent/heal infection  See below      Options discussed  Wound should heal now as moisture/wetness not an issue      Urinary incontience  Got suprapubic 11/3    Offloading as instructed      Following discussed with patient / dtr  Needs :  Serial debridement- debrided today- see note below    Good local wound care  Dressing:     HOLD Collagen, SNAP vac      mupirocin change daily      Patient/  understood and agrees with plan. Questions answered. Follow up with me in 2 week;      Procedure:     Ulcer assessment: Due to presence of necrotic tissue within the wound bed, ulcer requires debridement. Procedure: Debridement:   The indication for debridement was reviewed with patient. Risks of procedure (bleeding, infection, pain) were discussed with patient/and consent signed on first visit. Questions were answered    Subcutaneous excisional debridement  Indication: to remove necrotic tissue/ vitalized and devitalized tissue/ infected tissue/ PARE macerated tissue and epibole through skin and subcutaneous layer of wound bed  Time out done  Consent in chart   Anesthesia: Topical  lidocaine   Instrument: curette   Residual Necrosis: none  Bleeding: <1ml   Hemostasis: Pressure   Patient tolerated procedure well   Procedural Pain: 0  Post - procedural pain: 0    Post debridement measurements:  see below  Surface area debrided: <20 sq. Cm  WOUND POA CONDITIONS    Wound Sacrum #1 (Active)   Wound Image   11/04/21 1532   Wound Etiology Pressure Stage 3 06/17/21 1331   Dressing Status New dressing applied 10/21/21 1541   Cleansed Cleansed with saline 11/04/21 1532   Dressing/Treatment Gauze dressing/dressing sponge; Tape/Soft cloth adhesive tape;  Other (Comment) 10/21/21 1541   Wound Length (cm) 3.3 cm 11/04/21 1532   Wound Width (cm) 1.1 cm 11/04/21 1532   Wound Depth (cm) 0.2 cm 11/04/21 1532   Wound Surface Area (cm^2) 3.63 cm^2 11/04/21 1532   Change in Wound Size % -39.62 11/04/21 1532   Wound Volume (cm^3) 0.726 cm^3 11/04/21 1532   Wound Healing % 44 11/04/21 1532   Post-Procedure Length (cm) 3.5 cm 11/04/21 1554   Post-Procedure Width (cm) 1.3 cm 11/04/21 1554   Post-Procedure Depth (cm) 0.3 cm 11/04/21 1554   Post-Procedure Surface Area (cm^2) 4.55 cm^2 11/04/21 1554   Post-Procedure Volume (cm^3) 1.365 cm^3 11/04/21 1554   Wound Assessment Pink/red 11/04/21 1532   Drainage Amount Moderate 11/04/21 1532   Drainage Description Serosanguinous 11/04/21 1532   Wound Odor None 11/04/21 1532   Ginny-Wound/Incision Assessment Maceration 11/04/21 1532   Edges Epibole (rolled edges) 11/04/21 1532   Wound Thickness Description Full thickness 08/26/21 1457   Number of days: 80         -------    Past Medical History:   Diagnosis Date    Asthma     Hypercholesterolemia     Hypertension     Osteoarthritis       Past Surgical History:   Procedure Laterality Date    COLONOSCOPY N/A 2/2/2017    COLONOSCOPY performed by [facility]an [facility]ces [facility] ENDOSCOPY    HX CHOLECYSTECTOMY      HX HYSTERECTOMY       Family History   Problem Relation Age of Onset    No Known Problems Mother     No Known Problems Father     Diabetes Sister       Social History     Tobacco Use    Smoking status: Never Smoker    Smokeless tobacco: Never Used   Substance Use Topics    Alcohol use: No       Prior to Admission medications    Medication Sig Start Date End Date Taking? Authorizing Provider   montelukast (SINGULAIR) 10 mg tablet TAKE 1 TABLET BY MOUTH DAILY FOR ALLERGIES 10/27/21   Stephy Winn MD   lisinopril-hydroCHLOROthiazide (PRINZIDE, ZESTORETIC) 20-25 mg per tablet TAKE 1 TABLET BY MOUTH EVERY DAY 10/8/21   Breezy May MD   mupirocin (BACTROBAN) 2 % ointment Apply  to affected area daily.  10/7/21   Martinez Melton MD   potassium chloride (KLOR-CON) 10 mEq tablet TAKE 1 TABLET BY MOUTH DAILY 8/10/21   Stephy Winn MD   amLODIPine (NORVASC) 5 mg tablet TAKE 1 TABLET BY MOUTH EVERY DAY 7/18/21   Breezy May MD   simvastatin (ZOCOR) 10 mg tablet TAKE 1 TABLET BY MOUTH EVERY NIGHT AT BEDTIME 7/9/21   Breezy May MD   mupirocin (BACTROBAN) 2 % ointment Apply  to affected area daily. 6/10/21   Sylvania Cheadle, MD   gabapentin (NEURONTIN) 300 mg capsule Take 1 Cap by mouth two (2) times a day. Max Daily Amount: 600 mg. 4/22/21   Stephy Winn MD   lisinopril-hydroCHLOROthiazide (PRINZIDE, ZESTORETIC) 20-25 mg per tablet TAKE 1 TABLET BY MOUTH EVERY DAY 4/13/21   Stephy Winn MD   naproxen (NAPROSYN) 500 mg tablet TAKE 1 TABLET BY MOUTH TWICE DAILY AS NEEDED FOR PAIN 3/5/21   Yaquelin Wade MD   multivitamin (ONE A DAY) tablet Take 1 Tab by mouth daily. Provider, Historical   albuterol (PROVENTIL HFA, VENTOLIN HFA, PROAIR HFA) 90 mcg/actuation inhaler Take 2 Puffs by inhalation every six (6) hours as needed for Wheezing or Shortness of Breath. 4/7/20   Yaquelin Wade MD   fluticasone propion-salmeteroL (Advair Diskus) 100-50 mcg/dose diskus inhaler Take 1 Puff by inhalation every twelve (12) hours. 4/7/20   Yaquelin Wade MD   oxyCODONE IR (ROXICODONE) 10 mg tab immediate release tablet TK 1 T PO  Q 6 H. FILL ON/AFTER 03/23/19 6/24/19   Provider, Historical   linaclotide Sharad Campanile) 145 mcg cap capsule Take 1 Cap by mouth Daily (before breakfast). 4/3/19   Yaquelin Wade MD   polyethylene glycol (MIRALAX) 17 gram/dose powder Take 17 g by mouth daily. 4/3/19   Yaquelin Wade MD   cholecalciferol, vitamin D3, (VITAMIN D3) 2,000 unit tab Take  by mouth daily. Provider, Historical   tiZANidine (ZANAFLEX) 4 mg tablet Take 1 Tab by mouth two (2) times a day.  10/13/15   Julián Washburn MD     Allergies   Allergen Reactions    Penicillins Swelling          Signed By: Abilio Ruiz MD     November 4, 2021

## 2021-11-04 NOTE — WOUND CARE
11/04/21 1532 Anesthetic Anesthetic 4% Lidocaine Liquid Topical  
Wound Sacrum #1 Date First Assessed/Time First Assessed: 05/27/21 1434   Present on Hospital Admission: Yes  Primary Wound Type: Pressure Injury  Location: Sacrum  Wound Description: #1 Wound Image Cleansed Cleansed with saline Wound Length (cm) 3.3 cm Wound Width (cm) 1.1 cm Wound Depth (cm) 0.2 cm Wound Surface Area (cm^2) 3.63 cm^2 Change in Wound Size % -39.62 Wound Volume (cm^3) 0.726 cm^3 Wound Healing % 44 Wound Assessment Pink/red Drainage Amount Moderate Drainage Description Serosanguinous Wound Odor None Ginny-Wound/Incision Assessment Maceration Edges Epibole (rolled edges) Visit Vitals /79 (BP 1 Location: Left upper arm, BP Patient Position: Sitting) Pulse 85 Temp 98.8 °F (37.1 °C) Resp 18

## 2021-11-05 RX ORDER — AMLODIPINE BESYLATE 5 MG/1
TABLET ORAL
Qty: 90 TABLET | Refills: 1 | Status: SHIPPED | OUTPATIENT
Start: 2021-11-05 | End: 2022-07-17

## 2021-11-18 ENCOUNTER — HOSPITAL ENCOUNTER (OUTPATIENT)
Dept: WOUND CARE | Age: 76
Discharge: HOME | End: 2021-11-18
Payer: MEDICARE

## 2021-11-18 VITALS
HEART RATE: 80 BPM | TEMPERATURE: 97.2 F | RESPIRATION RATE: 18 BRPM | SYSTOLIC BLOOD PRESSURE: 114 MMHG | DIASTOLIC BLOOD PRESSURE: 69 MMHG

## 2021-11-18 PROCEDURE — 11042 DBRDMT SUBQ TIS 1ST 20SQCM/<: CPT

## 2021-11-18 NOTE — WOUND CARE
11/18/21 1607   Wound Sacrum #1   Date First Assessed/Time First Assessed: 05/27/21 1434   Present on Hospital Admission: Yes  Primary Wound Type: Pressure Injury  Location: Sacrum  Wound Description: #1   Dressing/Treatment Honey gel/honey paste; Collagen with Ag; Alginate with Ag; Gauze dressing/dressing sponge; Tape/Soft cloth adhesive tape   Discharge Condition: Stable     Pain: 0    Ambulatory Status: Walking and Walker     Discharge Destination: Home     Transportation: Car    Accompanied by: Self  and Family/Caregiver     Discharge instructions reviewed with Patient and Family/Caregiver  and copy or written instructions have been provided. All questions/concerns have been addressed at this time.

## 2021-11-18 NOTE — WOUND CARE
Wound Center  Progress Note / Procedure Note    Subjective:     Chief Complaint:  Lizeth Montes is a 67 y.o.  female  with sacral wound of >1 months duration. 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 fair bit but gets up and walks around every 2 hours    Has a cushion    History/Chart/Medications reviewed    Since last visit:  Still getting moisture there        Wound caused by: pressure / moisture  Current wound care:See flowsheet  Offloading wound: yes  Appetite: good  Wound associated pain: See flowsheet  Diabetic: no  Smoker: no  ROS: no N/V/D, no T/chills; no local rash, no chest pain or shortness of breath, no headache or dizzyness  +urinary incontinence- has  suprapubic catheter      Objective:     Physical Exam:   See flowsheet / nursing notes for vitals  Visit Vitals  /69 (BP 1 Location: Left upper arm, BP Patient Position: Sitting)   Pulse 80   Temp 97.2 °F (36.2 °C)   Resp 18     General: NAD. Hygiene good  Psych: cooperative. No anxiety or depression. Normal mood and affect. Neuro: alert and oriented to person/place/situation. Otherwise nonfocal.  Derm: Normal  turgor for age, dry skin  HEENT: Normocephalic, atraumatic. EOMI. Conjunctiva clear. No scleral icterus. Neck: Normal range of motion. No masses. Chest: Respirations nonlabored  Lower extremities: color normal; temperature normal.   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 female with recent h/o urinary incontinence    -sacral ulcer.   Pressure stage 3  Full thickness  stable  Still sl Macerated    Necessitates debridement  for wound healing and to prevent/heal infection  See below        Urinary incontience  Got suprapubic 11/3    Offloading as instructed      Following discussed with patient / dtr  Needs :  Serial debridement- debrided today- see note below    Good local wound care  Dressing:    medihoney, caollagen, aquacel ag  HOLD Collagen, SNAP vac        Patient/  understood and agrees with plan. Questions answered. Follow up with me in 2 week;      Procedure:     Ulcer assessment: Due to presence of necrotic tissue within the wound bed, ulcer requires debridement. Procedure: Debridement:   The indication for debridement was reviewed with patient. Risks of procedure (bleeding, infection, pain) were discussed with patient/and consent signed on first visit. Questions were answered    Subcutaneous excisional debridement  Indication: to remove necrotic tissue/ vitalized and devitalized tissue/ infected tissue/ PARE macerated tissue and epibole through skin and subcutaneous layer of wound bed  Time out done  Consent in chart   Anesthesia: Topical  lidocaine   Instrument: curette   Residual Necrosis: none  Bleeding: <1ml   Hemostasis: Pressure   Patient tolerated procedure well   Procedural Pain: 0  Post - procedural pain: 0    Post debridement measurements:  see below  Surface area debrided: <20 sq.  Cm  WOUND POA CONDITIONS    Wound Sacrum #1 (Active)   Wound Image   11/18/21 1530   Wound Etiology Pressure Stage 3 06/17/21 1331   Dressing Status New dressing applied 11/04/21 1600   Cleansed Cleansed with saline 11/04/21 1532   Dressing/Treatment Other (Comment); Gauze dressing/dressing sponge; Tape/Soft cloth adhesive tape 11/04/21 1600   Wound Length (cm) 3 cm 11/18/21 1530   Wound Width (cm) 0.9 cm 11/18/21 1530   Wound Depth (cm) 0.2 cm 11/18/21 1530   Wound Surface Area (cm^2) 2.7 cm^2 11/18/21 1530   Change in Wound Size % -3.85 11/18/21 1530   Wound Volume (cm^3) 0.54 cm^3 11/18/21 1530   Wound Healing % 58 11/18/21 1530   Post-Procedure Length (cm) 3.1 cm 11/18/21 1553   Post-Procedure Width (cm) 1 cm 11/18/21 1553   Post-Procedure Depth (cm) 0.3 cm 11/18/21 1553 Post-Procedure Surface Area (cm^2) 3.1 cm^2 11/18/21 1553   Post-Procedure Volume (cm^3) 0.93 cm^3 11/18/21 1553   Wound Assessment [place]/red; [facility] 11/18/21 1530   Drainage Amount Moderate 11/18/21 1530   Drainage Description Serosanguinous 11/18/21 1530   Wound Odor None 11/18/21 1530   Ginny-Wound/Incision Assessment Maceration 11/18/21 1530   Edges Epibole (rolled edges) 11/18/21 1530   Wound Thickness Description Full thickness 08/26/21 1457   Number of days: 175         -------    Past Medical History:   Diagnosis Date    Asthma     Hypercholesterolemia     Hypertension     Osteoarthritis       Past Surgical History:   Procedure Laterality Date    COLONOSCOPY N/A 2/2/2017    COL[facility] Sanford [facility] at [facility] ENDOSCOPY    HX CHOLECYSTECTOMY      HX HYSTERECTOMY       Family History   Problem Relation Age of Onset    No Known Problems Mother     No Known Problems Father     Diabetes Sister       Social History     Tobacco Use    Smoking status: Never Smoker    Smokeless tobacco: Never Used   Substance Use Topics    Alcohol use: No       Prior to Admission medications    Medication Sig Start Date End Date Taking? Authorizing Provider   amLODIPine (NORVASC) 5 mg tablet TAKE 1 TABLET BY MOUTH EVERY DAY 11/5/21  Yes Stephy Winn MD   montelukast (SINGULAIR) 10 mg tablet TAKE 1 TABLET BY MOUTH DAILY FOR ALLERGIES 10/27/21  Yes Stephy Winn MD   lisinopril-hydroCHLOROthiazide (PRINZIDE, ZESTORETIC) 20-25 mg per tablet TAKE 1 TABLET BY MOUTH EVERY DAY 10/8/21  Yes Nanci Jauregui MD   mupirocin (BACTROBAN) 2 % ointment Apply  to affected area daily.  10/7/21  Yes Sara Pink MD   potassium chloride (KLOR-CON) 10 mEq tablet TAKE 1 TABLET BY MOUTH DAILY 8/10/21  Yes Stephy Winn MD   simvastatin (ZOCOR) 10 mg tablet TAKE 1 TABLET BY MOUTH EVERY NIGHT AT BEDTIME 7/9/21  Yes Stephy Winn MD   gabapentin (NEURONTIN) 300 mg capsule Take 1 Cap by mouth two (2) times a day. Max Daily Amount: 600 mg. 4/22/21  Yes Stephy Winn MD   naproxen (NAPROSYN) 500 mg tablet TAKE 1 TABLET BY MOUTH TWICE DAILY AS NEEDED FOR PAIN 3/5/21  Yes Stephy Winn MD   multivitamin (ONE A DAY) tablet Take 1 Tab by mouth daily. Yes Provider, Historical   fluticasone propion-salmeteroL (Advair Diskus) 100-50 mcg/dose diskus inhaler Take 1 Puff by inhalation every twelve (12) hours. 4/7/20  Yes Stephy Winn MD   oxyCODONE IR (ROXICODONE) 10 mg tab immediate release tablet TK 1 T PO  Q 6 H. FILL ON/AFTER 03/23/19 6/24/19  Yes Provider, Historical   linaclotide (LINZESS) 145 mcg cap capsule Take 1 Cap by mouth Daily (before breakfast). 4/3/19  Yes Stephy Winn MD   polyethylene glycol (MIRALAX) 17 gram/dose powder Take 17 g by mouth daily. 4/3/19  Yes Stephy Winn MD   cholecalciferol, vitamin D3, (VITAMIN D3) 2,000 unit tab Take  by mouth daily. Yes Provider, Historical   tiZANidine (ZANAFLEX) 4 mg tablet Take 1 Tab by mouth two (2) times a day. 10/13/15  Yes Alysia May MD   lisinopril-hydroCHLOROthiazide (PRINZIDE, ZESTORETIC) 20-25 mg per tablet TAKE 1 TABLET BY MOUTH EVERY DAY 4/13/21   Barbara Robison MD   albuterol (PROVENTIL HFA, VENTOLIN HFA, PROAIR HFA) 90 mcg/actuation inhaler Take 2 Puffs by inhalation every six (6) hours as needed for Wheezing or Shortness of Breath.  4/7/20   Barbara Robison MD     Allergies   Allergen Reactions    Penicillins Swelling          Signed By: Rosaura Iglesias MD     November 18, 2021

## 2021-11-18 NOTE — DISCHARGE INSTRUCTIONS
Discharge Instructions for  [facility]  [address], [address]  Telephone: [phone]    NAME:  Greg Montoya  YOB: 1945  DATE:  11/4/2021    [x] Wound and dressing supply provider: [facility]    B COMPLEX  once daily make sure to have 1,000 , Magnesium argentate, threonate, 200 mcg  x once  a day , OMEGA 3 300 mg fish oil once daily , VIT A 10,000 international units 2 x a day, stop taking after three months, 1g  once daily, VIT D 3,000 IU or 0 international units once x  a day, Vit C 1,000mg x day,  ZINC 30 mg take once  x a day (may use gummy that has elderberry, if zinc alone hard to find) and Protein in take 1g-1.2g g per KG of weight   May find at [employer], [facility],[address], [facility], or [employer] (online), alpha lipoic acid 600 mg x day may take 300 mg if not tolerated. Wound Cleansing:    Do not scrub or use excessive force. Cleanse wound prior to applying a clean dressing with:  [] Normal Saline   [] Keep Wound Dry in Shower      [] Wound Cleanser   [x] Cleanse wound with Mild Soap & Water    [x] May Shower at Discharge - remove dressing 1st, wash, pat dry, re-dress right away    [] Do not shower  [] cleanse with baby shampoo lather leave 2-3 then rinse    Topical Treatments:  Do not apply lotions, creams, or ointments to wound bed unless directed. [] Apply moisturizing lotion {mcadams lotions :42687} to skin surrounding the wound prior to dressing change.   [x] Bactroban/Mupirocin  [] Gentamicin ointment    [] Gentian violet to wound bed and periwound  [] Other:     Dressings:                   Wound Location Sacrum     Apply Primary Dressing:    [x] Mupirocin ointment, gauze, tape      Change dressing:   [x] Daily      [] Every Other Day   [] Three times per week  [] Once a week   [] Do Not Change Dressing     [] Other:    Pressure Relief:  [x] When sitting, shift position or do seat lifts every 15 minutes.  [] Wheelchair cushion   [] Specialty Bed/Mattress  [x] Turn every 2 hours when in bed. Avoid position directing pressure on wound site. Off-Loading:   [x] Off-loading when [] walking  [x] in bed [x] sitting    Dietary:  [x] Diet as tolerated [] Diabetic Diet   [x] Increase Protein: examples (Meat, cheese, eggs, greek yogurt, fish, nuts)   [] Alistair Therapeutic Nutrition Powder  [] Dial a Dietician : Call [employer] at [phone] enter code ([phone]) when prompted. M-F 9am-5pm EST. Return Appointment:  [] Nurse Visit at wound center  [x] Return Appointment: Amy Browne in 2 week(s)   [] Ordered tests:     Electronically signed on 11/4/2021 at 8:24 AM     Britney Oliver 281: Should you experience any significant changes in your wound(s) or have questions about your wound care, please contact the [facility] Main at [address] [place] [address] 8:00 am - 4:30. If you need help with your wound outside these hours and cannot wait until we are again available, contact your PCP or go to the hospital emergency room. PLEASE NOTE: IF YOU ARE UNABLE TO OBTAIN WOUND SUPPLIES, CONTINUE TO USE THE SUPPLIES YOU HAVE AVAILABLE UNTIL YOU ARE ABLE TO REACH US. IT IS MOST IMPORTANT TO KEEP THE WOUND COVERED AT ALL TIMES.        Physician Signature:_______________________  Dr. Kajal Medinas

## 2021-11-18 NOTE — WOUND CARE
11/18/21 1530   Anesthetic   Anesthetic 4% Lidocaine Liquid Topical   Wound Sacrum #1   Date First Assessed/Time First Assessed: 05/27/21 1434   Present on Hospital Admission: Yes  Primary Wound Type: Pressure Injury  Location: Sacrum  Wound Description: #1   Wound Image    Wound Length (cm) 3 cm   Wound Width (cm) 0.9 cm   Wound Depth (cm) 0.2 cm   Wound Surface Area (cm^2) 2.7 cm^2   Change in Wound Size % -3.85   Wound Volume (cm^3) 0.54 cm^3   Wound Healing % 58   Wound Assessment Pink/red; Slough   Drainage Amount Moderate   Drainage Description Serosanguinous   Wound Odor None   Ginny-Wound/Incision Assessment Maceration   Edges Epibole (rolled edges)     Visit Vitals  /69 (BP 1 Location: Left upper arm, BP Patient Position: Sitting)   Pulse 80   Temp 97.2 °F (36.2 °C)   Resp 18

## 2021-12-01 ENCOUNTER — HOSPITAL ENCOUNTER (OUTPATIENT)
Dept: LAB | Age: 76
Discharge: HOME | End: 2021-12-01
Payer: MEDICARE

## 2021-12-01 PROCEDURE — 88305 TISSUE EXAM BY PATHOLOGIST: CPT

## 2021-12-02 ENCOUNTER — HOSPITAL ENCOUNTER (OUTPATIENT)
Dept: WOUND CARE | Age: 76
Discharge: HOME | End: 2021-12-02

## 2021-12-02 NOTE — DISCHARGE INSTRUCTIONS
Discharge Instructions for  [facility]  [address] [place], [address] [facility]  Telephone: [phone]    NAME:  Leopoldo Arshad  YOB: 1945  DATE:  12/2/2021    [x] Wound and dressing supply provider: [facility]    B COMPLEX  once daily make sure to have 1,000 , Magnesium argentate, threonate, 200 mcg  x once  a day , OMEGA 3 300 mg fish oil once daily , VIT A 10,000 international units 2 x a day, stop taking after three months, 1g  once daily, VIT D 3,000 IU or 0 international units once x  a day, Vit C 1,000mg x day,  ZINC 30 mg take once  x a day (may use gummy that has elderberry, if zinc alone hard to find) and Protein in take 1g-1.2g g per KG of weight   May find at [employer], [facility] Medical [facility],[address], [facility], or [employer] (online), alpha lipoic acid 600 mg x day may take 300 mg if not tolerated. Wound Cleansing:    Do not scrub or use excessive force. Cleanse wound prior to applying a clean dressing with:  [] Normal Saline   [] Keep Wound Dry in Shower      [] Wound Cleanser   [x] Cleanse wound with Mild Soap & Water    [x] May Shower at Discharge - remove dressing 1st, wash, pat dry, re-dress right away    [] Do not shower  [] cleanse with baby shampoo lather leave 2-3 then rinse    Topical Treatments:  Do not apply lotions, creams, or ointments to wound bed unless directed. [] Apply moisturizing lotion {mcadams lotions :57904} to skin surrounding the wound prior to dressing change. [x] Bactroban/Mupirocin  [] Gentamicin ointment    [] Gentian violet to wound bed and periwound  [] Other:     Dressings:                   Wound Location Sacrum     Apply Primary Dressing:    [x] Medi-honey gel, judy, Aquacel Ag, gauze, tape       Change dressing:   [] Daily    [x] Every Other Day   [] Three times per week  [] Once a week   [] Do Not Change Dressing     [] Other:    Pressure Relief:  [x] When sitting, shift position or do seat lifts every 15 minutes.   [] Wheelchair cushion   [] Specialty Bed/Mattress  [x] Turn every 2 hours when in bed. Avoid position directing pressure on wound site. Off-Loading:   [x] Off-loading when [] walking  [x] in bed [x] sitting    Dietary:  [x] Diet as tolerated [] Diabetic Diet   [x] Increase Protein: examples (Meat, cheese, eggs, greek yogurt, fish, nuts)   [] Alistair Therapeutic Nutrition Powder  [] Dial a Dietician : Call [employer] at [phone] enter code ([phone]) when prompted. M-F 9am-5pm EST. Return Appointment:  [] Nurse Visit at wound center  [x] Return Appointment: With Dr. Domonique Drake in 2 week(s)   [] Ordered tests:     Electronically signed on 12/2/2021 at 8:24 AM     Britney Oliver 281: Should you experience any significant changes in your wound(s) or have questions about your wound care, please contact the [facility] Main at [address] [place] [address] 8:00 am - 4:30. If you need help with your wound outside these hours and cannot wait until we are again available, contact your PCP or go to the hospital emergency room. PLEASE NOTE: IF YOU ARE UNABLE TO OBTAIN WOUND SUPPLIES, CONTINUE TO USE THE SUPPLIES YOU HAVE AVAILABLE UNTIL YOU ARE ABLE TO REACH US. IT IS MOST IMPORTANT TO KEEP THE WOUND COVERED AT ALL TIMES.         Physician Signature:_______________________  Dr. Sirena Copeland

## 2021-12-09 ENCOUNTER — HOSPITAL ENCOUNTER (OUTPATIENT)
Dept: WOUND CARE | Age: 76
Discharge: HOME | End: 2021-12-09

## 2021-12-09 NOTE — DISCHARGE INSTRUCTIONS
Discharge Instructions for  [facility]  [address] [place], [address] [facility] [address]  Telephone: [phone]    NAME:  Alan Jama  YOB: 1945  DATE:  12/9/2021    [x] Wound and dressing supply provider: [facility]    B COMPLEX  once daily make sure to have 1,000 , Magnesium argentate, threonate, 200 mcg  x once  a day , OMEGA 3 300 mg fish oil once daily , VIT A 10,000 international units 2 x a day, stop taking after three months, 1g  once daily, VIT D 3,000 IU or 0 international units once x  a day, Vit C 1,000mg x day,  ZINC 30 mg take once  x a day (may use gummy that has elderberry, if zinc alone hard to find) and Protein in take 1g-1.2g g per KG of weight   May find at [employer], [facility] Medical [facility],[address], [facility], or [employer] (online), alpha lipoic acid 600 mg x day may take 300 mg if not tolerated. Wound Cleansing:    Do not scrub or use excessive force. Cleanse wound prior to applying a clean dressing with:  [] Normal Saline   [] Keep Wound Dry in Shower      [] Wound Cleanser   [x] Cleanse wound with Mild Soap & Water    [x] May Shower at Discharge - remove dressing 1st, wash, pat dry, re-dress right away    [] Do not shower  [] cleanse with baby shampoo lather leave 2-3 then rinse    Topical Treatments:  Do not apply lotions, creams, or ointments to wound bed unless directed. [] Apply moisturizing lotion {mcadams lotions :31894} to skin surrounding the wound prior to dressing change. [x] Bactroban/Mupirocin  [] Gentamicin ointment    [] Gentian violet to wound bed and periwound  [] Other:     Dressings:                   Wound Location Sacrum     Apply Primary Dressing:    [x] Medi-honey gel, judy, Aquacel Ag, gauze, tape       Change dressing:   [] Daily    [x] Every Other Day   [] Three times per week  [] Once a week   [] Do Not Change Dressing     [] Other:    Pressure Relief:  [x] When sitting, shift position or do seat lifts every 15 minutes.   [] Wheelchair cushion   [] Specialty Bed/Mattress  [x] Turn every 2 hours when in bed. Avoid position directing pressure on wound site. Off-Loading:   [x] Off-loading when [] walking  [x] in bed [x] sitting    Dietary:  [x] Diet as tolerated [] Diabetic Diet   [x] Increase Protein: examples (Meat, cheese, eggs, greek yogurt, fish, nuts)   [] Alistair Therapeutic Nutrition Powder  [] Dial a Dietician : Call [employer] at [phone] enter code ([phone]) when prompted. M-F 9am-5pm EST. Return Appointment:  [] Nurse Visit at wound center  [x] Return Appointment: With Dr. Candace Chu in 2 week(s)   [] Ordered tests:     Electronically signed on 12/9/2021 at 8:24 AM     Britney Oliver 281: Should you experience any significant changes in your wound(s) or have questions about your wound care, please contact the [facility] Main at [address] [place] [address] 8:00 am - 4:30. If you need help with your wound outside these hours and cannot wait until we are again available, contact your PCP or go to the hospital emergency room. PLEASE NOTE: IF YOU ARE UNABLE TO OBTAIN WOUND SUPPLIES, CONTINUE TO USE THE SUPPLIES YOU HAVE AVAILABLE UNTIL YOU ARE ABLE TO REACH US. IT IS MOST IMPORTANT TO KEEP THE WOUND COVERED AT ALL TIMES.         Physician Signature:_______________________  Dr. Mel Dumont

## 2021-12-09 NOTE — DISCHARGE INSTRUCTIONS
Discharge Instructions for  [facility]  [address], [address] [facility] [address]  Telephone: [phone]    NAME:  Gabe Prescott  YOB: 1945  DATE:  11/18/2021    [x] Wound and dressing supply provider: [facility]    B COMPLEX  once daily make sure to have 1,000 , Magnesium argentate, threonate, 200 mcg  x once  a day , OMEGA 3 300 mg fish oil once daily , VIT A 10,000 international units 2 x a day, stop taking after three months, 1g  once daily, VIT D 3,000 IU or 0 international units once x  a day, Vit C 1,000mg x day,  ZINC 30 mg take once  x a day (may use gummy that has elderberry, if zinc alone hard to find) and Protein in take 1g-1.2g g per KG of weight   May find at [employer], [facility],[address], [facility], or [employer] (online), alpha lipoic acid 600 mg x day may take 300 mg if not tolerated. Wound Cleansing:    Do not scrub or use excessive force. Cleanse wound prior to applying a clean dressing with:  [] Normal Saline   [] Keep Wound Dry in Shower      [] Wound Cleanser   [x] Cleanse wound with Mild Soap & Water    [x] May Shower at Discharge - remove dressing 1st, wash, pat dry, re-dress right away    [] Do not shower  [] cleanse with baby shampoo lather leave 2-3 then rinse    Topical Treatments:  Do not apply lotions, creams, or ointments to wound bed unless directed. [] Apply moisturizing lotion {mcadams lotions :24057} to skin surrounding the wound prior to dressing change.   [x] Bactroban/Mupirocin  [] Gentamicin ointment    [] Gentian violet to wound bed and periwound  [] Other:     Dressings:                   Wound Location Sacrum     Apply Primary Dressing:    [x] Medi-honey gel, judy, Aquacel Ag, gauze, tape       Change dressing:   [] Daily    [x] Every Other Day   [] Three times per week  [] Once a week   [] Do Not Change Dressing     [] Other:    Pressure Relief:  [x] When sitting, shift position or do seat lifts every 15 minutes.  [] Wheelchair cushion   [] Specialty Bed/Mattress  [x] Turn every 2 hours when in bed. Avoid position directing pressure on wound site. Off-Loading:   [x] Off-loading when [] walking  [x] in bed [x] sitting    Dietary:  [x] Diet as tolerated [] Diabetic Diet   [x] Increase Protein: examples (Meat, cheese, eggs, greek yogurt, fish, nuts)   [] Alistair Therapeutic Nutrition Powder  [] Dial a Dietician : Call [employer] at [phone] enter code ([phone]) when prompted. M-F 9am-5pm EST. Return Appointment:  [] Nurse Visit at wound center  [x] Return Appointment: With Dr. Bandar Demarco in 2 week(s)   [] Ordered tests:     Electronically signed on 11/18/2021 at 8:24 AM     Britney Oliver 281: Should you experience any significant changes in your wound(s) or have questions about your wound care, please contact the [facility] Main at [address] [place] [address] Street 8:00 am - 4:30. If you need help with your wound outside these hours and cannot wait until we are again available, contact your PCP or go to the hospital emergency room. PLEASE NOTE: IF YOU ARE UNABLE TO OBTAIN WOUND SUPPLIES, CONTINUE TO USE THE SUPPLIES YOU HAVE AVAILABLE UNTIL YOU ARE ABLE TO REACH US. IT IS MOST IMPORTANT TO KEEP THE WOUND COVERED AT ALL TIMES.         Physician Signature:_______________________  Dr. Ronal Salas

## 2022-01-28 DIAGNOSIS — J30.2 SEASONAL ALLERGIC RHINITIS, UNSPECIFIED TRIGGER: ICD-10-CM

## 2022-01-28 RX ORDER — MONTELUKAST SODIUM 10 MG/1
TABLET ORAL
Qty: 90 TABLET | Refills: 0 | Status: SHIPPED | OUTPATIENT
Start: 2022-01-28 | End: 2022-06-20

## 2022-03-18 PROBLEM — M48.061 LUMBAR SPINAL STENOSIS: Status: ACTIVE | Noted: 2017-06-25

## 2022-03-18 PROBLEM — K59.03 CONSTIPATION DUE TO OPIOID THERAPY: Status: ACTIVE | Noted: 2018-10-04

## 2022-03-18 PROBLEM — E66.3 OVERWEIGHT (BMI 25.0-29.9): Status: ACTIVE | Noted: 2018-01-10

## 2022-03-18 PROBLEM — L89.153 PRESSURE INJURY OF SACRAL REGION, STAGE 3 (HCC): Status: ACTIVE | Noted: 2021-08-11

## 2022-03-18 PROBLEM — T40.2X5A CONSTIPATION DUE TO OPIOID THERAPY: Status: ACTIVE | Noted: 2018-10-04

## 2022-03-19 PROBLEM — M54.41 CHRONIC BILATERAL LOW BACK PAIN WITH BILATERAL SCIATICA: Status: ACTIVE | Noted: 2017-06-25

## 2022-03-19 PROBLEM — I10 ESSENTIAL HYPERTENSION: Status: ACTIVE | Noted: 2017-06-20

## 2022-03-19 PROBLEM — N39.0 RECURRENT UTI: Status: ACTIVE | Noted: 2020-10-07

## 2022-03-19 PROBLEM — E78.2 MIXED HYPERLIPIDEMIA: Status: ACTIVE | Noted: 2017-06-20

## 2022-03-19 PROBLEM — R32 URINARY INCONTINENCE: Status: ACTIVE | Noted: 2021-05-27

## 2022-03-19 PROBLEM — M54.42 CHRONIC BILATERAL LOW BACK PAIN WITH BILATERAL SCIATICA: Status: ACTIVE | Noted: 2017-06-25

## 2022-03-19 PROBLEM — L85.9: Status: ACTIVE | Noted: 2021-08-05

## 2022-03-19 PROBLEM — J45.30 MILD PERSISTENT ASTHMA WITHOUT COMPLICATION: Status: ACTIVE | Noted: 2017-10-09

## 2022-03-19 PROBLEM — Z71.89 ADVANCED DIRECTIVES, COUNSELING/DISCUSSION: Status: ACTIVE | Noted: 2017-10-09

## 2022-03-19 PROBLEM — G62.9 NEUROPATHY: Status: ACTIVE | Noted: 2019-01-29

## 2022-03-19 PROBLEM — G89.29 CHRONIC BILATERAL LOW BACK PAIN WITH BILATERAL SCIATICA: Status: ACTIVE | Noted: 2017-06-25

## 2022-03-20 PROBLEM — R73.03 PRE-DIABETES: Status: ACTIVE | Noted: 2020-10-07

## 2022-03-21 DIAGNOSIS — J45.30 MILD PERSISTENT ASTHMA WITHOUT COMPLICATION: ICD-10-CM

## 2022-03-21 RX ORDER — FLUTICASONE PROPIONATE AND SALMETEROL 100; 50 UG/1; UG/1
POWDER RESPIRATORY (INHALATION)
Qty: 60 EACH | Refills: 3 | Status: SHIPPED | OUTPATIENT
Start: 2022-03-21 | End: 2022-07-28 | Stop reason: SDUPTHER

## 2022-03-21 NOTE — PATIENT INSTRUCTIONS
Medicare Wellness Visit, Female     The best way to live healthy is to have a lifestyle where you eat a well-balanced diet, exercise regularly, limit alcohol use, and quit all forms of tobacco/nicotine, if applicable. Regular preventive services are another way to keep healthy. Preventive services (vaccines, screening tests, monitoring & exams) can help personalize your care plan, which helps you manage your own care. Screening tests can find health problems at the earliest stages, when they are easiest to treat. [facility] follows the current, evidence-based guidelines published by the [facility]aldo Son ([facility]) when recommending preventive services for our patients. Because we follow these guidelines, sometimes recommendations change over time as research supports it. (For example, mammograms used to be recommended annually. Even though Medicare will still pay for an annual mammogram, the newer guidelines recommend a mammogram every two years for women of average risk). Of course, you and your doctor may decide to screen more often for some diseases, based on your risk and your co-morbidities (chronic disease you are already diagnosed with). Preventive services for you include:  - Medicare offers their members a free annual wellness visit, which is time for you and your primary care provider to discuss and plan for your preventive service needs. Take advantage of this benefit every year!  -All adults over the age of 72 should receive the recommended pneumonia vaccines. Current USPSTF guidelines recommend a series of two vaccines for the best pneumonia protection.   -All adults should have a flu vaccine yearly and a tetanus vaccine every 10 years.   -All adults age 48 and older should receive the shingles vaccines (series of two vaccines).       -All adults age 38-68 who are overweight should have a diabetes screening test once every three years.   -All adults born between [birth year] and 1965 should be screened once for Hepatitis C.  -Other screening tests and preventive services for persons with diabetes include: an eye exam to screen for diabetic retinopathy, a kidney function test, a foot exam, and stricter control over your cholesterol.   -Cardiovascular screening for adults with routine risk involves an electrocardiogram (ECG) at intervals determined by your doctor.   -Colorectal cancer screenings should be done for adults age 54-65 with no increased risk factors for colorectal cancer. There are a number of acceptable methods of screening for this type of cancer. Each test has its own benefits and drawbacks. Discuss with your doctor what is most appropriate for you during your annual wellness visit. The different tests include: colonoscopy (considered the best screening method), a fecal occult blood test, a fecal DNA test, and sigmoidoscopy.    -A bone mass density test is recommended when a woman turns 65 to screen for osteoporosis. This test is only recommended one time, as a screening. Some providers will use this same test as a disease monitoring tool if you already have osteoporosis. -Breast cancer screenings are recommended every other year for women of normal risk, age 54-69.  -Cervical cancer screenings for women over age 72 are only recommended with certain risk factors.      Here is a list of your current Health Maintenance items (your personalized list of preventive services) with a due date:  Health Maintenance Due   Topic Date Due    Shingles Vaccine (1 of 2) Never done    Pneumococcal Vaccine (1 of 1 - PPSV23) Never done    COVID-19 Vaccine (3 - Booster for Torres Peter series) 08/30/2021    Yearly Flu Vaccine (1) 09/01/2021    Annual Well Visit  10/08/2021

## 2022-03-21 NOTE — PROGRESS NOTES
Guillermo Trent is a 67 y.o. female who was seen by synchronous (real-time) audio-video technology on 3/22/2022 for No chief complaint on file. Assessment & Plan:   Diagnoses and all orders for this visit:    1. Medicare annual wellness visit, subsequent    2. Essential hypertension    3. Pre-diabetes    4. Mild persistent asthma without complication    5. Recurrent UTI    6. Pressure injury of sacral region, stage 3 (HCC)    7. Spinal stenosis of lumbar region, unspecified whether neurogenic claudication present    8. Constipation due to opioid therapy          712  Subjective:     BP Readings from Last 3 Encounters:   11/18/21 114/69   11/04/21 133/79   10/21/21 115/72     Lab Results   Component Value Date/Time    Cholesterol, total 161 10/07/2019 11:52 AM    HDL Cholesterol 82 (H) 10/07/2019 11:52 AM    LDL, calculated 65.4 10/07/2019 11:52 AM    VLDL, calculated 13.6 10/07/2019 11:52 AM    Triglyceride 68 10/07/2019 11:52 AM    CHOL/HDL Ratio 2.0 10/07/2019 11:52 AM   On Zocor    Lab Results   Component Value Date/Time    Hemoglobin A1c (POC) 5.8 04/22/2021 01:42 PM    Hemoglobin A1c, External 6.0 12/24/2019 12:00 AM       Sacral decub    Asthma    Pain mgt    Linzess for constipation from opioids      Prior to Admission medications    Medication Sig Start Date End Date Taking? Authorizing Provider   Wixela Inhub 100-50 mcg/dose diskus inhaler INHALE 1 PUFF BY MOUTH EVERY 12 HOURS 3/21/22   Stephy Winn MD   montelukast (SINGULAIR) 10 mg tablet TAKE 1 TABLET BY MOUTH DAILY FOR ALLERGIES 1/28/22   Donald Cortes MD   simvastatin (ZOCOR) 10 mg tablet TAKE 1 TABLET BY MOUTH EVERY NIGHT AT BEDTIME 1/17/22   Stephy Winn MD   naproxen (NAPROSYN) 500 mg tablet TAKE 1 TABLET BY MOUTH TWICE DAILY AS NEEDED FOR PAIN 12/26/21   Donald Cortes MD   gabapentin (NEURONTIN) 300 mg capsule TAKE 1 CAPSULE BY MOUTH TWICE DAILY.  MAX DAILY AMOUNT: 600 MG 12/17/21   Donald Cortes MD amLODIPine (NORVASC) 5 mg tablet TAKE 1 TABLET BY MOUTH EVERY DAY 11/5/21   Stephy Winn MD   lisinopril-hydroCHLOROthiazide (PRINZIDE, ZESTORETIC) 20-25 mg per tablet TAKE 1 TABLET BY MOUTH EVERY DAY 10/8/21   Andreas Chavez MD   mupirocin (BACTROBAN) 2 % ointment Apply  to affected area daily. 10/7/21   Marylen Perish, MD   potassium chloride (KLOR-CON) 10 mEq tablet TAKE 1 TABLET BY MOUTH DAILY 8/10/21   Andreas Chavez MD   multivitamin (ONE A DAY) tablet Take 1 Tab by mouth daily. Provider, Historical   albuterol (PROVENTIL HFA, VENTOLIN HFA, PROAIR HFA) 90 mcg/actuation inhaler Take 2 Puffs by inhalation every six (6) hours as needed for Wheezing or Shortness of Breath. 4/7/20   Andreas Chavez MD   fluticasone propion-salmeteroL (Advair Diskus) 100-50 mcg/dose diskus inhaler Take 1 Puff by inhalation every twelve (12) hours. 4/7/20 3/21/22  Andreas Chavez MD   oxyCODONE IR (ROXICODONE) 10 mg tab immediate release tablet TK 1 T PO  Q 6 H. FILL ON/AFTER 03/23/19 6/24/19   Provider, Historical   linaclotide [place]) 145 mcg cap capsule Take 1 Cap by mouth Daily (before breakfast). 4/3/19   Andreas Chavez MD   polyethylene glycol (MIRALAX) 17 gram/dose powder Take 17 g by mouth daily. 4/3/19   Andreas Chavez MD   cholecalciferol, vitamin D3, (VITAMIN D3) 2,000 unit tab Take  by mouth daily. Provider, Historical   tiZANidine (ZANAFLEX) 4 mg tablet Take 1 Tab by mouth two (2) times a day.  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    Urinary incontinence 05/27/2021    Pre-diabetes 10/07/2020    Recurrent UTI 10/07/2020    Neuropathy 01/29/2019    Constipation due to opioid therapy 10/04/2018    Overweight (BMI 25.0-29.9) 01/10/2018    Mild persistent asthma without complication 57/23/7964    Advanced directives, counseling/discussion 10/09/2017    Chronic bilateral low back pain with bilateral sciatica 06/25/2017    Lumbar spinal stenosis 06/25/2017    Essential hypertension 06/20/2017    Mixed hyperlipidemia 06/20/2017       ROS  Pt denies: Wt loss, Fever/Chills, HA, Visual changes, Fatigue, Chest pain, SOB, MONDRAGON, Abd pain, N/V/D/C, Blood in stool or urine, Edema. Pertinent positive as above in HPI. All others were negative  Objective:   No flowsheet data found. General: alert, cooperative, no distress   Mental  status: normal mood, behavior, speech, dress, motor activity, and thought processes, able to follow commands   HENT: NCAT   Neck: no visualized mass   Resp: no respiratory distress   Neuro: no gross deficits   Skin: no discoloration or lesions of concern on visible areas   Psychiatric: normal affect, consistent with stated mood, no evidence of hallucinations     Additional exam findings: We discussed the expected course, resolution and complications of the diagnosis(es) in detail. Medication risks, benefits, costs, interactions, and alternatives were discussed as indicated. I advised her to contact the office if her condition worsens, changes or fails to improve as anticipated. She expressed understanding with the diagnosis(es) and plan. Paralee Due, was evaluated through a synchronous (real-time) audio-video encounter. The patient (or guardian if applicable) is aware that this is a billable service, which includes applicable co-pays. Verbal consent to proceed has been obtained. The visit was conducted pursuant to the emergency declaration under the [address] [place] [address] authority and the [employer] and [employer] General Act. Patient identification was verified, and a caregiver was present when appropriate. The patient was located at home in a state where the provider was licensed to provide care.     Neil Estimable Tylor Marks MD    This is the Subsequent Medicare Annual Wellness Exam, performed 12 months or more after the Initial AWV or the last Subsequent AWV    I have reviewed the patient's medical history in detail and updated the computerized patient record. Assessment/Plan   Education and counseling provided:  {Education List, choose as appropriate:::\"Are appropriate based on today's review and evaluation\"}    1. Medicare annual wellness visit, subsequent  2. Essential hypertension  3. Pre-diabetes  4. Mild persistent asthma without complication  5. Recurrent UTI  6. Pressure injury of sacral region, stage 3 (HCC)  7. Spinal stenosis of lumbar region, unspecified whether neurogenic claudication present  8. Constipation due to opioid therapy       Depression Risk Factor Screening:     3 most recent PHQ Screens 2021   Little interest or pleasure in doing things Not at all   Feeling down, depressed, irritable, or hopeless Not at all   Total Score PHQ 2 0       Alcohol & Drug Abuse Risk Screen    Do you average more than 1 drink per night or more than 7 drinks a week:  {Yes/No:494239::\"No\"}    On any one occasion in the past three months have you have had more than 3 drinks containing alcohol:  {Yes/No:616521::\"No\"}          Functional Ability and Level of Safety:    Hearing: {Desc; hearing loss:37348::\"Hearing is good. \"}      Activities of Daily Living: The home contains: {AWV Home NDQAIU:38741::\"MZ safety equipment. \"}  {Functional ADL's:83494::\"Patient does total self care\"}      Ambulation: {Patient ambulates:82492::\"with no difficulty\"}     Fall Risk:  Fall Risk Assessment, last 12 mths 10/7/2020   Able to walk? Yes   Fall in past 12 months?  No      Abuse Screen:  {Abuse Screen:::\"Patient is not abused\"}       Cognitive Screening    Has your family/caregiver stated any concerns about your memory: {AWV no/yes:98029::\"no\"}    {Cognitive Screenin}    Health Maintenance Due     Health Maintenance Due   Topic Date Due    Shingrix Vaccine Age 49> (1 of 2) Never done    Pneumococcal 65+ years (1 of 1 - PPSV23) Never done    COVID-19 Vaccine (3 - Booster for [employer] series) 08/30/2021    Flu Vaccine (1) 09/01/2021    Medicare Yearly Exam  10/08/2021       Patient Care Team   Patient Care Team:  Austin Mata MD as PCP - General (Internal Medicine)  Austin Mata MD as PCP - [facility]  Amanda Clancy MD (Physical Medicine and Rehabilitation)  Kanji, Beau Epley, MD (Optometry)    History     Patient Active Problem List   Diagnosis Code    Essential hypertension I10    Mixed hyperlipidemia E78.2    Chronic bilateral low back pain with bilateral sciatica M54.42, M54.41, G89.29    Lumbar spinal stenosis M48.061    Mild persistent asthma without complication G55.29    Advanced directives, counseling/discussion Z71.89    Overweight (BMI 25.0-29. 9) E66.3    Constipation due to opioid therapy K59.03, T40.2X5A    Neuropathy G62.9    Pre-diabetes R73.03    Recurrent UTI N39.0    Urinary incontinence R32    Pseudoepitheliomatous hyperplasia L85.9    Pressure injury of sacral region, stage 3 (HCC) L89.153     Past Medical History:   Diagnosis Date    Asthma     Hypercholesterolemia     Hypertension     Osteoarthritis       Past Surgical History:   Procedure Laterality Date    COLONOSCOPY N/A 2/2/2017    COLONOSCOPY performed by Pawel Josue MD at [address] HX CHOLECYSTECTOMY      HX HYSTERECTOMY       Current Outpatient Medications   Medication Sig Dispense Refill    Wixela Inhub 100-50 mcg/dose diskus inhaler INHALE 1 PUFF BY MOUTH EVERY 12 HOURS 60 Each 3    montelukast (SINGULAIR) 10 mg tablet TAKE 1 TABLET BY MOUTH DAILY FOR ALLERGIES 90 Tablet 0    simvastatin (ZOCOR) 10 mg tablet TAKE 1 TABLET BY MOUTH EVERY NIGHT AT BEDTIME 90 Tablet 1    naproxen (NAPROSYN) 500 mg tablet TAKE 1 TABLET BY MOUTH TWICE DAILY AS NEEDED FOR PAIN 180 Tablet 0    gabapentin (NEURONTIN) 300 mg capsule TAKE 1 CAPSULE BY MOUTH TWICE DAILY. MAX DAILY AMOUNT: 600  Capsule 0    amLODIPine (NORVASC) 5 mg tablet TAKE 1 TABLET BY MOUTH EVERY DAY 90 Tablet 1    lisinopril-hydroCHLOROthiazide (PRINZIDE, ZESTORETIC) 20-25 mg per tablet TAKE 1 TABLET BY MOUTH EVERY DAY 90 Tablet 1    mupirocin (BACTROBAN) 2 % ointment Apply  to affected area daily. 22 g 1    potassium chloride (KLOR-CON) 10 mEq tablet TAKE 1 TABLET BY MOUTH DAILY 90 Tablet 1    multivitamin (ONE A DAY) tablet Take 1 Tab by mouth daily.  albuterol (PROVENTIL HFA, VENTOLIN HFA, PROAIR HFA) 90 mcg/actuation inhaler Take 2 Puffs by inhalation every six (6) hours as needed for Wheezing or Shortness of Breath. 1 Inhaler 2    oxyCODONE IR (ROXICODONE) 10 mg tab immediate release tablet TK 1 T PO  Q 6 H. FILL ON/AFTER 03/23/19  0    linaclotide (LINZESS) 145 mcg cap capsule Take 1 Cap by mouth Daily (before breakfast). 90 Cap 1    polyethylene glycol (MIRALAX) 17 gram/dose powder Take 17 g by mouth daily. 1700 g 1    cholecalciferol, vitamin D3, (VITAMIN D3) 2,000 unit tab Take  by mouth daily.  tiZANidine (ZANAFLEX) 4 mg tablet Take 1 Tab by mouth two (2) times a day. 60 Tab 1     Allergies   Allergen Reactions    Penicillins Swelling       Family History   Problem Relation Age of Onset    No Known Problems Mother     No Known Problems Father     Diabetes Sister      Social History     Tobacco Use    Smoking status: Never Smoker    Smokeless tobacco: Never Used   Substance Use Topics    Alcohol use: No       Araceli Marsh, was evaluated through a synchronous (real-time) audio-video encounter. The patient (or guardian if applicable) is aware that this is a billable service, which includes applicable co-pays. Verbal consent to proceed has been obtained.  The visit was conducted pursuant to the emergency declaration under the [facility] [address] waiver authority and the [employer] and [employer] General Act. Patient identification was verified, and a caregiver was present when appropriate. The patient was located at home in a state where the provider was licensed to provide care. Bernice Fletcher MD    This encounter was created in error - please disregard.

## 2022-03-22 ENCOUNTER — VIRTUAL VISIT (OUTPATIENT)
Dept: FAMILY MEDICINE CLINIC | Age: 77
End: 2022-03-22

## 2022-03-22 DIAGNOSIS — I10 ESSENTIAL HYPERTENSION: ICD-10-CM

## 2022-03-22 DIAGNOSIS — R73.03 PRE-DIABETES: ICD-10-CM

## 2022-03-22 DIAGNOSIS — Z00.00 MEDICARE ANNUAL WELLNESS VISIT, SUBSEQUENT: Primary | ICD-10-CM

## 2022-03-22 DIAGNOSIS — T40.2X5A CONSTIPATION DUE TO OPIOID THERAPY: ICD-10-CM

## 2022-03-22 DIAGNOSIS — J45.30 MILD PERSISTENT ASTHMA WITHOUT COMPLICATION: ICD-10-CM

## 2022-03-22 DIAGNOSIS — M48.061 SPINAL STENOSIS OF LUMBAR REGION, UNSPECIFIED WHETHER NEUROGENIC CLAUDICATION PRESENT: ICD-10-CM

## 2022-03-22 DIAGNOSIS — K59.03 CONSTIPATION DUE TO OPIOID THERAPY: ICD-10-CM

## 2022-03-22 DIAGNOSIS — N39.0 RECURRENT UTI: ICD-10-CM

## 2022-03-22 DIAGNOSIS — L89.153 PRESSURE INJURY OF SACRAL REGION, STAGE 3 (HCC): ICD-10-CM

## 2022-03-22 RX ORDER — FLUTICASONE PROPIONATE AND SALMETEROL 100; 50 UG/1; UG/1
POWDER RESPIRATORY (INHALATION)
Qty: 60 EACH | Refills: 3 | Status: CANCELLED | OUTPATIENT
Start: 2022-03-22

## 2022-03-22 RX ORDER — LISINOPRIL AND HYDROCHLOROTHIAZIDE 20; 25 MG/1; MG/1
1 TABLET ORAL DAILY
Qty: 90 TABLET | Refills: 1 | Status: CANCELLED | OUTPATIENT
Start: 2022-03-22

## 2022-03-22 RX ORDER — LISINOPRIL AND HYDROCHLOROTHIAZIDE 20; 25 MG/1; MG/1
1 TABLET ORAL DAILY
Qty: 90 TABLET | Refills: 1 | Status: SHIPPED | OUTPATIENT
Start: 2022-03-22 | End: 2022-09-29

## 2022-03-22 NOTE — PROGRESS NOTES
Nina Ellis is a 67 y.o. female who was seen by synchronous (real-time) audio-video technology on 3/22/2022 for No chief complaint on file. Assessment & Plan:   Diagnoses and all orders for this visit:    1. Medicare annual wellness visit, subsequent            712  Subjective:     BP Readings from Last 3 Encounters:   11/18/21 114/69   11/04/21 133/79   10/21/21 115/72       Prior to Admission medications    Medication Sig Start Date End Date Taking? Authorizing Provider   Wixela Inhub 100-50 mcg/dose diskus inhaler INHALE 1 PUFF BY MOUTH EVERY 12 HOURS 3/21/22   Stephy Winn MD   montelukast (SINGULAIR) 10 mg tablet TAKE 1 TABLET BY MOUTH DAILY FOR ALLERGIES 1/28/22   Sofiya Smith MD   simvastatin (ZOCOR) 10 mg tablet TAKE 1 TABLET BY MOUTH EVERY NIGHT AT BEDTIME 1/17/22   Stephy Winn MD   naproxen (NAPROSYN) 500 mg tablet TAKE 1 TABLET BY MOUTH TWICE DAILY AS NEEDED FOR PAIN 12/26/21   Sofiya Smith MD   gabapentin (NEURONTIN) 300 mg capsule TAKE 1 CAPSULE BY MOUTH TWICE DAILY. MAX DAILY AMOUNT: 600 MG 12/17/21   Stephy Winn MD   amLODIPine (NORVASC) 5 mg tablet TAKE 1 TABLET BY MOUTH EVERY DAY 11/5/21   Stephy Winn MD   lisinopril-hydroCHLOROthiazide (PRINZIDE, ZESTORETIC) 20-25 mg per tablet TAKE 1 TABLET BY MOUTH EVERY DAY 10/8/21   Sofiya Smith MD   mupirocin (BACTROBAN) 2 % ointment Apply  to affected area daily. 10/7/21   Maci Gross MD   potassium chloride (KLOR-CON) 10 mEq tablet TAKE 1 TABLET BY MOUTH DAILY 8/10/21   Sofiya Smith MD   multivitamin (ONE A DAY) tablet Take 1 Tab by mouth daily. Provider, Historical   albuterol (PROVENTIL HFA, VENTOLIN HFA, PROAIR HFA) 90 mcg/actuation inhaler Take 2 Puffs by inhalation every six (6) hours as needed for Wheezing or Shortness of Breath. 4/7/20   Sofiya Smith MD   oxyCODONE IR (ROXICODONE) 10 mg tab immediate release tablet TK 1 T PO  Q 6 H.  FILL ON/AFTER 03/23/19 6/24/19   Provider, Historical   linaclotide (LINZESS) 145 mcg cap capsule Take 1 Cap by mouth Daily (before breakfast). 4/3/19   Halle Denton MD   polyethylene glycol (MIRALAX) 17 gram/dose powder Take 17 g by mouth daily. 4/3/19   Halle Denton MD   cholecalciferol, vitamin D3, (VITAMIN D3) 2,000 unit tab Take  by mouth daily. Provider, Historical   tiZANidine (ZANAFLEX) 4 mg tablet Take 1 Tab by mouth two (2) times a day. 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    Urinary incontinence 05/27/2021    Pre-diabetes 10/07/2020    Recurrent UTI 10/07/2020    Neuropathy 01/29/2019    Constipation due to opioid therapy 10/04/2018    Overweight (BMI 25.0-29.9) 01/10/2018    Mild persistent asthma without complication 30/55/3950    Advanced directives, counseling/discussion 10/09/2017    Chronic bilateral low back pain with bilateral sciatica 06/25/2017    Lumbar spinal stenosis 06/25/2017    Essential hypertension 06/20/2017    Mixed hyperlipidemia 06/20/2017       ROS  Pt denies: Wt loss, Fever/Chills, HA, Visual changes, Fatigue, Chest pain, SOB, MONDRAGON, Abd pain, N/V/D/C, Blood in stool or urine, Edema. Pertinent positive as above in HPI. All others were negative  Objective:   No flowsheet data found. General: alert, cooperative, no distress   Mental  status: normal mood, behavior, speech, dress, motor activity, and thought processes, able to follow commands   HENT: NCAT   Neck: no visualized mass   Resp: no respiratory distress   Neuro: no gross deficits   Skin: no discoloration or lesions of concern on visible areas   Psychiatric: normal affect, consistent with stated mood, no evidence of hallucinations     Additional exam findings:        We discussed the expected course, resolution and complications of the diagnosis(es) in detail. Medication risks, benefits, costs, interactions, and alternatives were discussed as indicated. I advised her to contact the office if her condition worsens, changes or fails to improve as anticipated. She expressed understanding with the diagnosis(es) and plan. Sahara Martin, was evaluated through a synchronous (real-time) audio-video encounter. The patient (or guardian if applicable) is aware that this is a billable service, which includes applicable co-pays. Verbal consent to proceed has been obtained. The visit was conducted pursuant to the emergency declaration under the [address], [address] [place] [address] authority and the [employer] and [employer] General Act. Patient identification was verified, and a caregiver was present when appropriate. The patient was located at home in a state where the provider was licensed to provide care. Levi Chamberlain MD    This is the Subsequent Medicare Annual Wellness Exam, performed 12 months or more after the Initial AWV or the last Subsequent AWV    I have reviewed the patient's medical history in detail and updated the computerized patient record. Assessment/Plan   Education and counseling provided:  {Education List, choose as appropriate:19754::\"Are appropriate based on today's review and evaluation\"}    1.  Medicare annual wellness visit, subsequent       Depression Risk Factor Screening:     3 most recent PHQ Screens 3/22/2022   Little interest or pleasure in doing things Not at all   Feeling down, depressed, irritable, or hopeless Not at all   Total Score PHQ 2 0       Alcohol & Drug Abuse Risk Screen    Do you average more than 1 drink per night or more than 7 drinks a week:  {Yes/No:284295::\"No\"}    On any one occasion in the past three months have you have had more than 3 drinks containing alcohol:  {Yes/No:024510::\"No\"}          Functional Ability and Level of Safety:    Hearing: {Desc; hearing loss:56256::\"Hearing is good. \"}      Activities of Daily Living: The home contains: {AWV Home DOUXXJ:42970::\"MAGY safety equipment. \"}  {Functional ADL's:95794::\"Patient does total self care\"}      Ambulation: {Patient ambulates:52487::\"with no difficulty\"}     Fall Risk:  Fall Risk Assessment, last 12 mths 3/22/2022   Able to walk? Yes   Fall in past 12 months? 0   Do you feel unsteady? 0   Are you worried about falling 0      Abuse Screen:  {Abuse Screen:::\"Patient is not abused\"}       Cognitive Screening    Has your family/caregiver stated any concerns about your memory: {AWV no/yes:13175::\"no\"}    {Cognitive Screenin}    Health Maintenance Due     Health Maintenance Due   Topic Date Due    Shingrix Vaccine Age 49> (1 of 2) Never done    Pneumococcal 65+ years (1 of 1 - PPSV23) Never done    Medicare Yearly Exam  10/08/2021       Patient Care Team   Patient Care Team:  Alex Phoenix MD as PCP - General (Internal Medicine)  Alex Phoenix MD as PCP - [facility]  Caprice Bahena MD (Physical Medicine and Rehabilitation)  Torres Hendrickson MD (Optometry)    History     Patient Active Problem List   Diagnosis Code    Essential hypertension I10    Mixed hyperlipidemia E78.2    Chronic bilateral low back pain with bilateral sciatica M54.42, M54.41, G89.29    Lumbar spinal stenosis M48.061    Mild persistent asthma without complication L10.43    Advanced directives, counseling/discussion Z71.89    Overweight (BMI 25.0-29. 9) E66.3    Constipation due to opioid therapy K59.03, T40.2X5A    Neuropathy G62.9    Pre-diabetes R73.03    Recurrent UTI N39.0    Urinary incontinence R32    Pseudoepitheliomatous hyperplasia L85.9    Pressure injury of sacral region, stage 3 (HCC) L89.153     Past Medical History:   Diagnosis Date    Asthma     Hypercholesterolemia     Hypertension     Osteoarthritis       Past Surgical History:   Procedure Laterality Date  COLONOSCOPY N/A 2/2/2017    COLONOSCOPY performed by Irving Blackburn MD at [address] HX CHOLECYSTECTOMY      HX HYSTERECTOMY       Current Outpatient Medications   Medication Sig Dispense Refill    Wixela Inhub 100-50 mcg/dose diskus inhaler INHALE 1 PUFF BY MOUTH EVERY 12 HOURS 60 Each 3    montelukast (SINGULAIR) 10 mg tablet TAKE 1 TABLET BY MOUTH DAILY FOR ALLERGIES 90 Tablet 0    simvastatin (ZOCOR) 10 mg tablet TAKE 1 TABLET BY MOUTH EVERY NIGHT AT BEDTIME 90 Tablet 1    naproxen (NAPROSYN) 500 mg tablet TAKE 1 TABLET BY MOUTH TWICE DAILY AS NEEDED FOR PAIN 180 Tablet 0    gabapentin (NEURONTIN) 300 mg capsule TAKE 1 CAPSULE BY MOUTH TWICE DAILY. MAX DAILY AMOUNT: 600  Capsule 0    amLODIPine (NORVASC) 5 mg tablet TAKE 1 TABLET BY MOUTH EVERY DAY 90 Tablet 1    lisinopril-hydroCHLOROthiazide (PRINZIDE, ZESTORETIC) 20-25 mg per tablet TAKE 1 TABLET BY MOUTH EVERY DAY 90 Tablet 1    mupirocin (BACTROBAN) 2 % ointment Apply  to affected area daily. 22 g 1    potassium chloride (KLOR-CON) 10 mEq tablet TAKE 1 TABLET BY MOUTH DAILY 90 Tablet 1    multivitamin (ONE A DAY) tablet Take 1 Tab by mouth daily.  albuterol (PROVENTIL HFA, VENTOLIN HFA, PROAIR HFA) 90 mcg/actuation inhaler Take 2 Puffs by inhalation every six (6) hours as needed for Wheezing or Shortness of Breath. 1 Inhaler 2    oxyCODONE IR (ROXICODONE) 10 mg tab immediate release tablet TK 1 T PO  Q 6 H. FILL ON/AFTER 03/23/19  0    linaclotide (LINZESS) 145 mcg cap capsule Take 1 Cap by mouth Daily (before breakfast). 90 Cap 1    polyethylene glycol (MIRALAX) 17 gram/dose powder Take 17 g by mouth daily. 1700 g 1    cholecalciferol, vitamin D3, (VITAMIN D3) 2,000 unit tab Take  by mouth daily.  tiZANidine (ZANAFLEX) 4 mg tablet Take 1 Tab by mouth two (2) times a day.  60 Tab 1     Allergies   Allergen Reactions    Penicillins Swelling       Family History   Problem Relation Age of Onset    No Known Problems Mother     No Known Problems Father     Diabetes Sister      Social History     Tobacco Use    Smoking status: Never Smoker    Smokeless tobacco: Never Used   Substance Use Topics    Alcohol use: No       Nina Rhonda, was evaluated through a synchronous (real-time) audio-video encounter. The patient (or guardian if applicable) is aware that this is a billable service, which includes applicable co-pays. Verbal consent to proceed has been obtained. The visit was conducted pursuant to the emergency declaration under the [address] [place] [address] authority and the [employer] and [employer] General Act. Patient identification was verified, and a caregiver was present when appropriate. The patient was located at home in a state where the provider was licensed to provide care.        Heike Alvarez MD

## 2022-03-22 NOTE — PROGRESS NOTES
Patient seen for medicare wellness     Health Maintenance Due   Topic Date Due    Shingrix Vaccine Age 49> (1 of 2) Never done    Pneumococcal 65+ years (1 of 1 - PPSV23) Never done    Medicare Yearly Exam  10/08/2021         1. \"Have you been to the ER, urgent care clinic since your last visit? Hospitalized since your last visit? \" No    2. \"Have you seen or consulted any other health care providers outside of the [address] [place] [address] since your last visit? \" Yes Dr. Gardenia Addison     3. For patients aged 39-70: Has the patient had a colonoscopy / FIT/ Cologuard? NA - based on age      If the patient is female:    4. For patients aged 41-77: Has the patient had a mammogram within the past 2 years? Yes - no Care Gap present      5. For patients aged 21-65: Has the patient had a pap smear?  Yes - no Care Gap present

## 2022-03-22 NOTE — PATIENT INSTRUCTIONS

## 2022-03-22 NOTE — PROGRESS NOTES
Patient seen for medicare wellness     Health Maintenance Due   Topic Date Due    Shingrix Vaccine Age 49> (1 of 2) Never done    Pneumococcal 65+ years (1 of 1 - PPSV23) Never done    Medicare Yearly Exam  10/08/2021         1. \"Have you been to the ER, urgent care clinic since your last visit? Hospitalized since your last visit? \" No    2. \"Have you seen or consulted any other health care providers outside of the [address] [place] [address] since your last visit? \" Yes Dr. Samantha Snow     3. For patients aged 39-70: Has the patient had a colonoscopy / FIT/ Cologuard? NA - based on age      If the patient is female:    4. For patients aged 41-77: Has the patient had a mammogram within the past 2 years? Yes - no Care Gap present      5. For patients aged 21-65: Has the patient had a pap smear?  Yes - no Care Gap present

## 2022-03-29 ENCOUNTER — HOSPITAL ENCOUNTER (OUTPATIENT)
Dept: LAB | Age: 77
Discharge: HOME | End: 2022-03-29
Payer: MEDICARE

## 2022-03-29 ENCOUNTER — OFFICE VISIT (OUTPATIENT)
Dept: FAMILY MEDICINE CLINIC | Age: 77
End: 2022-03-29
Payer: MEDICARE

## 2022-03-29 VITALS
TEMPERATURE: 98.3 F | SYSTOLIC BLOOD PRESSURE: 94 MMHG | BODY MASS INDEX: 26.19 KG/M2 | WEIGHT: 147.8 LBS | RESPIRATION RATE: 16 BRPM | OXYGEN SATURATION: 97 % | HEART RATE: 80 BPM | DIASTOLIC BLOOD PRESSURE: 60 MMHG | HEIGHT: 63 IN

## 2022-03-29 DIAGNOSIS — R73.03 PRE-DIABETES: ICD-10-CM

## 2022-03-29 DIAGNOSIS — N39.0 RECURRENT UTI: ICD-10-CM

## 2022-03-29 DIAGNOSIS — Z00.00 MEDICARE ANNUAL WELLNESS VISIT, SUBSEQUENT: Primary | ICD-10-CM

## 2022-03-29 DIAGNOSIS — J30.2 SEASONAL ALLERGIC RHINITIS, UNSPECIFIED TRIGGER: ICD-10-CM

## 2022-03-29 DIAGNOSIS — T40.2X5A CONSTIPATION DUE TO OPIOID THERAPY: ICD-10-CM

## 2022-03-29 DIAGNOSIS — I10 ESSENTIAL HYPERTENSION: ICD-10-CM

## 2022-03-29 DIAGNOSIS — Z23 ENCOUNTER FOR IMMUNIZATION: ICD-10-CM

## 2022-03-29 DIAGNOSIS — M48.061 SPINAL STENOSIS OF LUMBAR REGION, UNSPECIFIED WHETHER NEUROGENIC CLAUDICATION PRESENT: ICD-10-CM

## 2022-03-29 DIAGNOSIS — J45.30 MILD PERSISTENT ASTHMA WITHOUT COMPLICATION: ICD-10-CM

## 2022-03-29 DIAGNOSIS — E78.2 MIXED HYPERLIPIDEMIA: ICD-10-CM

## 2022-03-29 DIAGNOSIS — K59.03 CONSTIPATION DUE TO OPIOID THERAPY: ICD-10-CM

## 2022-03-29 DIAGNOSIS — L89.153 PRESSURE INJURY OF SACRAL REGION, STAGE 3 (HCC): ICD-10-CM

## 2022-03-29 LAB
ALBUMIN SERPL-MCNC: 3.6 G/DL (ref 3.4–5)
ALBUMIN/GLOB SERPL: 0.9 {RATIO} (ref 0.8–1.7)
ALP SERPL-CCNC: 63 U/L (ref 45–117)
ALT SERPL-CCNC: 26 U/L (ref 13–56)
ANION GAP SERPL CALC-SCNC: 4 MMOL/L (ref 3–18)
AST SERPL-CCNC: 20 U/L (ref 10–38)
BASOPHILS # BLD: 0.1 K/UL (ref 0–0.1)
BASOPHILS NFR BLD: 1 % (ref 0–2)
BILIRUB SERPL-MCNC: 0.4 MG/DL (ref 0.2–1)
BUN SERPL-MCNC: 26 MG/DL (ref 7–18)
BUN/CREAT SERPL: 34 (ref 12–20)
CALCIUM SERPL-MCNC: 9.5 MG/DL (ref 8.5–10.1)
CHLORIDE SERPL-SCNC: 105 MMOL/L (ref 100–111)
CHOLEST SERPL-MCNC: 146 MG/DL
CO2 SERPL-SCNC: 28 MMOL/L (ref 21–32)
CREAT SERPL-MCNC: 0.76 MG/DL (ref 0.6–1.3)
DIFFERENTIAL METHOD BLD: NORMAL
EOSINOPHIL # BLD: 0.1 K/UL (ref 0–0.4)
EOSINOPHIL NFR BLD: 1 % (ref 0–5)
ERYTHROCYTE [DISTWIDTH] IN BLOOD BY AUTOMATED COUNT: 14.5 % (ref 11.6–14.5)
EST. AVERAGE GLUCOSE BLD GHB EST-MCNC: 120 MG/DL
GLOBULIN SER CALC-MCNC: 3.9 G/DL (ref 2–4)
GLUCOSE SERPL-MCNC: 94 MG/DL (ref 74–99)
HBA1C MFR BLD: 5.8 % (ref 4.2–5.6)
HCT VFR BLD AUTO: 38.8 % (ref 35–45)
HDLC SERPL-MCNC: 70 MG/DL (ref 40–60)
HDLC SERPL: 2.1 {RATIO} (ref 0–5)
HGB BLD-MCNC: 12.4 G/DL (ref 12–16)
IMM GRANULOCYTES # BLD AUTO: 0 K/UL (ref 0–0.04)
IMM GRANULOCYTES NFR BLD AUTO: 0 % (ref 0–0.5)
LDLC SERPL CALC-MCNC: 58.2 MG/DL (ref 0–100)
LIPID PROFILE,FLP: ABNORMAL
LYMPHOCYTES # BLD: 2 K/UL (ref 0.9–3.6)
LYMPHOCYTES NFR BLD: 26 % (ref 21–52)
MCH RBC QN AUTO: 28.1 PG (ref 24–34)
MCHC RBC AUTO-ENTMCNC: 32 G/DL (ref 31–37)
MCV RBC AUTO: 87.8 FL (ref 78–100)
MONOCYTES # BLD: 0.4 K/UL (ref 0.05–1.2)
MONOCYTES NFR BLD: 6 % (ref 3–10)
NEUTS SEG # BLD: 5.1 K/UL (ref 1.8–8)
NEUTS SEG NFR BLD: 67 % (ref 40–73)
NRBC # BLD: 0 K/UL (ref 0–0.01)
NRBC BLD-RTO: 0 PER 100 WBC
PLATELET # BLD AUTO: 329 K/UL (ref 135–420)
PMV BLD AUTO: 11 FL (ref 9.2–11.8)
POTASSIUM SERPL-SCNC: 4.4 MMOL/L (ref 3.5–5.5)
PROT SERPL-MCNC: 7.5 G/DL (ref 6.4–8.2)
RBC # BLD AUTO: 4.42 M/UL (ref 4.2–5.3)
SODIUM SERPL-SCNC: 137 MMOL/L (ref 136–145)
TRIGL SERPL-MCNC: 89 MG/DL (ref ?–150)
VLDLC SERPL CALC-MCNC: 17.8 MG/DL
WBC # BLD AUTO: 7.7 K/UL (ref 4.6–13.2)

## 2022-03-29 PROCEDURE — 90732 PPSV23 VACC 2 YRS+ SUBQ/IM: CPT | Performed by: INTERNAL MEDICINE

## 2022-03-29 PROCEDURE — G8510 SCR DEP NEG, NO PLAN REQD: HCPCS | Performed by: INTERNAL MEDICINE

## 2022-03-29 PROCEDURE — 80053 COMPREHEN METABOLIC PANEL: CPT

## 2022-03-29 PROCEDURE — G8754 DIAS BP LESS 90: HCPCS | Performed by: INTERNAL MEDICINE

## 2022-03-29 PROCEDURE — 99214 OFFICE O/P EST MOD 30 MIN: CPT | Performed by: INTERNAL MEDICINE

## 2022-03-29 PROCEDURE — G8427 DOCREV CUR MEDS BY ELIG CLIN: HCPCS | Performed by: INTERNAL MEDICINE

## 2022-03-29 PROCEDURE — G0009 ADMIN PNEUMOCOCCAL VACCINE: HCPCS | Performed by: INTERNAL MEDICINE

## 2022-03-29 PROCEDURE — G8399 PT W/DXA RESULTS DOCUMENT: HCPCS | Performed by: INTERNAL MEDICINE

## 2022-03-29 PROCEDURE — 1101F PT FALLS ASSESS-DOCD LE1/YR: CPT | Performed by: INTERNAL MEDICINE

## 2022-03-29 PROCEDURE — 85025 COMPLETE CBC W/AUTO DIFF WBC: CPT

## 2022-03-29 PROCEDURE — 1090F PRES/ABSN URINE INCON ASSESS: CPT | Performed by: INTERNAL MEDICINE

## 2022-03-29 PROCEDURE — 83036 HEMOGLOBIN GLYCOSYLATED A1C: CPT

## 2022-03-29 PROCEDURE — G8536 NO DOC ELDER MAL SCRN: HCPCS | Performed by: INTERNAL MEDICINE

## 2022-03-29 PROCEDURE — G8419 CALC BMI OUT NRM PARAM NOF/U: HCPCS | Performed by: INTERNAL MEDICINE

## 2022-03-29 PROCEDURE — 36415 COLL VENOUS BLD VENIPUNCTURE: CPT

## 2022-03-29 PROCEDURE — G0439 PPPS, SUBSEQ VISIT: HCPCS | Performed by: INTERNAL MEDICINE

## 2022-03-29 PROCEDURE — G8752 SYS BP LESS 140: HCPCS | Performed by: INTERNAL MEDICINE

## 2022-03-29 PROCEDURE — 80061 LIPID PANEL: CPT

## 2022-03-29 RX ORDER — CELECOXIB 100 MG/1
100 CAPSULE ORAL
Qty: 180 CAPSULE | Refills: 0 | Status: SHIPPED | OUTPATIENT
Start: 2022-03-29 | End: 2022-06-23

## 2022-03-29 NOTE — PROGRESS NOTES
Patient seen for annual medicare wellness visit    Patient was given VIS for review,consent was obtained and per orders of Dr. Yasmani Fernández, injection of PPSV23 given by Shanti Paz LPN. Patient observed. No signs nor symptoms of any adverse reactions. Patient tolerated injection well. Health Maintenance Due   Topic Date Due    Shingrix Vaccine Age 49> (1 of 2) Never done    Pneumococcal 65+ years (1 of 1 - PPSV23) Never done    Medicare Yearly Exam  10/08/2021     1. \"Have you been to the ER, urgent care clinic since your last visit? Hospitalized since your last visit? \" No    2. \"Have you seen or consulted any other health care providers outside of the [address] [place] [address] since your last visit? \" Yes GYN for routine follow up     3. For patients aged 39-70: Has the patient had a colonoscopy / FIT/ Cologuard? NA - based on age      If the patient is female:    4. For patients aged 41-77: Has the patient had a mammogram within the past 2 years? NA - based on age or sex      11. For patients aged 21-65: Has the patient had a pap smear?  NA - based on age or sex

## 2022-03-29 NOTE — PATIENT INSTRUCTIONS

## 2022-03-29 NOTE — PROGRESS NOTES
Assessment/ Plan:   Diagnoses and all orders for this visit:    1. Medicare annual wellness visit, subsequent -see note below    2. Essential hypertension-controlled and on the low side; advised she could stop the Norvasc  -     CBC WITH AUTOMATED DIFF; Future  -     METABOLIC PANEL, COMPREHENSIVE; Future    3. Mild persistent asthma without complication-continue with current inhalers    4. Mixed hyperlipidemia-goal LDL of less than 100  -     LIPID PANEL; Future    5. Pre-diabetes-will start meds if a1C goes above 7.5  -     HEMOGLOBIN A1C WITH EAG; Future    6. Pressure injury of sacral region, stage 3 (HCC)-resolving; has a small area in the crease of the buttocks-advised to apply Desitin to the area    7. Spinal stenosis of lumbar region, unspecified whether neurogenic claudication present-see pain mgt; will change Naprosyn to Celebrex as the Naprosyn has been making her have palpitations  -     celecoxib (CELEBREX) 100 mg capsule; Take 1 Capsule by mouth two (2) times daily as needed for Pain. 8. Constipation due to opioid therapy-doing well on Linzess    9. Seasonal allergic rhinitis, unspecified trigger-continue with present meds    10. Recurrent UTI-see Urogyn for this            Follow-up and Dispositions    · Return in about 4 months (around 7/29/2022) for follow up.                    Chief Complaint   Patient presents with    Annual Wellness Visit    Follow Up Chronic Condition       Pt is a 67y.o. year old female who presents for follow up of her chronic medical problems    Health Maintenance Due   Topic Date Due    Shingrix Vaccine Age 50> (1 of 2)-advised to get this at her pharmacy Never done        Wt Readings from Last 3 Encounters:   03/29/22 147 lb 12.8 oz (67 kg)   05/27/21 156 lb 8.4 oz (71 kg)   04/22/21 152 lb (68.9 kg)       BP Readings from Last 3 Encounters:   03/29/22 94/60   11/18/21 114/69   11/04/21 133/79       Lab Results   Component Value Date/Time    Cholesterol, total 146 03/29/2022 12:12 PM    HDL Cholesterol 70 (H) 03/29/2022 12:12 PM    LDL, calculated 58.2 03/29/2022 12:12 PM    VLDL, calculated 17.8 03/29/2022 12:12 PM    Triglyceride 89 03/29/2022 12:12 PM    CHOL/HDL Ratio 2.1 03/29/2022 12:12 PM       Lab Results   Component Value Date/Time    Hemoglobin A1c 5.8 (H) 03/29/2022 12:12 PM    Hemoglobin A1c (POC) 5.8 04/22/2021 01:42 PM    Hemoglobin A1c, External 6.0 12/24/2019 12:00 AM       Pain Mgt for low back pain  Walks with a walker    Urinary incontinence  Sacral decub resolved  ROS:    Pt denies: Wt loss, Fever/Chills, HA, Visual changes, Fatigue, Chest pain, SOB, MONDRAGON, Abd pain, N/V/D/C, Blood in stool or urine, Edema. Pertinent positive as above in HPI. All others were negative    Patient Active Problem List   Diagnosis Code    Essential hypertension I10    Mixed hyperlipidemia E78.2    Chronic bilateral low back pain with bilateral sciatica M54.42, M54.41, G89.29    Lumbar spinal stenosis M48.061    Mild persistent asthma without complication Y65.10    Advanced directives, counseling/discussion Z71.89    Overweight (BMI 25.0-29. 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  90 Tablet 1    Wixela Inhub 100-50 mcg/dose diskus inhaler INHALE 1 PUFF BY MOUTH EVERY 12 HOURS 60 Each 3    montelukast (SINGULAIR) 10 mg tablet TAKE 1 TABLET BY MOUTH DAILY FOR ALLERGIES 90 Tablet 0    simvastatin (ZOCOR) 10 mg tablet TAKE 1 TABLET BY MOUTH EVERY NIGHT AT BEDTIME 90 Tablet 1    gabapentin (NEURONTIN) 300 mg capsule TAKE 1 CAPSULE BY MOUTH TWICE DAILY. MAX DAILY AMOUNT: 600  Capsule 0    amLODIPine (NORVASC) 5 mg tablet TAKE 1 TABLET BY MOUTH EVERY DAY 90 Tablet 1    mupirocin (BACTROBAN) 2 % ointment Apply  to affected area daily. 22 g 1    potassium chloride (KLOR-CON) 10 mEq tablet TAKE 1 TABLET BY MOUTH DAILY 90 Tablet 1    multivitamin (ONE A DAY) tablet Take 1 Tab by mouth daily.  albuterol (PROVENTIL HFA, VENTOLIN HFA, PROAIR HFA) 90 mcg/actuation inhaler Take 2 Puffs by inhalation every six (6) hours as needed for Wheezing or Shortness of Breath. 1 Inhaler 2    oxyCODONE IR (ROXICODONE) 10 mg tab immediate release tablet TK 1 T PO  Q 6 H. FILL ON/AFTER 03/23/19  0    linaclotide (LINZESS) 145 mcg cap capsule Take 1 Cap by mouth Daily (before breakfast). 90 Cap 1    polyethylene glycol (MIRALAX) 17 gram/dose powder Take 17 g by mouth daily. 1700 g 1    cholecalciferol, vitamin D3, (VITAMIN D3) 2,000 unit tab Take  by mouth daily.  tiZANidine (ZANAFLEX) 4 mg tablet Take 1 Tab by mouth two (2) times a day. 60 Tab 1       Social History     Tobacco Use   Smoking Status Never Smoker   Smokeless Tobacco Never Used       Allergies   Allergen Reactions    Penicillins Swelling       Patient Labs were reviewed: yes    Patient Past Records were reviewed: yes      Objective:     Vitals:    03/29/22 1109   BP: 94/60   Pulse: 80   Resp: 16   Temp: 98.3 °F (36.8 °C)   TempSrc: Temporal   SpO2: 97%   Weight: 147 lb 12.8 oz (67 kg)   Height: 5' 3\" (1.6 m)     Body mass index is 26.18 kg/m². Exam: walks with a walker  Appearance: alert, well appearing,  oriented to person, place, and time, acyanotic, in no respiratory distress and well hydrated.   HEENT:  NC/AT, pink conj, anicteric sclerae  Neck:  No cervical lymphadenopathy, no JVD, no thyromegaly, no carotid bruit  Heart:  RRR without M/R/G  Lungs:  CTAB, no rhonchi, rales, or wheezes with good air exchange   Abdomen:  Non-tender, pos bowel sounds, no hepatosplenomegaly  Ext:  No C/C/E    Skin: no rash  Neuro: no lateralizing signs, CNs II-XII intact            I have discussed the diagnosis with the patient and the intended plan as seen in the above orders. The patient has received an After-Visit Summary and questions were answered concerning future plans. Medication Side Effects and Warnings were discussed with patient: yes    Patient verbalized understanding of above instructions. Ant Kasper MD  Internal Medicine  [facility]    This is the Subsequent Medicare Annual Wellness Exam, performed 12 months or more after the Initial AWV or the last Subsequent AWV    I have reviewed the patient's medical history in detail and updated the computerized patient record. Assessment/Plan   Education and counseling provided:  Are appropriate based on today's review and evaluation  End-of-Life planning (with patient's consent)-primary decision maker verified  Pneumococcal Vaccine-done  Influenza Vaccine-done  Screening Mammography-done  Colorectal cancer screening tests-done  Cardiovascular screening blood test-lipids today  Bone mass measurement (DEXA)-done  Screening for glaucoma-eye exam is up to date  Diabetes screening test-RBS today    1. Medicare annual wellness visit, subsequent-Refer to above for plan and to patient instructions for recommendations on HM    2.  Encounter for immunization  -     PNEUMOCOCCAL POLYSACCHARIDE VACCINE, 23-VALENT, ADULT OR IMMUNOSUPPRESSED PT DOSE,  -     THER/PROPH/DIAG INJECTION, SUBCUT/IM     RTC yearly for wellness visit    Depression Risk Factor Screening     3 most recent PHQ Screens 3/29/2022   Little interest or pleasure in doing things Not at all   Feeling down, depressed, irritable, or hopeless Not at all   Total Score PHQ 2 0       Alcohol & Drug Abuse Risk Screen    Do you average more than 1 drink per night or more than 7 drinks a week:  No    On any one occasion in the past three months have you have had more than 3 drinks containing alcohol:  No          Functional Ability and Level of Safety    Hearing: Hearing is good. Activities of Daily Living: The home contains: handrails and grab bars  Patient does total self care      Ambulation: with difficulty, uses a walker     Fall Risk:  Fall Risk Assessment, last 12 mths 3/29/2022   Able to walk? Yes   Fall in past 12 months? 0   Do you feel unsteady? 0   Are you worried about falling 0      Abuse Screen:  Patient is not abused       Cognitive Screening    Has your family/caregiver stated any concerns about your memory: no     Cognitive Screening: Normal - Clock Drawing Test    Health Maintenance Due     Health Maintenance Due   Topic Date Due    Shingrix Vaccine Age 49> (1 of 2) Never done    Medicare Yearly Exam  10/08/2021       Patient Care Team   Patient Care Team:  Brianna Zepeda MD as PCP - General (Internal Medicine)  Brianna Zepeda MD as PCP - [facility] Empaneled Provider  Daria Jimenes MD (Physical Medicine and Rehabilitation)  Jaziel Hendrickson MD (Optometry)    History     Patient Active Problem List   Diagnosis Code    Essential hypertension I10    Mixed hyperlipidemia E78.2    Chronic bilateral low back pain with bilateral sciatica M54.42, M54.41, G89.29    Lumbar spinal stenosis M48.061    Mild persistent asthma without complication Y85.23    Advanced directives, counseling/discussion Z71.89    Overweight (BMI 25.0-29. 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 180 Capsule 0    lisinopril-hydroCHLOROthiazide (PRINZIDE, ZESTORETIC) 20-25 mg per tablet Take 1 Tablet by mouth daily. 90 Tablet 1    Wixela Inhub 100-50 mcg/dose diskus inhaler INHALE 1 PUFF BY MOUTH EVERY 12 HOURS 60 Each 3    montelukast (SINGULAIR) 10 mg tablet TAKE 1 TABLET BY MOUTH DAILY FOR ALLERGIES 90 Tablet 0    simvastatin (ZOCOR) 10 mg tablet TAKE 1 TABLET BY MOUTH EVERY NIGHT AT BEDTIME 90 Tablet 1    gabapentin (NEURONTIN) 300 mg capsule TAKE 1 CAPSULE BY MOUTH TWICE DAILY. MAX DAILY AMOUNT: 600  Capsule 0    amLODIPine (NORVASC) 5 mg tablet TAKE 1 TABLET BY MOUTH EVERY DAY 90 Tablet 1    mupirocin (BACTROBAN) 2 % ointment Apply  to affected area daily. 22 g 1    potassium chloride (KLOR-CON) 10 mEq tablet TAKE 1 TABLET BY MOUTH DAILY 90 Tablet 1    multivitamin (ONE A DAY) tablet Take 1 Tab by mouth daily.  albuterol (PROVENTIL HFA, VENTOLIN HFA, PROAIR HFA) 90 mcg/actuation inhaler Take 2 Puffs by inhalation every six (6) hours as needed for Wheezing or Shortness of Breath. 1 Inhaler 2    oxyCODONE IR (ROXICODONE) 10 mg tab immediate release tablet TK 1 T PO  Q 6 H. FILL ON/AFTER 03/23/19  0    linaclotide (LINZESS) 145 mcg cap capsule Take 1 Cap by mouth Daily (before breakfast). 90 Cap 1    polyethylene glycol (MIRALAX) 17 gram/dose powder Take 17 g by mouth daily. 1700 g 1    cholecalciferol, vitamin D3, (VITAMIN D3) 2,000 unit tab Take  by mouth daily.  tiZANidine (ZANAFLEX) 4 mg tablet Take 1 Tab by mouth two (2) times a day.  60 Tab 1     Allergies   Allergen Reactions    Penicillins Swelling       Family History   Problem Relation Age of Onset    No Known Problems Mother     No Known Problems Father     Diabetes Sister      Social History     Tobacco Use    Smoking status: Never Smoker    Smokeless tobacco: Never Used   Substance Use Topics    Alcohol use: No         Anibal Hamilton MD

## 2022-04-01 ENCOUNTER — TRANSCRIBE ORDER (OUTPATIENT)
Dept: REGISTRATION | Age: 77
End: 2022-04-01

## 2022-04-01 ENCOUNTER — HOSPITAL ENCOUNTER (OUTPATIENT)
Dept: NON INVASIVE DIAGNOSTICS | Age: 77
Discharge: HOME | End: 2022-04-01
Payer: MEDICARE

## 2022-04-01 DIAGNOSIS — Z01.810 PRE-OPERATIVE CARDIOVASCULAR EXAMINATION: Primary | ICD-10-CM

## 2022-04-01 DIAGNOSIS — Z01.810 PRE-OPERATIVE CARDIOVASCULAR EXAMINATION: ICD-10-CM

## 2022-04-01 LAB
ATRIAL RATE: 83 BPM
CALCULATED P AXIS, ECG09: 13 DEGREES
CALCULATED R AXIS, ECG10: 1 DEGREES
CALCULATED T AXIS, ECG11: 37 DEGREES
DIAGNOSIS, 93000: NORMAL
P-R INTERVAL, ECG05: 172 MS
Q-T INTERVAL, ECG07: 378 MS
QRS DURATION, ECG06: 72 MS
QTC CALCULATION (BEZET), ECG08: 444 MS
VENTRICULAR RATE, ECG03: 83 BPM

## 2022-04-01 PROCEDURE — 93005 ELECTROCARDIOGRAM TRACING: CPT

## 2022-04-06 ENCOUNTER — HOSPITAL ENCOUNTER (OUTPATIENT)
Dept: LAB | Age: 77
Discharge: HOME | End: 2022-04-06
Payer: MEDICARE

## 2022-04-06 PROCEDURE — 88305 TISSUE EXAM BY PATHOLOGIST: CPT

## 2022-04-06 PROCEDURE — 88304 TISSUE EXAM BY PATHOLOGIST: CPT

## 2022-05-05 ENCOUNTER — HOSPITAL ENCOUNTER (OUTPATIENT)
Dept: GENERAL RADIOLOGY | Age: 77
Discharge: HOME | End: 2022-05-05
Payer: MEDICARE

## 2022-05-05 ENCOUNTER — TRANSCRIBE ORDER (OUTPATIENT)
Dept: REGISTRATION | Age: 77
End: 2022-05-05

## 2022-05-05 DIAGNOSIS — M17.11 UNILATERAL PRIMARY OSTEOARTHRITIS, RIGHT KNEE: ICD-10-CM

## 2022-05-05 DIAGNOSIS — M17.11 UNILATERAL PRIMARY OSTEOARTHRITIS, RIGHT KNEE: Primary | ICD-10-CM

## 2022-05-05 PROCEDURE — 73562 X-RAY EXAM OF KNEE 3: CPT

## 2022-05-08 DIAGNOSIS — I10 ESSENTIAL HYPERTENSION: ICD-10-CM

## 2022-05-09 RX ORDER — POTASSIUM CHLORIDE 750 MG/1
TABLET, EXTENDED RELEASE ORAL
Qty: 90 TABLET | Refills: 1 | Status: SHIPPED | OUTPATIENT
Start: 2022-05-09 | End: 2022-08-11

## 2022-06-18 DIAGNOSIS — J30.2 SEASONAL ALLERGIC RHINITIS, UNSPECIFIED TRIGGER: ICD-10-CM

## 2022-06-20 RX ORDER — MONTELUKAST SODIUM 10 MG/1
TABLET ORAL
Qty: 90 TABLET | Refills: 0 | Status: SHIPPED | OUTPATIENT
Start: 2022-06-20 | End: 2022-11-03

## 2022-06-23 DIAGNOSIS — M48.061 SPINAL STENOSIS OF LUMBAR REGION, UNSPECIFIED WHETHER NEUROGENIC CLAUDICATION PRESENT: ICD-10-CM

## 2022-06-23 RX ORDER — CELECOXIB 100 MG/1
CAPSULE ORAL
Qty: 180 CAPSULE | Refills: 0 | Status: SHIPPED | OUTPATIENT
Start: 2022-06-23 | End: 2022-09-29

## 2022-07-17 RX ORDER — AMLODIPINE BESYLATE 5 MG/1
TABLET ORAL
Qty: 90 TABLET | Refills: 1 | Status: SHIPPED | OUTPATIENT
Start: 2022-07-17 | End: 2022-07-28

## 2022-07-17 RX ORDER — SIMVASTATIN 10 MG/1
TABLET, FILM COATED ORAL
Qty: 90 TABLET | Refills: 1 | Status: SHIPPED | OUTPATIENT
Start: 2022-07-17 | End: 2022-10-19

## 2022-07-28 ENCOUNTER — OFFICE VISIT (OUTPATIENT)
Dept: FAMILY MEDICINE CLINIC | Age: 77
End: 2022-07-28
Payer: MEDICARE

## 2022-07-28 VITALS
SYSTOLIC BLOOD PRESSURE: 108 MMHG | HEIGHT: 63 IN | HEART RATE: 98 BPM | DIASTOLIC BLOOD PRESSURE: 71 MMHG | OXYGEN SATURATION: 97 % | WEIGHT: 153.3 LBS | TEMPERATURE: 97.9 F | RESPIRATION RATE: 16 BRPM | BODY MASS INDEX: 27.16 KG/M2

## 2022-07-28 DIAGNOSIS — I10 ESSENTIAL HYPERTENSION: Primary | ICD-10-CM

## 2022-07-28 DIAGNOSIS — N39.0 URINARY TRACT INFECTION WITH HEMATURIA, SITE UNSPECIFIED: ICD-10-CM

## 2022-07-28 DIAGNOSIS — R31.9 URINARY TRACT INFECTION WITH HEMATURIA, SITE UNSPECIFIED: ICD-10-CM

## 2022-07-28 DIAGNOSIS — J45.30 MILD PERSISTENT ASTHMA WITHOUT COMPLICATION: ICD-10-CM

## 2022-07-28 DIAGNOSIS — M48.061 SPINAL STENOSIS OF LUMBAR REGION, UNSPECIFIED WHETHER NEUROGENIC CLAUDICATION PRESENT: ICD-10-CM

## 2022-07-28 DIAGNOSIS — K59.03 CONSTIPATION DUE TO OPIOID THERAPY: ICD-10-CM

## 2022-07-28 DIAGNOSIS — E78.2 MIXED HYPERLIPIDEMIA: ICD-10-CM

## 2022-07-28 DIAGNOSIS — T40.2X5A CONSTIPATION DUE TO OPIOID THERAPY: ICD-10-CM

## 2022-07-28 LAB
BILIRUB UR QL STRIP: NEGATIVE
GLUCOSE UR-MCNC: NEGATIVE MG/DL
KETONES P FAST UR STRIP-MCNC: NEGATIVE MG/DL
PH UR STRIP: 5.5 [PH] (ref 4.6–8)
PROT UR QL STRIP: NEGATIVE
SP GR UR STRIP: 1.01 (ref 1–1.03)
UA UROBILINOGEN AMB POC: NORMAL (ref 0.2–1)
URINALYSIS CLARITY POC: CLEAR
URINALYSIS COLOR POC: YELLOW
URINE BLOOD POC: NORMAL
URINE LEUKOCYTES POC: NORMAL
URINE NITRITES POC: NEGATIVE

## 2022-07-28 PROCEDURE — 1101F PT FALLS ASSESS-DOCD LE1/YR: CPT | Performed by: INTERNAL MEDICINE

## 2022-07-28 PROCEDURE — G8536 NO DOC ELDER MAL SCRN: HCPCS | Performed by: INTERNAL MEDICINE

## 2022-07-28 PROCEDURE — 1123F ACP DISCUSS/DSCN MKR DOCD: CPT | Performed by: INTERNAL MEDICINE

## 2022-07-28 PROCEDURE — 1090F PRES/ABSN URINE INCON ASSESS: CPT | Performed by: INTERNAL MEDICINE

## 2022-07-28 PROCEDURE — 99214 OFFICE O/P EST MOD 30 MIN: CPT | Performed by: INTERNAL MEDICINE

## 2022-07-28 PROCEDURE — G8510 SCR DEP NEG, NO PLAN REQD: HCPCS | Performed by: INTERNAL MEDICINE

## 2022-07-28 PROCEDURE — G8399 PT W/DXA RESULTS DOCUMENT: HCPCS | Performed by: INTERNAL MEDICINE

## 2022-07-28 PROCEDURE — G8417 CALC BMI ABV UP PARAM F/U: HCPCS | Performed by: INTERNAL MEDICINE

## 2022-07-28 PROCEDURE — G8427 DOCREV CUR MEDS BY ELIG CLIN: HCPCS | Performed by: INTERNAL MEDICINE

## 2022-07-28 PROCEDURE — 81003 URINALYSIS AUTO W/O SCOPE: CPT | Performed by: INTERNAL MEDICINE

## 2022-07-28 PROCEDURE — G8754 DIAS BP LESS 90: HCPCS | Performed by: INTERNAL MEDICINE

## 2022-07-28 PROCEDURE — G8752 SYS BP LESS 140: HCPCS | Performed by: INTERNAL MEDICINE

## 2022-07-28 RX ORDER — NITROFURANTOIN 25; 75 MG/1; MG/1
100 CAPSULE ORAL 2 TIMES DAILY
Qty: 14 CAPSULE | Refills: 0 | Status: SHIPPED | OUTPATIENT
Start: 2022-07-28 | End: 2022-08-04

## 2022-07-28 RX ORDER — FLUTICASONE PROPIONATE AND SALMETEROL 100; 50 UG/1; UG/1
POWDER RESPIRATORY (INHALATION)
Qty: 60 EACH | Refills: 3 | Status: SHIPPED | OUTPATIENT
Start: 2022-07-28

## 2022-07-28 RX ORDER — ALBUTEROL SULFATE 90 UG/1
2 AEROSOL, METERED RESPIRATORY (INHALATION)
Qty: 1 EACH | Refills: 2 | Status: SHIPPED | OUTPATIENT
Start: 2022-07-28

## 2022-07-28 RX ORDER — LUBIPROSTONE 24 UG/1
24 CAPSULE, GELATIN COATED ORAL 2 TIMES DAILY WITH MEALS
Qty: 60 CAPSULE | Refills: 3 | Status: SHIPPED | OUTPATIENT
Start: 2022-07-28

## 2022-07-28 NOTE — PROGRESS NOTES
Assessment/ Plan:   Diagnoses and all orders for this visit:    1. Essential hypertension-controlled off Amlodipine, continue with present meds    2. Urinary tract infection with hematuria, site unspecified-empiric tx with Macrobid; cannot be on Cipro or Bactrim as these interact with her meds  -     AMB POC URINALYSIS DIP STICK AUTO W/O MICRO  -     CULTURE, URINE; Future  -     nitrofurantoin, macrocrystal-monohydrate, (Macrobid) 100 mg capsule; Take 1 Capsule by mouth two (2) times a day for 7 days. 3. Mixed hyperlipidemia-at goal LDL of less than 100 on Zocor    4. Mild persistent asthma without complication-advised compliance with daily Advair  -     fluticasone propion-salmeteroL (Wixela Inhub) 100-50 mcg/dose diskus inhaler; INHALE 1 PUFF BY MOUTH EVERY 12 HOURS  -     albuterol (PROVENTIL HFA, VENTOLIN HFA, PROAIR HFA) 90 mcg/actuation inhaler; Take 2 Puffs by inhalation every six (6) hours as needed for Wheezing or Shortness of Breath. 5. Spinal stenosis of lumbar region, unspecified whether neurogenic claudication present-pain mgt giving her Oxycodone BID    6. Constipation due to opioid therapy-Linzess no longer working; ok to continue with Miralax daily and will have her try Amitiza  -     lubiPROStone (Amitiza) 24 mcg capsule; Take 1 Capsule by mouth two (2) times daily (with meals). Follow-up and Dispositions    Return in about 3 months (around 10/28/2022) for follow up.      Labs next visit      Chief Complaint   Patient presents with    Follow Up Chronic Condition    Urinary Frequency       Pt is a 65y.o. year old female who presents for follow up of her chronic medical problems    Does not mind driving here    Walks with a walker    Health Maintenance Due   Topic Date Due    Shingrix Vaccine Age 49> (1 of 2) Never done    COVID-19 Vaccine (4 - Booster for Wiper series)-done 02/21/2022-done, documented      Wt Readings from Last 3 Encounters:   07/28/22 153 lb 4.8 oz (69.5 kg) 03/29/22 147 lb 12.8 oz (67 kg)   05/27/21 156 lb 8.4 oz (71 kg)        BP Readings from Last 3 Encounters:   07/28/22 108/71   03/29/22 94/60   11/18/21 114/69    Off Amlodipine since last visit  -103, used her inhaler    Last Point of Care HGB A1C  Hemoglobin A1c (POC)   Date Value Ref Range Status   04/22/2021 5.8 % Final       Pain Mgt-on Oxycodone (1/2 BID) with Dr Keren Grier constipated with even the Eladio Fletcher  Buttocks ulceration    Asthma-daily ADvair    Last labs done 3/2022 all normal  ROS:    Pt denies: Wt loss, Fever/Chills, HA, Visual changes, Fatigue, Chest pain, SOB, MONDRAGON, Abd pain, N/V/D/C, Blood in stool or urine, Edema. Pertinent positive as above in HPI. All others were negative    Patient Active Problem List   Diagnosis Code    Essential hypertension I10    Mixed hyperlipidemia E78.2    Chronic bilateral low back pain with bilateral sciatica M54.42, M54.41, G89.29    Lumbar spinal stenosis M48.061    Mild persistent asthma without complication C56.82    Advanced directives, counseling/discussion Z71.89    Overweight (BMI 25.0-29. 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  MAX DAILY AMOUNT: 600  Capsule 1    celecoxib (CELEBREX) 100 mg capsule TAKE 1 CAPSULE BY MOUTH TWICE DAILY AS NEEDED FOR PAIN 180 Capsule 0    montelukast (SINGULAIR) 10 mg tablet TAKE 1 TABLET BY MOUTH DAILY FOR ALLERGIES 90 Tablet 0    potassium chloride (KLOR-CON M10) 10 mEq tablet TAKE 1 TABLET BY MOUTH DAILY 90 Tablet 1    lisinopril-hydroCHLOROthiazide (PRINZIDE, ZESTORETIC) 20-25 mg per tablet Take 1 Tablet by mouth daily. 90 Tablet 1    Wixela Inhub 100-50 mcg/dose diskus inhaler INHALE 1 PUFF BY MOUTH EVERY 12 HOURS 60 Each 3    mupirocin (BACTROBAN) 2 % ointment Apply  to affected area daily. 22 g 1    multivitamin (ONE A DAY) tablet Take 1 Tab by mouth daily. albuterol (PROVENTIL HFA, VENTOLIN HFA, PROAIR HFA) 90 mcg/actuation inhaler Take 2 Puffs by inhalation every six (6) hours as needed for Wheezing or Shortness of Breath. 1 Inhaler 2    oxyCODONE IR (ROXICODONE) 10 mg tab immediate release tablet TK 1 T PO  Q 6 H. FILL ON/AFTER 03/23/19  0    linaclotide (LINZESS) 145 mcg cap capsule Take 1 Cap by mouth Daily (before breakfast). 90 Cap 1    polyethylene glycol (MIRALAX) 17 gram/dose powder Take 17 g by mouth daily. 1700 g 1    cholecalciferol, vitamin D3, 50 mcg (2,000 unit) tab Take  by mouth daily. tiZANidine (ZANAFLEX) 4 mg tablet Take 1 Tab by mouth two (2) times a day. 60 Tab 1              Social History     Tobacco Use   Smoking Status Never   Smokeless Tobacco Never       Allergies   Allergen Reactions    Penicillins Swelling       Patient Labs were reviewed: yes    Patient Past Records were reviewed: yes      Objective:     Vitals:    07/28/22 1110 07/28/22 1148   BP: 108/71    Pulse: (!) 103 98   Resp: 16    Temp: 97.9 °F (36.6 °C)    TempSrc: Temporal    SpO2: 97%    Weight: 153 lb 4.8 oz (69.5 kg)    Height: 5' 3\" (1.6 m)      Body mass index is 27.16 kg/m². Exam: walks with a walker  Appearance: alert, well appearing,  oriented to person, place, and time, acyanotic, in no respiratory distress and well hydrated.   HEENT:  NC/AT, pink conj, anicteric sclerae  Neck:  No cervical lymphadenopathy, no JVD, no thyromegaly, no carotid bruit  Heart:  RRR without M/R/G  Lungs:  CTAB, no rhonchi, rales, or wheezes with good air exchange   Abdomen:  Non-tender, pos bowel sounds, no hepatosplenomegaly, no CVA tenderness  Ext:  No C/C/E    Skin: no rash  Neuro: no lateralizing signs, CNs II-XII intact    Urine dip showed brian and blood        I have discussed the diagnosis with the patient and the intended plan as seen in the above orders. The patient has received an After-Visit Summary and questions were answered concerning future plans. Medication Side Effects and Warnings were discussed with patient: yes    Patient verbalized understanding of above instructions.     Candi Miller MD  Internal Medicine  [facility]

## 2022-07-28 NOTE — PROGRESS NOTES
Patient seen for routine follow up with c/o urinary frequency denies burning but with some discoloration x 2 weeks. Health Maintenance Due   Topic Date Due    Shingrix Vaccine Age 49> (1 of 2) Never done    COVID-19 Vaccine (4 - Booster for Pfizer series) 02/21/2022     1. \"Have you been to the ER, urgent care clinic since your last visit? Hospitalized since your last visit? \" No    2. \"Have you seen or consulted any other health care providers outside of the [address] [place] [address] since your last visit? \" No     3. For patients aged 39-70: Has the patient had a colonoscopy / FIT/ Cologuard? NA - based on age      If the patient is female:    4. For patients aged 41-77: Has the patient had a mammogram within the past 2 years? NA - based on age or sex      11. For patients aged 21-65: Has the patient had a pap smear?  NA - based on age or sex

## 2022-10-19 RX ORDER — SIMVASTATIN 10 MG/1
TABLET, FILM COATED ORAL
Qty: 90 TABLET | Refills: 1 | Status: SHIPPED | OUTPATIENT
Start: 2022-10-19

## 2022-10-31 ENCOUNTER — OFFICE VISIT (OUTPATIENT)
Dept: FAMILY MEDICINE CLINIC | Age: 77
End: 2022-10-31
Payer: MEDICARE

## 2022-10-31 ENCOUNTER — HOSPITAL ENCOUNTER (OUTPATIENT)
Dept: LAB | Age: 77
Discharge: HOME | End: 2022-10-31
Payer: MEDICARE

## 2022-10-31 VITALS
HEART RATE: 70 BPM | TEMPERATURE: 97.8 F | OXYGEN SATURATION: 98 % | WEIGHT: 160 LBS | HEIGHT: 63 IN | RESPIRATION RATE: 16 BRPM | DIASTOLIC BLOOD PRESSURE: 70 MMHG | SYSTOLIC BLOOD PRESSURE: 110 MMHG | BODY MASS INDEX: 28.35 KG/M2

## 2022-10-31 DIAGNOSIS — R73.03 PRE-DIABETES: ICD-10-CM

## 2022-10-31 DIAGNOSIS — K59.03 CONSTIPATION DUE TO OPIOID THERAPY: ICD-10-CM

## 2022-10-31 DIAGNOSIS — T40.2X5A CONSTIPATION DUE TO OPIOID THERAPY: ICD-10-CM

## 2022-10-31 DIAGNOSIS — R35.0 URINE FREQUENCY: ICD-10-CM

## 2022-10-31 DIAGNOSIS — E78.2 MIXED HYPERLIPIDEMIA: ICD-10-CM

## 2022-10-31 DIAGNOSIS — I10 ESSENTIAL HYPERTENSION: ICD-10-CM

## 2022-10-31 DIAGNOSIS — Z23 ENCOUNTER FOR IMMUNIZATION: ICD-10-CM

## 2022-10-31 DIAGNOSIS — N39.0 RECURRENT UTI: ICD-10-CM

## 2022-10-31 DIAGNOSIS — J45.30 MILD PERSISTENT ASTHMA WITHOUT COMPLICATION: ICD-10-CM

## 2022-10-31 DIAGNOSIS — I10 ESSENTIAL HYPERTENSION: Primary | ICD-10-CM

## 2022-10-31 DIAGNOSIS — M48.061 SPINAL STENOSIS OF LUMBAR REGION, UNSPECIFIED WHETHER NEUROGENIC CLAUDICATION PRESENT: ICD-10-CM

## 2022-10-31 LAB
ALBUMIN SERPL-MCNC: 3.7 G/DL (ref 3.4–5)
ALBUMIN/GLOB SERPL: 1.1 {RATIO} (ref 0.8–1.7)
ALP SERPL-CCNC: 62 U/L (ref 45–117)
ALT SERPL-CCNC: 23 U/L (ref 13–56)
ANION GAP SERPL CALC-SCNC: 8 MMOL/L (ref 3–18)
AST SERPL-CCNC: 23 U/L (ref 10–38)
BASOPHILS # BLD: 0.1 K/UL (ref 0–0.1)
BASOPHILS NFR BLD: 1 % (ref 0–2)
BILIRUB SERPL-MCNC: 0.5 MG/DL (ref 0.2–1)
BILIRUB UR QL STRIP: NEGATIVE
BUN SERPL-MCNC: 25 MG/DL (ref 7–18)
BUN/CREAT SERPL: 28 (ref 12–20)
CALCIUM SERPL-MCNC: 9.2 MG/DL (ref 8.5–10.1)
CHLORIDE SERPL-SCNC: 97 MMOL/L (ref 100–111)
CHOLEST SERPL-MCNC: 139 MG/DL
CO2 SERPL-SCNC: 27 MMOL/L (ref 21–32)
CREAT SERPL-MCNC: 0.89 MG/DL (ref 0.6–1.3)
DIFFERENTIAL METHOD BLD: NORMAL
EOSINOPHIL # BLD: 0.1 K/UL (ref 0–0.4)
EOSINOPHIL NFR BLD: 1 % (ref 0–5)
ERYTHROCYTE [DISTWIDTH] IN BLOOD BY AUTOMATED COUNT: 13 % (ref 11.6–14.5)
EST. AVERAGE GLUCOSE BLD GHB EST-MCNC: 126 MG/DL
GLOBULIN SER CALC-MCNC: 3.5 G/DL (ref 2–4)
GLUCOSE SERPL-MCNC: 84 MG/DL (ref 74–99)
GLUCOSE UR-MCNC: NEGATIVE MG/DL
HBA1C MFR BLD: 6 % (ref 4.2–5.6)
HCT VFR BLD AUTO: 38.3 % (ref 35–45)
HDLC SERPL-MCNC: 66 MG/DL (ref 40–60)
HDLC SERPL: 2.1 {RATIO} (ref 0–5)
HGB BLD-MCNC: 12.2 G/DL (ref 12–16)
IMM GRANULOCYTES # BLD AUTO: 0 K/UL (ref 0–0.04)
IMM GRANULOCYTES NFR BLD AUTO: 0 % (ref 0–0.5)
KETONES P FAST UR STRIP-MCNC: NEGATIVE MG/DL
LDLC SERPL CALC-MCNC: 53 MG/DL (ref 0–100)
LIPID PROFILE,FLP: ABNORMAL
LYMPHOCYTES # BLD: 2.2 K/UL (ref 0.9–3.6)
LYMPHOCYTES NFR BLD: 23 % (ref 21–52)
MCH RBC QN AUTO: 27.7 PG (ref 24–34)
MCHC RBC AUTO-ENTMCNC: 31.9 G/DL (ref 31–37)
MCV RBC AUTO: 86.8 FL (ref 78–100)
MONOCYTES # BLD: 0.7 K/UL (ref 0.05–1.2)
MONOCYTES NFR BLD: 7 % (ref 3–10)
NEUTS SEG # BLD: 6.5 K/UL (ref 1.8–8)
NEUTS SEG NFR BLD: 68 % (ref 40–73)
NRBC # BLD: 0 K/UL (ref 0–0.01)
NRBC BLD-RTO: 0 PER 100 WBC
PH UR STRIP: 6 [PH] (ref 4.6–8)
PLATELET # BLD AUTO: 278 K/UL (ref 135–420)
PMV BLD AUTO: 11 FL (ref 9.2–11.8)
POTASSIUM SERPL-SCNC: 4.3 MMOL/L (ref 3.5–5.5)
PROT SERPL-MCNC: 7.2 G/DL (ref 6.4–8.2)
PROT UR QL STRIP: NEGATIVE
RBC # BLD AUTO: 4.41 M/UL (ref 4.2–5.3)
SODIUM SERPL-SCNC: 132 MMOL/L (ref 136–145)
SP GR UR STRIP: 1.01 (ref 1–1.03)
TRIGL SERPL-MCNC: 100 MG/DL (ref ?–150)
UA UROBILINOGEN AMB POC: NORMAL (ref 0.2–1)
URINALYSIS CLARITY POC: CLEAR
URINALYSIS COLOR POC: YELLOW
URINE BLOOD POC: NORMAL
URINE LEUKOCYTES POC: NORMAL
URINE NITRITES POC: NEGATIVE
VLDLC SERPL CALC-MCNC: 20 MG/DL
WBC # BLD AUTO: 9.6 K/UL (ref 4.6–13.2)

## 2022-10-31 PROCEDURE — 99214 OFFICE O/P EST MOD 30 MIN: CPT | Performed by: INTERNAL MEDICINE

## 2022-10-31 PROCEDURE — 83036 HEMOGLOBIN GLYCOSYLATED A1C: CPT

## 2022-10-31 PROCEDURE — G8427 DOCREV CUR MEDS BY ELIG CLIN: HCPCS | Performed by: INTERNAL MEDICINE

## 2022-10-31 PROCEDURE — 80053 COMPREHEN METABOLIC PANEL: CPT

## 2022-10-31 PROCEDURE — 1101F PT FALLS ASSESS-DOCD LE1/YR: CPT | Performed by: INTERNAL MEDICINE

## 2022-10-31 PROCEDURE — 87186 SC STD MICRODIL/AGAR DIL: CPT

## 2022-10-31 PROCEDURE — 36415 COLL VENOUS BLD VENIPUNCTURE: CPT

## 2022-10-31 PROCEDURE — 90694 VACC AIIV4 NO PRSRV 0.5ML IM: CPT | Performed by: INTERNAL MEDICINE

## 2022-10-31 PROCEDURE — 1123F ACP DISCUSS/DSCN MKR DOCD: CPT | Performed by: INTERNAL MEDICINE

## 2022-10-31 PROCEDURE — G8417 CALC BMI ABV UP PARAM F/U: HCPCS | Performed by: INTERNAL MEDICINE

## 2022-10-31 PROCEDURE — 87077 CULTURE AEROBIC IDENTIFY: CPT

## 2022-10-31 PROCEDURE — G8536 NO DOC ELDER MAL SCRN: HCPCS | Performed by: INTERNAL MEDICINE

## 2022-10-31 PROCEDURE — 80061 LIPID PANEL: CPT

## 2022-10-31 PROCEDURE — 81003 URINALYSIS AUTO W/O SCOPE: CPT | Performed by: INTERNAL MEDICINE

## 2022-10-31 PROCEDURE — G8432 DEP SCR NOT DOC, RNG: HCPCS | Performed by: INTERNAL MEDICINE

## 2022-10-31 PROCEDURE — 1090F PRES/ABSN URINE INCON ASSESS: CPT | Performed by: INTERNAL MEDICINE

## 2022-10-31 PROCEDURE — G8754 DIAS BP LESS 90: HCPCS | Performed by: INTERNAL MEDICINE

## 2022-10-31 PROCEDURE — G8752 SYS BP LESS 140: HCPCS | Performed by: INTERNAL MEDICINE

## 2022-10-31 PROCEDURE — 3074F SYST BP LT 130 MM HG: CPT | Performed by: INTERNAL MEDICINE

## 2022-10-31 PROCEDURE — 3078F DIAST BP <80 MM HG: CPT | Performed by: INTERNAL MEDICINE

## 2022-10-31 PROCEDURE — G0008 ADMIN INFLUENZA VIRUS VAC: HCPCS | Performed by: INTERNAL MEDICINE

## 2022-10-31 PROCEDURE — 85025 COMPLETE CBC W/AUTO DIFF WBC: CPT

## 2022-10-31 PROCEDURE — 87086 URINE CULTURE/COLONY COUNT: CPT

## 2022-10-31 PROCEDURE — G8399 PT W/DXA RESULTS DOCUMENT: HCPCS | Performed by: INTERNAL MEDICINE

## 2022-10-31 RX ORDER — DOXYCYCLINE 100 MG/1
100 TABLET ORAL 2 TIMES DAILY
Qty: 14 TABLET | Refills: 0 | Status: SHIPPED | OUTPATIENT
Start: 2022-10-31 | End: 2022-11-07

## 2022-10-31 RX ORDER — POTASSIUM CHLORIDE 750 MG/1
10 TABLET, EXTENDED RELEASE ORAL DAILY
Qty: 90 TABLET | Refills: 0 | Status: SHIPPED | OUTPATIENT
Start: 2022-10-31

## 2022-10-31 NOTE — PROGRESS NOTES
Patient seen for routine follow up c/o urine frequency with odor    Health Maintenance Due   Topic Date Due    Shingrix Vaccine Age 49> (1 of 2) Never done    Flu Vaccine (1) 08/01/2022    COVID-19 Vaccine (5 - Booster for Pfizer series) 08/05/2022     1. \"Have you been to the ER, urgent care clinic since your last visit? Hospitalized since your last visit? \" No    2. \"Have you seen or consulted any other health care providers outside of the [address] [place] [address] since your last visit? \" No     3. For patients aged 39-70: Has the patient had a colonoscopy / FIT/ Cologuard? NA - based on age      If the patient is female:    4. For patients aged 41-77: Has the patient had a mammogram within the past 2 years? NA - based on age or sex      11. For patients aged 21-65: Has the patient had a pap smear? NA - based on age or sex    Patient was given VIS for review,consent was obtained and per orders of Dr. Paddy Klinefelter, injection of fluad given by Fiordaliza Casillas LPN. Patient observed. No signs nor symptoms of any adverse reactions. Patient tolerated injection well.

## 2022-10-31 NOTE — PROGRESS NOTES
Assessment/ Plan:   Diagnoses and all orders for this visit:    1. Essential hypertension-controlled, continue with present meds  -     CBC WITH AUTOMATED DIFF; Future  -     METABOLIC PANEL, COMPREHENSIVE; Future  -     potassium chloride (KLOR-CON M10) 10 mEq tablet; Take 1 Tablet by mouth daily. 2. Pre-diabetes-will start med if A1c is above 7.5%  -     HEMOGLOBIN A1C WITH EAG; Future    3. Mixed hyperlipidemia-goal LDL of less than 100 on Zocor  -     LIPID PANEL; Future    4. Urine frequency-empiric tx with Doxy for possible UTI  -     AMB POC URINALYSIS DIP STICK AUTO W/O MICRO  -     CULTURE, URINE; Future  -     doxycycline (ADOXA) 100 mg tablet; Take 1 Tablet by mouth two (2) times a day for 7 days. 5. Spinal stenosis of lumbar region, unspecified whether neurogenic claudication present-on narcotics c/o Pain mgt    6. Mild persistent asthma without complication-sxs usually only when the weather changes    7. Constipation due to opioid therapy-doing well on Amitiza    8. Recurrent UTI-ok to try OTC Ukora or cranberry pills    9. Encounter for immunization  -     INFLUENZA, FLUAD, (AGE 72 Y+), IM, PF, 0.5 ML      Follow-up and Dispositions    Return in about 6 months (around 4/30/2023) for follow up.                          Chief Complaint   Patient presents with    Follow Up Chronic Condition    UTI       Pt is a 65y.o. year old female who presents for follow up of her chronic medical problems    Health Maintenance Due   Topic Date Due    Shingrix Vaccine Age 49> (1 of 2) Never done    Flu Vaccine (1)-today 08/01/2022    COVID-19 Vaccine (5 - Booster for Pfizer series)- 08/05/2022      BP Readings from Last 3 Encounters:   10/31/22 110/70   07/28/22 108/71   03/29/22 94/60        Wt Readings from Last 3 Encounters:   10/31/22 160 lb (72.6 kg)   07/28/22 153 lb 4.8 oz (69.5 kg)   03/29/22 147 lb 12.8 oz (67 kg)      Lab Results   Component Value Date/Time    Cholesterol, total 146 03/29/2022 12:12 PM HDL Cholesterol 70 (H) 03/29/2022 12:12 PM    LDL, calculated 58.2 03/29/2022 12:12 PM    VLDL, calculated 17.8 03/29/2022 12:12 PM    Triglyceride 89 03/29/2022 12:12 PM    CHOL/HDL Ratio 2.1 03/29/2022 12:12 PM    On zocor      Current sxs: noticed it recently  Urine smells and darker than usual    Urine dip showed blood, brian      Walks with a cane  Back still crazy-still on pain med  Not worse  Non radiating  Amitiza working      Last Point of Care HGB A1C  Hemoglobin A1c (POC)   Date Value Ref Range Status   04/22/2021 5.8 % Final           ROS:    Pt denies: Wt loss, Fever/Chills, HA, Visual changes, Fatigue, Chest pain, SOB, MONDRAGON, Abd pain, N/V/D/C, Blood in stool or urine, Edema. Pertinent positive as above in HPI. All others were negative    Patient Active Problem List   Diagnosis Code    Essential hypertension I10    Mixed hyperlipidemia E78.2    Chronic bilateral low back pain with bilateral sciatica M54.42, M54.41, G89.29    Lumbar spinal stenosis M48.061    Mild persistent asthma without complication L31.00    Advanced directives, counseling/discussion Z71.89    Overweight (BMI 25.0-29. 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    Pressure injury of sacral region, stage 3 (HCC) L89.153       Past Medical History:   Diagnosis Date    Asthma     Hypercholesterolemia     Hypertension     Osteoarthritis        Current Outpatient Medications   Medication Sig Dispense Refill    simvastatin (ZOCOR) 10 mg tablet TAKE 1 TABLET BY MOUTH EVERY NIGHT AT BEDTIME 90 Tablet 1    lisinopril-hydroCHLOROthiazide (PRINZIDE, ZESTORETIC) 20-25 mg per tablet TAKE 1 TABLET BY MOUTH DAILY 90 Tablet 1    celecoxib (CELEBREX) 100 mg capsule TAKE 1 CAPSULE BY MOUTH TWICE DAILY AS NEEDED FOR PAIN 180 Capsule 0    potassium chloride (KLOR-CON M10) 10 mEq tablet TAKE 1 TABLET BY MOUTH DAILY 90 Tablet 0-?can get off    fluticasone propion-salmeteroL (Wixela Inhub) 100-50 mcg/dose diskus inhaler INHALE 1 PUFF BY MOUTH EVERY 12 HOURS 60 Each 3-taking  Weather change makes her symptomatic    albuterol (PROVENTIL HFA, VENTOLIN HFA, PROAIR HFA) 90 mcg/actuation inhaler Take 2 Puffs by inhalation every six (6) hours as needed for Wheezing or Shortness of Breath. 1 Each 2    lubiPROStone (Amitiza) 24 mcg capsule Take 1 Capsule by mouth two (2) times daily (with meals). 60 Capsule 3    gabapentin (NEURONTIN) 300 mg capsule TAKE 1 CAPSULE BY MOUTH TWICE DAILY. MAX DAILY AMOUNT: 600  Capsule 1    montelukast (SINGULAIR) 10 mg tablet TAKE 1 TABLET BY MOUTH DAILY FOR ALLERGIES 90 Tablet 0    mupirocin (BACTROBAN) 2 % ointment Apply  to affected area daily. 22 g 1    multivitamin (ONE A DAY) tablet Take 1 Tab by mouth daily. oxyCODONE IR (ROXICODONE) 10 mg tab immediate release tablet TK 1 T PO  Q 6 H. FILL ON/AFTER 03/23/19  0    polyethylene glycol (MIRALAX) 17 gram/dose powder Take 17 g by mouth daily. 1700 g 1    cholecalciferol, vitamin D3, 50 mcg (2,000 unit) tab Take  by mouth daily. tiZANidine (ZANAFLEX) 4 mg tablet Take 1 Tab by mouth two (2) times a day. 60 Tab 1       Social History     Tobacco Use   Smoking Status Never   Smokeless Tobacco Never       Allergies   Allergen Reactions    Penicillins Swelling       Patient Labs were reviewed: yes    Patient Past Records were reviewed: yes      Objective:     Vitals:    10/31/22 1107   BP: 110/70   Pulse: 70   Resp: 16   Temp: 97.8 °F (36.6 °C)   TempSrc: Temporal   SpO2: 98%   Weight: 160 lb (72.6 kg)   Height: 5' 3\" (1.6 m)     Body mass index is 28.34 kg/m². Exam: walks with a walker  Appearance: alert, well appearing,  oriented to person, place, and time, acyanotic, in no respiratory distress and well hydrated.   HEENT:  NC/AT, pink conj, anicteric sclerae  Neck:  No cervical lymphadenopathy, no JVD, no thyromegaly, no carotid bruit  Heart:  RRR without M/R/G  Lungs:  CTAB, no rhonchi, rales, or wheezes with good air exchange   Abdomen:  Non-tender, pos bowel sounds, no hepatosplenomegaly, no CVA tenderness  Ext:  No C/C/E    Skin: no rash  Neuro: no lateralizing signs, CNs II-XII intact            I have discussed the diagnosis with the patient and the intended plan as seen in the above orders. The patient has received an After-Visit Summary and questions were answered concerning future plans. Medication Side Effects and Warnings were discussed with patient: yes    Patient verbalized understanding of above instructions.     Ly Bran MD  Internal Medicine  [facility]

## 2022-11-03 DIAGNOSIS — N39.0 URINARY TRACT INFECTION WITHOUT HEMATURIA, SITE UNSPECIFIED: ICD-10-CM

## 2022-11-03 DIAGNOSIS — R35.0 URINE FREQUENCY: Primary | ICD-10-CM

## 2022-11-03 LAB
BACTERIA SPEC CULT: ABNORMAL
CC UR VC: ABNORMAL
SERVICE CMNT-IMP: ABNORMAL

## 2022-11-03 RX ORDER — NITROFURANTOIN 25; 75 MG/1; MG/1
100 CAPSULE ORAL 2 TIMES DAILY
Qty: 14 CAPSULE | Refills: 0 | Status: SHIPPED | OUTPATIENT
Start: 2022-11-03 | End: 2022-11-10

## 2022-11-04 NOTE — PROGRESS NOTES
Pls let patient know to stop the Doxy for UTI; culture results came back and showed she needs another antibiotic

## 2022-11-04 NOTE — PROGRESS NOTES
Results have been reviewed and abnormal results noted. Please see documentation in new EMR. Patient informed of results and instructions.

## 2023-01-06 DIAGNOSIS — M48.061 SPINAL STENOSIS OF LUMBAR REGION, UNSPECIFIED WHETHER NEUROGENIC CLAUDICATION PRESENT: ICD-10-CM

## 2023-01-06 RX ORDER — CELECOXIB 100 MG/1
CAPSULE ORAL
Qty: 180 CAPSULE | Refills: 0 | Status: SHIPPED | OUTPATIENT
Start: 2023-01-06

## 2023-02-13 ENCOUNTER — HOSPITAL ENCOUNTER (OUTPATIENT)
Dept: NON INVASIVE DIAGNOSTICS | Age: 78
Discharge: HOME | End: 2023-02-13
Payer: MEDICARE

## 2023-02-13 ENCOUNTER — HOSPITAL ENCOUNTER (OUTPATIENT)
Dept: GENERAL RADIOLOGY | Age: 78
Discharge: HOME | End: 2023-02-13
Payer: MEDICARE

## 2023-02-13 ENCOUNTER — TRANSCRIBE ORDER (OUTPATIENT)
Dept: REGISTRATION | Age: 78
End: 2023-02-13

## 2023-02-13 DIAGNOSIS — Z01.811 PRE-OPERATIVE RESPIRATORY EXAMINATION: ICD-10-CM

## 2023-02-13 DIAGNOSIS — Z01.811 PRE-OPERATIVE RESPIRATORY EXAMINATION: Primary | ICD-10-CM

## 2023-02-13 LAB
ATRIAL RATE: 77 BPM
CALCULATED P AXIS, ECG09: 21 DEGREES
CALCULATED R AXIS, ECG10: -8 DEGREES
CALCULATED T AXIS, ECG11: 32 DEGREES
DIAGNOSIS, 93000: NORMAL
P-R INTERVAL, ECG05: 180 MS
Q-T INTERVAL, ECG07: 364 MS
QRS DURATION, ECG06: 68 MS
QTC CALCULATION (BEZET), ECG08: 411 MS
VENTRICULAR RATE, ECG03: 77 BPM

## 2023-02-15 ENCOUNTER — HOSPITAL ENCOUNTER (OUTPATIENT)
Facility: HOSPITAL | Age: 78
Setting detail: SPECIMEN
Discharge: HOME | End: 2023-02-18
Payer: MEDICARE

## 2023-02-15 PROCEDURE — 88305 TISSUE EXAM BY PATHOLOGIST: CPT

## 2023-03-27 RX ORDER — LISINOPRIL AND HYDROCHLOROTHIAZIDE 25; 20 MG/1; MG/1
1 TABLET ORAL DAILY
Qty: 90 TABLET | Refills: 1 | Status: SHIPPED | OUTPATIENT
Start: 2023-03-27

## 2023-03-30 RX ORDER — LISINOPRIL AND HYDROCHLOROTHIAZIDE 25; 20 MG/1; MG/1
1 TABLET ORAL DAILY
Qty: 90 TABLET | OUTPATIENT
Start: 2023-03-30

## 2023-03-31 RX ORDER — ALBUTEROL SULFATE 90 UG/1
AEROSOL, METERED RESPIRATORY (INHALATION)
Qty: 8.5 G | OUTPATIENT
Start: 2023-03-31

## 2023-03-31 RX ORDER — ALBUTEROL SULFATE 90 UG/1
2 AEROSOL, METERED RESPIRATORY (INHALATION) EVERY 6 HOURS PRN
Qty: 18 G | Refills: 1 | Status: SHIPPED | OUTPATIENT
Start: 2023-03-31

## 2023-04-22 DIAGNOSIS — Z01.811 PRE-OPERATIVE RESPIRATORY EXAMINATION: Primary | ICD-10-CM

## 2023-05-01 ENCOUNTER — OFFICE VISIT (OUTPATIENT)
Facility: CLINIC | Age: 78
End: 2023-05-01
Payer: MEDICARE

## 2023-05-01 ENCOUNTER — HOSPITAL ENCOUNTER (OUTPATIENT)
Facility: HOSPITAL | Age: 78
Setting detail: SPECIMEN
Discharge: HOME | End: 2023-05-04
Payer: MEDICARE

## 2023-05-01 VITALS
BODY MASS INDEX: 28.88 KG/M2 | HEIGHT: 63 IN | HEART RATE: 81 BPM | WEIGHT: 163 LBS | SYSTOLIC BLOOD PRESSURE: 124 MMHG | RESPIRATION RATE: 18 BRPM | DIASTOLIC BLOOD PRESSURE: 70 MMHG | TEMPERATURE: 98.2 F | OXYGEN SATURATION: 96 %

## 2023-05-01 DIAGNOSIS — T83.511D URINARY TRACT INFECTION ASSOCIATED WITH INDWELLING URETHRAL CATHETER, SUBSEQUENT ENCOUNTER: ICD-10-CM

## 2023-05-01 DIAGNOSIS — R73.03 PREDIABETES: ICD-10-CM

## 2023-05-01 DIAGNOSIS — I10 ESSENTIAL HYPERTENSION: Primary | ICD-10-CM

## 2023-05-01 DIAGNOSIS — R25.2 MUSCLE CRAMPS: ICD-10-CM

## 2023-05-01 DIAGNOSIS — E78.5 HYPERLIPIDEMIA, UNSPECIFIED HYPERLIPIDEMIA TYPE: ICD-10-CM

## 2023-05-01 DIAGNOSIS — N39.0 URINARY TRACT INFECTION ASSOCIATED WITH INDWELLING URETHRAL CATHETER, SUBSEQUENT ENCOUNTER: ICD-10-CM

## 2023-05-01 PROBLEM — L89.153 PRESSURE INJURY OF SACRAL REGION, STAGE 3 (HCC): Status: RESOLVED | Noted: 2021-08-11 | Resolved: 2023-05-01

## 2023-05-01 LAB
BILIRUBIN, URINE, POC: NEGATIVE
BLOOD URINE, POC: ABNORMAL
GLUCOSE URINE, POC: NEGATIVE
HBA1C MFR BLD: 5.4 %
KETONES, URINE, POC: NEGATIVE
LEUKOCYTE ESTERASE, URINE, POC: ABNORMAL
NITRITE, URINE, POC: NEGATIVE
PH, URINE, POC: 6 (ref 4.6–8)
PROTEIN,URINE, POC: ABNORMAL
SPECIFIC GRAVITY, URINE, POC: 1.01 (ref 1–1.03)
URINALYSIS CLARITY, POC: CLEAR
URINALYSIS COLOR, POC: YELLOW
UROBILINOGEN, POC: NORMAL

## 2023-05-01 PROCEDURE — 1090F PRES/ABSN URINE INCON ASSESS: CPT | Performed by: INTERNAL MEDICINE

## 2023-05-01 PROCEDURE — 3078F DIAST BP <80 MM HG: CPT | Performed by: INTERNAL MEDICINE

## 2023-05-01 PROCEDURE — G8419 CALC BMI OUT NRM PARAM NOF/U: HCPCS | Performed by: INTERNAL MEDICINE

## 2023-05-01 PROCEDURE — G8427 DOCREV CUR MEDS BY ELIG CLIN: HCPCS | Performed by: INTERNAL MEDICINE

## 2023-05-01 PROCEDURE — 81001 URINALYSIS AUTO W/SCOPE: CPT | Performed by: INTERNAL MEDICINE

## 2023-05-01 PROCEDURE — 3074F SYST BP LT 130 MM HG: CPT | Performed by: INTERNAL MEDICINE

## 2023-05-01 PROCEDURE — G8399 PT W/DXA RESULTS DOCUMENT: HCPCS | Performed by: INTERNAL MEDICINE

## 2023-05-01 PROCEDURE — 83036 HEMOGLOBIN GLYCOSYLATED A1C: CPT | Performed by: INTERNAL MEDICINE

## 2023-05-01 PROCEDURE — 99214 OFFICE O/P EST MOD 30 MIN: CPT | Performed by: INTERNAL MEDICINE

## 2023-05-01 PROCEDURE — 87077 CULTURE AEROBIC IDENTIFY: CPT

## 2023-05-01 PROCEDURE — 87186 SC STD MICRODIL/AGAR DIL: CPT

## 2023-05-01 PROCEDURE — 1123F ACP DISCUSS/DSCN MKR DOCD: CPT | Performed by: INTERNAL MEDICINE

## 2023-05-01 PROCEDURE — 1036F TOBACCO NON-USER: CPT | Performed by: INTERNAL MEDICINE

## 2023-05-01 PROCEDURE — 87086 URINE CULTURE/COLONY COUNT: CPT

## 2023-05-01 SDOH — ECONOMIC STABILITY: INCOME INSECURITY: HOW HARD IS IT FOR YOU TO PAY FOR THE VERY BASICS LIKE FOOD, HOUSING, MEDICAL CARE, AND HEATING?: NOT HARD AT ALL

## 2023-05-01 SDOH — ECONOMIC STABILITY: FOOD INSECURITY: WITHIN THE PAST 12 MONTHS, THE FOOD YOU BOUGHT JUST DIDN'T LAST AND YOU DIDN'T HAVE MONEY TO GET MORE.: NEVER TRUE

## 2023-05-01 SDOH — ECONOMIC STABILITY: FOOD INSECURITY: WITHIN THE PAST 12 MONTHS, YOU WORRIED THAT YOUR FOOD WOULD RUN OUT BEFORE YOU GOT MONEY TO BUY MORE.: NEVER TRUE

## 2023-05-01 SDOH — ECONOMIC STABILITY: HOUSING INSECURITY
IN THE LAST 12 MONTHS, WAS THERE A TIME WHEN YOU DID NOT HAVE A STEADY PLACE TO SLEEP OR SLEPT IN A SHELTER (INCLUDING NOW)?: NO

## 2023-05-01 ASSESSMENT — ANXIETY QUESTIONNAIRES
6. BECOMING EASILY ANNOYED OR IRRITABLE: 0
1. FEELING NERVOUS, ANXIOUS, OR ON EDGE: 0
GAD7 TOTAL SCORE: 0
7. FEELING AFRAID AS IF SOMETHING AWFUL MIGHT HAPPEN: 0
3. WORRYING TOO MUCH ABOUT DIFFERENT THINGS: 0
4. TROUBLE RELAXING: 0
2. NOT BEING ABLE TO STOP OR CONTROL WORRYING: 0
5. BEING SO RESTLESS THAT IT IS HARD TO SIT STILL: 0

## 2023-05-01 ASSESSMENT — PATIENT HEALTH QUESTIONNAIRE - PHQ9
SUM OF ALL RESPONSES TO PHQ QUESTIONS 1-9: 0
2. FEELING DOWN, DEPRESSED OR HOPELESS: 0
SUM OF ALL RESPONSES TO PHQ9 QUESTIONS 1 & 2: 0
SUM OF ALL RESPONSES TO PHQ QUESTIONS 1-9: 0
SUM OF ALL RESPONSES TO PHQ QUESTIONS 1-9: 0
1. LITTLE INTEREST OR PLEASURE IN DOING THINGS: 0
SUM OF ALL RESPONSES TO PHQ QUESTIONS 1-9: 0

## 2023-05-01 NOTE — PROGRESS NOTES
Candi Robert presents today for   Chief Complaint   Patient presents with    Follow-up     Pt states burning while voiding        Vitals:    05/01/23 1051   BP: 124/70   Site: Left Upper Arm   Position: Sitting   Pulse: 81   Resp: 18   Temp: 98.2 °F (36.8 °C)   TempSrc: Temporal   SpO2: 96%   Weight: 163 lb (73.9 kg)   Height: 5' 3\" (1.6 m)        Is someone accompanying this pt? no    Is the patient using any DME equipment during OV? walker    Depression Screening:  PHQ-9 Questionaire 5/1/2023   Little interest or pleasure in doing things 0   Feeling down, depressed, or hopeless 0   PHQ-9 Total Score 0       Abuse Screening: AMB Abuse Screening 5/1/2023   Do you ever feel afraid of your partner? N   Are you in a relationship with someone who physically or mentally threatens you? N   Is it safe for you to go home? Y       Fall Screening  Fall Risk 5/1/2023   2 or more falls in past year? no   Fall with injury in past year? no       Generalized Anxiety  SHEILA-7 SCREENING 5/1/2023   Feeling nervous, anxious, or on edge Not at all   Not being able to stop or control worrying Not at all   Worrying too much about different things Not at all   Trouble relaxing Not at all   Being so restless that it is hard to sit still Not at all   Becoming easily annoyed or irritable Not at all   Feeling afraid as if something awful might happen Not at all   SHEILA-7 Total Score 0        Health Maintenance Due   Topic Date Due    Hepatitis C screen  Never done    Shingles vaccine (1 of 2) Never done    COVID-19 Vaccine (5 - Booster for [place] series) 08/05/2022    Annual Wellness Visit (AWV)  03/30/2023   . Health Maintenance reviewed and discussed and ordered per Provider. Vaccines Due   Screenings Due       Candi Robert is updated on all HM    1. \"Have you been to the ER, urgent care clinic since your last visit? Hospitalized since your last visit? \" No    2.  \"Have you seen or consulted any other health care providers outside
kg/m². Exam:   Appearance: alert, well appearing,  oriented to person, place, and time, acyanotic, in no respiratory distress and well hydrated. HEENT:  NC/AT, pink conj, anicteric sclerae  Neck:  No cervical lymphadenopathy, no JVD, no thyromegaly, no carotid bruit  Heart:  RRR without M/R/G  Lungs:  CTAB, no rhonchi, rales, or wheezes with good air exchange   Abdomen:  Non-tender, pos bowel sounds, no hepatosplenomegaly  Ext:  No C/C/E    Skin: no rash  Neuro: no lateralizing signs, CNs II-XII intact            I have discussed the diagnosis with the patient and the intended plan as seen in the above orders. The patient has received an After-Visit Summary and questions were answered concerning future plans. Medication Side Effects and Warnings were discussed with patient: yes    Patient verbalized understanding of above instructions.     Earle Garza MD  Internal Medicine  [facility]

## 2023-05-03 RX ORDER — POTASSIUM CHLORIDE 750 MG/1
10 TABLET, EXTENDED RELEASE ORAL DAILY
Qty: 90 TABLET | Refills: 1 | Status: SHIPPED | OUTPATIENT
Start: 2023-05-03

## 2023-05-04 ENCOUNTER — TRANSCRIBE ORDER (OUTPATIENT)
Dept: SCHEDULING | Age: 78
End: 2023-05-04

## 2023-05-04 DIAGNOSIS — M54.16 RADICULOPATHY, LUMBAR REGION: Primary | ICD-10-CM

## 2023-05-05 ENCOUNTER — TRANSCRIBE ORDERS (OUTPATIENT)
Facility: HOSPITAL | Age: 78
End: 2023-05-05

## 2023-05-05 DIAGNOSIS — M54.16 LUMBAR RADICULOPATHY: Primary | ICD-10-CM

## 2023-05-05 LAB
BACTERIA SPEC CULT: ABNORMAL
BACTERIA SPEC CULT: ABNORMAL
CC UR VC: ABNORMAL
SERVICE CMNT-IMP: ABNORMAL

## 2023-05-08 DIAGNOSIS — N39.0 URINARY TRACT INFECTION ASSOCIATED WITH INDWELLING URETHRAL CATHETER, SUBSEQUENT ENCOUNTER: Primary | ICD-10-CM

## 2023-05-08 DIAGNOSIS — T83.511D URINARY TRACT INFECTION ASSOCIATED WITH INDWELLING URETHRAL CATHETER, SUBSEQUENT ENCOUNTER: Primary | ICD-10-CM

## 2023-05-08 RX ORDER — SULFAMETHOXAZOLE AND TRIMETHOPRIM 800; 160 MG/1; MG/1
1 TABLET ORAL 2 TIMES DAILY
Qty: 14 TABLET | Refills: 0 | Status: SHIPPED | OUTPATIENT
Start: 2023-05-08 | End: 2023-05-15

## 2023-05-08 RX ORDER — CIPROFLOXACIN 250 MG/1
250 TABLET, FILM COATED ORAL 2 TIMES DAILY
Qty: 14 TABLET | Refills: 0 | Status: SHIPPED | OUTPATIENT
Start: 2023-05-08 | End: 2023-05-15

## 2023-05-09 RX ORDER — MONTELUKAST SODIUM 10 MG/1
TABLET ORAL
Qty: 90 TABLET | Refills: 1 | Status: SHIPPED | OUTPATIENT
Start: 2023-05-09

## 2023-05-15 ENCOUNTER — HOSPITAL ENCOUNTER (OUTPATIENT)
Facility: HOSPITAL | Age: 78
Discharge: HOME | End: 2023-05-18
Payer: MEDICARE

## 2023-05-15 DIAGNOSIS — M54.16 LUMBAR RADICULOPATHY: ICD-10-CM

## 2023-05-15 PROCEDURE — 72148 MRI LUMBAR SPINE W/O DYE: CPT

## 2023-05-17 RX ORDER — CELECOXIB 100 MG/1
CAPSULE ORAL
Qty: 60 CAPSULE | Refills: 3 | Status: SHIPPED | OUTPATIENT
Start: 2023-05-17

## 2023-05-18 RX ORDER — CELECOXIB 100 MG/1
CAPSULE ORAL
Qty: 180 CAPSULE | OUTPATIENT
Start: 2023-05-18

## 2023-07-27 RX ORDER — SIMVASTATIN 10 MG
TABLET ORAL
Qty: 90 TABLET | Refills: 1 | Status: SHIPPED | OUTPATIENT
Start: 2023-07-27

## 2023-09-19 ENCOUNTER — OFFICE VISIT (OUTPATIENT)
Facility: CLINIC | Age: 78
End: 2023-09-19

## 2023-09-19 ENCOUNTER — HOSPITAL ENCOUNTER (OUTPATIENT)
Facility: HOSPITAL | Age: 78
Setting detail: SPECIMEN
Discharge: HOME | End: 2023-09-22
Payer: MEDICARE

## 2023-09-19 VITALS
DIASTOLIC BLOOD PRESSURE: 76 MMHG | TEMPERATURE: 97.4 F | BODY MASS INDEX: 28 KG/M2 | OXYGEN SATURATION: 97 % | HEART RATE: 110 BPM | HEIGHT: 63 IN | RESPIRATION RATE: 18 BRPM | WEIGHT: 158 LBS | SYSTOLIC BLOOD PRESSURE: 107 MMHG

## 2023-09-19 DIAGNOSIS — R73.03 PREDIABETES: ICD-10-CM

## 2023-09-19 DIAGNOSIS — Z11.59 NEED FOR HEPATITIS C SCREENING TEST: ICD-10-CM

## 2023-09-19 DIAGNOSIS — Z23 ENCOUNTER FOR IMMUNIZATION: ICD-10-CM

## 2023-09-19 DIAGNOSIS — N39.0 URINARY TRACT INFECTION WITHOUT HEMATURIA, SITE UNSPECIFIED: ICD-10-CM

## 2023-09-19 DIAGNOSIS — E78.5 HYPERLIPIDEMIA, UNSPECIFIED HYPERLIPIDEMIA TYPE: ICD-10-CM

## 2023-09-19 DIAGNOSIS — Z00.00 MEDICARE ANNUAL WELLNESS VISIT, SUBSEQUENT: Primary | ICD-10-CM

## 2023-09-19 DIAGNOSIS — I10 ESSENTIAL HYPERTENSION: ICD-10-CM

## 2023-09-19 LAB
ALBUMIN SERPL-MCNC: 3.6 G/DL (ref 3.4–5)
ALBUMIN/GLOB SERPL: 0.9 (ref 0.8–1.7)
ALP SERPL-CCNC: 63 U/L (ref 45–117)
ALT SERPL-CCNC: 22 U/L (ref 13–56)
ANION GAP SERPL CALC-SCNC: 7 MMOL/L (ref 3–18)
AST SERPL-CCNC: 20 U/L (ref 10–38)
BILIRUB SERPL-MCNC: 0.5 MG/DL (ref 0.2–1)
BILIRUBIN, URINE, POC: NEGATIVE
BLOOD URINE, POC: NORMAL
BUN SERPL-MCNC: 15 MG/DL (ref 7–18)
BUN/CREAT SERPL: 20 (ref 12–20)
CALCIUM SERPL-MCNC: 9.4 MG/DL (ref 8.5–10.1)
CHLORIDE SERPL-SCNC: 99 MMOL/L (ref 100–111)
CHOLEST SERPL-MCNC: 145 MG/DL
CO2 SERPL-SCNC: 28 MMOL/L (ref 21–32)
CREAT SERPL-MCNC: 0.74 MG/DL (ref 0.6–1.3)
ERYTHROCYTE [DISTWIDTH] IN BLOOD BY AUTOMATED COUNT: 14 % (ref 11.6–14.5)
EST. AVERAGE GLUCOSE BLD GHB EST-MCNC: 114 MG/DL
GLOBULIN SER CALC-MCNC: 3.9 G/DL (ref 2–4)
GLUCOSE SERPL-MCNC: 84 MG/DL (ref 74–99)
GLUCOSE URINE, POC: NEGATIVE
HBA1C MFR BLD: 5.6 % (ref 4.2–5.6)
HCT VFR BLD AUTO: 36.6 % (ref 35–45)
HDLC SERPL-MCNC: 74 MG/DL (ref 40–60)
HDLC SERPL: 2 (ref 0–5)
HGB BLD-MCNC: 11.6 G/DL (ref 12–16)
KETONES, URINE, POC: NEGATIVE
LDLC SERPL CALC-MCNC: 55.4 MG/DL (ref 0–100)
LEUKOCYTE ESTERASE, URINE, POC: NORMAL
LIPID PANEL: ABNORMAL
MCH RBC QN AUTO: 28.2 PG (ref 24–34)
MCHC RBC AUTO-ENTMCNC: 31.7 G/DL (ref 31–37)
MCV RBC AUTO: 89.1 FL (ref 78–100)
NITRITE, URINE, POC: NEGATIVE
NRBC # BLD: 0 K/UL (ref 0–0.01)
NRBC BLD-RTO: 0 PER 100 WBC
PH, URINE, POC: 6 (ref 4.6–8)
PLATELET # BLD AUTO: 283 K/UL (ref 135–420)
PMV BLD AUTO: 11.2 FL (ref 9.2–11.8)
POTASSIUM SERPL-SCNC: 4 MMOL/L (ref 3.5–5.5)
PROT SERPL-MCNC: 7.5 G/DL (ref 6.4–8.2)
PROTEIN,URINE, POC: NEGATIVE
RBC # BLD AUTO: 4.11 M/UL (ref 4.2–5.3)
SODIUM SERPL-SCNC: 134 MMOL/L (ref 136–145)
SPECIFIC GRAVITY, URINE, POC: 1 (ref 1–1.03)
TRIGL SERPL-MCNC: 78 MG/DL
URINALYSIS CLARITY, POC: NORMAL
URINALYSIS COLOR, POC: NORMAL
UROBILINOGEN, POC: NORMAL
VLDLC SERPL CALC-MCNC: 15.6 MG/DL
WBC # BLD AUTO: 9 K/UL (ref 4.6–13.2)

## 2023-09-19 PROCEDURE — 36415 COLL VENOUS BLD VENIPUNCTURE: CPT

## 2023-09-19 PROCEDURE — 80061 LIPID PANEL: CPT

## 2023-09-19 PROCEDURE — 87186 SC STD MICRODIL/AGAR DIL: CPT

## 2023-09-19 PROCEDURE — 87077 CULTURE AEROBIC IDENTIFY: CPT

## 2023-09-19 PROCEDURE — 80053 COMPREHEN METABOLIC PANEL: CPT

## 2023-09-19 PROCEDURE — 87086 URINE CULTURE/COLONY COUNT: CPT

## 2023-09-19 PROCEDURE — 83036 HEMOGLOBIN GLYCOSYLATED A1C: CPT

## 2023-09-19 PROCEDURE — 86803 HEPATITIS C AB TEST: CPT

## 2023-09-19 PROCEDURE — 85027 COMPLETE CBC AUTOMATED: CPT

## 2023-09-19 RX ORDER — SULFAMETHOXAZOLE AND TRIMETHOPRIM 800; 160 MG/1; MG/1
1 TABLET ORAL 2 TIMES DAILY
Qty: 14 TABLET | Refills: 0 | Status: SHIPPED | OUTPATIENT
Start: 2023-09-19 | End: 2023-09-26

## 2023-09-19 ASSESSMENT — LIFESTYLE VARIABLES
HOW OFTEN DO YOU HAVE A DRINK CONTAINING ALCOHOL: NEVER
HOW MANY STANDARD DRINKS CONTAINING ALCOHOL DO YOU HAVE ON A TYPICAL DAY: PATIENT DOES NOT DRINK

## 2023-09-19 NOTE — PROGRESS NOTES
Patient was given VIS for review, consent was obtained and per orders of Dr. Stacey Neil , injection of Flu vaccine given by Chrissy Madison. Patient observed. No signs nor symptoms of any adverse reactions. Patient tolerated injection well.

## 2023-09-19 NOTE — PROGRESS NOTES
Medicare Annual Wellness Visit    Donna Ferrari is here for 3 Month Follow-Up and Medicare AWV    Assessment & Plan   Medicare annual wellness visit, subsequent-advised re RSV and new covid booster    Essential hypertension-controlled, continue with Lisinopril HCT  -     CBC; Future    Prediabetes-will start med if A1c is above7.5%; continue to watch diet  -     Hemoglobin A1C; Future    Urinary tract infection without hematuria, site unspecified-empiric tx with Sulfa  -     Culture, Urine; Future  -     AMB POC URINALYSIS DIP STICK AUTO W/O MICRO  -     sulfamethoxazole-trimethoprim (BACTRIM DS;SEPTRA DS) 800-160 MG per tablet; Take 1 tablet by mouth 2 times daily for 7 days, Disp-14 tablet, R-0Normal    Hyperlipidemia, unspecified hyperlipidemia type-goal LDL of less than 100 on Zocor  -     Comprehensive Metabolic Panel; Future  -     Lipid Panel; Future  Encounter for immunization  -     AL THERAPEUTIC PROPHYLACTIC/DX INJECTION SUBQ/IM  Need for hepatitis C screening test  -     Hepatitis C Ab, Rflx to Qt by PCR; Future      Sees Dr Brandin Mora for pain Mgt-re lumbar DDD; walks with a walker bec of this    Also sees Dr Apoorva Torres prn    Recommendations for Preventive Services Due: see orders and patient instructions/AVS.  Recommended screening schedule for the next 5-10 years is provided to the patient in written form: see Patient Instructions/AVS.     Return in 6 months (on 3/19/2024) for follow up.      Subjective         Walks with a walker  Still drives from [place]    She sees Dr Brandin Mora too        Wt Readings from Last 3 Encounters:   09/19/23 158 lb (71.7 kg)   05/01/23 163 lb (73.9 kg)   10/31/22 160 lb (72.6 kg)        BP Readings from Last 3 Encounters:   09/19/23 107/76   05/01/23 124/70   10/31/22 110/70        Lab Results   Component Value Date/Time    CHOL 139 10/31/2022 11:48 AM    TRIG 100 10/31/2022 11:48 AM    HDL 66 10/31/2022 11:48 AM    LDLCALC 53 10/31/2022 11:48 AM      Fasting

## 2023-09-19 NOTE — PROGRESS NOTES
Room [address] [facility] [address] had concerns including 3 Month Follow-Up and Medicare AWV. for today's visit . When asked if patient has any concerns she would like to address with Dr. Fausto tMz. Patient states I would like to discuss taking Vitamins . Patient does not remember the name of the Vitamins. Emelia Gaspar has been notified  of patient concerns . Did patient bring someone? No     Did the patient have DME equipment? Did you take your medication today? 1. \"Have you been to the ER, urgent care clinic since your last visit? Hospitalized since your last visit? \" . NO    2. \"Have you seen or consulted any other health care providers outside of the [address] [place] [address] since your last visit? \" No     3. For patients aged 43-73: Has the patient had a colonoscopy / FIT/ Cologuard? N/A      If the patient is female:    4. For patients aged 43-66: Has the patient had a mammogram within the past 2 years? N/A      5. For patients aged 21-65: Has the patient had a pap smear? {Cancer Care Gap present? N/A          9/19/2023    11:53 AM   Amb Fall Risk Assessment and TUG Test   Do you feel unsteady or are you worried about falling?  no   2 or more falls in past year? no   Fall with injury in past year?  no              5/1/2023    10:56 AM   PHQ-9    Little interest or pleasure in doing things 0   Feeling down, depressed, or hopeless 0   PHQ-2 Score 0   PHQ-9 Total Score 0          Health Maintenance Due   Topic Date Due    Hepatitis C screen  Never done    Shingles vaccine (1 of 2) Never done    COVID-19 Vaccine (5 - Pfizer series) 08/05/2022    Pneumococcal 65+ years Vaccine (2 - PCV) 03/29/2023    Annual Wellness Visit (AWV)  03/30/2023    Flu vaccine (1) 08/01/2023

## 2023-09-19 NOTE — PROGRESS NOTES
Patient presents today for Urinalysis . Krisitne Jodie has been notified of results . Patient presents to office for [facility] Vaccine injection. Injection ordered per smart set and pended  for Lidia Thakkar  to sign. After obtaining signed consent from patient area was prepped and injection given IM. No signs nor symptoms of adverse reaction noted. Patient tolerated injection well.

## 2023-09-20 LAB
HCV AB SERPL QL IA: NORMAL
HCV IGG SERPL QL IA: NON REACTIVE S/CO RATIO

## 2023-09-22 LAB
BACTERIA SPEC CULT: ABNORMAL
CC UR VC: ABNORMAL
SERVICE CMNT-IMP: ABNORMAL

## 2023-09-25 RX ORDER — LISINOPRIL AND HYDROCHLOROTHIAZIDE 25; 20 MG/1; MG/1
1 TABLET ORAL DAILY
Qty: 90 TABLET | Refills: 1 | Status: SHIPPED | OUTPATIENT
Start: 2023-09-25

## 2023-09-28 ENCOUNTER — TELEPHONE (OUTPATIENT)
Facility: CLINIC | Age: 78
End: 2023-09-28

## 2023-09-29 NOTE — TELEPHONE ENCOUNTER
Component Ref Range & Units   Special Requests   NO SPECIAL REQUESTS     Osteen count   >100,000 COLONIES/mL    Culture   Klebsiella pneumoniae Abnormal

## 2023-10-26 RX ORDER — POTASSIUM CHLORIDE 750 MG/1
10 TABLET, EXTENDED RELEASE ORAL DAILY
Qty: 90 TABLET | Refills: 1 | Status: SHIPPED | OUTPATIENT
Start: 2023-10-26

## 2023-11-13 RX ORDER — MONTELUKAST SODIUM 10 MG/1
TABLET ORAL
Qty: 90 TABLET | Refills: 1 | Status: SHIPPED | OUTPATIENT
Start: 2023-11-13

## 2023-11-21 RX ORDER — FLUTICASONE PROPIONATE AND SALMETEROL 100; 50 UG/1; UG/1
POWDER RESPIRATORY (INHALATION)
Qty: 60 EACH | Refills: 3 | Status: SHIPPED | OUTPATIENT
Start: 2023-11-21

## 2023-12-28 DIAGNOSIS — M48.061 SPINAL STENOSIS, LUMBAR REGION WITHOUT NEUROGENIC CLAUDICATION: Primary | ICD-10-CM

## 2023-12-28 RX ORDER — GABAPENTIN 300 MG/1
CAPSULE ORAL
Qty: 180 CAPSULE | Refills: 0 | Status: SHIPPED | OUTPATIENT
Start: 2023-12-28 | End: 2024-03-27

## 2024-02-01 RX ORDER — SIMVASTATIN 10 MG
TABLET ORAL
Qty: 90 TABLET | Refills: 1 | Status: SHIPPED | OUTPATIENT
Start: 2024-02-01

## 2024-03-06 RX ORDER — ALBUTEROL SULFATE 90 UG/1
AEROSOL, METERED RESPIRATORY (INHALATION)
Qty: 8.5 G | Refills: 3 | Status: SHIPPED | OUTPATIENT
Start: 2024-03-06

## 2024-03-19 ENCOUNTER — HOSPITAL ENCOUNTER (OUTPATIENT)
Facility: HOSPITAL | Age: 79
Setting detail: SPECIMEN
Discharge: HOME | End: 2024-03-22
Payer: MEDICARE

## 2024-03-19 ENCOUNTER — OFFICE VISIT (OUTPATIENT)
Facility: CLINIC | Age: 79
End: 2024-03-19
Payer: MEDICARE

## 2024-03-19 VITALS
BODY MASS INDEX: 27 KG/M2 | TEMPERATURE: 98.5 F | WEIGHT: 152.4 LBS | HEIGHT: 63 IN | OXYGEN SATURATION: 96 % | DIASTOLIC BLOOD PRESSURE: 69 MMHG | SYSTOLIC BLOOD PRESSURE: 113 MMHG | RESPIRATION RATE: 16 BRPM | HEART RATE: 82 BPM

## 2024-03-19 DIAGNOSIS — E78.5 HYPERLIPIDEMIA, UNSPECIFIED HYPERLIPIDEMIA TYPE: ICD-10-CM

## 2024-03-19 DIAGNOSIS — I10 ESSENTIAL HYPERTENSION: ICD-10-CM

## 2024-03-19 DIAGNOSIS — Z00.00 MEDICARE ANNUAL WELLNESS VISIT, SUBSEQUENT: Primary | ICD-10-CM

## 2024-03-19 DIAGNOSIS — R73.03 PRE-DIABETES: ICD-10-CM

## 2024-03-19 DIAGNOSIS — R30.0 DYSURIA: ICD-10-CM

## 2024-03-19 DIAGNOSIS — M48.061 SPINAL STENOSIS, LUMBAR REGION WITHOUT NEUROGENIC CLAUDICATION: ICD-10-CM

## 2024-03-19 LAB
ALBUMIN SERPL-MCNC: 3.5 G/DL (ref 3.4–5)
ALBUMIN/GLOB SERPL: 0.9 (ref 0.8–1.7)
ALP SERPL-CCNC: 78 U/L (ref 45–117)
ALT SERPL-CCNC: 21 U/L (ref 13–56)
ANION GAP SERPL CALC-SCNC: 6 MMOL/L (ref 3–18)
AST SERPL-CCNC: 19 U/L (ref 10–38)
BILIRUB SERPL-MCNC: 0.3 MG/DL (ref 0.2–1)
BUN SERPL-MCNC: 19 MG/DL (ref 7–18)
BUN/CREAT SERPL: 22 (ref 12–20)
CALCIUM SERPL-MCNC: 9.5 MG/DL (ref 8.5–10.1)
CHLORIDE SERPL-SCNC: 103 MMOL/L (ref 100–111)
CHOLEST SERPL-MCNC: 118 MG/DL
CO2 SERPL-SCNC: 28 MMOL/L (ref 21–32)
CREAT SERPL-MCNC: 0.85 MG/DL (ref 0.6–1.3)
ERYTHROCYTE [DISTWIDTH] IN BLOOD BY AUTOMATED COUNT: 13.9 % (ref 11.6–14.5)
GLOBULIN SER CALC-MCNC: 4.1 G/DL (ref 2–4)
GLUCOSE SERPL-MCNC: 112 MG/DL (ref 74–99)
HBA1C MFR BLD: 5.7 %
HCT VFR BLD AUTO: 35.3 % (ref 35–45)
HDLC SERPL-MCNC: 67 MG/DL (ref 40–60)
HDLC SERPL: 1.8 (ref 0–5)
HGB BLD-MCNC: 11 G/DL (ref 12–16)
LDLC SERPL CALC-MCNC: 36.6 MG/DL (ref 0–100)
LIPID PANEL: ABNORMAL
MCH RBC QN AUTO: 28 PG (ref 24–34)
MCHC RBC AUTO-ENTMCNC: 31.2 G/DL (ref 31–37)
MCV RBC AUTO: 89.8 FL (ref 78–100)
NRBC # BLD: 0 K/UL (ref 0–0.01)
NRBC BLD-RTO: 0 PER 100 WBC
PLATELET # BLD AUTO: 369 K/UL (ref 135–420)
PMV BLD AUTO: 10.4 FL (ref 9.2–11.8)
POTASSIUM SERPL-SCNC: 4.1 MMOL/L (ref 3.5–5.5)
PROT SERPL-MCNC: 7.6 G/DL (ref 6.4–8.2)
RBC # BLD AUTO: 3.93 M/UL (ref 4.2–5.3)
SODIUM SERPL-SCNC: 137 MMOL/L (ref 136–145)
TRIGL SERPL-MCNC: 72 MG/DL
VLDLC SERPL CALC-MCNC: 14.4 MG/DL
WBC # BLD AUTO: 8.1 K/UL (ref 4.6–13.2)

## 2024-03-19 PROCEDURE — 1090F PRES/ABSN URINE INCON ASSESS: CPT | Performed by: INTERNAL MEDICINE

## 2024-03-19 PROCEDURE — 3074F SYST BP LT 130 MM HG: CPT | Performed by: INTERNAL MEDICINE

## 2024-03-19 PROCEDURE — G8419 CALC BMI OUT NRM PARAM NOF/U: HCPCS | Performed by: INTERNAL MEDICINE

## 2024-03-19 PROCEDURE — 83036 HEMOGLOBIN GLYCOSYLATED A1C: CPT | Performed by: INTERNAL MEDICINE

## 2024-03-19 PROCEDURE — G0439 PPPS, SUBSEQ VISIT: HCPCS | Performed by: INTERNAL MEDICINE

## 2024-03-19 PROCEDURE — 87086 URINE CULTURE/COLONY COUNT: CPT

## 2024-03-19 PROCEDURE — 3078F DIAST BP <80 MM HG: CPT | Performed by: INTERNAL MEDICINE

## 2024-03-19 PROCEDURE — 80053 COMPREHEN METABOLIC PANEL: CPT

## 2024-03-19 PROCEDURE — 1123F ACP DISCUSS/DSCN MKR DOCD: CPT | Performed by: INTERNAL MEDICINE

## 2024-03-19 PROCEDURE — 80061 LIPID PANEL: CPT

## 2024-03-19 PROCEDURE — 99214 OFFICE O/P EST MOD 30 MIN: CPT | Performed by: INTERNAL MEDICINE

## 2024-03-19 PROCEDURE — G8484 FLU IMMUNIZE NO ADMIN: HCPCS | Performed by: INTERNAL MEDICINE

## 2024-03-19 PROCEDURE — 85027 COMPLETE CBC AUTOMATED: CPT

## 2024-03-19 PROCEDURE — 1036F TOBACCO NON-USER: CPT | Performed by: INTERNAL MEDICINE

## 2024-03-19 PROCEDURE — G8427 DOCREV CUR MEDS BY ELIG CLIN: HCPCS | Performed by: INTERNAL MEDICINE

## 2024-03-19 PROCEDURE — 36415 COLL VENOUS BLD VENIPUNCTURE: CPT

## 2024-03-19 PROCEDURE — G8399 PT W/DXA RESULTS DOCUMENT: HCPCS | Performed by: INTERNAL MEDICINE

## 2024-03-19 ASSESSMENT — PATIENT HEALTH QUESTIONNAIRE - PHQ9
SUM OF ALL RESPONSES TO PHQ QUESTIONS 1-9: 0
1. LITTLE INTEREST OR PLEASURE IN DOING THINGS: NOT AT ALL
SUM OF ALL RESPONSES TO PHQ QUESTIONS 1-9: 0
2. FEELING DOWN, DEPRESSED OR HOPELESS: NOT AT ALL
SUM OF ALL RESPONSES TO PHQ9 QUESTIONS 1 & 2: 0

## 2024-03-19 NOTE — PROGRESS NOTES
\"Have you been to the ER, urgent care clinic since your last visit?  Hospitalized since your last visit?\"    NO    “Have you seen or consulted any other health care providers outside of [facility] since your last visit?”    NO            Click Here for Release of Records Request

## 2024-03-19 NOTE — PROGRESS NOTES
Medicare Annual Wellness Visit    Rosa Green is here for Medicare AWV (6 month follow up)    Assessment & Plan   Medicare annual wellness visit, subsequent-vaccines from her pharmacy updated in chart    Essential hypertension-controlled,continue with present meds  -     CBC; Future  -     Comprehensive Metabolic Panel; Future    Pre-diabetes-A1c at goal on diet alone  -     AMB POC HEMOGLOBIN A1C    Hyperlipidemia, unspecified hyperlipidemia type-goal LDL of less than 100 on Zocor  -     Lipid Panel; Future    Spinal stenosis, lumbar region without neurogenic claudication-Pain mgt following    Dysuria-await culture results prior to antibiotics  -     Culture, Urine; Future  Recommendations for Preventive Services Due: see orders and patient instructions/AVS.  Recommended screening schedule for the next 5-10 years is provided to the patient in written form: see Patient Instructions/AVS.     Return in 6 months (on 9/19/2024) for follow up.     Subjective     Health Maintenance Due   Topic Date Due    Respiratory Syncytial Virus (RSV) Pregnant or age 60 yrs+ (1 - 1-dose 60+ series) Never done    Pneumococcal 65+ years Vaccine (2 of 2 - PCV) 03/29/2023    Shingles vaccine (2 of 2) 11/21/2023    COVID-19 Vaccine (6 - 2023-24 season) 11/30/2023      Wt Readings from Last 3 Encounters:   03/19/24 69.1 kg (152 lb 6.4 oz)   09/19/23 71.7 kg (158 lb)   05/01/23 73.9 kg (163 lb)    Has been exercising    BP Readings from Last 3 Encounters:   03/19/24 113/69   09/19/23 107/76   05/01/23 124/70        Hemoglobin A1C   Date Value Ref Range Status   09/19/2023 5.6 4.2 - 5.6 % Final     Comment:     (NOTE)  HbA1C Interpretive Ranges  <5.7              Normal  5.7 - 6.4         Consider Prediabetes  >6.5              Consider Diabetes       Hemoglobin A1C, POC   Date Value Ref Range Status   03/19/2024 5.7 % Final        Lab Results   Component Value Date/Time    CHOL 118 03/19/2024 12:15 PM    TRIG 72 03/19/2024 12:15 PM

## 2024-03-21 LAB
BACTERIA SPEC CULT: NORMAL
CC UR VC: NORMAL
SERVICE CMNT-IMP: NORMAL

## 2024-03-26 ENCOUNTER — TELEPHONE (OUTPATIENT)
Facility: CLINIC | Age: 79
End: 2024-03-26

## 2024-03-26 NOTE — TELEPHONE ENCOUNTER
Patient aware of results and verbalized understanding of instructions. Patient states that she takes Alive Women's 50+ complete multivitamin.

## 2024-04-01 RX ORDER — LISINOPRIL AND HYDROCHLOROTHIAZIDE 25; 20 MG/1; MG/1
1 TABLET ORAL DAILY
Qty: 90 TABLET | Refills: 1 | Status: SHIPPED | OUTPATIENT
Start: 2024-04-01

## 2024-04-29 RX ORDER — SIMVASTATIN 10 MG
TABLET ORAL
Qty: 90 TABLET | Refills: 1 | Status: SHIPPED | OUTPATIENT
Start: 2024-04-29

## 2024-04-29 RX ORDER — POTASSIUM CHLORIDE 750 MG/1
10 TABLET, EXTENDED RELEASE ORAL DAILY
Qty: 90 TABLET | Refills: 1 | Status: SHIPPED | OUTPATIENT
Start: 2024-04-29

## 2024-05-01 NOTE — TELEPHONE ENCOUNTER
PT stated that she needs an refill on the medication, don't see medication listen in chart tiZANidine (ZANAFLEX) 4 MG tablet [[id number]]

## 2024-05-02 ENCOUNTER — TELEPHONE (OUTPATIENT)
Facility: CLINIC | Age: 79
End: 2024-05-02

## 2024-05-02 RX ORDER — TIZANIDINE 4 MG/1
4 TABLET ORAL 2 TIMES DAILY
Qty: 60 TABLET | Refills: 3 | Status: SHIPPED | OUTPATIENT
Start: 2024-05-02

## 2024-05-02 NOTE — TELEPHONE ENCOUNTER
PT stated that she needs an refill on the medication, don't see medication listen in chart tiZANidine (ZANAFLEX) 4 MG tablet [[id number]]

## 2024-05-08 RX ORDER — MONTELUKAST SODIUM 10 MG/1
TABLET ORAL
Qty: 90 TABLET | Refills: 1 | Status: SHIPPED | OUTPATIENT
Start: 2024-05-08

## 2024-06-03 RX ORDER — ALBUTEROL SULFATE 90 UG/1
AEROSOL, METERED RESPIRATORY (INHALATION)
Qty: 8.5 G | Refills: 3 | Status: SHIPPED | OUTPATIENT
Start: 2024-06-03

## 2024-06-26 DIAGNOSIS — M48.061 SPINAL STENOSIS, LUMBAR REGION WITHOUT NEUROGENIC CLAUDICATION: ICD-10-CM

## 2024-06-26 RX ORDER — GABAPENTIN 300 MG/1
CAPSULE ORAL
Qty: 180 CAPSULE | Refills: 1 | Status: SHIPPED | OUTPATIENT
Start: 2024-06-26 | End: 2024-09-24

## 2024-06-27 RX ORDER — LISINOPRIL AND HYDROCHLOROTHIAZIDE 25; 20 MG/1; MG/1
1 TABLET ORAL DAILY
Qty: 90 TABLET | Refills: 1 | Status: SHIPPED | OUTPATIENT
Start: 2024-06-27

## 2024-06-27 RX ORDER — LISINOPRIL AND HYDROCHLOROTHIAZIDE 20; 25 MG/1; MG/1
1 TABLET ORAL DAILY
Qty: 90 TABLET | Refills: 1 | OUTPATIENT
Start: 2024-06-27

## 2024-07-01 RX ORDER — TIZANIDINE 4 MG/1
4 TABLET ORAL 2 TIMES DAILY
Qty: 60 TABLET | Refills: 3 | Status: SHIPPED | OUTPATIENT
Start: 2024-07-01

## 2024-09-23 ENCOUNTER — OFFICE VISIT (OUTPATIENT)
Facility: CLINIC | Age: 79
End: 2024-09-23
Payer: MEDICARE

## 2024-09-23 VITALS
HEIGHT: 63 IN | HEART RATE: 74 BPM | SYSTOLIC BLOOD PRESSURE: 113 MMHG | OXYGEN SATURATION: 94 % | TEMPERATURE: 98.2 F | DIASTOLIC BLOOD PRESSURE: 71 MMHG | WEIGHT: 146 LBS | BODY MASS INDEX: 25.87 KG/M2 | RESPIRATION RATE: 16 BRPM

## 2024-09-23 DIAGNOSIS — R63.4 WEIGHT LOSS: Primary | ICD-10-CM

## 2024-09-23 DIAGNOSIS — I10 ESSENTIAL HYPERTENSION: ICD-10-CM

## 2024-09-23 DIAGNOSIS — R73.03 PRE-DIABETES: ICD-10-CM

## 2024-09-23 DIAGNOSIS — M48.061 SPINAL STENOSIS, LUMBAR REGION WITHOUT NEUROGENIC CLAUDICATION: ICD-10-CM

## 2024-09-23 DIAGNOSIS — N39.0 URINARY TRACT INFECTION WITHOUT HEMATURIA, SITE UNSPECIFIED: ICD-10-CM

## 2024-09-23 PROCEDURE — 1090F PRES/ABSN URINE INCON ASSESS: CPT | Performed by: INTERNAL MEDICINE

## 2024-09-23 PROCEDURE — G8399 PT W/DXA RESULTS DOCUMENT: HCPCS | Performed by: INTERNAL MEDICINE

## 2024-09-23 PROCEDURE — G8427 DOCREV CUR MEDS BY ELIG CLIN: HCPCS | Performed by: INTERNAL MEDICINE

## 2024-09-23 PROCEDURE — G8419 CALC BMI OUT NRM PARAM NOF/U: HCPCS | Performed by: INTERNAL MEDICINE

## 2024-09-23 PROCEDURE — 87086 URINE CULTURE/COLONY COUNT: CPT

## 2024-09-23 PROCEDURE — 3078F DIAST BP <80 MM HG: CPT | Performed by: INTERNAL MEDICINE

## 2024-09-23 PROCEDURE — 3074F SYST BP LT 130 MM HG: CPT | Performed by: INTERNAL MEDICINE

## 2024-09-23 PROCEDURE — 1123F ACP DISCUSS/DSCN MKR DOCD: CPT | Performed by: INTERNAL MEDICINE

## 2024-09-23 PROCEDURE — 1036F TOBACCO NON-USER: CPT | Performed by: INTERNAL MEDICINE

## 2024-09-23 PROCEDURE — 87106 FUNGI IDENTIFICATION YEAST: CPT

## 2024-09-23 PROCEDURE — 99214 OFFICE O/P EST MOD 30 MIN: CPT | Performed by: INTERNAL MEDICINE

## 2024-09-23 RX ORDER — MUPIROCIN 20 MG/G
OINTMENT TOPICAL DAILY
Qty: 30 G | Refills: 1 | Status: SHIPPED | OUTPATIENT
Start: 2024-09-23

## 2024-09-23 RX ORDER — LUBIPROSTONE 24 UG/1
24 CAPSULE ORAL 2 TIMES DAILY WITH MEALS
Qty: 180 CAPSULE | Refills: 1 | Status: SHIPPED | OUTPATIENT
Start: 2024-09-23

## 2024-09-23 RX ORDER — CIPROFLOXACIN 250 MG/1
250 TABLET, FILM COATED ORAL 2 TIMES DAILY
Qty: 14 TABLET | Refills: 0 | Status: SHIPPED | OUTPATIENT
Start: 2024-09-23 | End: 2024-09-30

## 2024-09-23 SDOH — ECONOMIC STABILITY: FOOD INSECURITY: WITHIN THE PAST 12 MONTHS, THE FOOD YOU BOUGHT JUST DIDN'T LAST AND YOU DIDN'T HAVE MONEY TO GET MORE.: NEVER TRUE

## 2024-09-23 SDOH — ECONOMIC STABILITY: FOOD INSECURITY: WITHIN THE PAST 12 MONTHS, YOU WORRIED THAT YOUR FOOD WOULD RUN OUT BEFORE YOU GOT MONEY TO BUY MORE.: NEVER TRUE

## 2024-09-23 SDOH — ECONOMIC STABILITY: INCOME INSECURITY: HOW HARD IS IT FOR YOU TO PAY FOR THE VERY BASICS LIKE FOOD, HOUSING, MEDICAL CARE, AND HEATING?: NOT HARD AT ALL

## 2024-09-23 ASSESSMENT — PATIENT HEALTH QUESTIONNAIRE - PHQ9
SUM OF ALL RESPONSES TO PHQ QUESTIONS 1-9: 0
2. FEELING DOWN, DEPRESSED OR HOPELESS: NOT AT ALL
SUM OF ALL RESPONSES TO PHQ QUESTIONS 1-9: 0
1. LITTLE INTEREST OR PLEASURE IN DOING THINGS: NOT AT ALL
SUM OF ALL RESPONSES TO PHQ QUESTIONS 1-9: 0
SUM OF ALL RESPONSES TO PHQ9 QUESTIONS 1 & 2: 0
SUM OF ALL RESPONSES TO PHQ QUESTIONS 1-9: 0

## 2024-09-24 ENCOUNTER — HOSPITAL ENCOUNTER (OUTPATIENT)
Facility: HOSPITAL | Age: 79
Setting detail: SPECIMEN
Discharge: HOME | End: 2024-09-26
Payer: MEDICARE

## 2024-09-24 ENCOUNTER — HOSPITAL ENCOUNTER (OUTPATIENT)
Facility: HOSPITAL | Age: 79
Discharge: HOME | End: 2024-09-24
Attending: PODIATRIST
Payer: MEDICARE

## 2024-09-24 VITALS
SYSTOLIC BLOOD PRESSURE: 123 MMHG | HEART RATE: 83 BPM | RESPIRATION RATE: 17 BRPM | DIASTOLIC BLOOD PRESSURE: 82 MMHG | TEMPERATURE: 97.2 F

## 2024-09-24 DIAGNOSIS — L89.513 STAGE III PRESSURE ULCER OF RIGHT ANKLE (HCC): Primary | ICD-10-CM

## 2024-09-24 DIAGNOSIS — N39.0 URINARY TRACT INFECTION WITHOUT HEMATURIA, SITE UNSPECIFIED: ICD-10-CM

## 2024-09-24 PROCEDURE — 11042 DBRDMT SUBQ TIS 1ST 20SQCM/<: CPT

## 2024-09-24 RX ORDER — GENTAMICIN SULFATE 1 MG/G
OINTMENT TOPICAL ONCE
OUTPATIENT
Start: 2024-09-24 | End: 2024-09-24

## 2024-09-24 RX ORDER — LIDOCAINE HYDROCHLORIDE 20 MG/ML
JELLY TOPICAL ONCE
OUTPATIENT
Start: 2024-09-24 | End: 2024-09-24

## 2024-09-24 RX ORDER — SODIUM CHLOR/HYPOCHLOROUS ACID 0.033 %
SOLUTION, IRRIGATION IRRIGATION ONCE
OUTPATIENT
Start: 2024-09-24 | End: 2024-09-24

## 2024-09-24 RX ORDER — LIDOCAINE HYDROCHLORIDE 40 MG/ML
SOLUTION TOPICAL ONCE
OUTPATIENT
Start: 2024-09-24 | End: 2024-09-24

## 2024-09-24 RX ORDER — NEOMYCIN/BACITRACIN/POLYMYXINB 3.5-400-5K
OINTMENT (GRAM) TOPICAL ONCE
OUTPATIENT
Start: 2024-09-24 | End: 2024-09-24

## 2024-09-24 RX ORDER — SILVER SULFADIAZINE 10 MG/G
CREAM TOPICAL ONCE
OUTPATIENT
Start: 2024-09-24 | End: 2024-09-24

## 2024-09-24 RX ORDER — GINSENG 100 MG
CAPSULE ORAL ONCE
OUTPATIENT
Start: 2024-09-24 | End: 2024-09-24

## 2024-09-24 RX ORDER — LIDOCAINE 40 MG/G
CREAM TOPICAL ONCE
OUTPATIENT
Start: 2024-09-24 | End: 2024-09-24

## 2024-09-24 RX ORDER — BETAMETHASONE DIPROPIONATE 0.5 MG/G
CREAM TOPICAL ONCE
OUTPATIENT
Start: 2024-09-24 | End: 2024-09-24

## 2024-09-24 RX ORDER — BACITRACIN ZINC AND POLYMYXIN B SULFATE 500; 1000 [USP'U]/G; [USP'U]/G
OINTMENT TOPICAL ONCE
OUTPATIENT
Start: 2024-09-24 | End: 2024-09-24

## 2024-09-24 RX ORDER — CLOBETASOL PROPIONATE 0.5 MG/G
OINTMENT TOPICAL ONCE
OUTPATIENT
Start: 2024-09-24 | End: 2024-09-24

## 2024-09-24 RX ORDER — LIDOCAINE 50 MG/G
OINTMENT TOPICAL ONCE
OUTPATIENT
Start: 2024-09-24 | End: 2024-09-24

## 2024-09-24 RX ORDER — TRIAMCINOLONE ACETONIDE 1 MG/G
OINTMENT TOPICAL ONCE
OUTPATIENT
Start: 2024-09-24 | End: 2024-09-24

## 2024-09-24 RX ORDER — MUPIROCIN 20 MG/G
OINTMENT TOPICAL ONCE
OUTPATIENT
Start: 2024-09-24 | End: 2024-09-24

## 2024-09-26 LAB
BACTERIA SPEC CULT: ABNORMAL
CC UR VC: ABNORMAL
SERVICE CMNT-IMP: ABNORMAL

## 2024-09-27 DIAGNOSIS — B37.49 CANDIDAL UTI (URINARY TRACT INFECTION): Primary | ICD-10-CM

## 2024-09-27 RX ORDER — FLUCONAZOLE 100 MG/1
200 TABLET ORAL DAILY
Qty: 28 TABLET | Refills: 0 | Status: SHIPPED | OUTPATIENT
Start: 2024-09-27 | End: 2024-10-11

## 2024-10-01 ENCOUNTER — HOSPITAL ENCOUNTER (OUTPATIENT)
Facility: HOSPITAL | Age: 79
Discharge: HOME | End: 2024-10-01
Attending: PODIATRIST
Payer: MEDICARE

## 2024-10-01 VITALS
SYSTOLIC BLOOD PRESSURE: 104 MMHG | TEMPERATURE: 98.1 F | HEART RATE: 83 BPM | RESPIRATION RATE: 15 BRPM | DIASTOLIC BLOOD PRESSURE: 68 MMHG

## 2024-10-01 DIAGNOSIS — R73.03 PRE-DIABETES: ICD-10-CM

## 2024-10-01 DIAGNOSIS — R63.4 WEIGHT LOSS: ICD-10-CM

## 2024-10-01 DIAGNOSIS — I10 ESSENTIAL HYPERTENSION: ICD-10-CM

## 2024-10-01 DIAGNOSIS — L89.513 STAGE III PRESSURE ULCER OF RIGHT ANKLE (HCC): Primary | ICD-10-CM

## 2024-10-01 LAB
ALBUMIN SERPL-MCNC: 3.6 G/DL (ref 3.5–5)
ALBUMIN/GLOB SERPL: 0.9 (ref 1.1–2.2)
ALP SERPL-CCNC: 69 U/L (ref 45–117)
ALT SERPL-CCNC: 19 U/L (ref 12–78)
ANION GAP SERPL CALC-SCNC: 4 MMOL/L (ref 2–12)
AST SERPL-CCNC: 19 U/L (ref 15–37)
BILIRUB SERPL-MCNC: 0.4 MG/DL (ref 0.2–1)
BUN SERPL-MCNC: 23 MG/DL (ref 6–20)
BUN/CREAT SERPL: 23 (ref 12–20)
CALCIUM SERPL-MCNC: 9.6 MG/DL (ref 8.5–10.1)
CHLORIDE SERPL-SCNC: 99 MMOL/L (ref 97–108)
CHOLEST SERPL-MCNC: 122 MG/DL
CO2 SERPL-SCNC: 30 MMOL/L (ref 21–32)
CREAT SERPL-MCNC: 1.01 MG/DL (ref 0.55–1.02)
ERYTHROCYTE [DISTWIDTH] IN BLOOD BY AUTOMATED COUNT: 14.4 % (ref 11.5–14.5)
EST. AVERAGE GLUCOSE BLD GHB EST-MCNC: 123 MG/DL
GLOBULIN SER CALC-MCNC: 4 G/DL (ref 2–4)
GLUCOSE SERPL-MCNC: 85 MG/DL (ref 65–100)
HBA1C MFR BLD: 5.9 % (ref 4–5.6)
HCT VFR BLD AUTO: 33.9 % (ref 35–47)
HDLC SERPL-MCNC: 64 MG/DL
HDLC SERPL: 1.9 (ref 0–5)
HGB BLD-MCNC: 10.5 G/DL (ref 11.5–16)
LDLC SERPL CALC-MCNC: 42 MG/DL (ref 0–100)
MCH RBC QN AUTO: 27.4 PG (ref 26–34)
MCHC RBC AUTO-ENTMCNC: 31 G/DL (ref 30–36.5)
MCV RBC AUTO: 88.5 FL (ref 80–99)
NRBC # BLD: 0 K/UL (ref 0–0.01)
NRBC BLD-RTO: 0 PER 100 WBC
PLATELET # BLD AUTO: 298 K/UL (ref 150–400)
PMV BLD AUTO: 11 FL (ref 8.9–12.9)
POTASSIUM SERPL-SCNC: 4.4 MMOL/L (ref 3.5–5.1)
PROT SERPL-MCNC: 7.6 G/DL (ref 6.4–8.2)
RBC # BLD AUTO: 3.83 M/UL (ref 3.8–5.2)
SODIUM SERPL-SCNC: 133 MMOL/L (ref 136–145)
TRIGL SERPL-MCNC: 80 MG/DL
TSH SERPL DL<=0.05 MIU/L-ACNC: 1.84 UIU/ML (ref 0.36–3.74)
VLDLC SERPL CALC-MCNC: 16 MG/DL
WBC # BLD AUTO: 7.1 K/UL (ref 3.6–11)

## 2024-10-01 PROCEDURE — 11042 DBRDMT SUBQ TIS 1ST 20SQCM/<: CPT

## 2024-10-01 RX ORDER — LIDOCAINE HYDROCHLORIDE 20 MG/ML
JELLY TOPICAL ONCE
OUTPATIENT
Start: 2024-10-01 | End: 2024-10-01

## 2024-10-01 RX ORDER — BETAMETHASONE DIPROPIONATE 0.5 MG/G
CREAM TOPICAL ONCE
OUTPATIENT
Start: 2024-10-01 | End: 2024-10-01

## 2024-10-01 RX ORDER — SILVER SULFADIAZINE 10 MG/G
CREAM TOPICAL ONCE
OUTPATIENT
Start: 2024-10-01 | End: 2024-10-01

## 2024-10-01 RX ORDER — GINSENG 100 MG
CAPSULE ORAL ONCE
OUTPATIENT
Start: 2024-10-01 | End: 2024-10-01

## 2024-10-01 RX ORDER — LIDOCAINE 40 MG/G
CREAM TOPICAL ONCE
OUTPATIENT
Start: 2024-10-01 | End: 2024-10-01

## 2024-10-01 RX ORDER — BACITRACIN ZINC AND POLYMYXIN B SULFATE 500; 1000 [USP'U]/G; [USP'U]/G
OINTMENT TOPICAL ONCE
OUTPATIENT
Start: 2024-10-01 | End: 2024-10-01

## 2024-10-01 RX ORDER — CLOBETASOL PROPIONATE 0.5 MG/G
OINTMENT TOPICAL ONCE
OUTPATIENT
Start: 2024-10-01 | End: 2024-10-01

## 2024-10-01 RX ORDER — LIDOCAINE HYDROCHLORIDE 40 MG/ML
SOLUTION TOPICAL ONCE
OUTPATIENT
Start: 2024-10-01 | End: 2024-10-01

## 2024-10-01 RX ORDER — GENTAMICIN SULFATE 1 MG/G
OINTMENT TOPICAL ONCE
OUTPATIENT
Start: 2024-10-01 | End: 2024-10-01

## 2024-10-01 RX ORDER — NEOMYCIN/BACITRACIN/POLYMYXINB 3.5-400-5K
OINTMENT (GRAM) TOPICAL ONCE
OUTPATIENT
Start: 2024-10-01 | End: 2024-10-01

## 2024-10-01 RX ORDER — SODIUM CHLOR/HYPOCHLOROUS ACID 0.033 %
SOLUTION, IRRIGATION IRRIGATION ONCE
OUTPATIENT
Start: 2024-10-01 | End: 2024-10-01

## 2024-10-01 RX ORDER — TRIAMCINOLONE ACETONIDE 1 MG/G
OINTMENT TOPICAL ONCE
OUTPATIENT
Start: 2024-10-01 | End: 2024-10-01

## 2024-10-01 RX ORDER — LIDOCAINE 50 MG/G
OINTMENT TOPICAL ONCE
OUTPATIENT
Start: 2024-10-01 | End: 2024-10-01

## 2024-10-01 RX ORDER — MUPIROCIN 20 MG/G
OINTMENT TOPICAL ONCE
OUTPATIENT
Start: 2024-10-01 | End: 2024-10-01

## 2024-10-01 NOTE — WOUND CARE
Wound Center  Progress Note    Subjective:   Patient is for follow up of LE ulcer(s).     Patient is doing well.  No concerns are reported.  No difficulty or problems with wound care.  Wound care is being performed by staff/pt and consists of dressing changes and offloading wound(s).  No complaints of wound pain.    There have been no changes in patient's medical history in the interim.    ROS:  No fever or chills. No rash. No pain at site of wound    Objective:   General: NAD  Psych: Cooperative, no anxiety or depression  Neuro: Alert, oriented to person/place/situation. Otherwise nonfocal.  Extremities: Bilateral mild pitting edema is noted.    Skin color is normal.   Vascular exam:  No gross changes in pedal pulses.   Capillary refill is intact, <3sec.  Dermatologic:  Skin color appears normal for patient.  Skin turgor is normal. Dystrophic nails are seen on the feet bilaterally.    Ulcer Description:   Measurement: in cm pre/post debridement:  same as pre with inc depth by 0.1 cm    Wound 09/24/24 Ankle Right;Lateral #1 (Active)   Wound Image   10/01/24 0914   Wound Etiology Pressure Stage 3 10/01/24 0914   Dressing Status Old drainage noted 10/01/24 0914   Wound Cleansed Irrigated with saline 10/01/24 0914   Dressing/Treatment Honey gel/honey paste;Gauze dressing/dressing sponge;ABD;Roll gauze;Tape/Soft cloth adhesive tape 10/01/24 0940   Wound Length (cm) 2.4 cm 10/01/24 0914   Wound Width (cm) 3 cm 10/01/24 0914   Wound Depth (cm) 0.4 cm 10/01/24 0914   Wound Surface Area (cm^2) 7.2 cm^2 10/01/24 0914   Change in Wound Size % (l*w) 10 10/01/24 0914   Wound Volume (cm^3) 2.88 cm^3 10/01/24 0914   Wound Healing % 10 10/01/24 0914   Post-Procedure Length (cm) 2.4 cm 10/01/24 0931   Post-Procedure Width (cm) 3 cm 10/01/24 0931   Post-Procedure Depth (cm) 0.5 cm 10/01/24 0931   Post-Procedure Surface Area (cm^2) 7.2 cm^2 10/01/24 0931   Post-Procedure Volume (cm^3) 3.6 cm^3 10/01/24 0931   Wound Assessment

## 2024-10-01 NOTE — PATIENT INSTRUCTIONS
Discharge Instructions for  [facility]  [address] [place] [address]  Telephone: [phone]   FAX [phone]    NAME:  Rosa Green  YOB: 1945  ACCOUNT NUMBER :  [id number]  DATE: 10/1/2024     : KP BARKER RN     Home Health: [facility]    Wound Cleansing:   Do not scrub or use excessive force.  Cleanse wound prior to applying a clean dressing with:      [] Cleanse wound with: baby shampoo    [] May Shower at Discharge     [] Shower on days of dressing change, remove dressing first    [x] Keep Wound Dry in Shower (may purchase a cast cover if needed)     Topical Treatments:  Do not apply lotions, creams, or ointments to wound bed unless directed.     [x] Apply moisturizing lotion A&D ointment  to skin surrounding the wound prior to dressing change.  [] Apply antifungal ointment to skin surrounding the wound prior to dressing change.  [] Apply thin film of Zinc barrier ointment to skin immediately around wound.       Dressings:           Wound Location Right lateral ankle   Apply Primary Dressing:       [x] MediHoney Gel      [x] Other:  Gentian violet to gerri wound, may use betadine at home to gerri wound      Cover and Secure with:    [x] Gauze   [] Christian   [x] Kerlix  [] Ace Wrap     [x] ABD  [x] Medipore tape  [] tape     Avoid contact of tape with skin.    Change dressing:   [] Daily      [] Every Other Day   [x] Three times per week  [] Once a week   [] Do Not Change Dressing           Compression:  Apply:  [] Multilayer Compression Wrap Applied in Clinic: Jes 2 layer calamine standard  []Right Leg []Left Leg  [] Bilateral legs      Edema Control:   Apply every morning immediately when getting up should be applied to affected leg(s) from mid foot to knee making sure to cover the heel.  Remove every night before going to bed.    Apply: [] Compression Stocking []Right Leg []Left Leg     [] Tubigrip []Right Leg Double

## 2024-10-01 NOTE — FLOWSHEET NOTE
10/01/24 0940   Wound 09/24/24 Ankle Right;Lateral #1   Date First Assessed/Time First Assessed: 09/24/24 0823   Present on Original Admission: Yes  Wound Approximate Age at First Assessment (Weeks): 4 weeks  Primary Wound Type: Pressure Injury  Location: Ankle  Wound Location Orientation: Right;Lateral  W...   Dressing/Treatment Honey gel/honey paste;Gauze dressing/dressing sponge;ABD;Roll gauze;Tape/Soft cloth adhesive tape  (gentian violet periwound)     Discharge Condition: Stable    Pain: 0    Ambulatory Status:Walking and Rollator Walker    Discharge Destination: Home    Transportation:Car    Accompanied by: Self and Family    Discharge instructions reviewed with Self and Family and copy or written instructions have been provided. All questions/concerns have been addressed at this time.

## 2024-10-01 NOTE — FLOWSHEET NOTE
10/01/24 0914   Anesthetic   Anesthetic 4% Lidocaine Liquid Topical   Right Leg Edema Point of Measurement   Leg circumference 35 cm   Ankle circumference 21.5 cm   LLE Neurovascular Assessment   Capillary Refill Less than/Equal to 3 seconds   Color Appropriate for Ethnicity   Temperature Cool   L Pedal Pulse +2   Wound 09/24/24 Ankle Right;Lateral #1   Date First Assessed/Time First Assessed: 09/24/24 0823   Present on Original Admission: Yes  Wound Approximate Age at First Assessment (Weeks): 4 weeks  Primary Wound Type: Pressure Injury  Location: Ankle  Wound Location Orientation: Right;Lateral  W...   Wound Image    Wound Etiology Pressure Stage 3   Dressing Status Old drainage noted   Wound Cleansed Irrigated with saline   Wound Length (cm) 2.4 cm   Wound Width (cm) 3 cm   Wound Depth (cm) 0.4 cm   Wound Surface Area (cm^2) 7.2 cm^2   Change in Wound Size % (l*w) 10   Wound Volume (cm^3) 2.88 cm^3   Wound Healing % 10   Wound Assessment Slough;Pink/red;Epithelialization   Drainage Amount Moderate (25-50%)   Drainage Description Serosanguinous   Odor None   Verito-wound Assessment Maceration;Excoriated;Dry/flaky   Margins Flat/open edges   Wound Thickness Description not for Pressure Injury Full thickness     /68   Pulse 83   Temp 98.1 °F (36.7 °C) (Temporal)   Resp 15

## 2024-10-01 NOTE — PROGRESS NOTES
[facility]  [address] [place] [address]  Telephone: [phone]     FAX [phone]    NAME:  Rosa Green  YOB: 1945  DATE:  10/1/2024    Case Management Note    Wound Care Management Outside of Clinic:  [] Wound and dressing supply provider:   [] Patient/family performs own wound care   [x] Home Healthcare: [facility]  [] Dressing changes performed once a week at wound care center    Advanced/Additional Wound Treatment (If applicable):   [] Vascular Referral:   [] Plastic Surgery Referral:  [] Dermatology Referral:  [] SNAP Vac:  [] Wound Vac:  [] Skin Substitute:   [] Pressure Reducing Surfaces:  [] Wheelchair/Cushion Assessment:   [] HBO Therapy:   [] IV Antibiotics:   [] Other:    Other Notes:  []

## 2024-10-04 ENCOUNTER — TELEPHONE (OUTPATIENT)
Facility: CLINIC | Age: 79
End: 2024-10-04

## 2024-10-04 DIAGNOSIS — L89.319 PRESSURE INJURY OF SKIN OF RIGHT BUTTOCK, UNSPECIFIED INJURY STAGE: Primary | ICD-10-CM

## 2024-10-04 NOTE — TELEPHONE ENCOUNTER
Spoke with Mrs. Amaro and received the information for [facility] with the phone number [phone] and fax number [phone]. Order, demographics and notes faxed to them on 10/4/2024. Received the number to [facility] with phone number [phone] and fax number [phone]. Order faxed to the radiology department on 10/4/2024.

## 2024-10-23 ENCOUNTER — HOSPITAL ENCOUNTER (OUTPATIENT)
Facility: HOSPITAL | Age: 79
Discharge: HOME | End: 2024-10-23
Attending: PODIATRIST
Payer: MEDICARE

## 2024-10-23 VITALS
TEMPERATURE: 97.6 F | SYSTOLIC BLOOD PRESSURE: 96 MMHG | DIASTOLIC BLOOD PRESSURE: 67 MMHG | RESPIRATION RATE: 16 BRPM | HEART RATE: 94 BPM

## 2024-10-23 DIAGNOSIS — L89.513 STAGE III PRESSURE ULCER OF RIGHT ANKLE (HCC): Primary | ICD-10-CM

## 2024-10-23 DIAGNOSIS — L98.422 SACRAL ULCER, WITH FAT LAYER EXPOSED (HCC): ICD-10-CM

## 2024-10-23 PROCEDURE — 11046 DBRDMT MUSC&/FSCA EA ADDL: CPT

## 2024-10-23 PROCEDURE — 11043 DBRDMT MUSC&/FSCA 1ST 20/<: CPT

## 2024-10-23 RX ORDER — LIDOCAINE 50 MG/G
OINTMENT TOPICAL ONCE
OUTPATIENT
Start: 2024-10-23 | End: 2024-10-23

## 2024-10-23 RX ORDER — BETAMETHASONE DIPROPIONATE 0.5 MG/G
CREAM TOPICAL ONCE
OUTPATIENT
Start: 2024-10-23 | End: 2024-10-23

## 2024-10-23 RX ORDER — SILVER SULFADIAZINE 10 MG/G
CREAM TOPICAL ONCE
OUTPATIENT
Start: 2024-10-23 | End: 2024-10-23

## 2024-10-23 RX ORDER — BACITRACIN ZINC AND POLYMYXIN B SULFATE 500; 1000 [USP'U]/G; [USP'U]/G
OINTMENT TOPICAL ONCE
OUTPATIENT
Start: 2024-10-23 | End: 2024-10-23

## 2024-10-23 RX ORDER — TRIAMCINOLONE ACETONIDE 1 MG/G
OINTMENT TOPICAL ONCE
OUTPATIENT
Start: 2024-10-23 | End: 2024-10-23

## 2024-10-23 RX ORDER — LIDOCAINE 40 MG/G
CREAM TOPICAL ONCE
OUTPATIENT
Start: 2024-10-23 | End: 2024-10-23

## 2024-10-23 RX ORDER — GENTAMICIN SULFATE 1 MG/G
OINTMENT TOPICAL ONCE
OUTPATIENT
Start: 2024-10-23 | End: 2024-10-23

## 2024-10-23 RX ORDER — NEOMYCIN/BACITRACIN/POLYMYXINB 3.5-400-5K
OINTMENT (GRAM) TOPICAL ONCE
OUTPATIENT
Start: 2024-10-23 | End: 2024-10-23

## 2024-10-23 RX ORDER — LIDOCAINE HYDROCHLORIDE 20 MG/ML
JELLY TOPICAL ONCE
OUTPATIENT
Start: 2024-10-23 | End: 2024-10-23

## 2024-10-23 RX ORDER — CLOBETASOL PROPIONATE 0.5 MG/G
OINTMENT TOPICAL ONCE
OUTPATIENT
Start: 2024-10-23 | End: 2024-10-23

## 2024-10-23 RX ORDER — MUPIROCIN 20 MG/G
OINTMENT TOPICAL ONCE
OUTPATIENT
Start: 2024-10-23 | End: 2024-10-23

## 2024-10-23 RX ORDER — SODIUM CHLOR/HYPOCHLOROUS ACID 0.033 %
SOLUTION, IRRIGATION IRRIGATION ONCE
OUTPATIENT
Start: 2024-10-23 | End: 2024-10-23

## 2024-10-23 RX ORDER — GINSENG 100 MG
CAPSULE ORAL ONCE
OUTPATIENT
Start: 2024-10-23 | End: 2024-10-23

## 2024-10-23 RX ORDER — LIDOCAINE HYDROCHLORIDE 40 MG/ML
SOLUTION TOPICAL ONCE
OUTPATIENT
Start: 2024-10-23 | End: 2024-10-23

## 2024-10-23 NOTE — PROGRESS NOTES
[facility]   Progress Note and Procedure Note   NO Procedure    Patient Name: Rosa Green  : 1945  MRN: [id number]    Past Medical History:   Diagnosis Date    Asthma     Hypercholesterolemia     Hypertension     Osteoarthritis      Past Surgical History:   Procedure Laterality Date    CHOLECYSTECTOMY      COLONOSCOPY N/A 2017    COLONOSCOPY performed by Leonardo Harrison MD at [facility] ENDOSCOPY    HYSTERECTOMY (CERVIX STATUS UNKNOWN)       Family History   Problem Relation Age of Onset    No Known Problems Father     No Known Problems Mother     Diabetes Sister      Social History     Tobacco Use    Smoking status: Never    Smokeless tobacco: Never   Substance Use Topics    Alcohol use: No    Drug use: No     Allergies   Allergen Reactions    Penicillins Swelling     Current Outpatient Medications on File Prior to Encounter   Medication Sig Dispense Refill    mupirocin (BACTROBAN) 2 % ointment Apply topically daily 30 g 1    lubiprostone (AMITIZA) 24 MCG capsule Take 1 capsule by mouth 2 times daily (with meals) 180 capsule 1    tiZANidine (ZANAFLEX) 4 MG tablet TAKE 1 TABLET BY MOUTH TWICE DAILY 60 tablet 3    lisinopril-hydroCHLOROthiazide (PRINZIDE;ZESTORETIC) 20-25 MG per tablet TAKE 1 TABLET BY MOUTH DAILY 90 tablet 1    gabapentin (NEURONTIN) 300 MG capsule TAKE 1 CAPSULE BY MOUTH TWICE DAILY. MAX DAILY AMOUNT: 600  capsule 1    albuterol sulfate HFA (PROVENTIL;VENTOLIN;PROAIR) 108 (90 Base) MCG/ACT inhaler INHALE 2 PUFFS BY MOUTH EVERY 6 HOURS AS NEEDED FOR WHEEZING OR SHORTNESS OF BREATH 8.5 g 3    montelukast (SINGULAIR) 10 MG tablet TAKE 1 TABLET BY MOUTH DAILY FOR ALLERGIES 90 tablet 1    potassium chloride (KLOR-CON M) 10 MEQ extended release tablet TAKE 1 TABLET BY MOUTH DAILY 90 tablet 1    simvastatin (ZOCOR) 10 MG tablet TAKE 1 TABLET BY MOUTH EVERY NIGHT AT BEDTIME 90 tablet 1    Cholecalciferol 50 MCG ( UT) TABS Take by mouth daily      oxyCODONE HCl

## 2024-10-23 NOTE — PATIENT INSTRUCTIONS
Discharge Instructions for  [facility]  [address] [place] [address]  Telephone: [phone]   FAX [phone]    NAME:  Rosa Green  YOB: 1945  ACCOUNT NUMBER :  [id number]  DATE:  10/23/2024       :SUHAS BARKER RN     HOME HEALTH: [facility]  for skilled nursing and PT/OT to assess patients bed for gel overlay mattress.       Wound Cleansing:   Do not scrub or use excessive force.  Cleanse wound prior to applying a clean dressing with:      [x] Cleanse wound with: Vashe cleanser     [] May Shower at Discharge     [] Shower on days of dressing change, remove dressing first    [] Keep Wound Dry in Shower (may purchase a cast cover if needed)      Dressings:           Wound Location : Buttocks   Apply Primary Dressing: Skin Prep        [] MediHoney Gel    [] MediHoney Alginate  [] Alginate     [x] Alginate with Silver       [] Collagen   [] Collagen with Silver     [] Hydrocolloid  [] Hydrofera Blue Classic (moisten with saline)  [] Hydrofera Blue Ready  [] Other:    [] Pack wound loosely with      Cover and Secure with:    [] Gauze   [] Christian   [] Kerlix  [] Ace Wrap     [] ABD  [] Medipore tape  [] tape  [x] Other: Zetuvit Sacral silicone boarder   Avoid contact of tape with skin.    Change dressing:   [x] Daily      [] Every Other Day   [] Three times per week  [] Once a week   [] Do Not Change Dressing     [] Other:      Pressure Relief:  [x] When sitting, shift position or do seat lifts every 15 minutes.  [] Wheelchair cushion   [x] Specialty Bed/Mattress  [x] Turn every 2 hours when in bed.  Avoid position directing pressure on wound site.  Limit side lying to 30 degree tilt.  Limit HOB elevation to 30 degrees.     Dietary:  [] Increase Protein: examples ( Meat, cheese, eggs, greek yogurt, premier protein drink, fish, nuts )   [x] Corey  [] Protien drink supplement   [] Recommended Supplements :    Activity:  Activity as tolerated:

## 2024-10-23 NOTE — FLOWSHEET NOTE
10/23/24 1125   Anesthetic   Anesthetic 4% Lidocaine Liquid Topical   Wound 10/23/24 Sacrum #1   Date First Assessed/Time First Assessed: 10/23/24 1123   Present on Original Admission: Yes  Location: Sacrum  Wound Description (Comments): #1   Wound Image    Wound Cleansed Cleansed with saline   Wound Length (cm) 7 cm   Wound Width (cm) 7 cm   Wound Depth (cm) 0.1 cm   Wound Surface Area (cm^2) 49 cm^2   Wound Volume (cm^3) 4.9 cm^3   Wound Assessment [place]/red;Slough   Drainage Amount Moderate (25-50%)   Drainage Description Serosanguinous   Odor None   Verito-wound Assessment Maceration   Margins Flat/open edges;Undefined edges   Wound Thickness Description not for Pressure Injury Full thickness   Pain Assessment   Pain Assessment None - Denies Pain     BP 96/67   Pulse 94   Temp 97.6 °F (36.4 °C) (Temporal)   Resp 16

## 2024-10-30 ENCOUNTER — HOSPITAL ENCOUNTER (OUTPATIENT)
Facility: HOSPITAL | Age: 79
Discharge: HOME | End: 2024-10-30
Attending: PODIATRIST
Payer: MEDICARE

## 2024-10-30 VITALS
HEART RATE: 77 BPM | TEMPERATURE: 97.8 F | DIASTOLIC BLOOD PRESSURE: 66 MMHG | SYSTOLIC BLOOD PRESSURE: 130 MMHG | RESPIRATION RATE: 16 BRPM

## 2024-10-30 DIAGNOSIS — L89.513 STAGE III PRESSURE ULCER OF RIGHT ANKLE (HCC): Primary | ICD-10-CM

## 2024-10-30 PROCEDURE — 11045 DBRDMT SUBQ TISS EACH ADDL: CPT

## 2024-10-30 PROCEDURE — 11042 DBRDMT SUBQ TIS 1ST 20SQCM/<: CPT

## 2024-10-30 RX ORDER — GINSENG 100 MG
CAPSULE ORAL ONCE
OUTPATIENT
Start: 2024-10-30 | End: 2024-10-30

## 2024-10-30 RX ORDER — LIDOCAINE 50 MG/G
OINTMENT TOPICAL ONCE
OUTPATIENT
Start: 2024-10-30 | End: 2024-10-30

## 2024-10-30 RX ORDER — LIDOCAINE HYDROCHLORIDE 20 MG/ML
JELLY TOPICAL ONCE
OUTPATIENT
Start: 2024-10-30 | End: 2024-10-30

## 2024-10-30 RX ORDER — BETAMETHASONE DIPROPIONATE 0.5 MG/G
CREAM TOPICAL ONCE
OUTPATIENT
Start: 2024-10-30 | End: 2024-10-30

## 2024-10-30 RX ORDER — LIDOCAINE HYDROCHLORIDE 40 MG/ML
SOLUTION TOPICAL ONCE
OUTPATIENT
Start: 2024-10-30 | End: 2024-10-30

## 2024-10-30 RX ORDER — CLOBETASOL PROPIONATE 0.5 MG/G
OINTMENT TOPICAL ONCE
OUTPATIENT
Start: 2024-10-30 | End: 2024-10-30

## 2024-10-30 RX ORDER — TRIAMCINOLONE ACETONIDE 1 MG/G
OINTMENT TOPICAL ONCE
OUTPATIENT
Start: 2024-10-30 | End: 2024-10-30

## 2024-10-30 RX ORDER — GENTAMICIN SULFATE 1 MG/G
OINTMENT TOPICAL ONCE
OUTPATIENT
Start: 2024-10-30 | End: 2024-10-30

## 2024-10-30 RX ORDER — SODIUM CHLOR/HYPOCHLOROUS ACID 0.033 %
SOLUTION, IRRIGATION IRRIGATION ONCE
OUTPATIENT
Start: 2024-10-30 | End: 2024-10-30

## 2024-10-30 RX ORDER — NEOMYCIN/BACITRACIN/POLYMYXINB 3.5-400-5K
OINTMENT (GRAM) TOPICAL ONCE
OUTPATIENT
Start: 2024-10-30 | End: 2024-10-30

## 2024-10-30 RX ORDER — MUPIROCIN 20 MG/G
OINTMENT TOPICAL ONCE
OUTPATIENT
Start: 2024-10-30 | End: 2024-10-30

## 2024-10-30 RX ORDER — LIDOCAINE 40 MG/G
CREAM TOPICAL ONCE
OUTPATIENT
Start: 2024-10-30 | End: 2024-10-30

## 2024-10-30 RX ORDER — BACITRACIN ZINC AND POLYMYXIN B SULFATE 500; 1000 [USP'U]/G; [USP'U]/G
OINTMENT TOPICAL ONCE
OUTPATIENT
Start: 2024-10-30 | End: 2024-10-30

## 2024-10-30 RX ORDER — SILVER SULFADIAZINE 10 MG/G
CREAM TOPICAL ONCE
OUTPATIENT
Start: 2024-10-30 | End: 2024-10-30

## 2024-10-30 NOTE — PATIENT INSTRUCTIONS
Discharge Instructions for  [facility]  [address] [place] [address]  Telephone: [phone]   FAX [phone]    NAME:  Rosa Green  YOB: 1945  ACCOUNT NUMBER :  [id number]  DATE:  10/30/2024       :SUHAS BARKER RN     HOME HEALTH: [facility]  for skilled nursing and PT/OT to assess patients bed for gel overlay mattress, please send update on overlay progress.     ROHO: May get off [employer]       Wound Cleansing:   Do not scrub or use excessive force.  Cleanse wound prior to applying a clean dressing with:      [x] Cleanse wound with: Vashe cleanser  rinse with saline or wound cleanser after Vashe sits 1-2 min    [] May Shower at Discharge     [x] Shower on days of dressing change, remove dressing first    [] Keep Wound Dry in Shower (may purchase a cast cover if needed)      Dressings:           Wound Location : Buttocks   Apply Primary Dressing: Skin Prep        [] MediHoney Gel    [] MediHoney Alginate  [] Alginate     [] Alginate with Silver       [] Collagen   [x] Collagen with Silver  1st small amount then cover with Alginate with Silver     [] Hydrocolloid  [] Hydrofera Blue Classic (moisten with saline)  [] Hydrofera Blue Ready  [] Other:    [] Pack wound loosely with      Cover and Secure with:    [] Gauze   [] Christian   [] Kerlix  [] Ace Wrap     [] ABD  [] Medipore tape  [] tape  [x] Other: Zetuvit Sacral silicone boarder   Avoid contact of tape with skin.    Change dressing:   [] Daily      [x] Every Other Day   [] Three times per week  [] Once a week   [] Do Not Change Dressing     [] Other:      Pressure Relief:  [x] When sitting, shift position or do seat lifts every 15 minutes.  [] Wheelchair cushion   [x] Specialty Bed/Mattress  [x] Turn every 2 hours when in bed.  Avoid position directing pressure on wound site.  Limit side lying to 30 degree tilt.  Limit HOB elevation to 30 degrees.     Dietary:  [] Increase Protein:

## 2024-10-30 NOTE — FLOWSHEET NOTE
10/30/24 0902   Anesthetic   Anesthetic 4% Lidocaine Liquid Topical   Wound 10/23/24 Sacrum #1   Date First Assessed/Time First Assessed: 10/23/24 1123   Present on Original Admission: Yes  Location: Sacrum  Wound Description (Comments): #1   Wound Image    Wound Cleansed Cleansed with saline   Wound Length (cm) 6 cm   Wound Width (cm) 6.5 cm   Wound Depth (cm) 0.2 cm   Wound Surface Area (cm^2) 39 cm^2   Change in Wound Size % (l*w) 20.41   Wound Volume (cm^3) 7.8 cm^3   Wound Healing % -59   Wound Assessment [place]/red;Slough   Drainage Amount Moderate (25-50%)   Drainage Description Serosanguinous   Odor None   Verito-wound Assessment Maceration;Blanchable erythema   Margins Flat/open edges;Epibole (rolled edges)   Wound Thickness Description not for Pressure Injury Full thickness     /66   Pulse 77   Temp 97.8 °F (36.6 °C) (Temporal)   Resp 16

## 2024-10-30 NOTE — PROGRESS NOTES
sacrum ulcer, improved depth, thin slough over all open surface.    Procedure:   Ulcer Type: diabetic and pressure  Consent obtained: Yes  Time out taken: Yes    Wound,(s)# sacral ulcer.  Procedure name: sharp excisional debridement.  Anaesthesia: Lidocaine; topical    Description: using a sharp curette I excised all non viable tissue to effect a clean bleeding base.  Tissue Level/depth of debridement: subq.  Post debridement dimensions changed as noted:    Depth, add 1.0 mm;    Width, add 0.0 mm   Length, add 0.0 mm.   Blood Loss: 2 CCs. Bleeding abated post treatment .   Post Procedure Condition/ Diagnosis: some progress, necrotic surfaces not extended.  Follow up and Future plans today: RTC 1 week, add gifty.    Specimens: .  Patient Counseled regarding/Discussed: as above, progress, add collagen will eval.  Clinical Considerations: alert to infection avoid trauma, best off loading.    Dx Codes: L98.422.    Antonio Parish MD  10/30/2024

## 2024-10-30 NOTE — FLOWSHEET NOTE
10/30/24 0946   Wound 10/23/24 Sacrum #1   Date First Assessed/Time First Assessed: 10/23/24 1123   Present on Original Admission: Yes  Location: Sacrum  Wound Description (Comments): #1   Wound Cleansed Vashe   Dressing/Treatment Collagen with Ag;Alginate with Ag;Silicone border     Discharge Condition: Stable    Pain: 0    Ambulatory Status:Walking and Rollator Walker    Discharge Destination: Home    Transportation:Car    Accompanied by: Self and Family    Discharge instructions reviewed with Self and Family and copy or written instructions have been provided. All questions/concerns have been addressed at this time.

## 2024-11-05 RX ORDER — MONTELUKAST SODIUM 10 MG/1
TABLET ORAL
Qty: 90 TABLET | Refills: 1 | Status: SHIPPED | OUTPATIENT
Start: 2024-11-05

## 2024-11-06 ENCOUNTER — HOSPITAL ENCOUNTER (OUTPATIENT)
Facility: HOSPITAL | Age: 79
Discharge: HOME | End: 2024-11-06
Attending: PODIATRIST
Payer: MEDICARE

## 2024-11-06 VITALS
TEMPERATURE: 96.8 F | DIASTOLIC BLOOD PRESSURE: 68 MMHG | RESPIRATION RATE: 17 BRPM | SYSTOLIC BLOOD PRESSURE: 121 MMHG | HEART RATE: 106 BPM

## 2024-11-06 DIAGNOSIS — L89.513 STAGE III PRESSURE ULCER OF RIGHT ANKLE (HCC): Primary | ICD-10-CM

## 2024-11-06 PROCEDURE — 11042 DBRDMT SUBQ TIS 1ST 20SQCM/<: CPT

## 2024-11-06 PROCEDURE — 11043 DBRDMT MUSC&/FSCA 1ST 20/<: CPT

## 2024-11-06 PROCEDURE — 11045 DBRDMT SUBQ TISS EACH ADDL: CPT

## 2024-11-06 RX ORDER — LIDOCAINE HYDROCHLORIDE 20 MG/ML
JELLY TOPICAL ONCE
OUTPATIENT
Start: 2024-11-06 | End: 2024-11-06

## 2024-11-06 RX ORDER — POTASSIUM CHLORIDE 750 MG/1
10 TABLET, EXTENDED RELEASE ORAL DAILY
Qty: 90 TABLET | Refills: 1 | Status: SHIPPED | OUTPATIENT
Start: 2024-11-06

## 2024-11-06 RX ORDER — LIDOCAINE 40 MG/G
CREAM TOPICAL ONCE
OUTPATIENT
Start: 2024-11-06 | End: 2024-11-06

## 2024-11-06 RX ORDER — GINSENG 100 MG
CAPSULE ORAL ONCE
OUTPATIENT
Start: 2024-11-06 | End: 2024-11-06

## 2024-11-06 RX ORDER — BETAMETHASONE DIPROPIONATE 0.5 MG/G
CREAM TOPICAL ONCE
OUTPATIENT
Start: 2024-11-06 | End: 2024-11-06

## 2024-11-06 RX ORDER — GENTAMICIN SULFATE 1 MG/G
OINTMENT TOPICAL ONCE
OUTPATIENT
Start: 2024-11-06 | End: 2024-11-06

## 2024-11-06 RX ORDER — SODIUM CHLOR/HYPOCHLOROUS ACID 0.033 %
SOLUTION, IRRIGATION IRRIGATION ONCE
OUTPATIENT
Start: 2024-11-06 | End: 2024-11-06

## 2024-11-06 RX ORDER — SILVER SULFADIAZINE 10 MG/G
CREAM TOPICAL ONCE
OUTPATIENT
Start: 2024-11-06 | End: 2024-11-06

## 2024-11-06 RX ORDER — LIDOCAINE HYDROCHLORIDE 40 MG/ML
SOLUTION TOPICAL ONCE
OUTPATIENT
Start: 2024-11-06 | End: 2024-11-06

## 2024-11-06 RX ORDER — TRIAMCINOLONE ACETONIDE 1 MG/G
OINTMENT TOPICAL ONCE
OUTPATIENT
Start: 2024-11-06 | End: 2024-11-06

## 2024-11-06 RX ORDER — MUPIROCIN 20 MG/G
OINTMENT TOPICAL ONCE
OUTPATIENT
Start: 2024-11-06 | End: 2024-11-06

## 2024-11-06 RX ORDER — LIDOCAINE 50 MG/G
OINTMENT TOPICAL ONCE
OUTPATIENT
Start: 2024-11-06 | End: 2024-11-06

## 2024-11-06 RX ORDER — CLOBETASOL PROPIONATE 0.5 MG/G
OINTMENT TOPICAL ONCE
OUTPATIENT
Start: 2024-11-06 | End: 2024-11-06

## 2024-11-06 RX ORDER — NEOMYCIN/BACITRACIN/POLYMYXINB 3.5-400-5K
OINTMENT (GRAM) TOPICAL ONCE
OUTPATIENT
Start: 2024-11-06 | End: 2024-11-06

## 2024-11-06 RX ORDER — BACITRACIN ZINC AND POLYMYXIN B SULFATE 500; 1000 [USP'U]/G; [USP'U]/G
OINTMENT TOPICAL ONCE
OUTPATIENT
Start: 2024-11-06 | End: 2024-11-06

## 2024-11-06 NOTE — PROGRESS NOTES
.  Lesion/Wound, focused exam on Presentation today: sacral ulcer little changed, wet, thin slough over all open surface.    Procedure:   Ulcer Type: pressure  Consent obtained: Yes  Time out taken: Yes    Wound,(s)# sacral ulcer.  Procedure name: sharp excisional debridement.  Anaesthesia: Lidocaine; topical    Description: using a sharp curette I excised all non viable tissue to effect a clean bleeding base.  Tissue Level/depth of debridement: subq.  Post debridement dimensions changed as noted:    Depth, add 1.0 mm;    Width, add 0.0 mm   Length, add 0.0 mm.   Blood Loss: 2 CCs. Bleeding abated post treatment .   Post Procedure Condition/ Diagnosis: no pain, wound staying wet.  Follow up and Future plans today: hold gifty, regular dressing, add chair mapping for producing a cushion.    Specimens: 0.  Patient Counseled regarding/Discussed: as above, .  Clinical Considerations: alert to infection avoid shear.    Dx Codes: L98.422.    Antonio Parish MD  11/6/2024

## 2024-11-06 NOTE — FLOWSHEET NOTE
11/06/24 0925   Wound 10/23/24 Sacrum #1   Date First Assessed/Time First Assessed: 10/23/24 1123   Present on Original Admission: Yes  Location: Sacrum  Wound Description (Comments): #1   Wound Cleansed Vashe   Dressing/Treatment Alginate with Ag;Gauze dressing/dressing sponge;Silicone border     Discharge Condition: Stable    Pain: 0    Ambulatory Status:Walking and Rollator Walker    Discharge Destination: Home    Transportation:Car    Accompanied by: Self and Family    Discharge instructions reviewed with Self and Family and copy or written instructions have been provided. All questions/concerns have been addressed at this time.

## 2024-11-06 NOTE — FLOWSHEET NOTE
11/06/24 0856   Wound 10/23/24 Sacrum #1   Date First Assessed/Time First Assessed: 10/23/24 1123   Present on Original Admission: Yes  Location: Sacrum  Wound Description (Comments): #1   Wound Image    Wound Etiology Pressure Stage 3   Dressing Status Old drainage noted   Wound Length (cm) 6 cm   Wound Width (cm) 6.5 cm   Wound Depth (cm) 0.3 cm   Wound Surface Area (cm^2) 39 cm^2   Change in Wound Size % (l*w) 20.41   Wound Volume (cm^3) 11.7 cm^3   Wound Healing % -139   Wound Assessment [place]/red;Slough   Drainage Amount Moderate (25-50%)   Drainage Description Serosanguinous   Odor Mild   Verito-wound Assessment Maceration;Blanchable erythema   Margins Flat/open edges   Wound Thickness Description not for Pressure Injury Full thickness     /68   Pulse (!) 106   Temp 96.8 °F (36 °C) (Temporal)   Resp 17

## 2024-11-06 NOTE — PATIENT INSTRUCTIONS
drink, fish, nuts )   [x] Corey  [] Protien drink supplement   [] Recommended Supplements :    Activity:  Activity as tolerated:    [x] Patient has no activity restrictions       [] Strict Bedrest:   [] Remain off Work:     [] Return to work with restrictions:    [] May return to full duty work:                                              Return Appointment:    [x] Return Appointment: With Dr. Antonio Parish  in 1 Week(s)          Wound Care Center Information: Should you experience any significant changes in your wound(s) or have questions about your wound care, please contact the [facility] at MONDAY-THURSDAY 8:00 am - 4:30 AND Friday 8:00 AM- 12:00 PM .  If you need help with your wound outside these hours and cannot wait until we are again available, contact your PCP or go to the hospital emergency room.     PLEASE NOTE: IF YOU ARE UNABLE TO OBTAIN WOUND SUPPLIES, CONTINUE TO USE THE SUPPLIES YOU HAVE AVAILABLE UNTIL YOU ARE ABLE TO REACH US. IT IS MOST IMPORTANT TO KEEP THE WOUND COVERED AT ALL TIMES.     Physician Signature:_______________________ Dr. Antonio Parish

## 2024-11-13 ENCOUNTER — HOSPITAL ENCOUNTER (OUTPATIENT)
Facility: HOSPITAL | Age: 79
Discharge: HOME | End: 2024-11-13
Attending: PODIATRIST
Payer: MEDICARE

## 2024-11-13 VITALS
HEART RATE: 82 BPM | TEMPERATURE: 96.9 F | DIASTOLIC BLOOD PRESSURE: 70 MMHG | RESPIRATION RATE: 14 BRPM | SYSTOLIC BLOOD PRESSURE: 103 MMHG

## 2024-11-13 DIAGNOSIS — L89.513 STAGE III PRESSURE ULCER OF RIGHT ANKLE (HCC): Primary | ICD-10-CM

## 2024-11-13 PROBLEM — L98.423 SACRAL ULCER, WITH NECROSIS OF MUSCLE (HCC): Status: ACTIVE | Noted: 2024-10-23

## 2024-11-13 PROCEDURE — 87185 SC STD ENZYME DETCJ PER NZM: CPT

## 2024-11-13 PROCEDURE — 87147 CULTURE TYPE IMMUNOLOGIC: CPT

## 2024-11-13 PROCEDURE — 87205 SMEAR GRAM STAIN: CPT

## 2024-11-13 PROCEDURE — 11043 DBRDMT MUSC&/FSCA 1ST 20/<: CPT

## 2024-11-13 PROCEDURE — 87070 CULTURE OTHR SPECIMN AEROBIC: CPT

## 2024-11-13 PROCEDURE — 11046 DBRDMT MUSC&/FSCA EA ADDL: CPT

## 2024-11-13 PROCEDURE — 87076 CULTURE ANAEROBE IDENT EACH: CPT

## 2024-11-13 RX ORDER — LIDOCAINE 40 MG/G
CREAM TOPICAL ONCE
OUTPATIENT
Start: 2024-11-13 | End: 2024-11-13

## 2024-11-13 RX ORDER — CLOBETASOL PROPIONATE 0.5 MG/G
OINTMENT TOPICAL ONCE
OUTPATIENT
Start: 2024-11-13 | End: 2024-11-13

## 2024-11-13 RX ORDER — LIDOCAINE HYDROCHLORIDE 20 MG/ML
JELLY TOPICAL ONCE
OUTPATIENT
Start: 2024-11-13 | End: 2024-11-13

## 2024-11-13 RX ORDER — SILVER SULFADIAZINE 10 MG/G
CREAM TOPICAL ONCE
OUTPATIENT
Start: 2024-11-13 | End: 2024-11-13

## 2024-11-13 RX ORDER — BACITRACIN ZINC AND POLYMYXIN B SULFATE 500; 1000 [USP'U]/G; [USP'U]/G
OINTMENT TOPICAL ONCE
OUTPATIENT
Start: 2024-11-13 | End: 2024-11-13

## 2024-11-13 RX ORDER — LIDOCAINE HYDROCHLORIDE 40 MG/ML
SOLUTION TOPICAL ONCE
OUTPATIENT
Start: 2024-11-13 | End: 2024-11-13

## 2024-11-13 RX ORDER — MUPIROCIN 20 MG/G
OINTMENT TOPICAL ONCE
OUTPATIENT
Start: 2024-11-13 | End: 2024-11-13

## 2024-11-13 RX ORDER — TRIAMCINOLONE ACETONIDE 1 MG/G
OINTMENT TOPICAL ONCE
OUTPATIENT
Start: 2024-11-13 | End: 2024-11-13

## 2024-11-13 RX ORDER — BETAMETHASONE DIPROPIONATE 0.5 MG/G
CREAM TOPICAL ONCE
OUTPATIENT
Start: 2024-11-13 | End: 2024-11-13

## 2024-11-13 RX ORDER — GINSENG 100 MG
CAPSULE ORAL ONCE
OUTPATIENT
Start: 2024-11-13 | End: 2024-11-13

## 2024-11-13 RX ORDER — SODIUM CHLOR/HYPOCHLOROUS ACID 0.033 %
SOLUTION, IRRIGATION IRRIGATION ONCE
OUTPATIENT
Start: 2024-11-13 | End: 2024-11-13

## 2024-11-13 RX ORDER — LIDOCAINE 50 MG/G
OINTMENT TOPICAL ONCE
OUTPATIENT
Start: 2024-11-13 | End: 2024-11-13

## 2024-11-13 RX ORDER — NEOMYCIN/BACITRACIN/POLYMYXINB 3.5-400-5K
OINTMENT (GRAM) TOPICAL ONCE
OUTPATIENT
Start: 2024-11-13 | End: 2024-11-13

## 2024-11-13 RX ORDER — GENTAMICIN SULFATE 1 MG/G
OINTMENT TOPICAL ONCE
OUTPATIENT
Start: 2024-11-13 | End: 2024-11-13

## 2024-11-13 NOTE — PATIENT INSTRUCTIONS
Discharge Instructions for  [facility]  [address] [place] [address]  Telephone: [phone]   FAX [phone]    NAME:  Rosa Green  YOB: 1945  ACCOUNT NUMBER :  [id number]  DATE:  11/13/2024       :SUHAS BARKER RN     HOME HEALTH: [facility]  for skilled nursing and PT/OT to assess patients bed for gel overlay mattress, please send update on overlay progress.     [employer] for pressure mapping : Phone: [phone]     ROHO: May get off [employer]       Wound Cleansing:   Do not scrub or use excessive force.  Cleanse wound prior to applying a clean dressing with:      [x] Cleanse wound with: Vashe cleanser  rinse with saline or wound cleanser after Vashe sits 1-2 min    [] May Shower at Discharge     [x] Shower on days of dressing change, remove dressing first    [] Keep Wound Dry in Shower (may purchase a cast cover if needed)      Dressings:           Wound Location : Buttocks   Apply Primary Dressing: Skin Prep        [] MediHoney Gel    [] MediHoney Alginate  [] Alginate     [] Alginate with Silver       [] Collagen   [x] Apolonia AG to wound base and undermining then cover with Alginate with Silver     [] Hydrocolloid  [] Hydrofera Blue Classic (moisten with saline)  [] Hydrofera Blue Ready  [] Other:    [] Pack wound loosely with      Cover and Secure with:    [x] Gauze   [] Christian   [] Kerlix  [] Ace Wrap     [] ABD  [] Medipore tape  [] tape  [x] Other: Zetuvit Sacral silicone boarder   Avoid contact of tape with skin.    Change dressing:   [] Daily      [x] Every Other Day   [] Three times per week  [] Once a week   [] Do Not Change Dressing     [] Other:    Negative Pressure:           Wound Location: Sacrum please start on arrival     [x] Pressure@125 mm/Hg [x]Continuous     []Intermittent     [x] Collagen AG then cover with Black  [] White Foam [] Other:     [x]Change dressing: []Two times per week     [x]Three times per week

## 2024-11-13 NOTE — FLOWSHEET NOTE
11/13/24 0848   Anesthetic   Anesthetic 4% Lidocaine Liquid Topical   Wound 10/23/24 Sacrum #1   Date First Assessed/Time First Assessed: 10/23/24 1123   Present on Original Admission: Yes  Location: Sacrum  Wound Description (Comments): #1   Wound Image    Wound Cleansed Cleansed with saline   Wound Length (cm) 6.5 cm   Wound Width (cm) 6.2 cm   Wound Depth (cm) 0.2 cm   Wound Surface Area (cm^2) 40.3 cm^2   Change in Wound Size % (l*w) 17.76   Wound Volume (cm^3) 8.06 cm^3   Wound Healing % -64   Wound Assessment Pink/red;Slough;Purple/maroon   Drainage Amount Moderate (25-50%)   Drainage Description Serosanguinous   Odor Moderate   Verito-wound Assessment Maceration   Margins Flat/open edges;Epibole (rolled edges)   Wound Thickness Description not for Pressure Injury Full thickness     /70   Pulse 82   Temp 96.9 °F (36.1 °C) (Temporal)   Resp 14

## 2024-11-13 NOTE — PROGRESS NOTES
[facility]   Progress Note and Procedure Note   NO Procedure    Patient Name: Rosa Green  : 1945  MRN: [id number]    Past Medical History:   Diagnosis Date    Asthma     Hypercholesterolemia     Hypertension     Osteoarthritis      Past Surgical History:   Procedure Laterality Date    CHOLECYSTECTOMY      COLONOSCOPY N/A 2017    COLONOSCOPY performed by Leonardo [facility]    HYSTERECTOMY (CERVIX STATUS UNKNOWN)       Family History   Problem Relation Age of Onset    No Known Problems Father     No Known Problems Mother     Diabetes Sister      Social History     Tobacco Use    Smoking status: Never    Smokeless tobacco: Never   Substance Use Topics    Alcohol use: No    Drug use: No     Allergies   Allergen Reactions    Penicillins Swelling     Current Outpatient Medications on File Prior to Encounter   Medication Sig Dispense Refill    potassium chloride (KLOR-CON M) 10 MEQ extended release tablet TAKE 1 TABLET BY MOUTH DAILY 90 tablet 1    montelukast (SINGULAIR) 10 MG tablet TAKE 1 TABLET BY MOUTH DAILY FOR ALLERGIES 90 tablet 1    mupirocin (BACTROBAN) 2 % ointment Apply topically daily 30 g 1    lubiprostone (AMITIZA) 24 MCG capsule Take 1 capsule by mouth 2 times daily (with meals) 180 capsule 1    tiZANidine (ZANAFLEX) 4 MG tablet TAKE 1 TABLET BY MOUTH TWICE DAILY 60 tablet 3    lisinopril-hydroCHLOROthiazide (PRINZIDE;ZESTORETIC) 20-25 MG per tablet TAKE 1 TABLET BY MOUTH DAILY 90 tablet 1    gabapentin (NEURONTIN) 300 MG capsule TAKE 1 CAPSULE BY MOUTH TWICE DAILY. MAX DAILY AMOUNT: 600  capsule 1    albuterol sulfate HFA (PROVENTIL;VENTOLIN;PROAIR) 108 (90 Base) MCG/ACT inhaler INHALE 2 PUFFS BY MOUTH EVERY 6 HOURS AS NEEDED FOR WHEEZING OR SHORTNESS OF BREATH 8.5 g 3    simvastatin (ZOCOR) 10 MG tablet TAKE 1 TABLET BY MOUTH EVERY NIGHT AT BEDTIME 90 tablet 1    Cholecalciferol 50 MCG ( UT) TABS Take by mouth daily      oxyCODONE HCl

## 2024-11-13 NOTE — FLOWSHEET NOTE
11/13/24 0947   Wound 10/23/24 Sacrum #1   Date First Assessed/Time First Assessed: 10/23/24 1123   Present on Original Admission: Yes  Location: Sacrum  Wound Description (Comments): #1   Dressing/Treatment Collagen with Ag;Alginate with Ag;Gauze dressing/dressing sponge;Silicone border     Discharge Condition: Stable    Pain: 2    Ambulatory Status:Walking and Rollator Walker    Discharge Destination: Home    Transportation:Car    Accompanied by: Self and Family    Discharge instructions reviewed with Self and Family and copy or written instructions have been provided. All questions/concerns have been addressed at this time.

## 2024-11-15 LAB
BACTERIA SPEC CULT: ABNORMAL
GRAM STN SPEC: ABNORMAL
GRAM STN SPEC: ABNORMAL
SERVICE CMNT-IMP: ABNORMAL

## 2024-11-19 ENCOUNTER — APPOINTMENT (OUTPATIENT)
Facility: HOSPITAL | Age: 79
End: 2024-11-19
Payer: MEDICARE

## 2024-11-19 ENCOUNTER — HOSPITAL ENCOUNTER (EMERGENCY)
Facility: HOSPITAL | Age: 79
Discharge: ANOTHER HOSPITAL | End: 2024-11-19
Attending: STUDENT IN AN ORGANIZED HEALTH CARE EDUCATION/TRAINING PROGRAM
Payer: MEDICARE

## 2024-11-19 ENCOUNTER — HOSPITAL ENCOUNTER (OUTPATIENT)
Facility: HOSPITAL | Age: 79
Discharge: HOME | End: 2024-11-19
Attending: PODIATRIST
Payer: MEDICARE

## 2024-11-19 VITALS
DIASTOLIC BLOOD PRESSURE: 71 MMHG | HEART RATE: 87 BPM | RESPIRATION RATE: 16 BRPM | SYSTOLIC BLOOD PRESSURE: 91 MMHG | TEMPERATURE: 97.3 F

## 2024-11-19 VITALS
OXYGEN SATURATION: 98 % | TEMPERATURE: 97.4 F | SYSTOLIC BLOOD PRESSURE: 107 MMHG | HEART RATE: 76 BPM | RESPIRATION RATE: 17 BRPM | WEIGHT: 154 LBS | DIASTOLIC BLOOD PRESSURE: 62 MMHG | HEIGHT: 63 IN | BODY MASS INDEX: 27.29 KG/M2

## 2024-11-19 DIAGNOSIS — K59.04 CHRONIC IDIOPATHIC CONSTIPATION: ICD-10-CM

## 2024-11-19 DIAGNOSIS — L89.154 PRESSURE INJURY OF SACRAL REGION, STAGE 4 (HCC): Primary | ICD-10-CM

## 2024-11-19 DIAGNOSIS — N17.9 AKI (ACUTE KIDNEY INJURY) (HCC): ICD-10-CM

## 2024-11-19 DIAGNOSIS — L89.94 INFECTED PRESSURE ULCER, STAGE IV (HCC): ICD-10-CM

## 2024-11-19 DIAGNOSIS — L08.9 INFECTED PRESSURE ULCER, STAGE IV (HCC): ICD-10-CM

## 2024-11-19 DIAGNOSIS — E87.6 HYPOKALEMIA: ICD-10-CM

## 2024-11-19 DIAGNOSIS — L89.513 STAGE III PRESSURE ULCER OF RIGHT ANKLE (HCC): Primary | ICD-10-CM

## 2024-11-19 LAB
ALBUMIN SERPL-MCNC: 2.8 G/DL (ref 3.5–5)
ALBUMIN/GLOB SERPL: 0.6 (ref 1.1–2.2)
ALP SERPL-CCNC: 111 U/L (ref 45–117)
ALT SERPL-CCNC: 18 U/L (ref 12–78)
ANION GAP SERPL CALC-SCNC: 12 MMOL/L (ref 2–12)
AST SERPL-CCNC: 24 U/L (ref 15–37)
BILIRUB SERPL-MCNC: 0.6 MG/DL (ref 0.2–1)
BUN SERPL-MCNC: 49 MG/DL (ref 6–20)
BUN/CREAT SERPL: 33 (ref 12–20)
CALCIUM SERPL-MCNC: 9.1 MG/DL (ref 8.5–10.1)
CHLORIDE SERPL-SCNC: 97 MMOL/L (ref 97–108)
CO2 SERPL-SCNC: 27 MMOL/L (ref 21–32)
CREAT SERPL-MCNC: 1.47 MG/DL (ref 0.55–1.02)
ERYTHROCYTE [DISTWIDTH] IN BLOOD BY AUTOMATED COUNT: 13.8 % (ref 11.5–14.5)
GLOBULIN SER CALC-MCNC: 4.8 G/DL (ref 2–4)
GLUCOSE SERPL-MCNC: 92 MG/DL (ref 65–100)
HCT VFR BLD AUTO: 31.7 % (ref 35–47)
HGB BLD-MCNC: 10.4 G/DL (ref 11.5–16)
LACTATE SERPL-SCNC: 1.1 MMOL/L (ref 0.4–2)
MCH RBC QN AUTO: 27.2 PG (ref 26–34)
MCHC RBC AUTO-ENTMCNC: 32.8 G/DL (ref 30–36.5)
MCV RBC AUTO: 82.8 FL (ref 80–99)
NRBC # BLD: 0 K/UL (ref 0–0.01)
NRBC BLD-RTO: 0 PER 100 WBC
PLATELET # BLD AUTO: 368 K/UL (ref 150–400)
PMV BLD AUTO: 10.4 FL (ref 8.9–12.9)
POTASSIUM SERPL-SCNC: 3.1 MMOL/L (ref 3.5–5.1)
PROT SERPL-MCNC: 7.6 G/DL (ref 6.4–8.2)
RBC # BLD AUTO: 3.83 M/UL (ref 3.8–5.2)
SODIUM SERPL-SCNC: 136 MMOL/L (ref 136–145)
WBC # BLD AUTO: 13.2 K/UL (ref 3.6–11)

## 2024-11-19 PROCEDURE — 99204 OFFICE O/P NEW MOD 45 MIN: CPT | Performed by: INTERNAL MEDICINE

## 2024-11-19 PROCEDURE — 36415 COLL VENOUS BLD VENIPUNCTURE: CPT

## 2024-11-19 PROCEDURE — 11043 DBRDMT MUSC&/FSCA 1ST 20/<: CPT | Performed by: INTERNAL MEDICINE

## 2024-11-19 PROCEDURE — 72192 CT PELVIS W/O DYE: CPT

## 2024-11-19 PROCEDURE — 96375 TX/PRO/DX INJ NEW DRUG ADDON: CPT

## 2024-11-19 PROCEDURE — 6370000000 HC RX 637 (ALT 250 FOR IP): Performed by: STUDENT IN AN ORGANIZED HEALTH CARE EDUCATION/TRAINING PROGRAM

## 2024-11-19 PROCEDURE — 96361 HYDRATE IV INFUSION ADD-ON: CPT

## 2024-11-19 PROCEDURE — 6360000002 HC RX W HCPCS: Performed by: STUDENT IN AN ORGANIZED HEALTH CARE EDUCATION/TRAINING PROGRAM

## 2024-11-19 PROCEDURE — 2580000003 HC RX 258: Performed by: STUDENT IN AN ORGANIZED HEALTH CARE EDUCATION/TRAINING PROGRAM

## 2024-11-19 PROCEDURE — 80053 COMPREHEN METABOLIC PANEL: CPT

## 2024-11-19 PROCEDURE — 87040 BLOOD CULTURE FOR BACTERIA: CPT

## 2024-11-19 PROCEDURE — 99285 EMERGENCY DEPT VISIT HI MDM: CPT

## 2024-11-19 PROCEDURE — 85027 COMPLETE CBC AUTOMATED: CPT

## 2024-11-19 PROCEDURE — 83605 ASSAY OF LACTIC ACID: CPT

## 2024-11-19 PROCEDURE — 96365 THER/PROPH/DIAG IV INF INIT: CPT

## 2024-11-19 RX ORDER — LIDOCAINE 50 MG/G
OINTMENT TOPICAL ONCE
OUTPATIENT
Start: 2024-11-19 | End: 2024-11-19

## 2024-11-19 RX ORDER — LIDOCAINE HYDROCHLORIDE 20 MG/ML
JELLY TOPICAL ONCE
OUTPATIENT
Start: 2024-11-19 | End: 2024-11-19

## 2024-11-19 RX ORDER — SODIUM CHLOR/HYPOCHLOROUS ACID 0.033 %
SOLUTION, IRRIGATION IRRIGATION ONCE
OUTPATIENT
Start: 2024-11-19 | End: 2024-11-19

## 2024-11-19 RX ORDER — GINSENG 100 MG
CAPSULE ORAL ONCE
OUTPATIENT
Start: 2024-11-19 | End: 2024-11-19

## 2024-11-19 RX ORDER — ACETAMINOPHEN 500 MG
1000 TABLET ORAL
Status: COMPLETED | OUTPATIENT
Start: 2024-11-19 | End: 2024-11-19

## 2024-11-19 RX ORDER — LIDOCAINE HYDROCHLORIDE 40 MG/ML
SOLUTION TOPICAL ONCE
OUTPATIENT
Start: 2024-11-19 | End: 2024-11-19

## 2024-11-19 RX ORDER — SILVER SULFADIAZINE 10 MG/G
CREAM TOPICAL ONCE
OUTPATIENT
Start: 2024-11-19 | End: 2024-11-19

## 2024-11-19 RX ORDER — TRIAMCINOLONE ACETONIDE 1 MG/G
OINTMENT TOPICAL ONCE
OUTPATIENT
Start: 2024-11-19 | End: 2024-11-19

## 2024-11-19 RX ORDER — NEOMYCIN/BACITRACIN/POLYMYXINB 3.5-400-5K
OINTMENT (GRAM) TOPICAL ONCE
OUTPATIENT
Start: 2024-11-19 | End: 2024-11-19

## 2024-11-19 RX ORDER — MUPIROCIN 20 MG/G
OINTMENT TOPICAL ONCE
OUTPATIENT
Start: 2024-11-19 | End: 2024-11-19

## 2024-11-19 RX ORDER — GENTAMICIN SULFATE 1 MG/G
OINTMENT TOPICAL ONCE
OUTPATIENT
Start: 2024-11-19 | End: 2024-11-19

## 2024-11-19 RX ORDER — BETAMETHASONE DIPROPIONATE 0.5 MG/G
CREAM TOPICAL ONCE
OUTPATIENT
Start: 2024-11-19 | End: 2024-11-19

## 2024-11-19 RX ORDER — POTASSIUM CHLORIDE 750 MG/1
40 TABLET, EXTENDED RELEASE ORAL ONCE
Status: COMPLETED | OUTPATIENT
Start: 2024-11-19 | End: 2024-11-19

## 2024-11-19 RX ORDER — 0.9 % SODIUM CHLORIDE 0.9 %
500 INTRAVENOUS SOLUTION INTRAVENOUS ONCE
Status: COMPLETED | OUTPATIENT
Start: 2024-11-19 | End: 2024-11-19

## 2024-11-19 RX ORDER — METRONIDAZOLE 500 MG/100ML
500 INJECTION, SOLUTION INTRAVENOUS ONCE
Status: COMPLETED | OUTPATIENT
Start: 2024-11-19 | End: 2024-11-19

## 2024-11-19 RX ORDER — CLOBETASOL PROPIONATE 0.5 MG/G
OINTMENT TOPICAL ONCE
OUTPATIENT
Start: 2024-11-19 | End: 2024-11-19

## 2024-11-19 RX ORDER — LIDOCAINE 40 MG/G
CREAM TOPICAL ONCE
OUTPATIENT
Start: 2024-11-19 | End: 2024-11-19

## 2024-11-19 RX ORDER — BACITRACIN ZINC AND POLYMYXIN B SULFATE 500; 1000 [USP'U]/G; [USP'U]/G
OINTMENT TOPICAL ONCE
OUTPATIENT
Start: 2024-11-19 | End: 2024-11-19

## 2024-11-19 RX ADMIN — SODIUM CHLORIDE 500 ML: 9 INJECTION, SOLUTION INTRAVENOUS at 17:48

## 2024-11-19 RX ADMIN — WATER 2000 MG: 1 INJECTION INTRAMUSCULAR; INTRAVENOUS; SUBCUTANEOUS at 16:57

## 2024-11-19 RX ADMIN — METRONIDAZOLE 500 MG: 500 INJECTION, SOLUTION INTRAVENOUS at 17:10

## 2024-11-19 RX ADMIN — ACETAMINOPHEN 1000 MG: 500 TABLET ORAL at 19:41

## 2024-11-19 RX ADMIN — POTASSIUM CHLORIDE 40 MEQ: 750 TABLET, EXTENDED RELEASE ORAL at 17:46

## 2024-11-19 ASSESSMENT — PAIN - FUNCTIONAL ASSESSMENT: PAIN_FUNCTIONAL_ASSESSMENT: NONE - DENIES PAIN

## 2024-11-19 ASSESSMENT — PAIN DESCRIPTION - LOCATION: LOCATION: BACK

## 2024-11-19 ASSESSMENT — PAIN SCALES - GENERAL: PAINLEVEL_OUTOF10: 4

## 2024-11-19 NOTE — ED NOTES
Bedside and Verbal shift change report given to Meena RN (oncoming nurse) by Arlene RN (offgoing nurse). Report included the following information Nurse Handoff Report, ED Encounter Summary, ED SBAR, Surgery Report, Intake/Output, MAR, Recent Results, Med Rec Status, Cardiac Rhythm  , and Neuro Assessment.

## 2024-11-19 NOTE — FLOWSHEET NOTE
11/19/24 1527   Wound 10/23/24 Sacrum #1   Date First Assessed/Time First Assessed: 10/23/24 1123   Present on Original Admission: Yes  Location: Sacrum  Wound Description (Comments): #1   Dressing Status New dressing applied   Wound Cleansed Vashe   Dressing/Treatment Hydrofera blue;Gauze dressing/dressing sponge;Other (comment)  (pack loosely with hydrofera blue transfer; cover with gauze and Zetuvit sacral silicone border; skin prep)     Discharge Condition: Stable    Pain: 0    Ambulatory Status:Rollator Walker and Wheelchair    Discharge Destination: Emergency Department    Transportation:Other family transporting via wheelchair    Accompanied by: Self and Family    Discharge instructions reviewed with Self and Family and copy or written instructions have been provided. All questions/concerns have been addressed at this time.

## 2024-11-19 NOTE — PATIENT INSTRUCTIONS
Discharge Instructions for  [facility]  [address] [place] [address]  Telephone: [phone]   FAX [phone]    NAME:  Rosa Green  YOB: 1945  ACCOUNT NUMBER :  [id number]  DATE:  11/19/2024    Go to [facility] ED for sacral wound IV antibiotics, surgical consultation for debridement if necessary       :SUHAS BARKER RN     [facility]ALTH[facility] and PT/OT to assess patients bed for gel overlay mattress, please send update on overlay progress.     [employer] for pressure mapping : Phone: [phone]     ROHO: May get off [employer]       Wound Cleansing:   Do not scrub or use excessive force.  Cleanse wound prior to applying a clean dressing with:      [x] Cleanse wound with: Vashe cleanser  rinse with saline or wound cleanser after Vashe sits 1-2 min    [] May Shower at Discharge     [x] Shower on days of dressing change, remove dressing first    [] Keep Wound Dry in Shower (may purchase a cast cover if needed)      Dressings:           Wound Location : Buttocks   Apply Primary Dressing: Skin Prep        [] MediHoney Gel    [] MediHoney Alginate  [] Alginate     [] Alginate with Silver       [] Collagen   [] Apolonia AG to wound base and undermining then cover with Alginate with Silver     [] Hydrocolloid  [] Hydrofera Blue Classic (moisten with saline)  [] Hydrofera Blue Ready  [] Other:    [x] Pack wound loosely with Hydrofera Blue transfer      Cover and Secure with:    [x] Gauze   [] Christian   [] Kerlix  [] Ace Wrap     [] ABD  [] Medipore tape  [] tape  [x] Other: Zetuvit Sacral silicone boarder   Avoid contact of tape with skin.    Change dressing:   [x] Daily      [] Every Other Day   [] Three times per week  [] Once a week   [] Do Not Change Dressing     [] Other:    Negative Pressure:           Wound Location: Sacrum please start on arrival     [x] Pressure@125 mm/Hg [x]Continuous     []Intermittent     [x] Collagen AG

## 2024-11-19 NOTE — ED TRIAGE NOTES
Sent from wound care for sacral wound that needs admission for IV antibiotins, wound dressed clean and intact

## 2024-11-19 NOTE — ED PROVIDER NOTES
[facility] EMERGENCY DEPT  EMERGENCY DEPARTMENT ENCOUNTER      Pt Name: Rosa Green  MRN: [id number]  Birthdate 1945  Date of evaluation: 11/19/2024  Provider: Viridiana Fragoso MD    CHIEF COMPLAINT       Chief Complaint   Patient presents with    Skin Ulcer     sacral     HISTORY OF PRESENT ILLNESS   (Location/Symptom, Timing/Onset, Context/Setting, Quality, Duration, Modifying Factors, Severity)  Note limiting factors.   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 Dr. Grace with ID/wound care has been seeing the patient in wound care clinic. Today he noted significant worsening of sacral wound appearance from last week.  Dr. Grace subsequently sent the patient to the ER as he felt patient requires IV antibiotic treatment as well as general surgery consult for surgical debridement.  Patient denies any fevers or chills.    Review of External Medical Records:     Nursing Notes were reviewed.    REVIEW OF SYSTEMS    (2-9 systems for level 4, 10 or more for level 5)     Review of Systems   All other systems reviewed and are negative.      Except as noted above the remainder of the review of systems was reviewed and negative.       PAST MEDICAL HISTORY     Past Medical History:   Diagnosis Date    Asthma     Hypercholesterolemia     Hypertension     Osteoarthritis          SURGICAL HISTORY       Past Surgical History:   Procedure Laterality Date    CHOLECYSTECTOMY      COLONOSCOPY N/A 2/2/2017    COLONOSCOPY performed by Leonardo Harrison [place] [facility] [place] [facility]    HYSTERECTOMY (CERVIX STATUS UNKNOWN)           CURRENT MEDICATIONS       Previous Medications    ALBUTEROL SULFATE HFA (PROVENTIL;VENTOLIN;PROAIR) 108 (90 BASE) MCG/ACT INHALER    INHALE 2 PUFFS BY MOUTH EVERY 6 HOURS AS NEEDED FOR WHEEZING OR SHORTNESS OF BREATH    CHOLECALCIFEROL 50 MCG (2000

## 2024-11-19 NOTE — PROGRESS NOTES
[facility]   Progress Note and Procedure Note   NO Procedure    Patient Name: Rosa Green  : 1945  MRN: [id number]    Past Medical History:   Diagnosis Date    Asthma     Hypercholesterolemia     Hypertension     Osteoarthritis      Past Surgical History:   Procedure Laterality Date    CHOLECYSTECTOMY      COLONOSCOPY N/A 2017    [place] [facility] [place]    HYSTERECTOMY (CERVIX STATUS UNKNOWN)       Family History   Problem Relation Age of Onset    No Known Problems Father     No Known Problems Mother     Diabetes Sister      Social History     Tobacco Use    Smoking status: Never    Smokeless tobacco: Never   Substance Use Topics    Alcohol use: No    Drug use: No     Allergies   Allergen Reactions    Penicillins Swelling     Current Outpatient Medications on File Prior to Encounter   Medication Sig Dispense Refill    potassium chloride (KLOR-CON M) 10 MEQ extended release tablet TAKE 1 TABLET BY MOUTH DAILY 90 tablet 1    montelukast (SINGULAIR) 10 MG tablet TAKE 1 TABLET BY MOUTH DAILY FOR ALLERGIES 90 tablet 1    lubiprostone (AMITIZA) 24 MCG capsule Take 1 capsule by mouth 2 times daily (with meals) 180 capsule 1    tiZANidine (ZANAFLEX) 4 MG tablet TAKE 1 TABLET BY MOUTH TWICE DAILY 60 tablet 3    lisinopril-hydroCHLOROthiazide (PRINZIDE;ZESTORETIC) 20-25 MG per tablet TAKE 1 TABLET BY MOUTH DAILY 90 tablet 1    gabapentin (NEURONTIN) 300 MG capsule TAKE 1 CAPSULE BY MOUTH TWICE DAILY. MAX DAILY AMOUNT: 600  capsule 1    simvastatin (ZOCOR) 10 MG tablet TAKE 1 TABLET BY MOUTH EVERY NIGHT AT BEDTIME 90 tablet 1    Cholecalciferol 50 MCG (2000 UT) TABS Take by mouth daily      mupirocin (BACTROBAN) 2 % ointment Apply topically daily 30 g 1    albuterol sulfate HFA (PROVENTIL;VENTOLIN;PROAIR) 108 (90 Base) MCG/ACT inhaler INHALE 2 PUFFS BY MOUTH EVERY 6 HOURS AS NEEDED FOR WHEEZING OR SHORTNESS OF BREATH 8.5 g 3    oxyCODONE HCl

## 2024-11-19 NOTE — ED NOTES
Sacral wound assessed in ED. Dressing clean from wound care clinic today. R ankle wound care performed. Dressing clean/dry/intact.

## 2024-11-19 NOTE — FLOWSHEET NOTE
11/19/24 1437   Anesthetic   Anesthetic 4% Lidocaine Liquid Topical   Wound 10/23/24 Sacrum #1   Date First Assessed/Time First Assessed: 10/23/24 1123   Present on Original Admission: Yes  Location: Sacrum  Wound Description (Comments): #1   Wound Image    Wound Etiology Pressure Stage 4   Wound Cleansed Cleansed with saline   Wound Length (cm) 10 cm   Wound Width (cm) 7 cm   Wound Depth (cm) 2.3 cm   Wound Surface Area (cm^2) 70 cm^2   Change in Wound Size % (l*w) -42.86   Wound Volume (cm^3) 161 cm^3   Wound Healing % -3186   Undermining Starts ___ O'Clock 6   Undermining Ends___ O'Clock 2   Undermining Maxium Distance (cm) 3.9  (deepest 9 o'clock)   Wound Assessment Other (Comment);Eschar moist;Slough;Pink/red  (probe to bone,)   Drainage Amount Large (50-75% saturated)   Drainage Description Brown;Serosanguinous   Odor Malodorous/putrid   Verito-wound Assessment Maceration;Hyperpigmented   Margins Epibole (rolled edges);Flat/open edges;Undefined edges   Wound Thickness Description not for Pressure Injury Full thickness   Pain Assessment   Pain Assessment 0-10   Pain Level 5   Pain Location Sacrum   Pain Descriptors Sore     BP 91/71   Pulse 87   Temp 97.3 °F (36.3 °C) (Temporal)   Resp 16

## 2024-11-20 NOTE — ED NOTES
TRANSFER - OUT REPORT:    Verbal report given to Gissel Shipman RN on Rosa Green  being transferred to [facility] ED for routine progression of patient care       Report consisted of patient's Situation, Background, Assessment and   Recommendations(SBAR).     Information from the following report(s) ED Encounter Summary, MAR, and Recent Results was reviewed with the receiving nurse.    [place] Fall Assessment:    Presents to emergency department  because of falls (Syncope, seizure, or loss of consciousness): No  Age > 70: Yes  Altered Mental Status, Intoxication with alcohol or substance confusion (Disorientation, impaired judgment, poor safety awaremess, or inability to follow instructions): No  Impaired Mobility: Ambulates or transfers with assistive devices or assistance; Unable to ambulate or transer.: Yes  Nursing Judgement: No          Lines:   Peripheral IV 11/19/24 Left Antecubital (Active)        Opportunity for questions and clarification was provided.      Patient transported with:       [facility] transport

## 2024-11-20 NOTE — ED NOTES
Pt's suprapubic catheter emptied.  Pt checked for incontinence and is clean at this time.  Pt's family undated on plan of care.

## 2024-11-20 NOTE — ED NOTES
Pt medicated and repositioned in bed. Family at bedside. Pt denies other needs. Call bell with patient.

## 2024-11-24 LAB
BACTERIA SPEC CULT: NORMAL
BACTERIA SPEC CULT: NORMAL
SERVICE CMNT-IMP: NORMAL
SERVICE CMNT-IMP: NORMAL

## 2024-11-26 ENCOUNTER — TELEPHONE (OUTPATIENT)
Facility: CLINIC | Age: 79
End: 2024-11-26

## 2024-11-26 NOTE — TELEPHONE ENCOUNTER
----- Message from Ronda ARRIETA sent at 10/11/2024 10:37 AM EDT -----  Regarding: ECC Message to Provider  ECC Message to Provider    Relationship to Patient: Other : Daughter  - Sondra DAVIES     Additional Information : Daughter of the patient wanted the patient nurse named Bennett to call her back because they already talked last week and she wants to have a follow up on that. Caller also mentioned that she has an information that she found out about her mother that she wanted to discuss to the nurse. She preferred to be called back Monday but any day is okay.   --------------------------------------------------------------------------------------------------------------------------    Call Back Information: OK to leave message on voicemail  Preferred Call Back Number: Phone :  [phone]

## 2025-04-11 ENCOUNTER — TELEPHONE (OUTPATIENT)
Age: 80
End: 2025-04-11

## 2025-04-11 NOTE — TELEPHONE ENCOUNTER
----- Message from Mansi JERILYN sent at 4/9/2025  3:18 PM EDT -----  Regarding: ECC Appointment Request  ECC Appointment Request    Patient needs appointment for ECC Appointment Type: New to Provider.    Patient Requested Dates(s):Any available date as soon as possible  Patient Requested Time: Any time available  Provider Name: Jud Sewell MD    Reason for Appointment Request: New Patient - No appointments available during search  --------------------------------------------------------------------------------------------------------------------------    Relationship to Patient: Other  Vartina - Daughter    Call Back Information: OK to leave message on voicemail  Preferred Call Back Number: Phone [phone]

## 2025-04-14 NOTE — TELEPHONE ENCOUNTER
Spoke to pt's daughter (Sondra) on PHI informed her 's next New Pt Appt is not until July.(Cristy) scheduled her for 7/30/25 @ 2:00 PM.

## 2025-04-16 NOTE — TELEPHONE ENCOUNTER
----- Message from Jessica PATEL sent at 4/15/2025  4:15 PM EDT -----  Regarding: FW: ECC Appointment Request    ----- Message -----  From: Mansi Fuentes  Sent: 4/9/2025   3:24 PM EDT  To: [facility] Clinical Staff  Subject: ECC Appointment Request                          ECC Appointment Request    Patient needs appointment for ECC Appointment Type: New to Provider.    Patient Requested Dates(s):Any available date as soon as possible  Patient Requested Time: Any time available  Provider Name: Jud Sewell MD    Reason for Appointment Request: New Patient - No appointments available during search  --------------------------------------------------------------------------------------------------------------------------    Relationship to Patient: Other  Vartina - Daughter    Call Back Information: OK to leave message on voicemail  Preferred Call Back Number: Phone [phone]

## 2025-04-16 NOTE — TELEPHONE ENCOUNTER
LVM to callback,pt is already scheduled to establish care with  on 7/30/25 @ 2:00 PM.Unfortunately if that doesn't work for her the pt will just get pushed out further.

## (undated) DEVICE — BASIN EMESIS 500CC ROSE 250/CS 60/PLT: Brand: MEDEGEN MEDICAL PRODUCTS, LLC

## (undated) DEVICE — SYR 50ML SLIP TIP NSAF LF STRL --

## (undated) DEVICE — DEVICE INFL 60ML 12ATM CONVENIENT LOK REL HNDL HI PRSS FLX

## (undated) DEVICE — KIT COLON W/ 1.1OZ LUB AND 2 END

## (undated) DEVICE — KENDALL 500 SERIES DIAPHORETIC FOAM MONITORING ELECTRODE - TEAR DROP SHAPE ( 30/PK): Brand: KENDALL

## (undated) DEVICE — FLEX ADVANTAGE 1500CC: Brand: FLEX ADVANTAGE

## (undated) DEVICE — MEDI-VAC NON-CONDUCTIVE SUCTION TUBING: Brand: CARDINAL HEALTH